# Patient Record
Sex: FEMALE | Race: BLACK OR AFRICAN AMERICAN | Employment: OTHER | URBAN - METROPOLITAN AREA
[De-identification: names, ages, dates, MRNs, and addresses within clinical notes are randomized per-mention and may not be internally consistent; named-entity substitution may affect disease eponyms.]

---

## 2017-06-10 ENCOUNTER — APPOINTMENT (OUTPATIENT)
Dept: GENERAL RADIOLOGY | Age: 82
DRG: 064 | End: 2017-06-10
Attending: EMERGENCY MEDICINE
Payer: MEDICARE

## 2017-06-10 ENCOUNTER — APPOINTMENT (OUTPATIENT)
Dept: CT IMAGING | Age: 82
DRG: 064 | End: 2017-06-10
Attending: EMERGENCY MEDICINE
Payer: MEDICARE

## 2017-06-10 ENCOUNTER — HOSPITAL ENCOUNTER (INPATIENT)
Age: 82
LOS: 5 days | Discharge: NURSING, REHAB OR LONG-TERM CARE | DRG: 064 | End: 2017-06-16
Attending: EMERGENCY MEDICINE | Admitting: FAMILY MEDICINE
Payer: MEDICARE

## 2017-06-10 DIAGNOSIS — K59.00 CONSTIPATION, UNSPECIFIED CONSTIPATION TYPE: ICD-10-CM

## 2017-06-10 DIAGNOSIS — R79.1 SUPRATHERAPEUTIC INR: ICD-10-CM

## 2017-06-10 DIAGNOSIS — I61.9 STROKE DUE TO INTRACEREBRAL HEMORRHAGE (HCC): Primary | ICD-10-CM

## 2017-06-10 DIAGNOSIS — R53.1 LEFT-SIDED WEAKNESS: ICD-10-CM

## 2017-06-10 DIAGNOSIS — I61.9 HEMORRHAGIC STROKE (HCC): ICD-10-CM

## 2017-06-10 DIAGNOSIS — E66.01 MORBID OBESITY, UNSPECIFIED OBESITY TYPE (HCC): ICD-10-CM

## 2017-06-10 DIAGNOSIS — N18.30 CKD (CHRONIC KIDNEY DISEASE) STAGE 3, GFR 30-59 ML/MIN (HCC): ICD-10-CM

## 2017-06-10 DIAGNOSIS — R06.02 SOB (SHORTNESS OF BREATH): ICD-10-CM

## 2017-06-10 LAB
ALBUMIN SERPL BCP-MCNC: 3.4 G/DL (ref 3.4–5)
ALBUMIN/GLOB SERPL: 1 {RATIO} (ref 0.8–1.7)
ALP SERPL-CCNC: 103 U/L (ref 45–117)
ALT SERPL-CCNC: 14 U/L (ref 13–56)
AMPHET UR QL SCN: NEGATIVE
ANION GAP BLD CALC-SCNC: 10 MMOL/L (ref 3–18)
APPEARANCE UR: CLEAR
ASPIRIN TEST, ASPIRN: 586 ARU
AST SERPL W P-5'-P-CCNC: 18 U/L (ref 15–37)
BARBITURATES UR QL SCN: NEGATIVE
BENZODIAZ UR QL: NEGATIVE
BILIRUB SERPL-MCNC: 0.5 MG/DL (ref 0.2–1)
BILIRUB UR QL: NEGATIVE
BUN SERPL-MCNC: 22 MG/DL (ref 7–18)
BUN/CREAT SERPL: 13 (ref 12–20)
CALCIUM SERPL-MCNC: 7.6 MG/DL (ref 8.5–10.1)
CANNABINOIDS UR QL SCN: NEGATIVE
CHLORIDE SERPL-SCNC: 101 MMOL/L (ref 100–108)
CK MB CFR SERPL CALC: NORMAL % (ref 0–4)
CK MB SERPL-MCNC: <1 NG/ML (ref 5–25)
CK SERPL-CCNC: 53 U/L (ref 26–192)
CO2 SERPL-SCNC: 29 MMOL/L (ref 21–32)
COCAINE UR QL SCN: NEGATIVE
COLOR UR: YELLOW
CREAT SERPL-MCNC: 1.68 MG/DL (ref 0.6–1.3)
EPITH CASTS URNS QL MICRO: NORMAL /LPF (ref 0–5)
ERYTHROCYTE [DISTWIDTH] IN BLOOD BY AUTOMATED COUNT: 15.3 % (ref 11.6–14.5)
FIBRINOGEN PPP-MCNC: 614 MG/DL (ref 210–451)
GLOBULIN SER CALC-MCNC: 3.4 G/DL (ref 2–4)
GLUCOSE BLD STRIP.AUTO-MCNC: 294 MG/DL (ref 70–110)
GLUCOSE SERPL-MCNC: 322 MG/DL (ref 74–99)
GLUCOSE UR STRIP.AUTO-MCNC: 250 MG/DL
HCT VFR BLD AUTO: 41.9 % (ref 35–45)
HDSCOM,HDSCOM: NORMAL
HGB BLD-MCNC: 13.2 G/DL (ref 12–16)
HGB UR QL STRIP: ABNORMAL
INR PPP: 3.3 (ref 0.8–1.2)
KETONES UR QL STRIP.AUTO: NEGATIVE MG/DL
LEUKOCYTE ESTERASE UR QL STRIP.AUTO: NEGATIVE
MCH RBC QN AUTO: 26.5 PG (ref 24–34)
MCHC RBC AUTO-ENTMCNC: 31.5 G/DL (ref 31–37)
MCV RBC AUTO: 84.1 FL (ref 74–97)
METHADONE UR QL: NEGATIVE
NITRITE UR QL STRIP.AUTO: NEGATIVE
OPIATES UR QL: NEGATIVE
P2Y12 PLT RESPONSE,PPPR: 241 PRU (ref 194–418)
PCP UR QL: NEGATIVE
PH UR STRIP: 5 [PH] (ref 5–8)
PLATELET # BLD AUTO: 138 K/UL (ref 135–420)
PMV BLD AUTO: 10.1 FL (ref 9.2–11.8)
POTASSIUM SERPL-SCNC: 3.1 MMOL/L (ref 3.5–5.5)
PROT SERPL-MCNC: 6.8 G/DL (ref 6.4–8.2)
PROT UR STRIP-MCNC: NEGATIVE MG/DL
PROTHROMBIN TIME: 31.7 SEC (ref 11.5–15.2)
RBC # BLD AUTO: 4.98 M/UL (ref 4.2–5.3)
RBC #/AREA URNS HPF: NORMAL /HPF (ref 0–5)
SODIUM SERPL-SCNC: 140 MMOL/L (ref 136–145)
SP GR UR REFRACTOMETRY: 1.01 (ref 1–1.03)
THROMBIN TIME: 18.2 SECS (ref 13.8–18.2)
TROPONIN I SERPL-MCNC: <0.02 NG/ML (ref 0–0.04)
UROBILINOGEN UR QL STRIP.AUTO: 1 EU/DL (ref 0.2–1)
WBC # BLD AUTO: 5.1 K/UL (ref 4.6–13.2)
WBC URNS QL MICRO: NORMAL /HPF (ref 0–4)

## 2017-06-10 PROCEDURE — 80053 COMPREHEN METABOLIC PANEL: CPT | Performed by: EMERGENCY MEDICINE

## 2017-06-10 PROCEDURE — 83036 HEMOGLOBIN GLYCOSYLATED A1C: CPT | Performed by: FAMILY MEDICINE

## 2017-06-10 PROCEDURE — 80307 DRUG TEST PRSMV CHEM ANLYZR: CPT | Performed by: EMERGENCY MEDICINE

## 2017-06-10 PROCEDURE — 74011000258 HC RX REV CODE- 258: Performed by: EMERGENCY MEDICINE

## 2017-06-10 PROCEDURE — 85384 FIBRINOGEN ACTIVITY: CPT | Performed by: EMERGENCY MEDICINE

## 2017-06-10 PROCEDURE — 99285 EMERGENCY DEPT VISIT HI MDM: CPT

## 2017-06-10 PROCEDURE — 70450 CT HEAD/BRAIN W/O DYE: CPT

## 2017-06-10 PROCEDURE — 85610 PROTHROMBIN TIME: CPT | Performed by: EMERGENCY MEDICINE

## 2017-06-10 PROCEDURE — 71010 XR CHEST PORT: CPT

## 2017-06-10 PROCEDURE — 85576 BLOOD PLATELET AGGREGATION: CPT | Performed by: EMERGENCY MEDICINE

## 2017-06-10 PROCEDURE — 74011250636 HC RX REV CODE- 250/636: Performed by: EMERGENCY MEDICINE

## 2017-06-10 PROCEDURE — 85670 THROMBIN TIME PLASMA: CPT | Performed by: EMERGENCY MEDICINE

## 2017-06-10 PROCEDURE — 94762 N-INVAS EAR/PLS OXIMTRY CONT: CPT

## 2017-06-10 PROCEDURE — 96365 THER/PROPH/DIAG IV INF INIT: CPT

## 2017-06-10 PROCEDURE — 85027 COMPLETE CBC AUTOMATED: CPT | Performed by: EMERGENCY MEDICINE

## 2017-06-10 PROCEDURE — 82550 ASSAY OF CK (CPK): CPT | Performed by: EMERGENCY MEDICINE

## 2017-06-10 PROCEDURE — 86900 BLOOD TYPING SEROLOGIC ABO: CPT | Performed by: EMERGENCY MEDICINE

## 2017-06-10 PROCEDURE — 82533 TOTAL CORTISOL: CPT | Performed by: FAMILY MEDICINE

## 2017-06-10 PROCEDURE — 82962 GLUCOSE BLOOD TEST: CPT

## 2017-06-10 PROCEDURE — 81001 URINALYSIS AUTO W/SCOPE: CPT | Performed by: EMERGENCY MEDICINE

## 2017-06-10 RX ORDER — SODIUM CHLORIDE 9 MG/ML
250 INJECTION, SOLUTION INTRAVENOUS AS NEEDED
Status: DISCONTINUED | OUTPATIENT
Start: 2017-06-10 | End: 2017-06-16 | Stop reason: HOSPADM

## 2017-06-10 RX ADMIN — PHYTONADIONE 10 MG: 10 INJECTION, EMULSION INTRAMUSCULAR; INTRAVENOUS; SUBCUTANEOUS at 23:44

## 2017-06-10 NOTE — Clinical Note
Status[de-identified] Inpatient [101] Type of Bed: Intensive Care [6] Inpatient Hospitalization Certified Necessary for the Following Reasons: 3. Patient receiving treatment that can only be provided in an inpatient setting (further clarification in H&P documentation) Admitting Diagnosis: Hemorrhagic stroke [facility]) [[id number]] Admitting Physician: Darryl Pal Attending Physician: Darryl Pal Estimated Length of Stay: > or = to 2 Midnights Discharge Plan[de-identified] Home with Office Follow-up

## 2017-06-10 NOTE — IP AVS SNAPSHOT
Current Discharge Medication List  
  
START taking these medications Dose & Instructions Dispensing Information Comments Morning Noon Evening Bedtime  
 bisacodyl 10 mg suppository Commonly known as:  DULCOLAX Your last dose was: Your next dose is:    
   
   
 Dose:  10 mg Insert 10 mg into rectum daily as needed. Quantity:  30 Suppository Refills:  0  
     
   
   
   
  
 folic acid 1 mg tablet Commonly known as:  [employer] Your last dose was: Your next dose is:    
   
   
 Dose:  1 mg Take 1 Tab by mouth daily. Quantity:  30 Tab Refills:  0 Thiamine Mononitrate 100 mg tablet Commonly known as:  B-1 Your last dose was: Your next dose is:    
   
   
 Dose:  100 mg Take 1 Tab by mouth daily. Quantity:  30 Tab Refills:  0 CONTINUE these medications which have NOT CHANGED Dose & Instructions Dispensing Information Comments Morning Noon Evening Bedtime AMARYL 2 mg tablet Generic drug:  glimepiride Your last dose was: Your next dose is:    
   
   
 Dose:  2 mg Take 2 mg by mouth daily. Refills:  0 AMBIEN 10 mg tablet Generic drug:  zolpidem Your last dose was: Your next dose is:    
   
   
 Dose:  10 mg Take 10 mg by mouth nightly as needed for Sleep. Refills:  0  
     
   
   
   
  
 betamethasone valerate 0.1 % topical cream  
Commonly known as:  Kayla Alejandrock Your last dose was: Your next dose is:    
   
   
 Apply  to affected area two (2) times a day. Refills:  0 LAURA-SEQUELS PO Your last dose was: Your next dose is:    
   
   
 Dose:  50 mg Take 50 mg by mouth daily. Refills:  0  
     
   
   
   
  
 furosemide 40 mg tablet Commonly known as:  LASIX Your last dose was: Your next dose is: Take  by mouth two (2) times a day. Refills:  0  
     
   
   
   
  
 gabapentin 300 mg capsule Commonly known as:  NEURONTIN Your last dose was: Your next dose is:    
   
   
 Dose:  300 mg Take 300 mg by mouth nightly. Refills:  0  
     
   
   
   
  
 linagliptin 5 mg tablet Commonly known as:  Dudley Sayer Your last dose was: Your next dose is:    
   
   
 Dose:  5 mg Take 5 mg by mouth daily. Refills:  0  
     
   
   
   
  
 losartan 50 mg tablet Commonly known as:  COZAAR Your last dose was: Your next dose is:    
   
   
 Dose:  50 mg Take 50 mg by mouth daily. Refills:  0  
     
   
   
   
  
 meclizine 25 mg tablet Commonly known as:  ANTIVERT Your last dose was: Your next dose is:    
   
   
 Dose:  25 mg Take 25 mg by mouth four (4) times daily as needed. Refills:  0  
     
   
   
   
  
 metoprolol tartrate 50 mg tablet Commonly known as:  LOPRESSOR Your last dose was: Your next dose is:    
   
   
 Dose:  50 mg Take 50 mg by mouth two (2) times a day. Refills:  0  
     
   
   
   
  
 simvastatin 40 mg tablet Commonly known as:  ZOCOR Your last dose was: Your next dose is:    
   
   
 Dose:  40 mg Take 40 mg by mouth nightly. Refills:  0  
     
   
   
   
  
 spironolactone 50 mg tablet Commonly known as:  ALDACTONE Your last dose was: Your next dose is:    
   
   
 Dose:  50 mg Take 50 mg by mouth two (2) times a day. Refills:  0  
     
   
   
   
  
 SYNTHROID 100 mcg tablet Generic drug:  levothyroxine Your last dose was: Your next dose is:    
   
   
 Dose:  100 mcg Take 100 mcg by mouth Daily (before breakfast). Refills:  0  
     
   
   
   
  
 VITAMIN C 500 mg tablet Generic drug:  ascorbic acid (vitamin C) Your last dose was:     
   
Your next dose is:    
   
   
 Dose:  500 mg  
 Take 500 mg by mouth daily. Refills:  0 STOP taking these medications   
 warfarin 5 mg tablet Commonly known as:  COUMADIN Where to Get Your Medications Information on where to get these meds will be given to you by the nurse or doctor. ! Ask your nurse or doctor about these medications  
  bisacodyl 10 mg suppository  
 folic acid 1 mg tablet Thiamine Mononitrate 100 mg tablet

## 2017-06-10 NOTE — IP AVS SNAPSHOT
Gita Winter 
 
 
 [address] [place] [address] 
[phone] Patient: Elen Peoples MRN: [id number] You are allergic to the following No active allergies Recent Documentation Height Weight BMI OB Status Smoking Status 1.499 m 97.7 kg 43.5 kg/m2 Postmenopausal Never Smoker Unresulted Labs Order Current Status OCCULT BLOOD, STOOL In process CULTURE, BLOOD Preliminary result CULTURE, BLOOD Preliminary result Emergency Contacts Name Discharge Info Relation Home Work Mobile Cullen Do  Child [2] [phone] Cullen Do  Child [2] [phone] Jenaro Benavides  Son [22]   [phone] About your hospitalization You were admitted on:  2017 You last received care in the:  [address] [facility]UR[facility] You were discharged on:  2017 Unit phone number:  [phone] Why you were hospitalized Your primary diagnosis was:  Hemorrhagic Stroke (Hcc) Your diagnoses also included:  Chronic A-Fib (Hcc), Supratherapeutic Inr, Left-Sided Weakness, Hypokalemia, Ckd (Chronic Kidney Disease) Stage 3, Gfr 30-59 Ml/Min, Obesity, Sob (Shortness Of Breath), Constipation Providers Seen During Your Hospitalizations Provider Role Specialty Primary office phone Radha Gillespie MD Attending Provider Emergency Medicine [phone] Azalia Vazquez MD Attending Provider [facility] [phone] [address] DO Attending Provider Internal Medicine [phone] Javi Truong MD Attending Provider Internal Medicine [phone] Your Primary Care Physician (PCP) Primary Care Physician Office Phone Office Fax [facility] [phone] Follow-up Information Follow up With Details Comments Contact Info MD Monik Gambino [facility] [address] [facility] [address] [id number] 
[phone] Current Discharge Medication List  
  
START taking these medications Dose & Instructions Dispensing Information Comments Morning Noon Evening Bedtime  
 bisacodyl 10 mg suppository Commonly known as:  DULCOLAX Your last dose was: Your next dose is:    
   
   
 Dose:  10 mg Insert 10 mg into rectum daily as needed. Quantity:  30 Suppository Refills:  0  
     
   
   
   
  
 folic acid 1 mg tablet Commonly known as:  Google Your last dose was: Your next dose is:    
   
   
 Dose:  1 mg Take 1 Tab by mouth daily. Quantity:  30 Tab Refills:  0 Thiamine Mononitrate 100 mg tablet Commonly known as:  B-1 Your last dose was: Your next dose is:    
   
   
 Dose:  100 mg Take 1 Tab by mouth daily. Quantity:  30 Tab Refills:  0 CONTINUE these medications which have NOT CHANGED Dose & Instructions Dispensing Information Comments Morning Noon Evening Bedtime AMARYL 2 mg tablet Generic drug:  glimepiride Your last dose was: Your next dose is:    
   
   
 Dose:  2 mg Take 2 mg by mouth daily. Refills:  0 AMBIEN 10 mg tablet Generic drug:  zolpidem Your last dose was: Your next dose is:    
   
   
 Dose:  10 mg Take 10 mg by mouth nightly as needed for Sleep. Refills:  0  
     
   
   
   
  
 betamethasone valerate 0.1 % topical cream  
Commonly known as:  Ella Links Your last dose was: Your next dose is:    
   
   
 Apply  to affected area two (2) times a day. Refills:  0 LAURA-SEQUELS PO Your last dose was: Your next dose is:    
   
   
 Dose:  50 mg Take 50 mg by mouth daily. Refills:  0  
     
   
   
   
  
 furosemide 40 mg tablet Commonly known as:  LASIX Your last dose was: Your next dose is: Take  by mouth two (2) times a day. Refills:  0  
     
   
   
   
  
 gabapentin 300 mg capsule Commonly known as:  NEURONTIN Your last dose was: Your next dose is:    
   
   
 Dose:  300 mg Take 300 mg by mouth nightly. Refills:  0  
     
   
   
   
  
 linagliptin 5 mg tablet Commonly known as:  [place] Your last dose was: Your next dose is:    
   
   
 Dose:  5 mg Take 5 mg by mouth daily. Refills:  0  
     
   
   
   
  
 losartan 50 mg tablet Commonly known as:  COZAAR Your last dose was: Your next dose is:    
   
   
 Dose:  50 mg Take 50 mg by mouth daily. Refills:  0  
     
   
   
   
  
 meclizine 25 mg tablet Commonly known as:  ANTIVERT Your last dose was: Your next dose is:    
   
   
 Dose:  25 mg Take 25 mg by mouth four (4) times daily as needed. Refills:  0  
     
   
   
   
  
 metoprolol tartrate 50 mg tablet Commonly known as:  LOPRESSOR Your last dose was: Your next dose is:    
   
   
 Dose:  50 mg Take 50 mg by mouth two (2) times a day. Refills:  0  
     
   
   
   
  
 simvastatin 40 mg tablet Commonly known as:  ZOCOR Your last dose was: Your next dose is:    
   
   
 Dose:  40 mg Take 40 mg by mouth nightly. Refills:  0  
     
   
   
   
  
 spironolactone 50 mg tablet Commonly known as:  ALDACTONE Your last dose was: Your next dose is:    
   
   
 Dose:  50 mg Take 50 mg by mouth two (2) times a day. Refills:  0  
     
   
   
   
  
 SYNTHROID 100 mcg tablet Generic drug:  levothyroxine Your last dose was: Your next dose is:    
   
   
 Dose:  100 mcg Take 100 mcg by mouth Daily (before breakfast). Refills:  0  
     
   
   
   
  
 VITAMIN C 500 mg tablet Generic drug:  ascorbic acid (vitamin C) Your last dose was: Your next dose is: Dose:  500 mg Take 500 mg by mouth daily. Refills:  0 STOP taking these medications   
 warfarin 5 mg tablet Commonly known as:  COUMADIN Where to Get Your Medications Information on where to get these meds will be given to you by the nurse or doctor. ! Ask your nurse or doctor about these medications  
  bisacodyl 10 mg suppository  
 folic acid 1 mg tablet Thiamine Mononitrate 100 mg tablet Discharge Instructions Atrial Fibrillation: Care Instructions Your Care Instructions Atrial fibrillation is an irregular and often fast heartbeat. Treating this condition is important for several reasons. It can cause blood clots, which can travel from your heart to your brain and cause a stroke. If you have a fast heartbeat, you may feel lightheaded, dizzy, and weak. An irregular heartbeat can also increase your risk for heart failure. Atrial fibrillation is often the result of another heart condition, such as high blood pressure or coronary artery disease. Making changes to improve your heart condition will help you stay healthy and active. Follow-up care is a key part of your treatment and safety. Be sure to make and go to all appointments, and call your doctor if you are having problems. It's also a good idea to know your test results and keep a list of the medicines you take. How can you care for yourself at home? Medicines · Take your medicines exactly as prescribed. Call your doctor if you think you are having a problem with your medicine. You will get more details on the specific medicines your doctor prescribes. · If your doctor has given you a blood thinner to prevent a stroke, be sure you get instructions about how to take your medicine safely. Blood thinners can cause serious bleeding problems.  
· Do not take any vitamins, over-the-counter drugs, or herbal products without talking to your doctor first. 
 Lifestyle changes · Do not smoke. Smoking can increase your chance of a stroke and heart attack. If you need help quitting, talk to your doctor about stop-smoking programs and medicines. These can increase your chances of quitting for good. · Eat a heart-healthy diet. · Stay at a healthy weight. Lose weight if you need to. · Limit alcohol to 2 drinks a day for men and 1 drink a day for women. Too much alcohol can cause health problems. · Avoid colds and flu. Get a pneumococcal vaccine shot. If you have had one before, ask your doctor whether you need another dose. Get a flu shot every year. If you must be around people with colds or flu, wash your hands often. Activity · If your doctor recommends it, get more exercise. Walking is a good choice. Bit by bit, increase the amount you walk every day. Try for at least 30 minutes on most days of the week. You also may want to swim, bike, or do other activities. Your doctor may suggest that you join a cardiac rehabilitation program so that you can have help increasing your physical activity safely. · Start light exercise if your doctor says it is okay. Even a small amount will help you get stronger, have more energy, and manage stress. Walking is an easy way to get exercise. Start out by walking a little more than you did in the hospital. Gradually increase the amount you walk. · When you exercise, watch for signs that your heart is working too hard. You are pushing too hard if you cannot talk while you are exercising. If you become short of breath or dizzy or have chest pain, sit down and rest immediately. · Check your pulse regularly. Place two fingers on the artery at the palm side of your wrist, in line with your thumb. If your heartbeat seems uneven or fast, talk to your doctor. When should you call for help? Call 911 anytime you think you may need emergency care. For example, call if: 
· You have symptoms of a heart attack. These may include: ¨ Chest pain or pressure, or a strange feeling in the chest. 
¨ Sweating. ¨ Shortness of breath. ¨ Nausea or vomiting. ¨ Pain, pressure, or a strange feeling in the back, neck, jaw, or upper belly or in one or both shoulders or arms. ¨ Lightheadedness or sudden weakness. ¨ A fast or irregular heartbeat. After you call 911, the  may tell you to chew 1 adult-strength or 2 to 4 low-dose aspirin. Wait for an ambulance. Do not try to drive yourself. · You have symptoms of a stroke. These may include: 
¨ Sudden numbness, tingling, weakness, or loss of movement in your face, arm, or leg, especially on only one side of your body. ¨ Sudden vision changes. ¨ Sudden trouble speaking. ¨ Sudden confusion or trouble understanding simple statements. ¨ Sudden problems with walking or balance. ¨ A sudden, severe headache that is different from past headaches. · You passed out (lost consciousness). Call your doctor now or seek immediate medical care if: 
· You have new or increased shortness of breath. · You feel dizzy or lightheaded, or you feel like you may faint. · Your heart rate becomes irregular. · You can feel your heart flutter in your chest or skip heartbeats. Tell your doctor if these symptoms are new or worse. Watch closely for changes in your health, and be sure to contact your doctor if you have any problems. Where can you learn more? Go to http://yamilet-joanne.info/. Enter U020 in the search box to learn more about \"Atrial Fibrillation: Care Instructions. \" Current as of: January 27, 2016 Content Version: 11.2 © 9637-7914 [employer]. Care instructions adapted under license by [employer] (which disclaims liability or warranty for this information).  If you have questions about a medical condition or this instruction, always ask your healthcare professional. [address] any warranty or liability for your use of this information. Stroke: Care Instructions Your Care Instructions You have had a stroke. This means that the blood flow to a part of your brain was blocked for some time, which damages the nerve cells in that part of the brain. The part of your body controlled by that part of your brain may not function properly now. The brain is an amazing organ that can heal itself to some degree. The stroke you had damaged part of your brain. But other parts of your brain may take over in some way for the damaged areas. You have already started this process. Your doctor will talk with you about what you can do to prevent another stroke. High blood pressure, high cholesterol, and diabetes are all risk factors for stroke. If you have any of these conditions, work with your doctor to make sure they are under control. Other risk factors for stroke include being overweight, smoking, and not getting regular exercise. Going home may be hard for you and your family. The more you can try to do for yourself, the better. Remember to take each day one at a time. Follow-up care is a key part of your treatment and safety. Be sure to make and go to all appointments, and call your doctor if you are having problems. It's also a good idea to know your test results and keep a list of the medicines you take. How can you care for yourself at home? · Enter a stroke rehabilitation (rehab) program, if your doctor recommends it. Physical, speech, and occupational therapies can help you manage bathing, dressing, eating, and other basics of daily living. · Do not drive until your doctor says it is okay. · It is normal to feel sad or depressed after a stroke. If these feelings last, talk to your doctor. · If you are having problems with urine leakage, go to the bathroom at regular times, including when you first wake up and at bedtime. Also, limit fluids after dinner.  
· If you are constipated, drink plenty of fluids, enough so that your urine is light yellow or clear like water. If you have kidney, heart, or liver disease and have to limit fluids, talk with your doctor before you increase the amount of fluids you drink. Set up a regular time for using the toilet. If you continue to have constipation, your doctor may suggest using a bulking agent, such as Metamucil, or a stool softener, laxative, or enema. Medicines · Take your medicines exactly as prescribed. Call your doctor if you think you are having a problem with your medicine. You may be taking several medicines. ACE (angiotensin-converting enzyme) inhibitors, angiotensin II receptor blockers (ARBs), beta-blockers, diuretics (water pills), and calcium channel blockers control your blood pressure. Statins help lower cholesterol. Your doctor may also prescribe medicines for depression, pain, sleep problems, anxiety, or agitation. · If your doctor has given you a blood thinner to prevent another stroke, be sure you get instructions about how to take your medicine safely. Blood thinners can cause serious bleeding problems. · Do not take any over-the-counter medicines or herbal products without talking to your doctor first. 
· If you take birth control pills or hormone therapy, talk to your doctor about whether they are right for you. For family members and caregivers · Make the home safe. Set up a room so that your loved one does not have to climb stairs. Be sure the bathroom is on the same floor. Move throw rugs and furniture that could cause falls. Make sure that the lighting is good. Put grab bars and seats in tubs and showers. · Find out what your loved one can do and what he or she needs help with. Try not to do things for your loved one that your loved one can do on his or her own. Help him or her learn and practice new skills. · Visit and talk with your loved one often. Try doing activities together that you both enjoy, such as playing cards or board games.  Keep in touch with your loved one's friends as much as you can. Encourage them to visit. · Take care of yourself. Do not try to do everything yourself. Ask other family members to help. Eat well, get enough rest, and take time to do things that you enjoy. Keep up with your own doctor visits, and make sure to take your medicines regularly. Get out of the house as much as you can. Join a local support group. Find out if you qualify for home health care visits to help with rehab or for adult day care. When should you call for help? Call 911 anytime you think you may need emergency care. For example, call if: 
· You have signs of another stroke. These may include: 
¨ Sudden numbness, tingling, weakness, or loss of movement in your face, arm, or leg, especially on only one side of your body. ¨ Sudden vision changes. ¨ Sudden trouble speaking. ¨ Sudden confusion or trouble understanding simple statements. ¨ Sudden problems with walking or balance. ¨ A sudden, severe headache that is different from past headaches. Call 911 even if these symptoms go away in a few minutes. Call your doctor now or seek immediate medical care if: 
· You have new symptoms that may be related to your stroke, such as falls or trouble swallowing. Watch closely for changes in your health, and be sure to contact your doctor if you have any problems. Where can you learn more? Go to http://yamilet-joanne.info/. Enter T118 in the search box to learn more about \"Stroke: Care Instructions. \" Current as of: June 4, 2016 Content Version: 11.2 © 1822-4408 [employer]. Care instructions adapted under license by [employer] (which disclaims liability or warranty for this information). If you have questions about a medical condition or this instruction, always ask your healthcare professional. [address] any warranty or liability for your use of this information. DISCHARGE SUMMARY from Nurse The following personal items are in your possession at time of discharge: 
 
Dental Appliances: Partials, Uppers, With patient Visual Aid: None Home Medications: None Jewelry: None Clothing: Pajamas, [address] home Other Valuables: None PATIENT INSTRUCTIONS: 
 
 
F-face looks uneven A-arms unable to move or move unevenly S-speech slurred or non-existent T-time-call 911 as soon as signs and symptoms begin-DO NOT go Back to bed or wait to see if you get better-TIME IS BRAIN. Warning Signs of HEART ATTACK Call 911 if you have these symptoms: 
? Chest discomfort. Most heart attacks involve discomfort in the center of the chest that lasts more than a few minutes, or that goes away and comes back. It can feel like uncomfortable pressure, squeezing, fullness, or pain. ? Discomfort in other areas of the upper body. Symptoms can include pain or discomfort in one or both arms, the back, neck, jaw, or stomach. ? Shortness of breath with or without chest discomfort. ? Other signs may include breaking out in a cold sweat, nausea, or lightheadedness. Don't wait more than five minutes to call [address]! Fast action can save your life. Calling 911 is almost always the fastest way to get lifesaving treatment. Emergency Medical Services staff can begin treatment when they arrive  up to an hour sooner than if someone gets to the hospital by car. The discharge information has been reviewed with the patient. The patient verbalized understanding. Discharge medications reviewed with the patient and appropriate educational materials and side effects teaching were provided. Discharge Orders None Asker Announcement We are excited to announce that we are making your provider's discharge notes available to you in Asker. You will see these notes when they are completed and signed by the physician that discharged you from your recent hospital stay. If you have any questions or concerns about any information you see in Asker, please call the Health Information Department where you were seen or reach out to your Primary Care Provider for more information about your plan of care. Introducing [facility]! Steven Smith introduces Asker patient portal. Now you can access parts of your medical record, email your doctor's office, and request medication refills online. 1. In your internet browser, go to [email or web address] 2. Click on the First Time User? Click Here link in the Sign In box. You will see the New Member Sign Up page. 3. Enter your Asker Access Code exactly as it appears below. You will not need to use this code after youve completed the sign-up process. If you do not sign up before the expiration date, you must request a new code. · Asker Access Code: [email or web address] Expires: 9/14/2017  3:50 PM 
 
4. Enter the last four digits of your Social Security Number (xxxx) and Date of Birth (mm/dd/yyyy) as indicated and click Submit. You will be taken to the next sign-up page. 5. Create a Asker ID. This will be your Asker login ID and cannot be changed, so think of one that is secure and easy to remember. 6. Create a Asker password. You can change your password at any time. 7. Enter your Password Reset Question and Answer. This can be used at a later time if you forget your password. 8. Enter your e-mail address. You will receive e-mail notification when new information is available in [address] 9. Click Sign Up. You can now view and download portions of your medical record. 10. Click the Download Summary menu link to download a portable copy of your medical information. If you have questions, please visit the Frequently Asked Questions section of the [employer] website. Remember, MyChart is NOT to be used for urgent needs. For medical emergencies, dial 911. Now available from your iPhone and Android! General Information Please provide this summary of care documentation to your next provider. Patient Signature:  ____________________________________________________________ Date:  ____________________________________________________________  
  
[facility] Provider Signature:  ____________________________________________________________ Date:  ____________________________________________________________

## 2017-06-11 ENCOUNTER — APPOINTMENT (OUTPATIENT)
Dept: GENERAL RADIOLOGY | Age: 82
DRG: 064 | End: 2017-06-11
Attending: FAMILY MEDICINE
Payer: MEDICARE

## 2017-06-11 ENCOUNTER — APPOINTMENT (OUTPATIENT)
Dept: CT IMAGING | Age: 82
DRG: 064 | End: 2017-06-11
Attending: FAMILY MEDICINE
Payer: MEDICARE

## 2017-06-11 PROBLEM — I48.20 CHRONIC A-FIB (HCC): Status: ACTIVE | Noted: 2017-06-11

## 2017-06-11 PROBLEM — I61.9 HEMORRHAGIC STROKE (HCC): Status: ACTIVE | Noted: 2017-06-11

## 2017-06-11 PROBLEM — R79.1 SUPRATHERAPEUTIC INR: Status: ACTIVE | Noted: 2017-06-11

## 2017-06-11 PROBLEM — E87.6 HYPOKALEMIA: Status: ACTIVE | Noted: 2017-06-11

## 2017-06-11 PROBLEM — R53.1 LEFT-SIDED WEAKNESS: Status: ACTIVE | Noted: 2017-06-11

## 2017-06-11 LAB
ABO + RH BLD: NORMAL
ALBUMIN SERPL BCP-MCNC: 3.2 G/DL (ref 3.4–5)
ALBUMIN/GLOB SERPL: 1 {RATIO} (ref 0.8–1.7)
ALP SERPL-CCNC: 91 U/L (ref 45–117)
ALT SERPL-CCNC: 14 U/L (ref 13–56)
AMMONIA PLAS-SCNC: 43 UMOL/L (ref 11–32)
AMPHET UR QL SCN: NEGATIVE
AMYLASE SERPL-CCNC: 66 U/L (ref 25–115)
ANION GAP BLD CALC-SCNC: 11 MMOL/L (ref 3–18)
APTT PPP: 35.7 SEC (ref 23–36.4)
AST SERPL W P-5'-P-CCNC: 15 U/L (ref 15–37)
ATRIAL RATE: 44 BPM
BARBITURATES UR QL SCN: NEGATIVE
BENZODIAZ UR QL: NEGATIVE
BILIRUB DIRECT SERPL-MCNC: 0.2 MG/DL (ref 0–0.2)
BILIRUB SERPL-MCNC: 0.4 MG/DL (ref 0.2–1)
BLOOD GROUP ANTIBODIES SERPL: NORMAL
BUN SERPL-MCNC: 21 MG/DL (ref 7–18)
BUN/CREAT SERPL: 13 (ref 12–20)
CA-I SERPL-SCNC: 0.91 MMOL/L (ref 1.12–1.32)
CALCIUM SERPL-MCNC: 7.6 MG/DL (ref 8.5–10.1)
CALCULATED R AXIS, ECG10: 62 DEGREES
CALCULATED T AXIS, ECG11: 44 DEGREES
CANNABINOIDS UR QL SCN: NEGATIVE
CHLORIDE SERPL-SCNC: 101 MMOL/L (ref 100–108)
CHOLEST SERPL-MCNC: 154 MG/DL
CK MB CFR SERPL CALC: NORMAL % (ref 0–4)
CK MB SERPL-MCNC: <1 NG/ML (ref 5–25)
CK SERPL-CCNC: 52 U/L (ref 26–192)
CK SERPL-CCNC: 55 U/L (ref 26–192)
CK SERPL-CCNC: 59 U/L (ref 26–192)
CO2 SERPL-SCNC: 30 MMOL/L (ref 21–32)
COCAINE UR QL SCN: NEGATIVE
CORTIS SERPL-MCNC: 14.9 UG/DL (ref 3.09–22.4)
CREAT SERPL-MCNC: 1.56 MG/DL (ref 0.6–1.3)
DIAGNOSIS, 93000: NORMAL
ERYTHROCYTE [DISTWIDTH] IN BLOOD BY AUTOMATED COUNT: 15.4 % (ref 11.6–14.5)
ERYTHROCYTE [SEDIMENTATION RATE] IN BLOOD: 41 MM/HR (ref 0–30)
EST. AVERAGE GLUCOSE BLD GHB EST-MCNC: 197 MG/DL
GLOBULIN SER CALC-MCNC: 3.2 G/DL (ref 2–4)
GLUCOSE BLD STRIP.AUTO-MCNC: 156 MG/DL (ref 70–110)
GLUCOSE BLD STRIP.AUTO-MCNC: 187 MG/DL (ref 70–110)
GLUCOSE BLD STRIP.AUTO-MCNC: 199 MG/DL (ref 70–110)
GLUCOSE BLD STRIP.AUTO-MCNC: 231 MG/DL (ref 70–110)
GLUCOSE SERPL-MCNC: 252 MG/DL (ref 74–99)
HBA1C MFR BLD: 8.5 % (ref 4.2–5.6)
HCT VFR BLD AUTO: 38.3 % (ref 35–45)
HDLC SERPL-MCNC: 74 MG/DL (ref 40–60)
HDLC SERPL: 2.1 {RATIO} (ref 0–5)
HDSCOM,HDSCOM: NORMAL
HGB BLD-MCNC: 11.8 G/DL (ref 12–16)
INR PPP: 1.3 (ref 0.8–1.2)
INR PPP: 1.4 (ref 0.8–1.2)
INR PPP: 1.4 (ref 0.8–1.2)
INR PPP: 2 (ref 0.8–1.2)
LDLC SERPL CALC-MCNC: 53.2 MG/DL (ref 0–100)
LIPASE SERPL-CCNC: 369 U/L (ref 73–393)
LIPID PROFILE,FLP: ABNORMAL
MAGNESIUM SERPL-MCNC: 1.6 MG/DL (ref 1.6–2.6)
MAGNESIUM SERPL-MCNC: 1.9 MG/DL (ref 1.6–2.6)
MCH RBC QN AUTO: 26 PG (ref 24–34)
MCHC RBC AUTO-ENTMCNC: 30.8 G/DL (ref 31–37)
MCV RBC AUTO: 84.5 FL (ref 74–97)
METHADONE UR QL: NEGATIVE
OPIATES UR QL: NEGATIVE
PCP UR QL: NEGATIVE
PHOSPHATE SERPL-MCNC: 1.5 MG/DL (ref 2.5–4.9)
PHOSPHATE SERPL-MCNC: 1.7 MG/DL (ref 2.5–4.9)
PLATELET # BLD AUTO: 121 K/UL (ref 135–420)
PMV BLD AUTO: 10.5 FL (ref 9.2–11.8)
POTASSIUM SERPL-SCNC: 2.8 MMOL/L (ref 3.5–5.5)
POTASSIUM SERPL-SCNC: 3.2 MMOL/L (ref 3.5–5.5)
PROT SERPL-MCNC: 6.4 G/DL (ref 6.4–8.2)
PROTHROMBIN TIME: 15.4 SEC (ref 11.5–15.2)
PROTHROMBIN TIME: 16.8 SEC (ref 11.5–15.2)
PROTHROMBIN TIME: 16.9 SEC (ref 11.5–15.2)
PROTHROMBIN TIME: 21.9 SEC (ref 11.5–15.2)
Q-T INTERVAL, ECG07: 462 MS
QRS DURATION, ECG06: 90 MS
QTC CALCULATION (BEZET), ECG08: 438 MS
RBC # BLD AUTO: 4.53 M/UL (ref 4.2–5.3)
SODIUM SERPL-SCNC: 142 MMOL/L (ref 136–145)
SPECIMEN EXP DATE BLD: NORMAL
TRIGL SERPL-MCNC: 134 MG/DL (ref ?–150)
TROPONIN I SERPL-MCNC: <0.02 NG/ML (ref 0–0.04)
VENTRICULAR RATE, ECG03: 54 BPM
VLDLC SERPL CALC-MCNC: 26.8 MG/DL
WBC # BLD AUTO: 4.6 K/UL (ref 4.6–13.2)

## 2017-06-11 PROCEDURE — 85027 COMPLETE CBC AUTOMATED: CPT | Performed by: FAMILY MEDICINE

## 2017-06-11 PROCEDURE — 82550 ASSAY OF CK (CPK): CPT | Performed by: FAMILY MEDICINE

## 2017-06-11 PROCEDURE — 93005 ELECTROCARDIOGRAM TRACING: CPT

## 2017-06-11 PROCEDURE — 83735 ASSAY OF MAGNESIUM: CPT | Performed by: FAMILY MEDICINE

## 2017-06-11 PROCEDURE — 77030033263 HC DRSG MEPILEX 16-48IN BORD MOLN -B

## 2017-06-11 PROCEDURE — 85652 RBC SED RATE AUTOMATED: CPT | Performed by: FAMILY MEDICINE

## 2017-06-11 PROCEDURE — 36415 COLL VENOUS BLD VENIPUNCTURE: CPT | Performed by: FAMILY MEDICINE

## 2017-06-11 PROCEDURE — 84100 ASSAY OF PHOSPHORUS: CPT | Performed by: HOSPITALIST

## 2017-06-11 PROCEDURE — 82150 ASSAY OF AMYLASE: CPT | Performed by: FAMILY MEDICINE

## 2017-06-11 PROCEDURE — 82962 GLUCOSE BLOOD TEST: CPT

## 2017-06-11 PROCEDURE — 80076 HEPATIC FUNCTION PANEL: CPT | Performed by: FAMILY MEDICINE

## 2017-06-11 PROCEDURE — 92610 EVALUATE SWALLOWING FUNCTION: CPT

## 2017-06-11 PROCEDURE — 84132 ASSAY OF SERUM POTASSIUM: CPT | Performed by: FAMILY MEDICINE

## 2017-06-11 PROCEDURE — 74011000250 HC RX REV CODE- 250: Performed by: FAMILY MEDICINE

## 2017-06-11 PROCEDURE — 74011250636 HC RX REV CODE- 250/636: Performed by: FAMILY MEDICINE

## 2017-06-11 PROCEDURE — 70450 CT HEAD/BRAIN W/O DYE: CPT

## 2017-06-11 PROCEDURE — 30233K1 TRANSFUSION OF NONAUTOLOGOUS FROZEN PLASMA INTO PERIPHERAL VEIN, PERCUTANEOUS APPROACH: ICD-10-PCS | Performed by: INTERNAL MEDICINE

## 2017-06-11 PROCEDURE — 82330 ASSAY OF CALCIUM: CPT | Performed by: FAMILY MEDICINE

## 2017-06-11 PROCEDURE — 82272 OCCULT BLD FECES 1-3 TESTS: CPT | Performed by: FAMILY MEDICINE

## 2017-06-11 PROCEDURE — P9059 PLASMA, FRZ BETWEEN 8-24HOUR: HCPCS | Performed by: EMERGENCY MEDICINE

## 2017-06-11 PROCEDURE — 36430 TRANSFUSION BLD/BLD COMPNT: CPT

## 2017-06-11 PROCEDURE — 85610 PROTHROMBIN TIME: CPT | Performed by: FAMILY MEDICINE

## 2017-06-11 PROCEDURE — 82140 ASSAY OF AMMONIA: CPT | Performed by: FAMILY MEDICINE

## 2017-06-11 PROCEDURE — 74011000258 HC RX REV CODE- 258: Performed by: FAMILY MEDICINE

## 2017-06-11 PROCEDURE — 77030020256 HC SOL INJ NACL 0.9%  500ML

## 2017-06-11 PROCEDURE — 74011000250 HC RX REV CODE- 250: Performed by: INTERNAL MEDICINE

## 2017-06-11 PROCEDURE — 85730 THROMBOPLASTIN TIME PARTIAL: CPT | Performed by: FAMILY MEDICINE

## 2017-06-11 PROCEDURE — 80061 LIPID PANEL: CPT | Performed by: FAMILY MEDICINE

## 2017-06-11 PROCEDURE — 93306 TTE W/DOPPLER COMPLETE: CPT

## 2017-06-11 PROCEDURE — 74011250636 HC RX REV CODE- 250/636: Performed by: INTERNAL MEDICINE

## 2017-06-11 PROCEDURE — 74011250636 HC RX REV CODE- 250/636: Performed by: HOSPITALIST

## 2017-06-11 PROCEDURE — 74011250637 HC RX REV CODE- 250/637: Performed by: HOSPITALIST

## 2017-06-11 PROCEDURE — 84100 ASSAY OF PHOSPHORUS: CPT | Performed by: FAMILY MEDICINE

## 2017-06-11 PROCEDURE — 74011250637 HC RX REV CODE- 250/637: Performed by: FAMILY MEDICINE

## 2017-06-11 PROCEDURE — C9113 INJ PANTOPRAZOLE SODIUM, VIA: HCPCS | Performed by: FAMILY MEDICINE

## 2017-06-11 PROCEDURE — 74011636637 HC RX REV CODE- 636/637: Performed by: FAMILY MEDICINE

## 2017-06-11 PROCEDURE — 83690 ASSAY OF LIPASE: CPT | Performed by: FAMILY MEDICINE

## 2017-06-11 PROCEDURE — 80307 DRUG TEST PRSMV CHEM ANLYZR: CPT | Performed by: FAMILY MEDICINE

## 2017-06-11 PROCEDURE — 65610000006 HC RM INTENSIVE CARE

## 2017-06-11 PROCEDURE — 86141 C-REACTIVE PROTEIN HS: CPT | Performed by: FAMILY MEDICINE

## 2017-06-11 RX ORDER — DEXTROSE 50 % IN WATER (D50W) INTRAVENOUS SYRINGE
12.5-25 AS NEEDED
Status: DISCONTINUED | OUTPATIENT
Start: 2017-06-11 | End: 2017-06-16 | Stop reason: HOSPADM

## 2017-06-11 RX ORDER — MAGNESIUM SULFATE HEPTAHYDRATE 40 MG/ML
2 INJECTION, SOLUTION INTRAVENOUS ONCE
Status: COMPLETED | OUTPATIENT
Start: 2017-06-11 | End: 2017-06-11

## 2017-06-11 RX ORDER — ACETAMINOPHEN 650 MG/1
650 SUPPOSITORY RECTAL
Status: DISCONTINUED | OUTPATIENT
Start: 2017-06-11 | End: 2017-06-16 | Stop reason: HOSPADM

## 2017-06-11 RX ORDER — LABETALOL HCL 20 MG/4 ML
5 SYRINGE (ML) INTRAVENOUS
Status: DISCONTINUED | OUTPATIENT
Start: 2017-06-11 | End: 2017-06-16 | Stop reason: HOSPADM

## 2017-06-11 RX ORDER — POTASSIUM CHLORIDE 7.45 MG/ML
10 INJECTION INTRAVENOUS
Status: COMPLETED | OUTPATIENT
Start: 2017-06-11 | End: 2017-06-12

## 2017-06-11 RX ORDER — CHLORHEXIDINE GLUCONATE 1.2 MG/ML
15 RINSE ORAL EVERY 12 HOURS
Status: DISCONTINUED | OUTPATIENT
Start: 2017-06-11 | End: 2017-06-11

## 2017-06-11 RX ORDER — ALBUTEROL SULFATE 0.83 MG/ML
2.5 SOLUTION RESPIRATORY (INHALATION)
Status: DISCONTINUED | OUTPATIENT
Start: 2017-06-11 | End: 2017-06-16 | Stop reason: HOSPADM

## 2017-06-11 RX ORDER — NICARDIPINE HYDROCHLORIDE 0.1 MG/ML
0-15 INJECTION INTRAVENOUS
Status: DISCONTINUED | OUTPATIENT
Start: 2017-06-11 | End: 2017-06-11 | Stop reason: CLARIF

## 2017-06-11 RX ORDER — DEXTROSE MONOHYDRATE AND SODIUM CHLORIDE 5; .9 G/100ML; G/100ML
0.75 INJECTION, SOLUTION INTRAVENOUS CONTINUOUS
Status: DISCONTINUED | OUTPATIENT
Start: 2017-06-11 | End: 2017-06-11

## 2017-06-11 RX ORDER — SODIUM CHLORIDE AND POTASSIUM CHLORIDE .9; .15 G/100ML; G/100ML
SOLUTION INTRAVENOUS CONTINUOUS
Status: DISCONTINUED | OUTPATIENT
Start: 2017-06-11 | End: 2017-06-11

## 2017-06-11 RX ORDER — FACIAL-BODY WIPES
10 EACH TOPICAL DAILY PRN
Status: DISCONTINUED | OUTPATIENT
Start: 2017-06-11 | End: 2017-06-16 | Stop reason: HOSPADM

## 2017-06-11 RX ORDER — ACETAMINOPHEN 325 MG/1
650 TABLET ORAL
Status: DISCONTINUED | OUTPATIENT
Start: 2017-06-11 | End: 2017-06-16 | Stop reason: HOSPADM

## 2017-06-11 RX ORDER — INSULIN LISPRO 100 [IU]/ML
INJECTION, SOLUTION INTRAVENOUS; SUBCUTANEOUS EVERY 6 HOURS
Status: DISCONTINUED | OUTPATIENT
Start: 2017-06-11 | End: 2017-06-12

## 2017-06-11 RX ORDER — BISACODYL 5 MG
5 TABLET, DELAYED RELEASE (ENTERIC COATED) ORAL DAILY PRN
Status: DISCONTINUED | OUTPATIENT
Start: 2017-06-11 | End: 2017-06-16 | Stop reason: HOSPADM

## 2017-06-11 RX ORDER — MAGNESIUM SULFATE 100 %
4 CRYSTALS MISCELLANEOUS AS NEEDED
Status: DISCONTINUED | OUTPATIENT
Start: 2017-06-11 | End: 2017-06-16 | Stop reason: HOSPADM

## 2017-06-11 RX ORDER — AMOXICILLIN 250 MG
2 CAPSULE ORAL
Status: DISCONTINUED | OUTPATIENT
Start: 2017-06-11 | End: 2017-06-14

## 2017-06-11 RX ORDER — ONDANSETRON 2 MG/ML
1 INJECTION INTRAMUSCULAR; INTRAVENOUS
Status: DISCONTINUED | OUTPATIENT
Start: 2017-06-11 | End: 2017-06-16 | Stop reason: HOSPADM

## 2017-06-11 RX ORDER — POTASSIUM CHLORIDE, DEXTROSE MONOHYDRATE AND SODIUM CHLORIDE 300; 5; 900 MG/100ML; G/100ML; MG/100ML
INJECTION, SOLUTION INTRAVENOUS CONTINUOUS
Status: DISCONTINUED | OUTPATIENT
Start: 2017-06-11 | End: 2017-06-13

## 2017-06-11 RX ADMIN — INSULIN LISPRO 2 UNITS: 100 INJECTION, SOLUTION INTRAVENOUS; SUBCUTANEOUS at 11:38

## 2017-06-11 RX ADMIN — POTASSIUM CHLORIDE 10 MEQ: 7.46 INJECTION, SOLUTION INTRAVENOUS at 08:26

## 2017-06-11 RX ADMIN — POTASSIUM CHLORIDE 10 MEQ: 7.46 INJECTION, SOLUTION INTRAVENOUS at 06:42

## 2017-06-11 RX ADMIN — SODIUM CHLORIDE 40 MG: 9 INJECTION, SOLUTION INTRAMUSCULAR; INTRAVENOUS; SUBCUTANEOUS at 08:26

## 2017-06-11 RX ADMIN — NICARDIPINE HYDROCHLORIDE 5 MG/HR: 0.1 INJECTION, SOLUTION INTRAVENOUS at 02:37

## 2017-06-11 RX ADMIN — SODIUM CHLORIDE 40 MG: 9 INJECTION, SOLUTION INTRAMUSCULAR; INTRAVENOUS; SUBCUTANEOUS at 21:08

## 2017-06-11 RX ADMIN — POTASSIUM CHLORIDE 10 MEQ: 7.46 INJECTION, SOLUTION INTRAVENOUS at 18:54

## 2017-06-11 RX ADMIN — SODIUM CHLORIDE 40 MG: 9 INJECTION, SOLUTION INTRAMUSCULAR; INTRAVENOUS; SUBCUTANEOUS at 02:35

## 2017-06-11 RX ADMIN — POTASSIUM CHLORIDE 10 MEQ: 7.46 INJECTION, SOLUTION INTRAVENOUS at 21:08

## 2017-06-11 RX ADMIN — LACTULOSE 20 G: 20 SOLUTION ORAL at 16:54

## 2017-06-11 RX ADMIN — SODIUM PHOSPHATE, MONOBASIC, MONOHYDRATE AND SODIUM PHOSPHATE, DIBASIC ANHYDROUS 9 MMOL: 276; 142 INJECTION, SOLUTION INTRAVENOUS at 05:28

## 2017-06-11 RX ADMIN — INSULIN LISPRO 2 UNITS: 100 INJECTION, SOLUTION INTRAVENOUS; SUBCUTANEOUS at 06:18

## 2017-06-11 RX ADMIN — INSULIN LISPRO 3 UNITS: 100 INJECTION, SOLUTION INTRAVENOUS; SUBCUTANEOUS at 03:59

## 2017-06-11 RX ADMIN — DEXTROSE MONOHYDRATE, SODIUM CHLORIDE, AND POTASSIUM CHLORIDE 74.9 ML/HR: 50; 9; 2.98 INJECTION, SOLUTION INTRAVENOUS at 04:01

## 2017-06-11 RX ADMIN — POTASSIUM CHLORIDE 10 MEQ: 7.46 INJECTION, SOLUTION INTRAVENOUS at 22:09

## 2017-06-11 RX ADMIN — INSULIN LISPRO 2 UNITS: 100 INJECTION, SOLUTION INTRAVENOUS; SUBCUTANEOUS at 17:11

## 2017-06-11 RX ADMIN — LACTULOSE 20 G: 20 SOLUTION ORAL at 21:08

## 2017-06-11 RX ADMIN — LEVOTHYROXINE SODIUM ANHYDROUS 50 MCG: 100 INJECTION, POWDER, LYOPHILIZED, FOR SOLUTION INTRAVENOUS at 10:07

## 2017-06-11 RX ADMIN — POTASSIUM CHLORIDE 10 MEQ: 7.46 INJECTION, SOLUTION INTRAVENOUS at 17:52

## 2017-06-11 RX ADMIN — MAGNESIUM SULFATE HEPTAHYDRATE 2 G: 40 INJECTION, SOLUTION INTRAVENOUS at 04:02

## 2017-06-11 RX ADMIN — SODIUM CHLORIDE 6 MMOL: 900 INJECTION, SOLUTION INTRAVENOUS at 21:08

## 2017-06-11 RX ADMIN — POTASSIUM CHLORIDE 10 MEQ: 7.46 INJECTION, SOLUTION INTRAVENOUS at 23:18

## 2017-06-11 RX ADMIN — FOLIC ACID: 5 INJECTION, SOLUTION INTRAMUSCULAR; INTRAVENOUS; SUBCUTANEOUS at 16:54

## 2017-06-11 RX ADMIN — POTASSIUM CHLORIDE 10 MEQ: 7.46 INJECTION, SOLUTION INTRAVENOUS at 04:02

## 2017-06-11 RX ADMIN — POTASSIUM CHLORIDE 10 MEQ: 7.46 INJECTION, SOLUTION INTRAVENOUS at 05:20

## 2017-06-11 RX ADMIN — DOCUSATE SODIUM AND SENNOSIDES 2 TABLET: 8.6; 5 TABLET, FILM COATED ORAL at 21:09

## 2017-06-11 NOTE — PROGRESS NOTES
0130 - Pt arrived on unit. Skin assessment completed with Nadiya Steele RN. Pt bathed and neuro assessment complete. Blood noted on and around pt's vagina. Pt stated it was from a catheter being inserted. Pt denies any pain or discomfort at this time. 0200 - Blood noted in pt urine. Pt denies any pain or discomfort from urination. 0230 - Nicardipine started at 5 mg/hr for SBP > 140 sustained. 9358 - Orders placed for potassium, magnesium, and phosphorus replacements per electrolyte replacement protocol. [address] [place] called to ask to have the phosphorus mixed and brought over. 7276 - Sodium phosphate arrived on unit.   0700 - Report given to Jeb Cranker, RN. Orders, medications, neuro exam, labs, and skin reviewed.

## 2017-06-11 NOTE — CONSULTS
Neurology Consult Note    Admit Date: 6/10/2017  Length of Stay: 0  Primary Care: Stephen Aguila MD       Assessment:   Right internal capsule hemorrhagic stroke  Atrial fibrillation, high INR, s/p FFP  hyperammonemia       Plan: on lactulose, will start PT/OT after she stabilizes. Her speech and swallowing seem intact. Stroke Protocol in place  Recheck CT if she changes clinically  She will need rehabilitation and will have to stop Coumadin     HPI: Admitted with acute onset left sides weakness last evening. No headache, blurred vision or slurred speech. CT showed small IC bleed with INR 3.3. Vitamin K and FFP were given since then. Second CT of the head showed some increase in the size from about 9.7 to 14 mm. INR to be repeated this evening. Principle Problem: Hemorrhagic stroke [facility])     Problem List: Principal Problem:    Hemorrhagic stroke ([facility] [address] (6/11/2017)    Active Problems:    Chronic a-fib ([facility] [address] (6/11/2017)      Supratherapeutic INR (6/11/2017)      Left-sided weakness (6/11/2017)      Hypokalemia (6/11/2017)        Vital Signs:   Visit Vitals    /60    Pulse 64    Temp 98.6 °F (37 °C)    Resp 19    Wt 95.9 kg (211 lb 6.7 oz)    SpO2 96%    BMI 42.7 kg/m2        Neurological examination:   · Appearance:  Awake, alert in NAD  · Mental status examination:  Memory, attention, language and knowledge ok  · Cardiovascular: 3+ leg edema, heart irregularly irregular, no murmurs, no carotid bruits  · Cranial Nerves: face symmetric, speech clear, tongue and uvula midline, dentures, hearing intact, visual fields normal, pupils 4mm and reactive bilaterally, pseudophakia  · Motor exam: left hemiparesis, arm 1/5 deltoid and biceps, triceps are 4/5,  5/5, I/O 4/5.  Left leg: psoas 2/5, TA 2/5  · Sensory: distal temp loss  · Station and Gait: not tested due to clinical factors  · Reflexes: none in legs, 1+ in arms, Left Babinski present  · Coordination:   Reduced AMR on the left    Allergies: No Known Allergies     Review of Systems: Review of Systems   Constitutional: Negative for chills and fever. HENT: Negative for hearing loss. Eyes: Negative for visual disturbance. Respiratory: Negative for shortness of breath. Cardiovascular: Positive for leg swelling. Negative for palpitations. Musculoskeletal: Positive for gait problem. Skin: Negative for color change. Neurological: Positive for weakness and numbness. Negative for tremors, seizures, syncope, facial asymmetry, speech difficulty, light-headedness and headaches. Hematological: Bruises/bleeds easily. Psychiatric/Behavioral: Negative for confusion.        PMH:   Past Medical History:   Diagnosis Date    Atrial fibrillation ([facility] [address]     Chronic kidney disease     unknown what stage    [facility] ([facility]         FH:   Family History   Problem Relation Age of Onset    Family history unknown: Yes        SH:   Social History     Social History    Marital status:      Spouse name: N/A    Number of children: N/A    Years of education: N/A     Social History Main Topics    Smoking status: Never Smoker    Smokeless tobacco: None    Alcohol use No    Drug use: No    Sexual activity: Not Asked     Other Topics Concern    None     Social History Narrative          Medications:    [x] REVIEWED  Current Facility-Administered Medications   Medication    ELECTROLYTE REPLACEMENT PROTOCOL    ELECTROLYTE REPLACEMENT PROTOCOL    ELECTROLYTE REPLACEMENT PROTOCOL    PHARMACY INFORMATION NOTE    ondansetron (ZOFRAN) injection 1 mg    labetalol (NORMODYNE;TRANDATE) 20 mg/4 mL (5 mg/mL) injection 5 mg    acetaminophen (TYLENOL) tablet 650 mg    acetaminophen (TYLENOL) suppository 650 mg    senna-docusate (PERICOLACE) 8.6-50 mg per tablet 2 Tab    bisacodyl (DULCOLAX) tablet 5 mg    bisacodyl (DULCOLAX) suppository 10 mg    pantoprazole (PROTONIX) 40 mg in sodium chloride 0.9 % 10 mL injection    albuterol (PROVENTIL VENTOLIN) nebulizer solution 2.5 mg    insulin lispro (HUMALOG) injection    glucose chewable tablet 16 g    dextrose (D50W) injection syrg 12.5-25 g    glucagon (GLUCAGEN) injection 1 mg    levothyroxine (SYNTHROID) injection 50 mcg    dextrose 5% - 0.9% NaCl with KCl 40 mEq/L infusion    niCARdipine (CARDENE) 25 mg in 0.9% sodium chloride 250 mL infusion    folic acid (FOLVITE) 1 mg, thiamine (B-1) 100 mg in 0.9% sodium chloride 50 mL ivpb    lactulose (CHRONULAC) solution 20 g    0.9% sodium chloride infusion 250 mL     Data:    [x] REVIEWED  CT Results (most recent):      Results from Hospital Encounter encounter on 06/10/17   CT HEAD WO CONT   Narrative EXAM: CT head    INDICATION: Follow-up of intracranial hemorrhage    COMPARISON: 6/10/2017. TECHNIQUE: Axial CT imaging of the head was performed without intravenous  contrast.    One or more dose reduction techniques were used on this CT: automated exposure  control, adjustment of the mAs and/or kVp according to patient's size, and  iterative reconstruction techniques. The specific techniques utilized on this CT  exam have been documented in the patient's electronic medical record.  _______________    FINDINGS:    BRAIN AND POSTERIOR FOSSA: There has been interval increase in the size of the  right internal capsule intracranial hemorrhage, now measuring 14 x 13 x 15 mm  (previously 10 x 6 x 11 mm) with an associated hematocrit level. No  intraventricular extension or significant mass effect. There is a mild degree of sulcal enlargement and ventricular dilatation  consistent with cortical atrophy. There is hypoattenuation of the  periventricular white matter, which is nonspecific but most likely represents  changes from chronic microangiopathy. EXTRA-AXIAL SPACES AND MENINGES: There are no abnormal extra-axial fluid  collections. CALVARIUM: Intact. SINUSES: Clear.     OTHER: Atherosclerotic calcifications noted.    _______________         Impression IMPRESSION:    1. Mild interval increase in size of small-volume right internal capsule  intracranial hemorrhage. 2. Senescent changes of the brain including cortical atrophy and findings most  suggestive of chronic microvascular ischemia. MRI Results (most recent):  No results found for this or any previous visit.   Latest Cardiology Procedure:  Results for orders placed or performed during the hospital encounter of 06/10/17   EKG, 12 LEAD, INITIAL   Result Value Ref Range    Ventricular Rate 54 BPM    Atrial Rate 44 BPM    QRS Duration 90 ms    Q-T Interval 462 ms    QTC Calculation (Bezet) 438 ms    Calculated R Axis 62 degrees    Calculated T Axis 44 degrees    Diagnosis       Atrial fibrillation with slow ventricular response  Nonspecific ST abnormality , probably digitalis effect  Abnormal ECG  No previous ECGs available  Confirmed by Tre Blue MD, --- ([id number]) on 6/11/2017 10:00:44 AM         Important Labs:  No components found for: B12  Lab Results   Component Value Date/Time    Calcium 7.6 06/11/2017 01:30 AM    Phosphorus 1.5 06/11/2017 01:30 AM    Magnesium 1.9 06/11/2017 08:45 AM     No results found for: TSH, FT4, TSHEXT  Lab Results   Component Value Date/Time    Hemoglobin A1c 8.5 06/10/2017 09:55 PM

## 2017-06-11 NOTE — PROGRESS NOTES
Problem: Dysphagia (Adult)  Goal: *Acute Goals and Plan of Care (Insert Text)  Recommendations:  Diet: mech-soft  Meds: per pt preference  Aspiration Precautions  Oral Care TID    Goals: Patient will:  1. Tolerate PO trials with 0 s/s overt distress in 4/5 trials  2. Utilize compensatory swallow strategies/maneuvers (decrease bite/sip, size/rate, alt. liq/sol) with min cues in 4/5 trials  [address] EVALUATION     Patient: Michael Farmer (55 y.o. female)  Date: 6/11/2017  Primary Diagnosis: Hemorrhagic stroke (HCC)  Hemorrhagic stroke ([facility] [address]  Supratherapeutic INR  Chronic a-fib (HCC)        Precautions: aspiration         ASSESSMENT :  Based on the objective data described below, the patient presents with mild oropharyngeal in the setting of stroke. Pt A&Ox4; screen revealed basic cognitive-linguistic function intact. OM exam revealed structures intact for po intake. Accepted thin + straw, applesauce, and crackers without aspiration s/s. Pt with labored oral bolus prep with lingual spread requiring liquid wash to clear. Adequate swallow timing. SLP recommending mech-soft diet initiation with SLP to follow for diet tolerance and education. D/w RN, Hoda Sosa.      Patient will benefit from skilled intervention to address the above impairments. Patients rehabilitation potential is considered to be Fair  Factors which may influence rehabilitation potential include:   [X]            None noted  [ ]            Mental ability/status  [ ]            Medical condition  [ ]            Home/family situation and support systems  [ ]            Safety awareness  [ ]            Pain tolerance/management  [ ]            Other:        PLAN :  Recommendations and Planned Interventions:  mech-soft  Frequency/Duration: Patient will be followed by speech-language pathology  1-2 visits to address goals. Discharge Recommendations: Skilled Nursing Facility       SUBJECTIVE:   Patient stated Aram hale . OBJECTIVE:       Past Medical History:   Diagnosis Date    Atrial fibrillation ([facility] [address]      Chronic kidney disease       unknown what stage    [facility] ([facility]     History reviewed. No pertinent surgical history. Prior Level of Function/Home Situation: lives with dtr  Home Situation  Home Environment: Private residence  One/Two Story Residence: Two story  Living Alone: No  Support Systems: Child(janeth), Family member(s), Friends \ neighbors  Patient Expects to be Discharged to[de-identified] Private residence  Current DME Used/Available at Home: Alber Ripley, straight, Walker  Diet prior to admission: regular  Current Diet:  soft   Cognitive and Communication Status:  Neurologic State: Alert  Orientation Level: Oriented X4  Cognition: Follows commands  Perception: Appears intact  Perseveration: No perseveration noted  Safety/Judgement: Fall prevention  Oral Assessment:  Oral Assessment  Labial: No impairment  Dentition: Intact  Oral Hygiene: good  Lingual: Decreased rate  Velum: No impairment  Mandible: No impairment  P.O. Trials:  Patient Position: HOB 60  Vocal quality prior to P.O.: No impairment  Consistency Presented: Thin liquid;Pudding; Solid  How Presented: SLP-fed/presented;Straw;Successive swallows     Bolus Acceptance: No impairment  Bolus Formation/Control: Impaired  Type of Impairment: Delayed;Mastication  Propulsion: Delayed (# of seconds)  Oral Residue: 10-50% of bolus; Lingual  Initiation of Swallow: No impairment  Laryngeal Elevation: Functional  Aspiration Signs/Symptoms: None  Pharyngeal Phase Characteristics: Poor endurance  Effective Modifications: Small sips and bites  Cues for Modifications: Minimal        Oral Phase Severity: Mild  Pharyngeal Phase Severity : Mild     GCODESwallowing:  Swallow Current Status CJ= 20-39%   Swallow Goal Status CI= 1-19%     The severity rating is based on the following outcomes:  BROOKE Noms Swallow Level 5              Clinical Judgement     PAIN:  Start of Eval: 0  End of Eval: 0      After treatment:   [ ]            Patient left in no apparent distress sitting up in chair  [X]            Patient left in no apparent distress in bed  [X]            Call bell left within reach  [X]            Nursing notified  [ ]            Family present  [ ]            Caregiver present  [ ]            Bed alarm activated      COMMUNICATION/EDUCATION:   [X]            Aspiration precautions; swallow safety; compensatory techniques. [X]            Patient/family have participated as able in goal setting and plan of care. [ ]            Patient/family agree to work toward stated goals and plan of care. [ ]            Patient understands intent and goals of therapy; neutral about participation. [ ]            Patient unable to participate in goal setting/plan of care; educ ongoing with interdisciplinary staff  [ ]             Posted safety precautions in patient's room.      Thank you for this referral.  ROSMERY Palomo  Time Calculation: 15 mins

## 2017-06-11 NOTE — H&P
History and Physical    Patient: Ricardo Castro               Sex: female          DOA: 6/10/2017       YOB: 1929      Age:  80 y.o.        LOS:  LOS: 0 days        Chief Complaint   Patient presents with    Extremity Weakness         HPI:     Ricardo Castro is a 80 y.o. female with pmhx of Afib, hypothyroid , CKD, hypertension, DM, Neuropathy who presents with c/o left sided weakness onset PM today. Patient was last seen normal around 5 pm. She reports that she was at home and she got up to walk to the bathroom and her left leg and arm were weak. She denies any LOC, headache , blurry vision or slurred speech. Her grandson drove her to the ED. On arrival a Code S was called. CT head done showed small brain hemorrhage. No midline shift. INR 3.3 . Patient on [facility] Warfarin for chronic afib. FFP and Vit K ordered. Patient will be admitted to NICU. Intensivist and neurology consulted. Past Medical History:   Diagnosis Date    Atrial fibrillation ([facility] [address]     Chronic kidney disease     unknown what stage    Diabetes ([facility] [address]    . History reviewed. No pertinent surgical history. No current facility-administered medications on file prior to encounter. Current Outpatient Prescriptions on File Prior to Encounter   Medication Sig Dispense Refill    IRON/DOCUSATE SODIUM (LAURA-SEQUELS PO) Take 50 mg by mouth daily.  ascorbic acid, vitamin C, (VITAMIN C) 500 mg tablet Take 500 mg by mouth daily.  meclizine (ANTIVERT) 25 mg tablet Take 25 mg by mouth four (4) times daily as needed.  warfarin (COUMADIN) 5 mg tablet Take 5 mg by mouth daily. Indications: 5 mg 1/2 tabs QHS 6 days a week      zolpidem (AMBIEN) 10 mg tablet Take 10 mg by mouth nightly as needed for Sleep.  betamethasone valerate (VALISONE) 0.1 % topical cream Apply  to affected area two (2) times a day.  furosemide (LASIX) 40 mg tablet Take  by mouth two (2) times a day.       gabapentin (NEURONTIN) 300 mg capsule Take 300 mg by mouth nightly.  losartan (COZAAR) 50 mg tablet Take 50 mg by mouth daily.  glimepiride (AMARYL) 2 mg tablet Take 2 mg by mouth daily.  metoprolol tartrate (LOPRESSOR) 50 mg tablet Take 50 mg by mouth two (2) times a day.  levothyroxine (SYNTHROID) 100 mcg tablet Take 100 mcg by mouth Daily (before breakfast).  spironolactone (ALDACTONE) 50 mg tablet Take 50 mg by mouth two (2) times a day.  simvastatin (ZOCOR) 40 mg tablet Take 40 mg by mouth nightly. Social History     Social History    Marital status:      Spouse name: N/A    Number of children: N/A    Years of education: N/A     Occupational History    Not on file. Social History Main Topics    Smoking status: Never Smoker    Smokeless tobacco: Not on file    Alcohol use No    Drug use: No    Sexual activity: Not on file     Other Topics Concern    Not on file     Social History Narrative       Prior to Admission Medications   Prescriptions Last Dose Informant Patient Reported? Taking? IRON/DOCUSATE SODIUM (LAURA-SEQUELS PO)   Yes No   Sig: Take 50 mg by mouth daily. ascorbic acid, vitamin C, (VITAMIN C) 500 mg tablet   Yes No   Sig: Take 500 mg by mouth daily. betamethasone valerate (VALISONE) 0.1 % topical cream   Yes No   Sig: Apply  to affected area two (2) times a day. furosemide (LASIX) 40 mg tablet   Yes No   Sig: Take  by mouth two (2) times a day.   gabapentin (NEURONTIN) 300 mg capsule   Yes No   Sig: Take 300 mg by mouth nightly. glimepiride (AMARYL) 2 mg tablet   Yes No   Sig: Take 2 mg by mouth daily. levothyroxine (SYNTHROID) 100 mcg tablet   Yes No   Sig: Take 100 mcg by mouth Daily (before breakfast). losartan (COZAAR) 50 mg tablet   Yes No   Sig: Take 50 mg by mouth daily. meclizine (ANTIVERT) 25 mg tablet   Yes No   Sig: Take 25 mg by mouth four (4) times daily as needed.    metoprolol tartrate (LOPRESSOR) 50 mg tablet   Yes No   Sig: Take 50 mg by mouth two (2) times a day. simvastatin (ZOCOR) 40 mg tablet   Yes No   Sig: Take 40 mg by mouth nightly. spironolactone (ALDACTONE) 50 mg tablet   Yes No   Sig: Take 50 mg by mouth two (2) times a day. warfarin (COUMADIN) 5 mg tablet   Yes No   Sig: Take 5 mg by mouth daily. Indications: 5 mg 1/2 tabs QHS 6 days a week   zolpidem (AMBIEN) 10 mg tablet   Yes No   Sig: Take 10 mg by mouth nightly as needed for Sleep. Facility-Administered Medications: None       Family History   Problem Relation Age of Onset    Family history unknown: Yes       Review of Systems   Constitutional: Negative. HENT: Negative. Eyes: Negative. Respiratory: Negative. Cardiovascular: Negative. Genitourinary: Negative. Musculoskeletal: Negative. Skin: Negative. Neurological: Positive for focal weakness. Negative for dizziness, sensory change, speech change, seizures and loss of consciousness. Endo/Heme/Allergies: Negative. Psychiatric/Behavioral: Negative. Physical Exam:       Visit Vitals    /53    Pulse (!) 58    Temp 98.2 °F (36.8 °C)    Resp 20    Wt 99.8 kg (220 lb)    SpO2 98%    BMI 44.43 kg/m2     Physical Exam   Constitutional: She is oriented to person, place, and time. She appears well-developed and well-nourished. No distress. HENT:   Head: Normocephalic and atraumatic. Mouth/Throat: Oropharynx is clear and moist. No oropharyngeal exudate. Eyes: Conjunctivae and EOM are normal. Pupils are equal, round, and reactive to light. Neck: Neck supple. Cardiovascular: Normal rate, S1 normal, S2 normal and normal heart sounds. An irregularly irregular rhythm present. No murmur heard. Pulmonary/Chest: Effort normal and breath sounds normal. No respiratory distress. She has no wheezes. She has no rales. Abdominal: Soft. Bowel sounds are normal. She exhibits no distension. There is no tenderness. There is no guarding. Musculoskeletal: She exhibits edema (BL). Neurological: She is alert and oriented to person, place, and time. GCS eye subscore is 4. GCS verbal subscore is 5. GCS motor subscore is 6. Left upper and lower weakness  No sensory changes bilaterally      Skin: Skin is warm. No rash noted. She is not diaphoretic. No erythema. No pallor. Vitals reviewed. Ancillary Studies: All lab and imaging reviewed for the past 24 hours. Recent Results (from the past 24 hour(s))   CBC W/O DIFF    Collection Time: 06/10/17  9:55 PM   Result Value Ref Range    WBC 5.1 4.6 - 13.2 K/uL    RBC 4.98 4.20 - 5.30 M/uL    HGB 13.2 12.0 - 16.0 g/dL    HCT 41.9 35.0 - 45.0 %    MCV 84.1 74.0 - 97.0 FL    MCH 26.5 24.0 - 34.0 PG    MCHC 31.5 31.0 - 37.0 g/dL    RDW 15.3 (H) 11.6 - 14.5 %    PLATELET 895 829 - 228 K/uL    MPV 10.1 9.2 - 88.6 FL   METABOLIC PANEL, COMPREHENSIVE    Collection Time: 06/10/17  9:55 PM   Result Value Ref Range    Sodium 140 136 - 145 mmol/L    Potassium 3.1 (L) 3.5 - 5.5 mmol/L    Chloride 101 100 - 108 mmol/L    CO2 29 21 - 32 mmol/L    Anion gap 10 3.0 - 18 mmol/L    Glucose 322 (H) 74 - 99 mg/dL    BUN 22 (H) 7.0 - 18 MG/DL    Creatinine 1.68 (H) 0.6 - 1.3 MG/DL    BUN/Creatinine ratio 13 12 - 20      GFR est AA 35 (L) >60 ml/min/1.73m2    GFR est non-AA 29 (L) >60 ml/min/1.73m2    Calcium 7.6 (L) 8.5 - 10.1 MG/DL    Bilirubin, total 0.5 0.2 - 1.0 MG/DL    ALT (SGPT) 14 13 - 56 U/L    AST (SGOT) 18 15 - 37 U/L    Alk.  phosphatase 103 45 - 117 U/L    Protein, total 6.8 6.4 - 8.2 g/dL    Albumin 3.4 3.4 - 5.0 g/dL    Globulin 3.4 2.0 - 4.0 g/dL    A-G Ratio 1.0 0.8 - 1.7     PROTHROMBIN TIME + INR    Collection Time: 06/10/17  9:55 PM   Result Value Ref Range    Prothrombin time 31.7 (H) 11.5 - 15.2 sec    INR 3.3 (H) 0.8 - 1.2     THROMBIN TIME    Collection Time: 06/10/17  9:55 PM   Result Value Ref Range    Thrombin time 18.2 13.8 - 18.2 SECS   CARDIAC PANEL,(CK, CKMB & TROPONIN)    Collection Time: 06/10/17  9:55 PM   Result Value Ref Range CK 53 26 - 192 U/L    CK - MB <1.0 <3.6 ng/ml    CK-MB Index Cannot be calulated 0.0 - 4.0 %    Troponin-I, Qt. <0.02 0.0 - 0.045 NG/ML   FIBRINOGEN    Collection Time: 06/10/17  9:55 PM   Result Value Ref Range    Fibrinogen 614 (H) 210 - 451 mg/dL   TYPE & SCREEN    Collection Time: 06/10/17  9:55 PM   Result Value Ref Range    Crossmatch Expiration 06/13/2017     ABO/Rh(D) Melly Karma POSITIVE     Antibody screen NEG    ASPIRIN TEST    Collection Time: 06/10/17  9:55 PM   Result Value Ref Range    Aspirin test 586 ARU   PLATELET FUNCTION, VERIFY NOW P2Y12    Collection Time: 06/10/17  9:55 PM   Result Value Ref Range    P2Y12 Plt response 241 194 - 418 PRU   GLUCOSE, POC    Collection Time: 06/10/17  9:57 PM   Result Value Ref Range    Glucose (POC) 294 (H) 70 - 110 mg/dL   URINALYSIS W/ RFLX MICROSCOPIC    Collection Time: 06/10/17 10:15 PM   Result Value Ref Range    Color YELLOW      Appearance CLEAR      Specific gravity 1.013 1.005 - 1.030      pH (UA) 5.0 5.0 - 8.0      Protein NEGATIVE  NEG mg/dL    Glucose 250 (A) NEG mg/dL    Ketone NEGATIVE  NEG mg/dL    Bilirubin NEGATIVE  NEG      Blood MODERATE (A) NEG      Urobilinogen 1.0 0.2 - 1.0 EU/dL    Nitrites NEGATIVE  NEG      Leukocyte Esterase NEGATIVE  NEG     DRUG SCREEN, URINE    Collection Time: 06/10/17 10:15 PM   Result Value Ref Range    BENZODIAZEPINE NEGATIVE  NEG      BARBITURATES NEGATIVE  NEG      THC (TH-CANNABINOL) NEGATIVE  NEG      OPIATES NEGATIVE  NEG      PCP(PHENCYCLIDINE) NEGATIVE  NEG      COCAINE NEGATIVE  NEG      AMPHETAMINE NEGATIVE  NEG      METHADONE NEGATIVE       HDSCOM (NOTE)    URINE MICROSCOPIC ONLY    Collection Time: 06/10/17 10:15 PM   Result Value Ref Range    WBC 0 to 3 0 - 4 /hpf    RBC 4 to 10 0 - 5 /hpf    Epithelial cells FEW 0 - 5 /lpf   FFP, ALLOCATE    Collection Time: 06/10/17 10:45 PM   Result Value Ref Range    Unit number [id number]     Blood component type FP24H,THAW     Unit division 00     Status of unit ALLOCATED     Unit number [id number]     Blood component type FP24H,THAW     Unit division 00     Status of unit ALLOCATED        Assessment/Plan     Principal Problem:    Hemorrhagic stroke ([address] (6/11/2017)    Active Problems:    Chronic a-fib (HCC) (6/11/2017)      Supratherapeutic INR (6/11/2017)      Left-sided weakness (6/11/2017)      Hypokalemia (6/11/2017)      CKD4  JOSIE  DM  HTN  Neuropathy  Hypothyroid     PLAN:    Hemorrhagic stroke   Left side weakness. Likely hypertensive in the setting of supra therapeutic INR 3.3  Reversal Vit K and FFP ordered. Monitor INR for goal < 1.5  CT head reviewed. Small hemorrhage no midline shift. Repeat CT head AM ordered  PT/OT/Speech  D/C Warfarin  Neuro and Vital check per protocol  Keep SBP < 140. Use Labetalol or titrate Cardene to meet goal  Neurology consult   PCCM consulted    Chronic Afib  Currently in afib w. Normal ventricular response  No EKG done . Order EKG  Hold Warfarin . INR supra therapeutic 3.3 . On reversal due to brain hemorrhage   Monitor INR for goal < 1.5    Left side weakness   2/2 Hemorrhagic CVA     CKD4/JOSIE  Recommend Nephrology consult     Hypokalemia   Replete    HTN  Controlled  Keep sbp < 140 per Hemorrhagic CVA .     DM  SSI  A1c    Neuropathy  Resume gabapentin if cleared by speech for dysphagia     Hypothyroid   Synthroid    DVT Prophylaxis - None Supra therapeutic     GI Prophylaxis     Full code             Valery Panchal MD  6/11/2017  12:04 AM

## 2017-06-11 NOTE — ED PROVIDER NOTES
HPI Comments: 9:55 PM Anne Mays is a 80 y.o. female with a history of Diabetes, A-Fib, and Chronic kidney disease who presents to the emergency department c/o L side weakness onset approximately at 5pm. The patient explains was eating dinner and got up to go use the bathroom when her L leg gave out. Pt denies problems talking or swallowing, chest pain, abdominal pain, nausea, and vomiting. No other concerns at this time. PCP: Tasia Wallace MD      The history is provided by the patient. Past Medical History:   Diagnosis Date    Atrial fibrillation ([facility]     Chronic kidney disease     unknown what stage    Diabetes ([facility]        History reviewed. No pertinent surgical history. Family History:   Problem Relation Age of Onset    Family history unknown: Yes       Social History     Social History    Marital status:      Spouse name: N/A    Number of children: N/A    Years of education: N/A     Occupational History    Not on file. Social History Main Topics    Smoking status: Never Smoker    Smokeless tobacco: Not on file    Alcohol use No    Drug use: No    Sexual activity: Not on file     Other Topics Concern    Not on file     Social History Narrative         ALLERGIES: Review of patient's allergies indicates no known allergies. Review of Systems   Constitutional: Negative for fever. HENT: Negative for trouble swallowing. Respiratory: Negative for shortness of breath. Cardiovascular: Negative for chest pain. Gastrointestinal: Negative for abdominal pain, diarrhea and vomiting. Genitourinary: Negative for difficulty urinating. Musculoskeletal: Negative for back pain and neck pain. Skin: Negative for wound. Neurological: Positive for weakness (L side and leg). Negative for syncope and speech difficulty. Psychiatric/Behavioral: Negative for behavioral problems. All other systems reviewed and are negative.       Vitals:    06/10/17 2300 06/10/17 2315 06/10/17 2321 06/10/17 2330   BP: 131/51 130/56 117/68 145/53   Pulse: (!) 58 61 61 (!) 58   Resp: 16 20 19 20   Temp:   98.2 °F (36.8 °C)    SpO2: 98% 98% 95% 98%   Weight:                Physical Exam   Constitutional: She is oriented to person, place, and time. She appears well-developed. No distress. Chronically ill-appearing, nad   HENT:   Head: Normocephalic and atraumatic. Eyes: EOM are normal. Pupils are equal, round, and reactive to light. Neck: Normal range of motion. Cardiovascular: Normal rate. Pulmonary/Chest: Effort normal and breath sounds normal. No respiratory distress. Abdominal: Soft. There is no tenderness. Musculoskeletal: Normal range of motion. She exhibits edema (bilateral lower extremity edema). Mechanically stable   Neurological: She is alert and oriented to person, place, and time. L upper and lower extremity weakness. Psychiatric: Her speech is normal and behavior is normal.   Nursing note and vitals reviewed. MDM  Number of Diagnoses or Management Options  Stroke due to intracerebral hemorrhage [facility]):   Supratherapeutic INR:   Diagnosis management comments: 81 yo AAF with PMHx afib, DM presents with left side weakness, onset about 1715. No speech problems, no other symptoms. Examination in ED with left arm and left leg weakness. Suspect likely acute stroke. Code S started. Discussed with family. 11:57 PM  CT with small ICH. INR 3.3, will reverse as per neurology recs. Work-up otherwise unremarkable. Spoke with Dr. Kayla Rothman, intensivist, who agreed to evaluate the pt in ICU. Discussed with Dr. Javier Beckham, who agreed to evaluate pt for admission. Updated pt and family on results and poc.        Amount and/or Complexity of Data Reviewed  Clinical lab tests: ordered and reviewed  Tests in the radiology section of CPT®: ordered and reviewed  Discussion of test results with the performing providers: yes  Obtain history from someone other than the patient: yes  Review and summarize past medical records: yes  Discuss the patient with other providers: yes  Independent visualization of images, tracings, or specimens: yes    Risk of Complications, Morbidity, and/or Mortality  Presenting problems: high  Diagnostic procedures: high  Management options: high    Critical Care  Total time providing critical care:  minutes    Patient Progress  Patient progress: stable    ED Course       CRITICAL CARE (ASAP ONLY)  Performed by: [employer]  Authorized by: [employer]     Critical care provider statement:     Critical care time was exclusive of:  Separately billable procedures and treating other patients    Critical care was necessary to treat or prevent imminent or life-threatening deterioration of the following conditions:  CNS failure or compromise    Critical care was time spent personally by me on the following activities:  Ordering and performing treatments and interventions, ordering and review of laboratory studies, ordering and review of radiographic studies, pulse oximetry, re-evaluation of patient's condition, review of old charts, obtaining history from patient or surrogate, examination of patient, evaluation of patient's response to treatment, discussions with consultants and development of treatment plan with patient or surrogate          Vitals:  Patient Vitals for the past 12 hrs:   Temp Pulse Resp BP SpO2   06/10/17 2330 - (!) 58 20 145/53 98 %   06/10/17 2321 98.2 °F (36.8 °C) 61 19 117/68 95 %   06/10/17 2315 - 61 20 130/56 98 %   06/10/17 2300 - (!) 58 16 131/51 98 %   06/10/17 2245 - 63 19 117/52 98 %   06/10/17 2240 - 61 21 135/44 97 %   06/10/17 2235 - 65 18 134/45 98 %   06/10/17 2230 - 67 20 132/58 98 %   06/10/17 2225 - 63 23 121/41 97 %   06/10/17 2220 - 64 21 129/44 96 %   06/10/17 2215 - 65 22 121/46 96 %   06/10/17 2213 - 69 19 126/63 94 %   06/10/17 2200 - 74 18 146/51 98 %   06/10/17 2155 99.3 °F (37.4 °C) 68 16 168/71 98 % 06/10/17 2154 - 70 26 - 97 %   06/10/17 2151 - 73 21 - 97 %             X-Ray, CT or other radiology findings or impressions:  CT HEAD WO CONT   Final Result      XR CHEST PORT    (Results Pending)           Progress notes, Consult notes or additional Procedure notes:   10:08 Consult:  Discussed care with Dr. Etienne Yadav, will evaluate. Standard discussion; including history of patients chief complaint, available diagnostic results, and treatment course. 11:02 PM Consult:  Discussed care with Dr. Christy Stark, agrees to evaluate patient. Standard discussion; including history of patients chief complaint, available diagnostic results, and treatment course. Disposition:  Diagnosis:   1. Stroke due to intracerebral hemorrhage ([facility] [address]    2. Supratherapeutic INR        Disposition: Admit to ICU    Martin Zendejas 8 for and in presence of Reji Cox MD (06/10/17)      Physician Attestation  I personally preformed the services described in this documentation, reviewed and edited the documentation which was dictated to the scribe in my presence, and it accurately records my words and actions.      Reji Cox MD (06/10/17)      Signed by: Martin Velez, 06/10/17, 9:54 PM

## 2017-06-11 NOTE — PROGRESS NOTES
[facility]   Discharge Planning/ Assessment    Reasons for Intervention: Interviewed patient, she agrees to share her discharge information with her daughter, see below. She lives with her daughter and uses a walker or a cane to assist with ambulation and see Dr Reynaldo Osborne for her primary care needs. She describes herself as independent but now states she has left sided weakness. She states she would like to return home at discharge but concedes she may need rehab. Provided a SNF list, Placed in e discharge , matching held until she can share the list with her daughter.      High Risk Criteria  [x] Yes  []No   Physician Referral  [] Yes  [x]No        Date    Nursing Referral  [] Yes  [x]No        Date    Patient/Family Request  [] Yes  [x]No        Date       Resources:    Medicare  [x] Yes  []No   Medicaid  [] Yes  [x]No   No Resources  [] Yes  [x]No   Private Insurance  [] Yes  [x]No    Name/Phone Number    Other  [] Yes  [x]No        (i.e. Workman's Comp)         Prior Services:    Prior Services  [] Yes  [x]No   Home Health  [] Yes  [x]No   [address] [place]  [] Yes  [x]No        Number of [address]  [] Yes  [x]No       Meals on Wheels  [] Yes  [x]No   Office on Aging  [] Yes  [x]No   Transportation Services  [] Yes  [x]No   Nursing Home  [] Yes  [x]No        Nursing Home Name    [address]  [] Yes  [x]No        [address] Name    Other       Information Source:      Information obtained from  [x] Patient  [] Parent   [] [facility]  [] Child  [] Spouse   [] Significant Other/Partner   [] Friend      [] EMS    [] Nursing Home Chart          [x] Other:chart   Chart Review  [x] Yes  []No     Family/Support System:    Patient lives with  [] Alone    [] Spouse   [] Significant Other  [x] Children  [] Caretaker   [] Parent  [] Sibling     [] Other       Other Support System:    Is the patient responsible for care of others  [] Yes  [x]No Information of person caring for patient on  discharge self   Managers financial affairs independently  [x] Yes  []No   If no, explain:      Status Prior to Admission:    Mental Status  [x] Awake  [] Alert  [] Oriented  [] Quiet/Calm [] Lethargic/Sedated   [] Disoriented  [] Restless/Anxious  [] Combative   Personal Care  [] Dependent  [] [address]  [x] Requires Assistance   Meal Preparation Ability  [] Independent   [] Standby Assistance   [] Minimal Assistance   [] Moderate Assistance  [x] Maximum Assistance     [] Total Assistance   Chores  [] Independent with Chores   [] N/A Nursing Home Resident   [x] Requires Assistance   Bowel/Bladder  [] Continent  [] Catheter  [] Incontinent  [] Ostomy Self-Care    [] Urine Diversion Self-Care  [] Maximum Assistance     [] Total Assistance   Number of Persons needed for assistance    DME at home  [] [address], [address], Ladell Ping  [x] [address] [place] [address], Ne, Straight   [] Commode    [] Bathroom/Grab Bars  [] Hospital Bed  [] Nebulizer  [] Oxygen           [] Raised Toilet Seat  [] Shower Chair  [] Side Rails for Bed   [] Tub Transfer Bench   [x] Towana Prey  [] Holger Kerr, Livan      [] Other:   Vendor      Treatment Presently Receiving:    Current Treatments  [] Chemotherapy  [] Dialysis  [] Insulin  [] IVAB [x] IVaccess   [x] O2  [] PCA   [] PT   [] RT   [] Tube Feedings   [] Wound Care     Psychosocial Evaluation:    Verbalized Knowledge of Disease Process  [x] Patient  [x]Family   Coping with Disease Process  [x] Patient  []Family   Requires Further Counseling Coping with Disease Process  [] Patient  []Family     Identified Projected Needs:    Home Health Aid  [] Yes  [x]No   Transportation  [x] Yes  []No   Education  [] Yes  [x]No        Specific Education     Financial Counseling  [] Yes  [x]No   Inability to Care for Self/Will Require 24 hour care  [] Yes  [x]No   Pain Management  [] Yes  [x]No   Home Infusion Therapy  [] Yes  [x]No   Oxygen Therapy  [] Yes  [x]No   DME  [] Yes [x]No   Long Term Care Placement  [] Yes  [x]No   Rehab  [x] Yes  []No   Physical Therapy  [x] Yes  []No   Needs Anticipated At This Time  [x] Yes  []No     Intra-Hospital Referral:    [address]  [] Yes  [x]No     [] Yes  [x]No   Patient Representative  [] Yes  [x]No   Staff for Teaching Needs  [] Yes  [x]No   Specialty Teaching Needs     Diabetic Educator  [] Yes  [x]No   Referral for Diabetic Educator Needed  [] Yes  [x]No  If Yes, place order for Nutritionist or Diabetic Consult     Tentative Discharge Plan:    Home with No Services  [] Yes  [x]No   Home with [address]  [] Yes  [x]No        If Yes, specify type    Home Care Program  [] Yes  [x]No        If Yes, specify type    Meals on Wheels  [] Yes  [x]No   Office of Aging  [] Yes  [x]No   NHP  [] Yes  [x]No   Return to the Nursing Home  [] Yes  [x]No   Rehab Therapy  [x] Yes  []No   Acute Rehab  [] Yes  [x]No   Subacute Rehab  [x] Yes  []No   Private Care  [] Yes  [x]No   Substance Abuse Referral  [] Yes  [x]No   Transportation  [] Yes  [x]No   Chore Service  [] Yes  [x]No   Inpatient Hospice  [] Yes  [x]No   OP RT  [] Yes  [x] No   OP Hemo  [] Yes  [x] No   OP PT  [] Yes  [x]No   Support Group  [] Yes  [x]No   Reach to Recovery  [] Yes  [x]No   OP Oncology Clinic  [] Yes  [x]No   Clinic Appointment  [] Yes  [x]No   DME  [] Yes  [x]No   Comments    Name of D/C Planner or  Given to Patient or Family Fouzia Vasquez RN   Phone Number [phone] extension [phone]   Date June 11, 2017   Time 1018 am   If you are discharged home, whom do you designate to participate in your discharge plan and receive any information needed?      Enter name of Noam Ramos  daughter        Phone # of designee         Address of Mabel Matthew Maria Isabelsylviemargaret [facility] [address] [place] [address] Drive, [address] [place] [address]        Updated June 11, 2017        Patient refused to designate any           individual

## 2017-06-11 NOTE — CONSULTS
PCCM  Consult, Initial, Brief    Small hemorrhagic small vessel stroke while on AC for Afib. Doing well; NIHSS 4 on admission and does not appear to have changed. Repeat CT at 6am sl increase in hemorrhage volume, but now INR is down to 1.4 (from 2 at 0130 AM). FFP completed infusing at 0450. No antiplatelets on board.     Repeat INR at 1800    -devan [phone]

## 2017-06-11 NOTE — PROGRESS NOTES
Pt is an 80year old admitted earlier this morning by the night hospitalist for hemorrhagic stroke. Chart was reviewed to include review of labs, imaging, and notes. Ammonia elevated at 43 and Lactulose started and will recheck Ammonia in AM and other AM labs also ordered.  Continue current treatment plan as outlined in H & P

## 2017-06-11 NOTE — ROUTINE PROCESS
0710- Bedside shift change report given to Rashi Wright RN (oncoming nurse) by Gadiel Erwin (offgoing nurse). Report included the following information SBAR, MAR and Cardiac Rhythm Sinus Lakesha Reading w/ A- Fib.     1915- Bedside shift change report given to Gadiel Erwin (oncoming nurse) by Josr Waldron (offgoing nurse). Report included the following information SBAR, MAR and Cardiac Rhythm Sinus Lakesha Reading w/A fib.

## 2017-06-11 NOTE — PROGRESS NOTES
Patient has designated her duaghter to participate in his/her discharge plan and to receive any needed information.      Name:   Address:  Teresita Hahn [address] [place] [address], [address] [place] [address]   June 11, 2017     Phone number:

## 2017-06-11 NOTE — ED NOTES
RN Straight Cath pt for UA specimen, pt has depends and small BM. Staff cleaned pt. No Abnormalities noted. Minimal urine returned.

## 2017-06-11 NOTE — PROGRESS NOTES
Problem: Diabetes Self-Management  Goal: *Insulin pump training  Outcome: Resolved/Met Date Met:  56/12/93  Not applicable  Goal: *Patient Specific Goal (EDIT GOAL, INSERT TEXT)  Outcome: Resolved/Met Date Met:  69/30/01  Not applicable

## 2017-06-11 NOTE — ED NOTES
Pt taken directly to ed bed 4 for eval.  Pt has left sided weakness, last normal at 1700 after eating dinner. Dr Moni Stevenson to bedside. Code S level 2 called.   Pt awake, alert and in NAD

## 2017-06-11 NOTE — PROGRESS NOTES
6/11/2017 - Patient chart reviewed however patient with active bedrest orders. PT eval will be held until Bedrest orders are removed or it is clearly documented to see patient at bed level for therapy. Will continue to f/u with patient. Thank you.   Oj Morel, PT, DPT

## 2017-06-12 ENCOUNTER — APPOINTMENT (OUTPATIENT)
Dept: GENERAL RADIOLOGY | Age: 82
DRG: 064 | End: 2017-06-12
Attending: HOSPITALIST
Payer: MEDICARE

## 2017-06-12 ENCOUNTER — APPOINTMENT (OUTPATIENT)
Dept: CT IMAGING | Age: 82
DRG: 064 | End: 2017-06-12
Attending: PSYCHIATRY & NEUROLOGY
Payer: MEDICARE

## 2017-06-12 ENCOUNTER — APPOINTMENT (OUTPATIENT)
Dept: GENERAL RADIOLOGY | Age: 82
DRG: 064 | End: 2017-06-12
Attending: FAMILY MEDICINE
Payer: MEDICARE

## 2017-06-12 LAB
ALBUMIN SERPL BCP-MCNC: 3.1 G/DL (ref 3.4–5)
ALBUMIN/GLOB SERPL: 1.1 {RATIO} (ref 0.8–1.7)
ALP SERPL-CCNC: 87 U/L (ref 45–117)
ALT SERPL-CCNC: 13 U/L (ref 13–56)
AMMONIA PLAS-SCNC: 54 UMOL/L (ref 11–32)
ANION GAP BLD CALC-SCNC: 9 MMOL/L (ref 3–18)
APTT PPP: 29.3 SEC (ref 23–36.4)
AST SERPL W P-5'-P-CCNC: 16 U/L (ref 15–37)
ATRIAL RATE: 258 BPM
ATRIAL RATE: 64 BPM
BASOPHILS # BLD AUTO: 0 K/UL (ref 0–0.06)
BASOPHILS # BLD: 0 % (ref 0–2)
BILIRUB SERPL-MCNC: 0.6 MG/DL (ref 0.2–1)
BLD PROD TYP BPU: NORMAL
BLD PROD TYP BPU: NORMAL
BPU ID: NORMAL
BPU ID: NORMAL
BUN SERPL-MCNC: 13 MG/DL (ref 7–18)
BUN/CREAT SERPL: 9 (ref 12–20)
CA-I SERPL-SCNC: 1.03 MMOL/L (ref 1.12–1.32)
CA-I SERPL-SCNC: 1.13 MMOL/L (ref 1.12–1.32)
CALCIUM SERPL-MCNC: 8.2 MG/DL (ref 8.5–10.1)
CALCULATED R AXIS, ECG10: 54 DEGREES
CALCULATED R AXIS, ECG10: 56 DEGREES
CALCULATED T AXIS, ECG11: 41 DEGREES
CALCULATED T AXIS, ECG11: 77 DEGREES
CHLORIDE SERPL-SCNC: 109 MMOL/L (ref 100–108)
CO2 SERPL-SCNC: 26 MMOL/L (ref 21–32)
CREAT SERPL-MCNC: 1.37 MG/DL (ref 0.6–1.3)
DIAGNOSIS, 93000: NORMAL
DIAGNOSIS, 93000: NORMAL
DIFFERENTIAL METHOD BLD: ABNORMAL
EOSINOPHIL # BLD: 0.2 K/UL (ref 0–0.4)
EOSINOPHIL NFR BLD: 4 % (ref 0–5)
ERYTHROCYTE [DISTWIDTH] IN BLOOD BY AUTOMATED COUNT: 15.5 % (ref 11.6–14.5)
GLOBULIN SER CALC-MCNC: 2.8 G/DL (ref 2–4)
GLUCOSE BLD STRIP.AUTO-MCNC: 206 MG/DL (ref 70–110)
GLUCOSE BLD STRIP.AUTO-MCNC: 210 MG/DL (ref 70–110)
GLUCOSE BLD STRIP.AUTO-MCNC: 214 MG/DL (ref 70–110)
GLUCOSE BLD STRIP.AUTO-MCNC: 240 MG/DL (ref 70–110)
GLUCOSE BLD STRIP.AUTO-MCNC: 249 MG/DL (ref 70–110)
GLUCOSE SERPL-MCNC: 206 MG/DL (ref 74–99)
HCT VFR BLD AUTO: 35.6 % (ref 35–45)
HEMOCCULT STL QL: NEGATIVE
HGB BLD-MCNC: 11.1 G/DL (ref 12–16)
INR PPP: 1.2 (ref 0.8–1.2)
LYMPHOCYTES # BLD AUTO: 23 % (ref 21–52)
LYMPHOCYTES # BLD: 1.2 K/UL (ref 0.9–3.6)
MAGNESIUM SERPL-MCNC: 1.9 MG/DL (ref 1.6–2.6)
MCH RBC QN AUTO: 26.2 PG (ref 24–34)
MCHC RBC AUTO-ENTMCNC: 31.2 G/DL (ref 31–37)
MCV RBC AUTO: 84.2 FL (ref 74–97)
MONOCYTES # BLD: 0.4 K/UL (ref 0.05–1.2)
MONOCYTES NFR BLD AUTO: 7 % (ref 3–10)
NEUTS SEG # BLD: 3.4 K/UL (ref 1.8–8)
NEUTS SEG NFR BLD AUTO: 66 % (ref 40–73)
PHOSPHATE SERPL-MCNC: 2 MG/DL (ref 2.5–4.9)
PLATELET # BLD AUTO: 138 K/UL (ref 135–420)
PMV BLD AUTO: 11.1 FL (ref 9.2–11.8)
POTASSIUM SERPL-SCNC: 3.6 MMOL/L (ref 3.5–5.5)
POTASSIUM SERPL-SCNC: 4.7 MMOL/L (ref 3.5–5.5)
PROT SERPL-MCNC: 5.9 G/DL (ref 6.4–8.2)
PROTHROMBIN TIME: 14.9 SEC (ref 11.5–15.2)
Q-T INTERVAL, ECG07: 354 MS
Q-T INTERVAL, ECG07: 468 MS
QRS DURATION, ECG06: 80 MS
QRS DURATION, ECG06: 94 MS
QTC CALCULATION (BEZET), ECG08: 413 MS
QTC CALCULATION (BEZET), ECG08: 475 MS
RBC # BLD AUTO: 4.23 M/UL (ref 4.2–5.3)
SODIUM SERPL-SCNC: 144 MMOL/L (ref 136–145)
STATUS OF UNIT,%ST: NORMAL
STATUS OF UNIT,%ST: NORMAL
UNIT DIVISION, %UDIV: 0
UNIT DIVISION, %UDIV: 0
VENTRICULAR RATE, ECG03: 62 BPM
VENTRICULAR RATE, ECG03: 82 BPM
WBC # BLD AUTO: 5.1 K/UL (ref 4.6–13.2)

## 2017-06-12 PROCEDURE — C9113 INJ PANTOPRAZOLE SODIUM, VIA: HCPCS | Performed by: FAMILY MEDICINE

## 2017-06-12 PROCEDURE — 85610 PROTHROMBIN TIME: CPT | Performed by: FAMILY MEDICINE

## 2017-06-12 PROCEDURE — 74011636637 HC RX REV CODE- 636/637: Performed by: INTERNAL MEDICINE

## 2017-06-12 PROCEDURE — 92526 ORAL FUNCTION THERAPY: CPT

## 2017-06-12 PROCEDURE — 77030011256 HC DRSG MEPILEX <16IN NO BORD MOLN -A

## 2017-06-12 PROCEDURE — 71010 XR CHEST PORT: CPT

## 2017-06-12 PROCEDURE — C9113 INJ PANTOPRAZOLE SODIUM, VIA: HCPCS | Performed by: INTERNAL MEDICINE

## 2017-06-12 PROCEDURE — 65270000029 HC RM PRIVATE

## 2017-06-12 PROCEDURE — 74011250637 HC RX REV CODE- 250/637: Performed by: INTERNAL MEDICINE

## 2017-06-12 PROCEDURE — 74011000250 HC RX REV CODE- 250: Performed by: FAMILY MEDICINE

## 2017-06-12 PROCEDURE — 74011250637 HC RX REV CODE- 250/637: Performed by: FAMILY MEDICINE

## 2017-06-12 PROCEDURE — 74011636637 HC RX REV CODE- 636/637: Performed by: FAMILY MEDICINE

## 2017-06-12 PROCEDURE — 84100 ASSAY OF PHOSPHORUS: CPT | Performed by: FAMILY MEDICINE

## 2017-06-12 PROCEDURE — 93005 ELECTROCARDIOGRAM TRACING: CPT

## 2017-06-12 PROCEDURE — 82962 GLUCOSE BLOOD TEST: CPT

## 2017-06-12 PROCEDURE — 74011250636 HC RX REV CODE- 250/636: Performed by: INTERNAL MEDICINE

## 2017-06-12 PROCEDURE — 74011250636 HC RX REV CODE- 250/636: Performed by: HOSPITALIST

## 2017-06-12 PROCEDURE — 82330 ASSAY OF CALCIUM: CPT | Performed by: INTERNAL MEDICINE

## 2017-06-12 PROCEDURE — 70450 CT HEAD/BRAIN W/O DYE: CPT

## 2017-06-12 PROCEDURE — 84132 ASSAY OF SERUM POTASSIUM: CPT | Performed by: FAMILY MEDICINE

## 2017-06-12 PROCEDURE — 36415 COLL VENOUS BLD VENIPUNCTURE: CPT | Performed by: INTERNAL MEDICINE

## 2017-06-12 PROCEDURE — 77030029684 HC NEB SM VOL KT MONA -A

## 2017-06-12 PROCEDURE — 74011000258 HC RX REV CODE- 258: Performed by: INTERNAL MEDICINE

## 2017-06-12 PROCEDURE — 85025 COMPLETE CBC W/AUTO DIFF WBC: CPT | Performed by: FAMILY MEDICINE

## 2017-06-12 PROCEDURE — 77030033263 HC DRSG MEPILEX 16-48IN BORD MOLN -B

## 2017-06-12 PROCEDURE — 82330 ASSAY OF CALCIUM: CPT | Performed by: FAMILY MEDICINE

## 2017-06-12 PROCEDURE — 85730 THROMBOPLASTIN TIME PARTIAL: CPT | Performed by: FAMILY MEDICINE

## 2017-06-12 PROCEDURE — 83735 ASSAY OF MAGNESIUM: CPT | Performed by: FAMILY MEDICINE

## 2017-06-12 PROCEDURE — 74011000258 HC RX REV CODE- 258: Performed by: FAMILY MEDICINE

## 2017-06-12 PROCEDURE — 82140 ASSAY OF AMMONIA: CPT | Performed by: HOSPITALIST

## 2017-06-12 PROCEDURE — 74011250636 HC RX REV CODE- 250/636: Performed by: FAMILY MEDICINE

## 2017-06-12 PROCEDURE — 74011250637 HC RX REV CODE- 250/637: Performed by: HOSPITALIST

## 2017-06-12 RX ORDER — PANTOPRAZOLE SODIUM 40 MG/10ML
40 INJECTION, POWDER, LYOPHILIZED, FOR SOLUTION INTRAVENOUS EVERY 12 HOURS
Status: DISCONTINUED | OUTPATIENT
Start: 2017-06-12 | End: 2017-06-14

## 2017-06-12 RX ORDER — SODIUM,POTASSIUM PHOSPHATES 280-250MG
2 POWDER IN PACKET (EA) ORAL
Status: COMPLETED | OUTPATIENT
Start: 2017-06-12 | End: 2017-06-12

## 2017-06-12 RX ORDER — INSULIN LISPRO 100 [IU]/ML
INJECTION, SOLUTION INTRAVENOUS; SUBCUTANEOUS
Status: DISCONTINUED | OUTPATIENT
Start: 2017-06-12 | End: 2017-06-16 | Stop reason: HOSPADM

## 2017-06-12 RX ORDER — POTASSIUM CHLORIDE 1.5 G/1.77G
40 POWDER, FOR SOLUTION ORAL
Status: COMPLETED | OUTPATIENT
Start: 2017-06-12 | End: 2017-06-12

## 2017-06-12 RX ADMIN — INSULIN LISPRO 3 UNITS: 100 INJECTION, SOLUTION INTRAVENOUS; SUBCUTANEOUS at 00:07

## 2017-06-12 RX ADMIN — INSULIN LISPRO 3 UNITS: 100 INJECTION, SOLUTION INTRAVENOUS; SUBCUTANEOUS at 12:13

## 2017-06-12 RX ADMIN — POTASSIUM CHLORIDE 10 MEQ: 7.46 INJECTION, SOLUTION INTRAVENOUS at 00:32

## 2017-06-12 RX ADMIN — DOCUSATE SODIUM AND SENNOSIDES 2 TABLET: 8.6; 5 TABLET, FILM COATED ORAL at 21:33

## 2017-06-12 RX ADMIN — INSULIN LISPRO 6 UNITS: 100 INJECTION, SOLUTION INTRAVENOUS; SUBCUTANEOUS at 22:13

## 2017-06-12 RX ADMIN — INSULIN LISPRO 6 UNITS: 100 INJECTION, SOLUTION INTRAVENOUS; SUBCUTANEOUS at 16:57

## 2017-06-12 RX ADMIN — ALBUTEROL SULFATE 2.5 MG: 2.5 SOLUTION RESPIRATORY (INHALATION) at 21:49

## 2017-06-12 RX ADMIN — FOLIC ACID: 5 INJECTION, SOLUTION INTRAMUSCULAR; INTRAVENOUS; SUBCUTANEOUS at 16:57

## 2017-06-12 RX ADMIN — POTASSIUM CHLORIDE 40 MEQ: 1.5 POWDER, FOR SOLUTION ORAL at 10:34

## 2017-06-12 RX ADMIN — DEXTROSE MONOHYDRATE, SODIUM CHLORIDE, AND POTASSIUM CHLORIDE: 50; 9; 2.98 INJECTION, SOLUTION INTRAVENOUS at 07:16

## 2017-06-12 RX ADMIN — LACTULOSE 20 G: 20 SOLUTION ORAL at 10:33

## 2017-06-12 RX ADMIN — PANTOPRAZOLE SODIUM 40 MG: 40 INJECTION, POWDER, FOR SOLUTION INTRAVENOUS at 21:34

## 2017-06-12 RX ADMIN — INSULIN LISPRO 3 UNITS: 100 INJECTION, SOLUTION INTRAVENOUS; SUBCUTANEOUS at 08:00

## 2017-06-12 RX ADMIN — POTASSIUM & SODIUM PHOSPHATES POWDER PACK 280-160-250 MG 2 PACKET: 280-160-250 PACK at 10:36

## 2017-06-12 RX ADMIN — CALCIUM GLUCONATE 2 G: 94 INJECTION, SOLUTION INTRAVENOUS at 10:31

## 2017-06-12 RX ADMIN — LEVOTHYROXINE SODIUM ANHYDROUS 50 MCG: 100 INJECTION, POWDER, LYOPHILIZED, FOR SOLUTION INTRAVENOUS at 15:42

## 2017-06-12 RX ADMIN — POTASSIUM & SODIUM PHOSPHATES POWDER PACK 280-160-250 MG 2 PACKET: 280-160-250 PACK at 08:00

## 2017-06-12 RX ADMIN — SODIUM CHLORIDE 40 MG: 9 INJECTION, SOLUTION INTRAMUSCULAR; INTRAVENOUS; SUBCUTANEOUS at 10:34

## 2017-06-12 NOTE — PROGRESS NOTES
Hospitalist Progress Note  Marilou Wallace MD  Internal medicine/ Hospitalist    Daily Progress Note: 6/12/2017 1:03 PM      Interval history / Subjective:   Patient admitted for hemorrhagic stroke. He has h/o Afib, hypothyroid , CKD, hypertension, DM, Neuropathy and was on coumadin. INR on admission 3.3 and patient received FFP and vit K. He is now admitted to NICU. Care consisting on monitoring by repeating CT head. Pt feeling fine,no headaches,no blurred vision,no sob. Family members in the room visiting.       Current Facility-Administered Medications   Medication Dose Route Frequency    insulin lispro (HUMALOG) injection   SubCUTAneous AC&HS    ELECTROLYTE REPLACEMENT PROTOCOL  1 Each Other Q8H    ELECTROLYTE REPLACEMENT PROTOCOL  1 Each Other Q8H    ELECTROLYTE REPLACEMENT PROTOCOL  1 Each Other Q8H    PHARMACY INFORMATION NOTE  1 Each Other DAILY    ondansetron (ZOFRAN) injection 1 mg  1 mg IntraVENous Q6H PRN    labetalol (NORMODYNE;TRANDATE) 20 mg/4 mL (5 mg/mL) injection 5 mg  5 mg IntraVENous Q15MIN PRN    acetaminophen (TYLENOL) tablet 650 mg  650 mg Oral Q4H PRN    acetaminophen (TYLENOL) suppository 650 mg  650 mg Rectal Q4H PRN    senna-docusate (PERICOLACE) 8.6-50 mg per tablet 2 Tab  2 Tab Oral QHS    bisacodyl (DULCOLAX) tablet 5 mg  5 mg Oral DAILY PRN    bisacodyl (DULCOLAX) suppository 10 mg  10 mg Rectal DAILY PRN    pantoprazole (PROTONIX) 40 mg in sodium chloride 0.9 % 10 mL injection  40 mg IntraVENous Q12H    albuterol (PROVENTIL VENTOLIN) nebulizer solution 2.5 mg  2.5 mg Nebulization Q4H PRN    glucose chewable tablet 16 g  4 Tab Oral PRN    dextrose (D50W) injection syrg 12.5-25 g  12.5-25 g IntraVENous PRN    glucagon (GLUCAGEN) injection 1 mg  1 mg IntraMUSCular PRN    levothyroxine (SYNTHROID) injection 50 mcg  50 mcg IntraVENous DAILY    dextrose 5% - 0.9% NaCl with KCl 40 mEq/L infusion   IntraVENous CONTINUOUS    niCARdipine (CARDENE) 25 mg in 0.9% sodium chloride 250 mL infusion  0-15 mg/hr IntraVENous TITRATE    folic acid (FOLVITE) 1 mg, thiamine (B-1) 100 mg in 0.9% sodium chloride 50 mL ivpb   IntraVENous DAILY    0.9% sodium chloride infusion 250 mL  250 mL IntraVENous PRN        Review of Systems  No complaint. Objective:     Visit Vitals    /54    Pulse 77    Temp 99.2 °F (37.3 °C)    Resp 26    Wt 97.7 kg (215 lb 6.2 oz)    SpO2 97%    BMI 43.5 kg/m2      O2 Device: Room air    Temp (24hrs), Av.1 °F (37.3 °C), Min:98.8 °F (37.1 °C), Max:99.5 °F (37.5 °C)      701 -  1900  In: 374.5 [I.V.:374.5]  Out: 150 [Urine:150]  06/10 1901 -  0700  In: 3853.7 [P.O.:140; I.V.:3307.9]  Out: 1210 [Urine:1210]  P/E  NAD,lying in bed,comfortably,family members sitting in the room. Heent:perrla,at/nc,mouth moist.  Lungs ctab  Heart s1s2 nl,no m/g  Abdm:soft,not tender,bs present. Extr:no edema,good pulses.   Neuro:aaox3,grossly intact    Data Review    Recent Results (from the past 12 hour(s))   PROTHROMBIN TIME + INR    Collection Time: 17  2:40 AM   Result Value Ref Range    Prothrombin time 14.9 11.5 - 15.2 sec    INR 1.2 0.8 - 1.2     PTT    Collection Time: 17  2:40 AM   Result Value Ref Range    aPTT 29.3 23.0 - 36.4 SEC   CALCIUM, IONIZED    Collection Time: 17  2:40 AM   Result Value Ref Range    Ionized Calcium 1.03 (L) 1.12 - 1.32 MMOL/L   MAGNESIUM    Collection Time: 17  2:40 AM   Result Value Ref Range    Magnesium 1.9 1.6 - 2.6 mg/dL   PHOSPHORUS    Collection Time: 17  2:40 AM   Result Value Ref Range    Phosphorus 2.0 (L) 2.5 - 4.9 MG/DL   CBC WITH AUTOMATED DIFF    Collection Time: 17  2:40 AM   Result Value Ref Range    WBC 5.1 4.6 - 13.2 K/uL    RBC 4.23 4.20 - 5.30 M/uL    HGB 11.1 (L) 12.0 - 16.0 g/dL    HCT 35.6 35.0 - 45.0 %    MCV 84.2 74.0 - 97.0 FL    MCH 26.2 24.0 - 34.0 PG    MCHC 31.2 31.0 - 37.0 g/dL    RDW 15.5 (H) 11.6 - 14.5 %    PLATELET 669 495 - 036 K/uL    MPV 11.1 9.2 - 11.8 FL    NEUTROPHILS 66 40 - 73 %    LYMPHOCYTES 23 21 - 52 %    MONOCYTES 7 3 - 10 %    EOSINOPHILS 4 0 - 5 %    BASOPHILS 0 0 - 2 %    ABS. NEUTROPHILS 3.4 1.8 - 8.0 K/UL    ABS. LYMPHOCYTES 1.2 0.9 - 3.6 K/UL    ABS. MONOCYTES 0.4 0.05 - 1.2 K/UL    ABS. EOSINOPHILS 0.2 0.0 - 0.4 K/UL    ABS. BASOPHILS 0.0 0.0 - 0.06 K/UL    DF AUTOMATED     METABOLIC PANEL, COMPREHENSIVE    Collection Time: 06/12/17  2:40 AM   Result Value Ref Range    Sodium 144 136 - 145 mmol/L    Potassium 3.6 3.5 - 5.5 mmol/L    Chloride 109 (H) 100 - 108 mmol/L    CO2 26 21 - 32 mmol/L    Anion gap 9 3.0 - 18 mmol/L    Glucose 206 (H) 74 - 99 mg/dL    BUN 13 7.0 - 18 MG/DL    Creatinine 1.37 (H) 0.6 - 1.3 MG/DL    BUN/Creatinine ratio 9 (L) 12 - 20      GFR est AA 44 (L) >60 ml/min/1.73m2    GFR est non-AA 36 (L) >60 ml/min/1.73m2    Calcium 8.2 (L) 8.5 - 10.1 MG/DL    Bilirubin, total 0.6 0.2 - 1.0 MG/DL    ALT (SGPT) 13 13 - 56 U/L    AST (SGOT) 16 15 - 37 U/L    Alk.  phosphatase 87 45 - 117 U/L    Protein, total 5.9 (L) 6.4 - 8.2 g/dL    Albumin 3.1 (L) 3.4 - 5.0 g/dL    Globulin 2.8 2.0 - 4.0 g/dL    A-G Ratio 1.1 0.8 - 1.7     AMMONIA    Collection Time: 06/12/17  2:40 AM   Result Value Ref Range    Ammonia 54 (H) 11 - 32 UMOL/L   EKG, 12 LEAD, SUBSEQUENT    Collection Time: 06/12/17  5:46 AM   Result Value Ref Range    Ventricular Rate 82 BPM    Atrial Rate 64 BPM    QRS Duration 80 ms    Q-T Interval 354 ms    QTC Calculation (Bezet) 413 ms    Calculated R Axis 54 degrees    Calculated T Axis 77 degrees    Diagnosis       Poor data quality, interpretation may be adversely affected  Electrode noise  Atrial fibrillation  Nonspecific ST and T wave abnormality , probably digitalis effect  Abnormal ECG  Confirmed by Giuseppe Hay ([id number]) on 6/12/2017 11:39:45 AM     GLUCOSE, POC    Collection Time: 06/12/17 11:58 AM   Result Value Ref Range    Glucose (POC) 249 (H) 70 - 110 mg/dL         Assessment/Plan:     Principal Problem: Hemorrhagic stroke ([facility] [address] (6/11/2017)    Active Problems:    Chronic a-fib ([facility] [address] (6/11/2017)      Supratherapeutic INR (6/11/2017)      Left-sided weakness (6/11/2017)      Hypokalemia (6/11/2017)      CKD4    JOSIE    DM    HTN    Neuropathy    Hypothyroid     Care Plan  1. Hemorrhagic stroke      Left side weakness. Likely hypertensive in the setting of supra therapeutic INR 3.3     Reversal Vit K and FFP given on admission. INR=1.2 today     Monitor INR for goal < 1.5     CT head repeated:CT-mildly progressively enlarging small right thalamic hematoma. PT/OT/Speech     D/C Warfarin     Neuro and Vital check per protocol     Keep SBP < 140. Use Labetalol or titrate Cardene to meet goal     Neurology consulted and following patient         2. Chronic Afib     Currently in afib w. Normal ventricular response     No EKG done . Order EKG     Hold Warfarin . Not a candidate for [facility]     3. Left side weakness      2/2 Hemorrhagic CVA      PT/OT     4. CKD4/JOSIE     Creat improved.     5. Hypokalemia      Resoleved,will follow     6. HTN     Controlled     Keep sbp < 140 per Hemorrhagic CVA .     7.DM    SSI    A1c     8. Neuropathy     Resume gabapentin if cleared by speech for dysphagia      9. Hypothyroid      Synthroid     DVT Prophylaxis - None Supra therapeutic      GI Prophylaxis      Full code

## 2017-06-12 NOTE — PROGRESS NOTES
OT order received and chart reviewed. Pt has active bedrest orders; will need bedrest orders discontinued in order to mobilize pt and complete OT evaluation. Thank you.     Ophelia Ellington MS OTR/L  Office Ext: [phone]  Pager: [phone]

## 2017-06-12 NOTE — PROGRESS NOTES
conducted an initial consultation and Spiritual Assessment for Elen Peoples, who is a 80 y.o.,female. Patients Primary Language is: [place]. According to the patients EMR Worship Affiliation is: Other. The reason the Patient came to the hospital is:   Patient Active Problem List    Diagnosis Date Noted    Hemorrhagic stroke ([facility] [address] 06/11/2017    Chronic a-fib ([facility] [address] 06/11/2017    Supratherapeutic INR 06/11/2017    Left-sided weakness 06/11/2017    Hypokalemia 06/11/2017        The  provided the following Interventions:  Initiated a relationship of care and support with patient in room 2604 this morning around 0945. Listened empathically as patient talked about being here this time and in the past for some surgery. Provided information about Spiritual Care Services and of our continuous presence here for her. Offered prayer and assurance of continued prayers on patients behalf. The following outcomes were achieved:  Patient shared limited information aboutheir  Her  medical narrative and spiritual journey/beliefs. Patient processed feeling about current hospitalization. Patient expressed gratitude for pastoral care visit. Assessment:  Patient does not have any Restorationism/cultural needs that will affect patients preferences in health care. There are no further spiritual or Restorationism issues which require Spiritual Care Services interventions at this time. Plan:  Chaplains will continue to follow and will provide pastoral care on an as needed/requested basis    . Brandi Benitez   Spiritual Care   [phone]

## 2017-06-12 NOTE — DIABETES MGMT
NUTRITIONAL ASSESSMENT GLYCEMIC CONTROL/ PLAN OF Bettie Benavides           80 y.o.           6/10/2017                 1. Stroke due to intracerebral hemorrhage ([address]    2. Supratherapeutic INR       INTERVENTIONS/PLAN:   1. Add No Added Sugar [place] Instant Breakfast (stw) TID  2. Implement preferences. 3.  Monitor po intake, glycemic control, labs and weights. 4.  Advance corrective lispro to very insulin resistant dosing. ASSESSMENT:   Nutritional Status:  Pt is 199% ideal wt; BMI (calculated): 43.5 kg/m2 (extreme obesity). Pt appears well nourished. Poor po noted today. Diabetes Management:   Chart review indicates pt was admitted to ED on 12-12-16 with hypoglycemia (BS was 44), was taking Amaryl at the time and was eating well. Her dtr states pt then followed up with Dr. Brad Melendez who discontinued Amaryl, pt was started on Trajenta and she has since not experienced hypoglycemia. Varied blood glucose at this time. Recent blood glucose:  6/12/17:  240, 249  6/11/17:  231, 187, 156, 199 - received 9 units corrective lispro     Within target range (non-ICU: <140; ICU<180): [] Yes   [x]  No  Current Insulin regimen:   Corrective lispro, normal insulin sensitivity ACHS  Home medication/insulin regimen:   Tranjeta  HbA1c:  8.5% - ave BG has been ~ 195 mg/dL over past 3 months. Adequate glycemic control PTA:  [x] Yes, considering pt's advanced age  [] No     SUBJECTIVE/OBJECTIVE:   Information obtained from: pt, pt's dtr, chart review, ICU rounds  Pt reports having a poor appetite PTA with weight loss but unable to quantify. She denies N/V. No food allergies and she denies problems with chewing or swallowing. Pt admitted with Small hemorrhagic small vessel stroke while on anticoagulant for Afib and PMHX of T2DM, CKD, HTN.       Diet: mechanical soft (SLP note reviewed)  - pt intake at breakfast <100 calories    Patient Vitals for the past 100 hrs:   % Diet Eaten   06/12/17 1200 0 % 06/12/17 0900 10 %   06/11/17 1800 75 %   06/11/17 1400 75 %         Medications: [x]                Reviewed   IVF:  D5 NS with 40 mEq KCl/L at 74.9 ml/hr    Most Recent POC Glucose: Recent Labs      06/12/17   0240  06/11/17   0130  06/10/17   2155   GLU  206*  252*  322*         Labs:   Lab Results   Component Value Date/Time    Hemoglobin A1c 8.5 06/10/2017 09:55 PM     Lab Results   Component Value Date/Time    Sodium 144 06/12/2017 02:40 AM    Potassium 3.6 06/12/2017 02:40 AM    Chloride 109 06/12/2017 02:40 AM    CO2 26 06/12/2017 02:40 AM    Anion gap 9 06/12/2017 02:40 AM    Glucose 206 06/12/2017 02:40 AM    BUN 13 06/12/2017 02:40 AM    Creatinine 1.37 06/12/2017 02:40 AM    Calcium 8.2 06/12/2017 02:40 AM    Magnesium 1.9 06/12/2017 02:40 AM    Phosphorus 2.0 06/12/2017 02:40 AM    Albumin 3.1 06/12/2017 02:40 AM       Anthropometrics: IBW : 49 kg (108 lb), % IBW (Calculated): 199.43 %, BMI (calculated): 43.5  Wt Readings from Last 1 Encounters:   06/12/17 97.7 kg (215 lb 6.2 oz)    Ht Readings from Last 1 Encounters:   06/12/17 4' 11\" (1.499 m)       Estimated Nutrition Needs:  2765 Kcals/day, Protein (g): 59 g Fluid (ml): 1600 ml  Based on:   [x]          Actual BW    []          ABW   []            Adjusted BW        Nutrition Diagnoses:   Obesity due to excess energy intake PTA as evidenced by BMI of 43.5 kg/m2. Altered nutrition related labs due to diabetes as evidenced by A1C of 8.5%. Nutrition Interventions:  No added Sugar [place] Instant Breakfast TID. Mechanical soft diet  No concentrated sweets  implement preferences  Goal:   Blood glucose will be within target range of  mg/dL by 6/15/17. Po intake will meet > 75% nutrient needs by 6/17/17.         Nutrition Monitoring and Evaluation      [x]     Monitor po intake on meal rounds  [x]     Continue inpatient monitoring and intervention  []     Other:      Nutrition Risk:  []   High     [x]  Moderate    [] Minimal/Uncompromised    Martine Wan, [address] [place] [address], E   Office:  [facility] Pager:  [phone]

## 2017-06-12 NOTE — CDMP QUERY
Please clarify if this patient is being treated/managed for:    =>Cerebral Edema  noted on Ct scan 6-12-17    =>Other Explanation of clinical findings  =>Unable to Determine (no explanation of clinical findings)    The medical record reflects the following:    Risk: pt admitted with Cva     Clinical Indicators:  edema noted on ct scan 6-12-17  not apparent on initial ct scan of 6-10-17     Treatment:  neuro consult, ffp, vit k,     Please clarify and document your clinical opinion in the progress notes and discharge summary including the definitive and/or presumptive diagnosis, (suspected or probable), related to the above clinical findings. Please include clinical findings supporting your diagnosis. If you DECLINE this query or would like to communicate with [facility], please utilize the \"[facility] message box\" at the TOP of the Progress Note on the right.       Thank you,  Rozina Peralta Rn/CCDS    [phone]

## 2017-06-12 NOTE — PROGRESS NOTES
PT orders received and chart reviewed. Active bedrest orders.  Per recommendation of stroke round team, holding PT until removal of bedrest orders to allow for functional mobility eval.        Thank you,     Uzair Genao, PT, DPT

## 2017-06-12 NOTE — PROGRESS NOTES
Problem: Dysphagia (Adult)  Goal: *Acute Goals and Plan of Care (Insert Text)  Recommendations:  Diet: mech-soft  Meds: per pt preference  Aspiration Precautions  Oral Care TID  Other: Patient may have cereal in milk for breakfast    Goals: Patient will:  1. Tolerate PO trials with 0 s/s overt distress in 4/5 trials - goal met 6/12  2. Utilize compensatory swallow strategies/maneuvers (decrease bite/sip, size/rate, alt. liq/sol) with min cues in 4/5 trials - goal met 6/12   Outcome: Progressing Towards Goal  SPEECH LANGUAGE PATHOLOGY DYSPHAGIA TREATMENT/DISCHARGE     Patient: Rafaela Carranza (89 y.o. female)  Date: 6/12/2017  Diagnosis: Hemorrhagic stroke ([facility] [address]  Hemorrhagic stroke ([facility] [address]  Supratherapeutic INR  Chronic a-fib ([facility] [address] Hemorrhagic stroke ([facility] [address]       Precautions: aspiration        ASSESSMENT:  Patient seen for swallowing therapy this day. Patient with thin liquids (+) straw and dental soft therapuetic snack. Of note, breakfast tray at bedside with ~70-80% of meal remaining. Reports only eating cereal for breakfast. With trials today, patient demonstrated no oropharyngeal dysphagia. Limited dentition noted, therefore recommend cont dental soft diet, however patient is cleared to consume cereal texture with milk. Discussed with RN, Mirtha Lara. Patient educated on importance of safe swallowing guidelines (small bites/sips, decreased rate, alt solids/liquids, HOB >60 for all PO intake); verbalized comprehension. Maximum therapeutic gains met; safest, least restrictive diet achieved. D/C ST intervention at this time. PLAN:  Recommendations and Planned Interventions:  Dental soft, thin liquids  Maximum therapeutic gains met; safest, least restrictive diet achieved. D/C ST intervention at this time. Discharge Recommendations:  None       SUBJECTIVE:   Patient stated I just don't have an appetite.       OBJECTIVE:   Cognitive and Communication Status:  Neurologic State: Alert  Orientation Level: Oriented X4  Cognition: Appropriate decision making  Perception: Appears intact  Perseveration: No perseveration noted  Safety/Judgement: Awareness of environment  Dysphagia Treatment:  Oral Assessment:  Oral Assessment  Labial: No impairment  Dentition: Intact  Oral Hygiene: good  Lingual: No impairment  Velum: No impairment  Mandible: No impairment  P.O. Trials:              Patient Position: HOb60              Vocal quality prior to P.O.: No impairment              Consistency Presented: Thin liquid, Solid              How Presented: SLP-fed/presented, Straw              How Much: 12              Bolus Acceptance: No impairment              Bolus Formation/Control: No impairment              Type of Impairment: Delayed, Mastication              Propulsion: No impairment              Oral Residue: None              Initiation of Swallow: No impairment              Laryngeal Elevation: Functional              Aspiration Signs/Symptoms: None              Pharyngeal Phase Characteristics: No impairment, issues, or problems               Effective Modifications: Alternate liquids/solids, Small sips and bites              Cues for Modifications: None                                Oral Phase Severity: No impairment              Pharyngeal Phase Severity : No impairment                       PAIN:  Start of Tx: 0  End of Tx: 0      After treatment:   [ ]              Patient left in no apparent distress sitting up in chair  [X]              Patient left in no apparent distress in bed  [X]              Call bell left within reach  [X]              Nursing notified  [ ]              Family present  [ ]              Caregiver present  [ ]              Bed alarm activated         COMMUNICATION/EDUCATION:   [X]        Aspiration precautions; swallow safety; compensatory techniques  [ ]        Patient unable to participate in education; education ongoing with staff  [ ]         Posted safety precautions in patient's room.   [ ] Oral-motor/laryngeal strengthening exercises        Soraya Lock MS [facility] SLP  Time Calculation: 30 mins

## 2017-06-12 NOTE — INTERDISCIPLINARY ROUNDS
Interdisciplinary STROKE Rounds ICH/SAH       Recommendations:     Recommendations are as follows:   1. Mobilize and discontinue bed rest  2:  Wait 2 weeks before restarting anticoagulation  3:  Good rehabilitation candidate  Gordy Rubinstein MD    Tests for Today: none  Discharge/Case Management Plan: home vs SNF    Core Measure Review:     VTE Prophylaxis:  Yes   Dysphagia Screen:  Yes   Therapies:       1. Physical Therapy consult:  Bedrest: yes  pending        2. Occupational Therapy consult:  pending        3. Speech Therapy consult:   Recommendations:  Diet: mech-soft  Meds: per pt preference  Aspiration Precautions  Oral Care TID   Nutrition Consult pending   Stroke Care Plan:  Yes   Stroke Education Book Given: Yes   Smoking cessation given: No - never smoker   Current NIHSS Total: 7 (06/12/17 0700)   Pre-mRS 3   Current mRS 4   ICH Score 1   NEFF/DONOHUE n/a   Marcelo Grade n/a     Situation     Santosh koroma a 80 y.o. female presented to ED c/o L side weakness onset approximately at 5pm. The patient explains was eating dinner and got up to go use the bathroom when her L leg gave out. Onset: 06/10/17 1700    Hospital day: 1     Background     Past Medical History:   Diagnosis Date    Atrial fibrillation ([facility]     Chronic kidney disease     unknown what stage    Diabetes ([facility] [facility]        History reviewed. No pertinent surgical history. Prescriptions Prior to Admission   Medication Sig    IRON/DOCUSATE SODIUM (LAURA-SEQUELS PO) Take 50 mg by mouth daily.  ascorbic acid, vitamin C, (VITAMIN C) 500 mg tablet Take 500 mg by mouth daily.  meclizine (ANTIVERT) 25 mg tablet Take 25 mg by mouth four (4) times daily as needed.  warfarin (COUMADIN) 5 mg tablet Take 5 mg by mouth daily. Indications: 5 mg 1/2 tabs QHS 6 days a week    zolpidem (AMBIEN) 10 mg tablet Take 10 mg by mouth nightly as needed for Sleep.     betamethasone valerate (VALISONE) 0.1 % topical cream Apply  to affected area two (2) times a day.  furosemide (LASIX) 40 mg tablet Take  by mouth two (2) times a day.  gabapentin (NEURONTIN) 300 mg capsule Take 300 mg by mouth nightly.  losartan (COZAAR) 50 mg tablet Take 50 mg by mouth daily.  glimepiride (AMARYL) 2 mg tablet Take 2 mg by mouth daily.  metoprolol tartrate (LOPRESSOR) 50 mg tablet Take 50 mg by mouth two (2) times a day.  levothyroxine (SYNTHROID) 100 mcg tablet Take 100 mcg by mouth Daily (before breakfast).  spironolactone (ALDACTONE) 50 mg tablet Take 50 mg by mouth two (2) times a day.  simvastatin (ZOCOR) 40 mg tablet Take 40 mg by mouth nightly.          Current Facility-Administered Medications:     insulin lispro (HUMALOG) injection, , SubCUTAneous, AC&HS, Jose Lyon MD    potassium chloride (KLOR-CON) packet 40 mEq, 40 mEq, Oral, NOW, Jose Lyon MD    potassium, sodium phosphates (NEUTRA-PHOS) packet 2 Packet, 2 Packet, Oral, Q2H, Jose Lyon MD    ELECTROLYTE REPLACEMENT PROTOCOL, 1 Each, Other, Connie Newby MD, 1 Each at 06/12/17 0600    ELECTROLYTE REPLACEMENT PROTOCOL, 1 Each, Other, Connie Newby MD, 1 Each at 06/11/17 1400    ELECTROLYTE REPLACEMENT PROTOCOL, 1 Each, Other, Q8H, Rosanne Arias MD, 1 Each at 06/12/17 0600    PHARMACY INFORMATION NOTE, 1 Each, Other, Wilver Saleem MD, 1 Each at 06/12/17 0600    ondansetron (ZOFRAN) injection 1 mg, 1 mg, IntraVENous, Q6H PRN, Rosanne Arias MD    labetalol (NORMODYNE;TRANDATE) 20 mg/4 mL (5 mg/mL) injection 5 mg, 5 mg, IntraVENous, Q15MIN PRN, Rosanne Arias MD    acetaminophen (TYLENOL) tablet 650 mg, 650 mg, Oral, Q4H PRN, Rosanne Arias MD    acetaminophen (TYLENOL) suppository 650 mg, 650 mg, Rectal, Q4H PRN, Ajit De Paz MD    senna-docusate (PERICOLACE) 8.6-50 mg per tablet 2 Tab, 2 Tab, Oral, QHS, Rosanne Arias MD, 2 Tab at 06/11/17 2109    bisacodyl (DULCOLAX) tablet 5 mg, 5 mg, Oral, DAILY PRN, Ele Antonio MD    bisacodyl (DULCOLAX) suppository 10 mg, 10 mg, Rectal, DAILY PRN, Ele Antonio MD    pantoprazole (PROTONIX) 40 mg in sodium chloride 0.9 % 10 mL injection, 40 mg, IntraVENous, Q12H, Ele Antonio MD, 40 mg at 06/11/17 2108    albuterol (PROVENTIL VENTOLIN) nebulizer solution 2.5 mg, 2.5 mg, Nebulization, Q4H PRN, Ele Antonio MD    glucose chewable tablet 16 g, 4 Tab, Oral, PRN, Ele Antonio MD    dextrose (D50W) injection syrg 12.5-25 g, 12.5-25 g, IntraVENous, PRN, Ele Antonio MD    glucagon (GLUCAGEN) injection 1 mg, 1 mg, IntraMUSCular, PRN, Ele Antonio MD    levothyroxine (SYNTHROID) injection 50 mcg, 50 mcg, IntraVENous, DAILY, Ajit De Paz MD, 50 mcg at 06/11/17 1007    dextrose 5% - 0.9% NaCl with KCl 40 mEq/L infusion, , IntraVENous, CONTINUOUS, Ele Antonio MD, Last Rate: 74.9 mL/hr at 06/12/17 0716    niCARdipine (CARDENE) 25 mg in 0.9% sodium chloride 250 mL infusion, 0-15 mg/hr, IntraVENous, TITRATE, Ele Antonio MD, Stopped at 22/53/60 6317    folic acid (FOLVITE) 1 mg, thiamine (B-1) 100 mg in 0.9% sodium chloride 50 mL ivpb, , IntraVENous, DAILY, Ajit De Paz MD    lactulose (CHRONULAC) solution 20 g, 30 mL, Oral, TID, Gilberto Mathew, , 20 g at 06/11/17 2108    0.9% sodium chloride infusion 250 mL, 250 mL, IntraVENous, PRN, Daren Mendoza MD    Social History     Social History    Marital status:      Spouse name: N/A    Number of children: N/A    Years of education: N/A     Occupational History    Not on file.      Social History Main Topics    Smoking status: Never Smoker    Smokeless tobacco: Not on file    Alcohol use No    Drug use: No    Sexual activity: Not on file     Other Topics Concern    Not on file     Social History Narrative Assessment:     Principal Problem:    Hemorrhagic stroke ([facility] (6/11/2017)    Active Problems:    Chronic a-fib ([facility] (6/11/2017)      Supratherapeutic INR (6/11/2017)      Left-sided weakness (6/11/2017)      Hypokalemia (6/11/2017)        Patient Vitals for the past 12 hrs:   Temp Pulse Resp BP SpO2   06/12/17 0710 - 87 23 132/54 94 %   06/12/17 0700 - 83 26 140/51 94 %   06/12/17 0600 - 77 23 120/52 95 %   06/12/17 0527 - 88 21 139/60 95 %   06/12/17 0400 99.5 °F (37.5 °C) 66 23 141/48 97 %   06/12/17 0300 - 73 22 139/56 97 %   06/12/17 0200 - 67 22 140/54 97 %   06/12/17 0100 - 64 26 125/50 95 %   06/12/17 0022 - 62 21 139/52 96 %   06/12/17 0013 - 62 25 141/58 96 %   06/12/17 0001 99.5 °F (37.5 °C) 62 25 144/56 96 %   06/11/17 2300 - 67 26 140/50 96 %   06/11/17 2200 - 66 24 141/56 95 %   06/11/17 2115 - 71 24 136/57 95 %   06/11/17 2000 98.8 °F (37.1 °C) 67 25 132/55 95 %         Intake/Output Summary (Last 24 hours) at 06/12/17 0724  Last data filed at 06/12/17 0700   Gross per 24 hour   Intake           2866.7 ml   Output              650 ml   Net           2216.7 ml       Labs  :   Lab Results   Component Value Date/Time    Cholesterol, total 154 06/11/2017 01:30 AM    HDL Cholesterol 74 06/11/2017 01:30 AM    LDL, calculated 53.2 06/11/2017 01:30 AM    Triglyceride 134 06/11/2017 01:30 AM    CHOL/HDL Ratio 2.1 06/11/2017 01:30 AM       Lab Results   Component Value Date/Time    Hemoglobin A1c 8.5 06/10/2017 09:55 PM        aPTT   Date Value Ref Range Status   06/12/2017 29.3 23.0 - 36.4 SEC Final     INR   Date Value Ref Range Status   06/12/2017 1.2 0.8 - 1.2   Final     Comment:                INR Therapeutic Ranges         (on stable oral anticoagulant):     INDICATION                INR  DVT/PE/Atrial Fib          2.0-3.0  MI/Mechanical Heart Valve  2.5-3.5         EKG Results     Procedure 720 Value Units Date/Time    EKG, 12 LEAD, SUBSEQUENT [[id number]] Collected:  06/12/17 0546    Order Status:  Completed Updated:  06/12/17 0548     Ventricular Rate 82 BPM      Atrial Rate 64 BPM      QRS Duration 80 ms      Q-T Interval 354 ms      QTC Calculation (Bezet) 413 ms      Calculated R Axis 54 degrees      Calculated T Axis 77 degrees      Diagnosis --     Atrial fibrillation  Nonspecific ST and T wave abnormality , probably digitalis effect  Abnormal ECG  When compared with ECG of 11-JUN-2017 10:21,  ST now depressed in Anterior leads  Nonspecific T wave abnormality no longer evident in Inferior leads  Nonspecific T wave abnormality now evident in Lateral leads  QT has shortened      SCANNED CARDIAC RHYTHM STRIP [[id number]] Collected:  06/11/17 1338    Order Status:  Completed Updated:  06/11/17 1437    EKG, 12 LEAD, SUBSEQUENT [[id number]] Collected:  06/11/17 1021    Order Status:  Completed Updated:  06/11/17 1023     Ventricular Rate 62 BPM      Atrial Rate 258 BPM      QRS Duration 94 ms      Q-T Interval 468 ms      QTC Calculation (Bezet) 475 ms      Calculated R Axis 56 degrees      Calculated T Axis 41 degrees      Diagnosis --     Atrial fibrillation  Abnormal ECG  When compared with ECG of 11-JUN-2017 01:55,  No significant change was found      EKG, 12 LEAD, INITIAL [[id number]] Collected:  06/11/17 0155    Order Status:  Completed Updated:  06/11/17 1000     Ventricular Rate 54 BPM      Atrial Rate 44 BPM      QRS Duration 90 ms      Q-T Interval 462 ms      QTC Calculation (Bezet) 438 ms      Calculated R Axis 62 degrees      Calculated T Axis 44 degrees      Diagnosis --     Atrial fibrillation with slow ventricular response  Nonspecific ST abnormality , probably digitalis effect  Abnormal ECG  No previous ECGs available  Confirmed by Orion Ramirez MD, --- ([id number]) on 6/11/2017 10:00:44 AM      EKG, 12 LEAD, SUBSEQUENT [[id number]]     Order Status:  Canceled     EKG, 12 LEAD, INITIAL [[id number]]     Order Status:  Canceled           CT Results (most recent):    Results from [facility] Encounter encounter on 06/10/17   CT HEAD WO CONT   Narrative EXAM: CT head    INDICATION: Follow-up of intracranial hemorrhage    COMPARISON: 6/10/2017. TECHNIQUE: Axial CT imaging of the head was performed without intravenous  contrast.    One or more dose reduction techniques were used on this CT: automated exposure  control, adjustment of the mAs and/or kVp according to patient's size, and  iterative reconstruction techniques. The specific techniques utilized on this CT  exam have been documented in the patient's electronic medical record.  _______________    FINDINGS:    BRAIN AND POSTERIOR FOSSA: There has been interval increase in the size of the  right internal capsule intracranial hemorrhage, now measuring 14 x 13 x 15 mm  (previously 10 x 6 x 11 mm) with an associated hematocrit level. No  intraventricular extension or significant mass effect. There is a mild degree of sulcal enlargement and ventricular dilatation  consistent with cortical atrophy. There is hypoattenuation of the  periventricular white matter, which is nonspecific but most likely represents  changes from chronic microangiopathy. EXTRA-AXIAL SPACES AND MENINGES: There are no abnormal extra-axial fluid  collections. CALVARIUM: Intact. SINUSES: Clear. OTHER: Atherosclerotic calcifications noted. _______________         Impression IMPRESSION:    1. Mild interval increase in size of small-volume right internal capsule  intracranial hemorrhage. 2. Senescent changes of the brain including cortical atrophy and findings most  suggestive of chronic microvascular ischemia. MRI Results (most recent):  No results found for this or any previous visit. ECHO Results: none ordered                                              Hemorrhagic Causes   Hypertensive: Chronic, sustained or acute crisis. Most common. Bleeding Diathesis: Acquired or inherited disorders of coagulation. Rare.  Use of antiplatelets, anticoagulants, or thrombolytic agents. Tumor: Bleeding in tumor bed. Vasculitis: Unusual cause intraparenchymal hemorrhage. Elevated erythrocyte sedimentation rate or C-reactive protein    Drug Abuse: Use of cocaine, amphetamines, and/or other stimulants    Aneurysm, Cavernous Malformation, Venous Thrombosis or AVM: uncommon    Cerebral Amyloid Angiopathy    Hemorrhagic Conversion from Ischemic Stroke         Discipline Participation:   Interdisciplinary rounds were conducted by: Neuroscience Medical Direcctor, Stroke Coordinator and PT/OT/SLP Rep      REFERENCE       Score Modified [place] Description    0 No Symptoms at all   1 No significant disability despite symptoms;able to carry out all usual duties    2 Slight disability; unable to carry out all previous activities , but able to look after own affairs without assistance.    3 Moderate disability; requires some help but able to walk without assistance   4 Severe disability ;unable to walk without assistance and unable to attend to own bodily needs without assistance   5  Severe disability; bedridden, incontinent and requiring constant nursing care and attention   6 Dead        Score  Og/Ramírez Description   1  Asymptomatic, mild headache, nuchal rigidity   2  Moderate to sever headache, nuchal rigidity, no neurologic deficit other than cranial nerve   3  Drowsiness/confusion, mild focal neurologic deficit   4  Stupor, moderate/severe hemiparesis   5  Come, decerebrate posturing   Total

## 2017-06-12 NOTE — PROGRESS NOTES
PCCM  Progress  6/12/2017     Small hemorrhagic small vessel stroke while on AC for Afib.     Persistent left hemiparesis; NIHSS 7 is [facility] Tel[facility] on presentation (4) but does not appear to have changed since arrival in NCCU. Repeat CT at 5:15 AM says AGAIN SLIGHT INCREASE in hemorrhage volume. INR last night 1.3 and is now 1.2. Plts 138 with normal testing parameters & no antiplatelets on board. Seen by Stroke Team this AM:  Recommendations are as follows:   1.  Mobilize and discontinue bed rest  2: Wait 2 weeks before restarting anticoagulation  3: Good rehabilitation candidate  A Davis LAWS    IMPRESSION & PLAN:  # small ICH  # normotensive  # followed by [facility] Maggie Valencia Neurologist  # no role for lactulose my opinion - will dc  # PCCM will sign off; available as needed      -[facility] [phone]

## 2017-06-12 NOTE — PROGRESS NOTES
1900 - Report received from Radha Arnold, [address] [place] [address]. Orders, medications, labs, and skin reviewed. 0530 - Pt has had multiple liquid stools. Gluteal cleft showed scant blood. EPC cream applied to site and surrounding areas. 0630 - Lab called and informed this nurse the chemistry drawn at [phone] had hemolyzed and needed to be re-drawn. 0754 - New chemistry drawn and walked to lab and handed to technician to run.   0700 - Report given to Leonidas Sam, [address] [place] [address]. Orders, medications, labs, neuro exam, and skin reviewed. 26 - Called lab to inquire about when labs would result. Told two minutes.

## 2017-06-12 NOTE — PROGRESS NOTES
Progress Note    Patient: Isadora Monteiro MRN: [id number]  SSN: [id number]    YOB: 1929  Age: 80 y.o. Sex: female      Admit Date: 6/10/2017    LOS: 1 day     Subjective:     Denies headache or other pain. Feels weakness left side seems a little better. BP stable. Off coumadin. INR today 1.2. Repeat CT reviewed and shows slight increase in size of hemorrhage, now 17x14 mm with mild edema, originally 9x6 mm. Objective:     Vitals:    06/12/17 0800 06/12/17 0900 06/12/17 1100 06/12/17 1200   BP: 125/58 138/80 131/50 150/54   Pulse: 81 84 83 77   Resp: 25 23 22 26   Temp: 99 °F (37.2 °C)   99.2 °F (37.3 °C)   SpO2: 93% 94% 94% 97%   Weight:            Intake and Output:  Current Shift: 06/12 0701 - 06/12 1900  In: 704.3 [P.O.:180; I.V.:524.3]  Out: 150 [Urine:150]  Last three shifts: 06/10 1901 - 06/12 0700  In: 3853.7 [P.O.:140; I.V.:3307.9]  Out: 1210 [Urine:1210]    Physical Exam:   GENERAL: alert, cooperative, no distress, appears stated age  EYE: negative  EXTREMITIES:  no edema  NEUROLOGIC: awake, alert, oriented. Speech fluent and appropriate. Normal strength right. Left arm biceps 2/5 with triceps 4-/5. Left finger spread 3/5. Left hip flexion barely 3/5    Lab/Data Review: All lab results for the last 24 hours reviewed. , glu 206, creat 1.37. Echo with 55-60% EF and moderate tricuspid regurgitation, otherwise ok    Assessment/Plan: 1. ICH in setting of high INR. CT with slight increase in size but remains small and clinically she is stable. 2. Left hemiparesis. 3. Atrial fibrillation. Plan: Jennifer Cage to mobilize. PT/OT. No anticoagulation for next 2 weeks. Can go to floor.      Principal Problem:    Hemorrhagic stroke ([address] (6/11/2017)    Active Problems:    Chronic a-fib ([address] (6/11/2017)      Supratherapeutic INR (6/11/2017)      Left-sided weakness (6/11/2017)      Hypokalemia (6/11/2017)            Signed By: Chris Culver MD     June 12, 2017

## 2017-06-12 NOTE — ROUTINE PROCESS
Primary Nurse Angela Ashley RN and Jaun Perez RN performed a dual skin assessment on this patient Impairment noted- see wound doc flow sheet  Kamlesh score is 7

## 2017-06-13 ENCOUNTER — APPOINTMENT (OUTPATIENT)
Dept: CT IMAGING | Age: 82
DRG: 064 | End: 2017-06-13
Attending: INTERNAL MEDICINE
Payer: MEDICARE

## 2017-06-13 ENCOUNTER — APPOINTMENT (OUTPATIENT)
Dept: GENERAL RADIOLOGY | Age: 82
DRG: 064 | End: 2017-06-13
Attending: HOSPITALIST
Payer: MEDICARE

## 2017-06-13 LAB
ANION GAP BLD CALC-SCNC: 11 MMOL/L (ref 3–18)
APPEARANCE UR: CLEAR
APTT PPP: 30.1 SEC (ref 23–36.4)
ARTERIAL PATENCY WRIST A: YES
ATRIAL RATE: 81 BPM
BACTERIA URNS QL MICRO: NEGATIVE /HPF
BASE DEFICIT BLD-SCNC: 2 MMOL/L
BASOPHILS # BLD AUTO: 0 K/UL (ref 0–0.1)
BASOPHILS # BLD: 0 % (ref 0–3)
BDY SITE: ABNORMAL
BILIRUB UR QL: NEGATIVE
BNP SERPL-MCNC: 6251 PG/ML (ref 0–1800)
BODY TEMPERATURE: 98.3
BUN SERPL-MCNC: 8 MG/DL (ref 7–18)
BUN/CREAT SERPL: 7 (ref 12–20)
CA-I SERPL-SCNC: 1.22 MMOL/L (ref 1.12–1.32)
CALCIUM SERPL-MCNC: 8.7 MG/DL (ref 8.5–10.1)
CALCULATED P AXIS, ECG09: 62 DEGREES
CALCULATED R AXIS, ECG10: 54 DEGREES
CALCULATED T AXIS, ECG11: 43 DEGREES
CHLORIDE SERPL-SCNC: 113 MMOL/L (ref 100–108)
CO2 SERPL-SCNC: 21 MMOL/L (ref 21–32)
COLOR UR: YELLOW
CREAT SERPL-MCNC: 1.09 MG/DL (ref 0.6–1.3)
CRP SERPL HS-MCNC: 6.7 MG/L (ref 0–3)
D DIMER PPP FEU-MCNC: 1.53 UG/ML(FEU)
DIAGNOSIS, 93000: NORMAL
DIFFERENTIAL METHOD BLD: ABNORMAL
EOSINOPHIL # BLD: 0.1 K/UL (ref 0–0.4)
EOSINOPHIL NFR BLD: 2 % (ref 0–5)
EPITH CASTS URNS QL MICRO: ABNORMAL /LPF (ref 0–5)
ERYTHROCYTE [DISTWIDTH] IN BLOOD BY AUTOMATED COUNT: 16 % (ref 11.6–14.5)
GAS FLOW.O2 O2 DELIVERY SYS: ABNORMAL L/MIN
GLUCOSE BLD STRIP.AUTO-MCNC: 150 MG/DL (ref 70–110)
GLUCOSE BLD STRIP.AUTO-MCNC: 168 MG/DL (ref 70–110)
GLUCOSE BLD STRIP.AUTO-MCNC: 227 MG/DL (ref 70–110)
GLUCOSE BLD STRIP.AUTO-MCNC: 230 MG/DL (ref 70–110)
GLUCOSE BLD STRIP.AUTO-MCNC: 235 MG/DL (ref 70–110)
GLUCOSE SERPL-MCNC: 198 MG/DL (ref 74–99)
GLUCOSE UR STRIP.AUTO-MCNC: 250 MG/DL
HCO3 BLD-SCNC: 23.9 MMOL/L (ref 22–26)
HCT VFR BLD AUTO: 35.4 % (ref 35–45)
HGB BLD-MCNC: 11 G/DL (ref 12–16)
HGB UR QL STRIP: ABNORMAL
INR PPP: 1.1 (ref 0.8–1.2)
KETONES UR QL STRIP.AUTO: NEGATIVE MG/DL
LEUKOCYTE ESTERASE UR QL STRIP.AUTO: NEGATIVE
LYMPHOCYTES # BLD AUTO: 18 % (ref 20–51)
LYMPHOCYTES # BLD: 1 K/UL (ref 0.8–3.5)
MAGNESIUM SERPL-MCNC: 1.7 MG/DL (ref 1.6–2.6)
MCH RBC QN AUTO: 26.4 PG (ref 24–34)
MCHC RBC AUTO-ENTMCNC: 31.1 G/DL (ref 31–37)
MCV RBC AUTO: 85.1 FL (ref 74–97)
MONOCYTES # BLD: 0.3 K/UL (ref 0–1)
MONOCYTES NFR BLD AUTO: 6 % (ref 2–9)
NEUTS SEG # BLD: 4.1 K/UL (ref 1.8–8)
NEUTS SEG NFR BLD AUTO: 74 % (ref 42–75)
NITRITE UR QL STRIP.AUTO: NEGATIVE
O2/TOTAL GAS SETTING VFR VENT: 45 %
P-R INTERVAL, ECG05: 186 MS
PCO2 BLD: 46.9 MMHG (ref 35–45)
PEEP RESPIRATORY: 8 CMH2O
PH BLD: 7.32 [PH] (ref 7.35–7.45)
PH UR STRIP: 5.5 [PH] (ref 5–8)
PHOSPHATE SERPL-MCNC: 2.4 MG/DL (ref 2.5–4.9)
PIP ISTAT,IPIP: 14
PLATELET # BLD AUTO: 156 K/UL (ref 135–420)
PMV BLD AUTO: 10.4 FL (ref 9.2–11.8)
PO2 BLD: 116 MMHG (ref 80–100)
POTASSIUM SERPL-SCNC: 4.8 MMOL/L (ref 3.5–5.5)
PRESSURE SUPPORT SETTING VENT: 6 CMH2O
PROT UR STRIP-MCNC: NEGATIVE MG/DL
PROTHROMBIN TIME: 14.1 SEC (ref 11.5–15.2)
Q-T INTERVAL, ECG07: 358 MS
QRS DURATION, ECG06: 82 MS
QTC CALCULATION (BEZET), ECG08: 415 MS
RBC # BLD AUTO: 4.16 M/UL (ref 4.2–5.3)
RBC #/AREA URNS HPF: ABNORMAL /HPF (ref 0–5)
RBC MORPH BLD: ABNORMAL
SAO2 % BLD: 98 % (ref 92–97)
SERVICE CMNT-IMP: ABNORMAL
SODIUM SERPL-SCNC: 145 MMOL/L (ref 136–145)
SP GR UR REFRACTOMETRY: 1.01 (ref 1–1.03)
SPECIMEN TYPE: ABNORMAL
SPONTANEOUS TIMED, IST: YES
TOTAL RESP. RATE, ITRR: 18
UROBILINOGEN UR QL STRIP.AUTO: 0.2 EU/DL (ref 0.2–1)
VENTRICULAR RATE, ECG03: 81 BPM
WBC # BLD AUTO: 5.5 K/UL (ref 4.6–13.2)
WBC URNS QL MICRO: ABNORMAL /HPF (ref 0–4)

## 2017-06-13 PROCEDURE — 94660 CPAP INITIATION&MGMT: CPT

## 2017-06-13 PROCEDURE — 77030034849

## 2017-06-13 PROCEDURE — 82803 BLOOD GASES ANY COMBINATION: CPT

## 2017-06-13 PROCEDURE — 36415 COLL VENOUS BLD VENIPUNCTURE: CPT | Performed by: INTERNAL MEDICINE

## 2017-06-13 PROCEDURE — 85379 FIBRIN DEGRADATION QUANT: CPT | Performed by: HOSPITALIST

## 2017-06-13 PROCEDURE — 74011000258 HC RX REV CODE- 258: Performed by: INTERNAL MEDICINE

## 2017-06-13 PROCEDURE — 74011000250 HC RX REV CODE- 250: Performed by: FAMILY MEDICINE

## 2017-06-13 PROCEDURE — C9113 INJ PANTOPRAZOLE SODIUM, VIA: HCPCS | Performed by: INTERNAL MEDICINE

## 2017-06-13 PROCEDURE — 74011250636 HC RX REV CODE- 250/636: Performed by: INTERNAL MEDICINE

## 2017-06-13 PROCEDURE — 87040 BLOOD CULTURE FOR BACTERIA: CPT | Performed by: INTERNAL MEDICINE

## 2017-06-13 PROCEDURE — 71010 XR CHEST PORT: CPT

## 2017-06-13 PROCEDURE — 74011636320 HC RX REV CODE- 636/320: Performed by: INTERNAL MEDICINE

## 2017-06-13 PROCEDURE — 93005 ELECTROCARDIOGRAM TRACING: CPT

## 2017-06-13 PROCEDURE — 74011250636 HC RX REV CODE- 250/636

## 2017-06-13 PROCEDURE — 81001 URINALYSIS AUTO W/SCOPE: CPT | Performed by: INTERNAL MEDICINE

## 2017-06-13 PROCEDURE — 74011250637 HC RX REV CODE- 250/637: Performed by: FAMILY MEDICINE

## 2017-06-13 PROCEDURE — 85025 COMPLETE CBC W/AUTO DIFF WBC: CPT | Performed by: FAMILY MEDICINE

## 2017-06-13 PROCEDURE — 65610000006 HC RM INTENSIVE CARE

## 2017-06-13 PROCEDURE — 82962 GLUCOSE BLOOD TEST: CPT

## 2017-06-13 PROCEDURE — 80048 BASIC METABOLIC PNL TOTAL CA: CPT | Performed by: FAMILY MEDICINE

## 2017-06-13 PROCEDURE — 71260 CT THORAX DX C+: CPT

## 2017-06-13 PROCEDURE — 84100 ASSAY OF PHOSPHORUS: CPT | Performed by: FAMILY MEDICINE

## 2017-06-13 PROCEDURE — 83880 ASSAY OF NATRIURETIC PEPTIDE: CPT | Performed by: INTERNAL MEDICINE

## 2017-06-13 PROCEDURE — 83735 ASSAY OF MAGNESIUM: CPT | Performed by: FAMILY MEDICINE

## 2017-06-13 PROCEDURE — 74011250636 HC RX REV CODE- 250/636: Performed by: FAMILY MEDICINE

## 2017-06-13 PROCEDURE — 85730 THROMBOPLASTIN TIME PARTIAL: CPT | Performed by: FAMILY MEDICINE

## 2017-06-13 PROCEDURE — 74011000258 HC RX REV CODE- 258: Performed by: FAMILY MEDICINE

## 2017-06-13 PROCEDURE — 36600 WITHDRAWAL OF ARTERIAL BLOOD: CPT

## 2017-06-13 PROCEDURE — 85610 PROTHROMBIN TIME: CPT | Performed by: FAMILY MEDICINE

## 2017-06-13 PROCEDURE — 82330 ASSAY OF CALCIUM: CPT | Performed by: FAMILY MEDICINE

## 2017-06-13 PROCEDURE — 74011636637 HC RX REV CODE- 636/637: Performed by: INTERNAL MEDICINE

## 2017-06-13 RX ORDER — MAGNESIUM SULFATE 1 G/100ML
1 INJECTION INTRAVENOUS ONCE
Status: ACTIVE | OUTPATIENT
Start: 2017-06-13 | End: 2017-06-13

## 2017-06-13 RX ORDER — LABETALOL HCL 20 MG/4 ML
SYRINGE (ML) INTRAVENOUS
Status: DISPENSED
Start: 2017-06-13 | End: 2017-06-13

## 2017-06-13 RX ORDER — LEVOFLOXACIN 5 MG/ML
750 INJECTION, SOLUTION INTRAVENOUS
Status: DISCONTINUED | OUTPATIENT
Start: 2017-06-13 | End: 2017-06-14

## 2017-06-13 RX ORDER — FUROSEMIDE 10 MG/ML
INJECTION INTRAMUSCULAR; INTRAVENOUS
Status: COMPLETED
Start: 2017-06-13 | End: 2017-06-13

## 2017-06-13 RX ADMIN — DOCUSATE SODIUM AND SENNOSIDES 2 TABLET: 8.6; 5 TABLET, FILM COATED ORAL at 22:16

## 2017-06-13 RX ADMIN — INSULIN LISPRO 6 UNITS: 100 INJECTION, SOLUTION INTRAVENOUS; SUBCUTANEOUS at 09:32

## 2017-06-13 RX ADMIN — FOLIC ACID: 5 INJECTION, SOLUTION INTRAMUSCULAR; INTRAVENOUS; SUBCUTANEOUS at 16:08

## 2017-06-13 RX ADMIN — INSULIN LISPRO 3 UNITS: 100 INJECTION, SOLUTION INTRAVENOUS; SUBCUTANEOUS at 17:14

## 2017-06-13 RX ADMIN — FUROSEMIDE 40 MG: 10 INJECTION, SOLUTION INTRAMUSCULAR; INTRAVENOUS at 10:10

## 2017-06-13 RX ADMIN — ALBUTEROL SULFATE 2.5 MG: 2.5 SOLUTION RESPIRATORY (INHALATION) at 09:39

## 2017-06-13 RX ADMIN — LEVOFLOXACIN 750 MG: 5 INJECTION, SOLUTION INTRAVENOUS at 12:15

## 2017-06-13 RX ADMIN — PANTOPRAZOLE SODIUM 40 MG: 40 INJECTION, POWDER, FOR SOLUTION INTRAVENOUS at 22:16

## 2017-06-13 RX ADMIN — PANTOPRAZOLE SODIUM 40 MG: 40 INJECTION, POWDER, FOR SOLUTION INTRAVENOUS at 09:31

## 2017-06-13 RX ADMIN — LABETALOL HYDROCHLORIDE 5 MG: 5 INJECTION, SOLUTION INTRAVENOUS at 10:19

## 2017-06-13 RX ADMIN — INSULIN LISPRO 3 UNITS: 100 INJECTION, SOLUTION INTRAVENOUS; SUBCUTANEOUS at 22:27

## 2017-06-13 RX ADMIN — INSULIN DETEMIR 5 UNITS: 100 INJECTION, SOLUTION SUBCUTANEOUS at 17:14

## 2017-06-13 RX ADMIN — IOPAMIDOL 70 ML: 612 INJECTION, SOLUTION INTRAVENOUS at 09:01

## 2017-06-13 RX ADMIN — ACETAMINOPHEN 650 MG: 325 TABLET, FILM COATED ORAL at 04:34

## 2017-06-13 RX ADMIN — SODIUM CHLORIDE 2000 MG: 900 INJECTION, SOLUTION INTRAVENOUS at 13:35

## 2017-06-13 RX ADMIN — LEVOTHYROXINE SODIUM ANHYDROUS 50 MCG: 100 INJECTION, POWDER, LYOPHILIZED, FOR SOLUTION INTRAVENOUS at 11:59

## 2017-06-13 RX ADMIN — INSULIN LISPRO 6 UNITS: 100 INJECTION, SOLUTION INTRAVENOUS; SUBCUTANEOUS at 11:30

## 2017-06-13 RX ADMIN — ALBUTEROL SULFATE 2.5 MG: 2.5 SOLUTION RESPIRATORY (INHALATION) at 04:04

## 2017-06-13 RX ADMIN — DEXTROSE MONOHYDRATE, SODIUM CHLORIDE, AND POTASSIUM CHLORIDE: 50; 9; 2.98 INJECTION, SOLUTION INTRAVENOUS at 00:14

## 2017-06-13 RX ADMIN — PIPERACILLIN SODIUM,TAZOBACTAM SODIUM 4.5 G: 4; .5 INJECTION, POWDER, FOR SOLUTION INTRAVENOUS at 14:53

## 2017-06-13 NOTE — PROGRESS NOTES
1051: Called to patient's room for respiratory distress. Ordered to draw ABG. Attempted twice - once in right radial, once right brachial - and was unsuccessful. Given verbal order by Dr Mariola Braxton to place pt on BiPAP and transport pt to ICU. Pt placed on BiPAP with the following initial settings: IPAP 14, EPAP 8, FIO2 100%. BiPAP check performed. 1226: ABG drawn by RT Tiffanie Montaño. Results shown to Dr Kristina Lopez. Received pt on BiPAP with following settings: IPAP 14, EPAP 8, FIO2 45%. 1334: Pt transported from 2708 to 2802 via hospital bed and BiPAP. No adverse effects observed. 1559: Pt awake, breathing comfortably on BiPAP. SPo2 99% on BiPAP. Pt removed from BiPAP and placed on 5 LPM NC. Will titrate FIO2 as pt tolerates. Diminished breath sounds heard. Pt appears to  Tolerate NC.  BiPAP is on standby

## 2017-06-13 NOTE — DIABETES MGMT
GLYCEMIC CONTROL       Pt currently receiving corrective lispro ACHS,very insulin resistant. Suggest adding very low dose of basal insulin. Recommend 5 units Levemir/day. (GFR- 58). Pt takes Tradjenta at home;  A1C = 8.5%.         Majo Hoffmann RD, CDE   Office:  [address] [place] [address] Pager:  [phone]

## 2017-06-13 NOTE — PROGRESS NOTES
[facility] Dosing Services     Pharmacy dosing ABX empirically for treatment HAP     Day of Therapy: 0    Labs:   Bun/Serum Creatinine - 8 mg/dl / 1.09 mg/dl  CrCl - 39.3 ml/min  WBC - 5.5    Cultures:   pending    Plan:   Vancomycin:   Goal trough 15-20. Loading dose: 2 gm IV x1. Continue with 0.75 gm iv q24h. Check Vanco-trough on 6/16. Zosyn:  Continue 4.5 gm IV q8h extended 4 hours infusion / protocol. Levaquin:  Continue 750 mg IV q48h    Pharmacy to follow and will make changes to dose and/or frequency based on clinical status. Thanks for the consult.

## 2017-06-13 NOTE — PROGRESS NOTES
RECOMMENDATION TO DISCONTINUE PROTON PUMP INHIBITOR    The patient was ordered a PPI for the following indication:  SUP Prophylaxis    The patient is no longer in the ICU and is on the following Diet: Dental Soft    The patients profile has been reviewed and no documentation of the following were found:  Heartburn/symptomatic GERD, Gastritis, GI bleed, Peptic Ulcer Disease (Gastric or Duodenal Ulcers), Proximal GI fistula, erosive esophagitis or Girons esophagitis, hypersecretory conditions (eg, Zollinger-Fofana syndrome), H. pylori,  NSAID/corticosteroid prevention or treatment of ulcer. Please indicate reason for continuing the proton pump inhibitor. Thank you,  LOIS HARMON

## 2017-06-13 NOTE — PROGRESS NOTES
Reason for Renal Dosing:  Per Renal Dosing Policy    Indication: HAP    Patient clinical status and labs ordered/reviewed. Pt Weight Weight: 97.7 kg (215 lb 6.2 oz)   Serum Creatinine Lab Results   Component Value Date/Time    Creatinine 1.09 06/13/2017 04:55 AM       Creatinine Clearance Estimated Creatinine Clearance: 39.3 mL/min (based on Cr of 1.09). BUN Lab Results   Component Value Date/Time    BUN 8 06/13/2017 04:55 AM       WBC Lab Results   Component Value Date/Time    WBC 5.5 06/13/2017 04:55 AM      Temperature Temp: 98.4 °F (36.9 °C)   HR Pulse (Heart Rate): (!) 102       BP BP: 185/77           Drug type: extended spectrum penicillin      Drug/dose: The patient meets Zosyn ES dosing regimen. The Zosyn has been renally dosed to 4.5 grams IV every 8 hours, each dose infused over 4 hours. Continue to monitor.     Signed Yolanda Ponce PHARMD  Date 6/13/2017  Time 10:54 AM

## 2017-06-13 NOTE — PROGRESS NOTES
Pt. Care received. Resting comfortably in bed. A/O x 4. Denies pain. Call light within reach and bed in lowest position and locked. Patient stable.

## 2017-06-13 NOTE — PROGRESS NOTES
[facility]  ICU transfer 6/13/2017 at 1100 AM    We were aware of patient but not following. She was transferred out of ICU yesterday with no resp complaints breathing RA. Apparently she was started on NCO2 last night. Looks like she has developed pulmonary congestion. Doing ok on BIPAP.      -[facility] [phone]

## 2017-06-13 NOTE — PROGRESS NOTES
[phone]  Pt reports SOB. Pt 89% on RA, titrated up to 4L NC to raise to 95%. Resp treatment given for wheezing. Dr Celso Gayle contacted and STAT CXR ordered. 0430  Pt with HR sustained 120-125 bpm.  Pt continues to report SOB despite pulse ox of 95-99% on 4L and post nebs treatment. Dr Celso Gayle paged and D Dimer ordered; contact Dr with results. 0752  Pt's son called and was updated     [phone]  Informed Dr Jb Lara of pt's D Dimer results; CT chest ordered.

## 2017-06-13 NOTE — PROGRESS NOTES
0818: 1st attempt for OT evaluation; transporter arriving to take pt off the floor. 2352: 2nd attempt; pt eating breakfast.  1000: 3rd attempt; rapid response called due to pt report of difficulty breathing. Will hold for today. Thank you.     Stan Perales MS OTR/L  Office Ext: [phone]  Pager: [phone]

## 2017-06-13 NOTE — PROGRESS NOTES
Problem: Hemorrhagic Stroke: Day 2  Goal: Activity/Safety  Outcome: Not Progressing Towards Goal  Pt with decreased mobility; Q2 turn per protocol  Goal: Nutrition/Diet  Outcome: Progressing Towards Goal  Pt with poor appetite; tolerating small amounts of food  Goal: Respiratory  Outcome: Not Progressing Towards Goal  Pt reported sudden SOB at rest; pt on 4L NC, Nebs given PRN; CRX performed, awaiting results  Goal: *Optimal pain control at patients stated goal  Outcome: Progressing Towards Goal  Pt denies pain

## 2017-06-13 NOTE — ROUTINE PROCESS
Rapid response called on patient for respiratory distress. Patient placed on 4L NC and hooked up to Vital signs machine. Dr. Birgit Paris, Dr. Willy Parks, and Dr. Marcia Espinosa present. Primary RN Bro Church and nurse manager sania present as well. Respiratory called for STAT ABG and STAT X-ray ordered. Lasix given. Labetalol given for SBP >160. Patient placed on BIPAP and transferred to ICU as Stepdown. Connected to monitor. EKG completed.

## 2017-06-13 NOTE — PROGRESS NOTES
Assumed care of patient. Patient is AOx4, resting in bed, in stable condition. Patient denies having any pain or discomfort. Dual NIHSS conducted with Leonid RIOS. 1000: RRT called on patient due to difficulty breathing. RRT team arrived. Dr. Char Cole and Dr. Ric Nassar responded to the RRT, labs, and BP medication administered. 1035: After stabilizing patient, patient was transferred to ICU, room 2708.

## 2017-06-13 NOTE — PROGRESS NOTES
Hospitalist Progress Note  Windy Shannon MD  Internal medicine/ Hospitalist    Daily Progress Note: 6/13/2017 1:03 PM      Interval history / Subjective:   Patient admitted for hemorrhagic stroke. He has h/o Afib, hypothyroid , CKD, hypertension, DM, Neuropathy and was on coumadin. INR on admission 3.3 and patient received FFP and vit K. He is now admitted to NICU. Care consisting on monitoring by repeating CT head. Pt went into respiratory distress today am and was transferred to icu on BIPAP. Lasix iv was given and patient's O2 sat improved. She was also started on iv atbx as CTA chest showed possible pneumonia.       Current Facility-Administered Medications   Medication Dose Route Frequency    labetalol (NORMODYNE;TRANDATE) 20 mg/4 mL (5 mg/mL) injection        levoFLOXacin (LEVAQUIN) 750 mg in D5W IVPB  750 mg IntraVENous Q48H    magnesium sulfate 1 g/100 ml IVPB (premix or compounded)  1 g IntraVENous ONCE    piperacillin-tazobactam (ZOSYN) 4.5 g in 0.9% sodium chloride (MBP/ADV) 100 mL MBP - EXTENDED 4 HOUR INFUSION ###  4.5 g IntraVENous Q8H    insulin detemir (LEVEMIR) injection 5 Units  5 Units SubCUTAneous DAILY WITH DINNER    [START ON 6/14/2017] vancomycin (VANCOCIN) 750 mg in 0.9% sodium chloride (MBP/ADV) 250 mL ADV  750 mg IntraVENous Q24H    [START ON 6/16/2017] VANCOMYCIN INFORMATION NOTE   Other ONCE    insulin lispro (HUMALOG) injection   SubCUTAneous AC&HS    pantoprazole (PROTONIX) injection 40 mg  40 mg IntraVENous Q12H    PHARMACY INFORMATION NOTE  1 Each Other DAILY    ondansetron (ZOFRAN) injection 1 mg  1 mg IntraVENous Q6H PRN    labetalol (NORMODYNE;TRANDATE) 20 mg/4 mL (5 mg/mL) injection 5 mg  5 mg IntraVENous Q15MIN PRN    acetaminophen (TYLENOL) tablet 650 mg  650 mg Oral Q4H PRN    acetaminophen (TYLENOL) suppository 650 mg  650 mg Rectal Q4H PRN    senna-docusate (PERICOLACE) 8.6-50 mg per tablet 2 Tab  2 Tab Oral QHS    bisacodyl (DULCOLAX) tablet 5 mg  5 mg Oral DAILY PRN    bisacodyl (DULCOLAX) suppository 10 mg  10 mg Rectal DAILY PRN    albuterol (PROVENTIL VENTOLIN) nebulizer solution 2.5 mg  2.5 mg Nebulization Q4H PRN    glucose chewable tablet 16 g  4 Tab Oral PRN    dextrose (D50W) injection syrg 12.5-25 g  12.5-25 g IntraVENous PRN    glucagon (GLUCAGEN) injection 1 mg  1 mg IntraMUSCular PRN    levothyroxine (SYNTHROID) injection 50 mcg  50 mcg IntraVENous DAILY    folic acid (FOLVITE) 1 mg, thiamine (B-1) 100 mg in 0.9% sodium chloride 50 mL ivpb   IntraVENous DAILY    0.9% sodium chloride infusion 250 mL  250 mL IntraVENous PRN        Review of Systems  On BIPAP,feeling that her breathing is better. Objective:     Visit Vitals    /56    Pulse 86    Temp 97.7 °F (36.5 °C)    Resp 19    Ht 4' 11\" (1.499 m)  Comment: from old record 16    Wt 97.7 kg (215 lb 6.2 oz)    SpO2 100%    BMI 43.5 kg/m2    O2 Flow Rate (L/min): 5 l/min O2 Device: Nasal cannula    Temp (24hrs), Av.4 °F (36.9 °C), Min:97.1 °F (36.2 °C), Max:99.7 °F (37.6 °C)      701 - 1900  In: 567 [P.O.:120; I.V.:650]  Out: 0556 [Urine:1825]  1901 -  0700  In: 3160.3 [P.O.:510; I.V.:2650.3]  Out: 56 [Urine:675]  P/E  NAD,lying in bed,on BIPAP now,family members sitting in the room. Heent:perrla,at/nc,mouth moist.  Lungs ctab  Heart s1s2 nl,no m/g  Abdm:soft,not tender,bs present. Extr:no edema,good pulses.   Neuro:aaox3,grossly intact    Data Review    Recent Results (from the past 12 hour(s))   PTT    Collection Time: 17  4:55 AM   Result Value Ref Range    aPTT 30.1 23.0 - 36.4 SEC   CALCIUM, IONIZED    Collection Time: 17  4:55 AM   Result Value Ref Range    Ionized Calcium 1.22 1.12 - 1.32 MMOL/L   MAGNESIUM    Collection Time: 17  4:55 AM   Result Value Ref Range    Magnesium 1.7 1.6 - 2.6 mg/dL   PHOSPHORUS    Collection Time: 17  4:55 AM   Result Value Ref Range    Phosphorus 2.4 (L) 2.5 - 4.9 MG/DL   METABOLIC PANEL, BASIC    Collection Time: 06/13/17  4:55 AM   Result Value Ref Range    Sodium 145 136 - 145 mmol/L    Potassium 4.8 3.5 - 5.5 mmol/L    Chloride 113 (H) 100 - 108 mmol/L    CO2 21 21 - 32 mmol/L    Anion gap 11 3.0 - 18 mmol/L    Glucose 198 (H) 74 - 99 mg/dL    BUN 8 7.0 - 18 MG/DL    Creatinine 1.09 0.6 - 1.3 MG/DL    BUN/Creatinine ratio 7 (L) 12 - 20      GFR est AA 58 (L) >60 ml/min/1.73m2    GFR est non-AA 47 (L) >60 ml/min/1.73m2    Calcium 8.7 8.5 - 10.1 MG/DL   CBC WITH AUTOMATED DIFF    Collection Time: 06/13/17  4:55 AM   Result Value Ref Range    WBC 5.5 4.6 - 13.2 K/uL    RBC 4.16 (L) 4.20 - 5.30 M/uL    HGB 11.0 (L) 12.0 - 16.0 g/dL    HCT 35.4 35.0 - 45.0 %    MCV 85.1 74.0 - 97.0 FL    MCH 26.4 24.0 - 34.0 PG    MCHC 31.1 31.0 - 37.0 g/dL    RDW 16.0 (H) 11.6 - 14.5 %    PLATELET 296 306 - 604 K/uL    MPV 10.4 9.2 - 11.8 FL    NEUTROPHILS 74 42 - 75 %    LYMPHOCYTES 18 (L) 20 - 51 %    MONOCYTES 6 2 - 9 %    EOSINOPHILS 2 0 - 5 %    BASOPHILS 0 0 - 3 %    ABS. NEUTROPHILS 4.1 1.8 - 8.0 K/UL    ABS. LYMPHOCYTES 1.0 0.8 - 3.5 K/UL    ABS. MONOCYTES 0.3 0 - 1.0 K/UL    ABS. EOSINOPHILS 0.1 0.0 - 0.4 K/UL    ABS.  BASOPHILS 0.0 0.0 - 0.1 K/UL    RBC COMMENTS NORMOCYTIC, NORMOCHROMIC      DF MANUAL     PROTHROMBIN TIME + INR    Collection Time: 06/13/17  4:55 AM   Result Value Ref Range    Prothrombin time 14.1 11.5 - 15.2 sec    INR 1.1 0.8 - 1.2     D DIMER    Collection Time: 06/13/17  4:55 AM   Result Value Ref Range    D DIMER 1.53 (H) <0.46 ug/ml(FEU)   GLUCOSE, POC    Collection Time: 06/13/17  8:20 AM   Result Value Ref Range    Glucose (POC) 227 (H) 70 - 110 mg/dL   GLUCOSE, POC    Collection Time: 06/13/17 10:01 AM   Result Value Ref Range    Glucose (POC) 230 (H) 70 - 110 mg/dL   EKG, 12 LEAD, INITIAL    Collection Time: 06/13/17 10:50 AM   Result Value Ref Range    Ventricular Rate 81 BPM    Atrial Rate 81 BPM    P-R Interval 186 ms    QRS Duration 82 ms    Q-T Interval 358 ms    QTC Calculation (Bezet) 415 ms    Calculated P Axis 62 degrees    Calculated R Axis 54 degrees    Calculated T Axis 43 degrees    Diagnosis       Normal sinus rhythm  Normal ECG  Confirmed by Kevin Quintero ([id number]) on 6/13/2017 4:06:35 PM     POC G3    Collection Time: 06/13/17 12:29 PM   Result Value Ref Range    Device: BIPAP      FIO2 (POC) 45 %    pH (POC) 7.315 (L) 7.35 - 7.45      pCO2 (POC) 46.9 (H) 35.0 - 45.0 MMHG    pO2 (POC) 116 (H) 80 - 100 MMHG    HCO3 (POC) 23.9 22 - 26 MMOL/L    sO2 (POC) 98 (H) 92 - 97 %    Base deficit (POC) 2 mmol/L    PEEP/CPAP (POC) 8 cmH2O    PIP (POC) 14      Pressure support 6 cmH2O    Allens test (POC) YES      Total resp. rate 18      Site RIGHT RADIAL      Patient temp. 98.3      Specimen type (POC) ARTERIAL      Performed by Corene Agent     Spontaneous timed YES     GLUCOSE, POC    Collection Time: 06/13/17 12:50 PM   Result Value Ref Range    Glucose (POC) 235 (H) 70 - 110 mg/dL         Assessment/Plan:     Principal Problem:    Hemorrhagic stroke ([facility] [address] (6/11/2017)    Active Problems:    Chronic a-fib (HCC) (6/11/2017)      Supratherapeutic INR (6/11/2017)      Left-sided weakness (6/11/2017)      Hypokalemia (6/11/2017)      CKD4    JOSIE    DM    HTN    Neuropathy    Hypothyroid     Care Plan  1. Hemorrhagic stroke      Left side weakness. Likely hypertensive in the setting of supra therapeutic INR 3.3     Reversal Vit K and FFP given on admission. INR=1.2 today     Monitor INR for goal < 1.5     CT head repeated:CT-mildly progressively enlarging small right thalamic hematoma. PT/OT/Speech     D/C Warfarin     Neuro and Vital check per protocol     Keep SBP < 140. Use Labetalol or titrate Cardene to meet goal     Neurology consulted and following patient    2. Acute hypoxic respiratory failure    -Likely due to fluid overload.    -Lasix given and pt improved. -Now in icu on BIPAP    -Critical care following and appreciate help.     3.Possible PNA on CTA    -On vanc,zosyn,levaquin    -Blood cx ordered. 4.Chronic Afib     Currently in afib w. Normal ventricular response     No EKG done . Order EKG     Hold Warfarin . Not a candidate for [facility]     5. Left side weakness      2/2 Hemorrhagic CVA      PT/OT     4. CKD4/JOSIE     Resolved JOSIE,creatinine normal     5. Hypokalemia      Corrected     6. HTN     Controlled     Keep sbp < 140 per Hemorrhagic CVA .     7.DM    SSI    A1c     8. Neuropathy     Will renew neurontin once stable      9. Hypothyroid      Synthroid     DVT Prophylaxis - None Supra therapeutic      GI Prophylaxis      Full code   Will order palliative care for future care decision

## 2017-06-13 NOTE — ROUTINE PROCESS
Bedside and Verbal shift change report given to [address] by Chris Cameron RN. Report included the following information SBAR, Kardex, Intake/Output and MAR.

## 2017-06-13 NOTE — ROUTINE PROCESS
Patient received into 21  after RRT on 2 west.  Patient on bipap. EKG performed with NSR result, urinary catheter inserted and ua obtained per protocol.

## 2017-06-13 NOTE — CDMP QUERY
To reflect this pt's bmi, please add the below documentation to the progress notes     =>Morbid obesity with bmi >40  =>Other Explanation of clinical findings  =>Unable to Determine (no explanation of clinical findings)    The medical record reflects the following    Please clarify and document your clinical opinion in the progress notes and discharge summary including the definitive and/or presumptive diagnosis, (suspected or probable), related to the above clinical findings. Please include clinical findings supporting your diagnosis. If you DECLINE this query or would like to communicate with [facility], please utilize the \"Needish message box\" at the TOP of the Progress Note on the right.       Thank you,  Jadon Munoz RN/CCDS   [phone]

## 2017-06-14 PROBLEM — N18.30 CKD (CHRONIC KIDNEY DISEASE) STAGE 3, GFR 30-59 ML/MIN (HCC): Status: ACTIVE | Noted: 2017-06-14

## 2017-06-14 PROBLEM — E66.9 OBESITY: Status: ACTIVE | Noted: 2017-06-14

## 2017-06-14 PROBLEM — R06.02 SOB (SHORTNESS OF BREATH): Status: ACTIVE | Noted: 2017-06-14

## 2017-06-14 LAB
ABO + RH BLD: NORMAL
ALBUMIN SERPL BCP-MCNC: 2.8 G/DL (ref 3.4–5)
ALBUMIN/GLOB SERPL: 1 {RATIO} (ref 0.8–1.7)
ALP SERPL-CCNC: 83 U/L (ref 45–117)
ALT SERPL-CCNC: 12 U/L (ref 13–56)
AMMONIA PLAS-SCNC: 25 UMOL/L (ref 11–32)
AMYLASE SERPL-CCNC: 33 U/L (ref 25–115)
APTT PPP: 29.9 SEC (ref 23–36.4)
AST SERPL W P-5'-P-CCNC: 12 U/L (ref 15–37)
BILIRUB DIRECT SERPL-MCNC: 0.4 MG/DL (ref 0–0.2)
BILIRUB SERPL-MCNC: 0.9 MG/DL (ref 0.2–1)
BLOOD GROUP ANTIBODIES SERPL: NORMAL
CA-I SERPL-SCNC: 1.2 MMOL/L (ref 1.12–1.32)
GLOBULIN SER CALC-MCNC: 2.8 G/DL (ref 2–4)
GLUCOSE BLD STRIP.AUTO-MCNC: 143 MG/DL (ref 70–110)
GLUCOSE BLD STRIP.AUTO-MCNC: 153 MG/DL (ref 70–110)
GLUCOSE BLD STRIP.AUTO-MCNC: 156 MG/DL (ref 70–110)
GLUCOSE BLD STRIP.AUTO-MCNC: 175 MG/DL (ref 70–110)
INR PPP: 1.2 (ref 0.8–1.2)
LIPASE SERPL-CCNC: 91 U/L (ref 73–393)
MAGNESIUM SERPL-MCNC: 1.5 MG/DL (ref 1.6–2.6)
PHOSPHATE SERPL-MCNC: 2.5 MG/DL (ref 2.5–4.9)
PROT SERPL-MCNC: 5.6 G/DL (ref 6.4–8.2)
PROTHROMBIN TIME: 14.9 SEC (ref 11.5–15.2)
SPECIMEN EXP DATE BLD: NORMAL

## 2017-06-14 PROCEDURE — 82962 GLUCOSE BLOOD TEST: CPT

## 2017-06-14 PROCEDURE — 80076 HEPATIC FUNCTION PANEL: CPT | Performed by: FAMILY MEDICINE

## 2017-06-14 PROCEDURE — C9113 INJ PANTOPRAZOLE SODIUM, VIA: HCPCS | Performed by: INTERNAL MEDICINE

## 2017-06-14 PROCEDURE — 85730 THROMBOPLASTIN TIME PARTIAL: CPT | Performed by: FAMILY MEDICINE

## 2017-06-14 PROCEDURE — 36415 COLL VENOUS BLD VENIPUNCTURE: CPT | Performed by: FAMILY MEDICINE

## 2017-06-14 PROCEDURE — 82330 ASSAY OF CALCIUM: CPT | Performed by: FAMILY MEDICINE

## 2017-06-14 PROCEDURE — 82150 ASSAY OF AMYLASE: CPT | Performed by: FAMILY MEDICINE

## 2017-06-14 PROCEDURE — 65270000029 HC RM PRIVATE

## 2017-06-14 PROCEDURE — 97530 THERAPEUTIC ACTIVITIES: CPT

## 2017-06-14 PROCEDURE — 82140 ASSAY OF AMMONIA: CPT | Performed by: FAMILY MEDICINE

## 2017-06-14 PROCEDURE — 74011000258 HC RX REV CODE- 258: Performed by: INTERNAL MEDICINE

## 2017-06-14 PROCEDURE — 74011636637 HC RX REV CODE- 636/637: Performed by: INTERNAL MEDICINE

## 2017-06-14 PROCEDURE — 94660 CPAP INITIATION&MGMT: CPT

## 2017-06-14 PROCEDURE — 85610 PROTHROMBIN TIME: CPT | Performed by: FAMILY MEDICINE

## 2017-06-14 PROCEDURE — 76450000000

## 2017-06-14 PROCEDURE — 83690 ASSAY OF LIPASE: CPT | Performed by: FAMILY MEDICINE

## 2017-06-14 PROCEDURE — 74011250636 HC RX REV CODE- 250/636: Performed by: INTERNAL MEDICINE

## 2017-06-14 PROCEDURE — 74011250637 HC RX REV CODE- 250/637: Performed by: PHYSICIAN ASSISTANT

## 2017-06-14 PROCEDURE — 86900 BLOOD TYPING SEROLOGIC ABO: CPT | Performed by: FAMILY MEDICINE

## 2017-06-14 PROCEDURE — 97165 OT EVAL LOW COMPLEX 30 MIN: CPT

## 2017-06-14 PROCEDURE — 84100 ASSAY OF PHOSPHORUS: CPT | Performed by: FAMILY MEDICINE

## 2017-06-14 PROCEDURE — 74011000250 HC RX REV CODE- 250: Performed by: FAMILY MEDICINE

## 2017-06-14 PROCEDURE — 97162 PT EVAL MOD COMPLEX 30 MIN: CPT

## 2017-06-14 PROCEDURE — 83735 ASSAY OF MAGNESIUM: CPT | Performed by: FAMILY MEDICINE

## 2017-06-14 RX ORDER — ASPIRIN 325 MG/1
100 TABLET, FILM COATED ORAL DAILY
Status: DISCONTINUED | OUTPATIENT
Start: 2017-06-15 | End: 2017-06-16 | Stop reason: HOSPADM

## 2017-06-14 RX ORDER — FOLIC ACID 1 MG/1
1 TABLET ORAL DAILY
Status: DISCONTINUED | OUTPATIENT
Start: 2017-06-15 | End: 2017-06-16 | Stop reason: HOSPADM

## 2017-06-14 RX ORDER — PANTOPRAZOLE SODIUM 40 MG/1
40 TABLET, DELAYED RELEASE ORAL
Status: DISCONTINUED | OUTPATIENT
Start: 2017-06-15 | End: 2017-06-16 | Stop reason: HOSPADM

## 2017-06-14 RX ORDER — MAGNESIUM SULFATE HEPTAHYDRATE 40 MG/ML
2 INJECTION, SOLUTION INTRAVENOUS ONCE
Status: COMPLETED | OUTPATIENT
Start: 2017-06-14 | End: 2017-06-14

## 2017-06-14 RX ORDER — FUROSEMIDE 40 MG/1
40 TABLET ORAL
Status: DISCONTINUED | OUTPATIENT
Start: 2017-06-14 | End: 2017-06-16 | Stop reason: HOSPADM

## 2017-06-14 RX ADMIN — INSULIN LISPRO 3 UNITS: 100 INJECTION, SOLUTION INTRAVENOUS; SUBCUTANEOUS at 22:52

## 2017-06-14 RX ADMIN — FUROSEMIDE 40 MG: 40 TABLET ORAL at 16:29

## 2017-06-14 RX ADMIN — MAGNESIUM SULFATE HEPTAHYDRATE 2 G: 40 INJECTION, SOLUTION INTRAVENOUS at 11:02

## 2017-06-14 RX ADMIN — INSULIN LISPRO 3 UNITS: 100 INJECTION, SOLUTION INTRAVENOUS; SUBCUTANEOUS at 12:51

## 2017-06-14 RX ADMIN — FUROSEMIDE 40 MG: 40 TABLET ORAL at 11:02

## 2017-06-14 RX ADMIN — INSULIN DETEMIR 5 UNITS: 100 INJECTION, SOLUTION SUBCUTANEOUS at 17:45

## 2017-06-14 RX ADMIN — INSULIN LISPRO 3 UNITS: 100 INJECTION, SOLUTION INTRAVENOUS; SUBCUTANEOUS at 08:53

## 2017-06-14 RX ADMIN — PIPERACILLIN SODIUM,TAZOBACTAM SODIUM 4.5 G: 4; .5 INJECTION, POWDER, FOR SOLUTION INTRAVENOUS at 01:32

## 2017-06-14 RX ADMIN — PANTOPRAZOLE SODIUM 40 MG: 40 INJECTION, POWDER, FOR SOLUTION INTRAVENOUS at 08:31

## 2017-06-14 RX ADMIN — LEVOTHYROXINE SODIUM ANHYDROUS 50 MCG: 100 INJECTION, POWDER, LYOPHILIZED, FOR SOLUTION INTRAVENOUS at 08:53

## 2017-06-14 RX ADMIN — PIPERACILLIN SODIUM,TAZOBACTAM SODIUM 4.5 G: 4; .5 INJECTION, POWDER, FOR SOLUTION INTRAVENOUS at 08:31

## 2017-06-14 NOTE — PROGRESS NOTES
Patient received in bed awake from 2800. Patient alert and oriented X4, denies pain and discomfort. Patient resting quietly. Frequent use items within reach. Bed locked in low position. Call bell within reach and patient verbalized understanding of use for assistance and needs. Family at bedside. 1940:  Bedside and Verbal shift change report given to BLANCA Salas (oncoming nurse) by Lien Rutledge RN BSN (offgoing nurse). Report included the following information SBAR, Kardex, Intake/Output, MAR and Recent Results.

## 2017-06-14 NOTE — PROGRESS NOTES
06/13/2017 2000 Assumed care of pt after bedside report with pt lying in bed with HOB elevated. Monitor denotes NSR without ectopy. Neuro intact except for lt arm and not pulling against gravity. AAO x 4. Lungs clear and diminished in bases. O2 in progress at 4 L/NC. Abd soft and nontender. Duggan catheter in place and draining clear yellow urine. 2200 Acucheck result 150. Lispro insulin 3 units SQ given as per sliding scale coverage. 06/14/2017  0000 Pt c/o SOB. RR 26 and O2 sat 97. Pt became increasingly anxious and RT called and in to place pt on BIPAP. 0100 SX resolved and pt resting without complaints. 0200 AM labs drawn and sent to lab. 0600 Pt in no acute distress. Resting without complaints.

## 2017-06-14 NOTE — ROUTINE PROCESS
Bedside verbal report received from [facility]. 0730 IV line found to be clotted / infiltrated. 0745  New PIV in L hand started as documented. Bedside verbal report given to [facility]. TRANSFER - OUT REPORT:    Verbal report given to [facility] RN (name) on Anders Samayoa  being transferred to  (unit) for routine progression of care       Report consisted of patients Situation, Background, Assessment and   Recommendations(SBAR). Information from the following report(s) SBAR, Kardex and Cardiac Rhythm sinus rhytmn w/ occasionally sinus exit block was reviewed with the receiving nurse. Lines:   Peripheral IV 06/14/17 Left Hand (Active)   Site Assessment Clean, dry, & intact 6/14/2017  8:00 AM   Phlebitis Assessment 0 6/14/2017  8:00 AM   Infiltration Assessment 0 6/14/2017  8:00 AM   Dressing Status Clean, dry, & intact 6/14/2017  8:00 AM   Dressing Type Transparent;Tape 6/14/2017  8:00 AM   Hub Color/Line Status Blue;Patent; Flushed 6/14/2017  8:00 AM   Action Taken Open ports on tubing capped 6/14/2017  8:00 AM   Alcohol Cap Used Yes 6/14/2017  8:00 AM        Opportunity for questions and clarification was provided.       Patient transported with:   O2 @ 3 liters  Registered Nurse

## 2017-06-14 NOTE — PROGRESS NOTES
PCCM AM Brief/MDR   Uses lasix 4x a day at home PTA  Good diuresis yesterday with return to baseline  Used BIPAP last night  No PAP or O2 at home. +Morbid obesity  PT worked with her today  d/c antibitotics  Reason for hosp was small ICH which does not seem to have changed. She has new LUE hemiparesis although  strength is reasonably well preserved.    Pt is followed by [address] [place], [place] [address]  PCCM will continue to follow for/ determine resp rx needs over next day or so  -devan   pager: [phone]

## 2017-06-14 NOTE — CDMP QUERY
Please clarify if this patient is being treated/managed for:    =>Acute systolic or Acute Diastolic Chf   =>Other Explanation of clinical findings  =>Unable to Determine (no explanation of clinical findings)    The medical record reflects the following:    Risk: Cva,  pt with hx of ckd, htn, heart disease ,     Clinical Indicators:  6-13-17  RR called for resp distress, pt placed on 4l nc, bnp 6251, ef 55%   Cxr:   Continued findings suggestive of moderate CHF with small bilateral pleural  effusions, with interval increased right basilar consolidation. Treatment:  02, lasix, stat abg's, labetalol, to icu , bi-pap and or 02     Please clarify and document your clinical opinion in the progress notes and discharge summary including the definitive and/or presumptive diagnosis, (suspected or probable), related to the above clinical findings. Please include clinical findings supporting your diagnosis. If you DECLINE this query or would like to communicate with [facility], please utilize the \"Testt message box\" at the TOP of the Progress Note on the right.       Thank you,  Page Jones RN/CCDS   [phone]

## 2017-06-14 NOTE — PROGRESS NOTES
Hospitalist Progress Note  Alisson Ren MD  Internal medicine/ Hospitalist    Daily Progress Note: 6/14/2017 1:03 PM      Interval history / Subjective:   Patient admitted for hemorrhagic stroke. He has h/o Afib, hypothyroid , CKD, hypertension, DM, Neuropathy and was on coumadin. INR on admission 3.3 and patient received FFP and vit K. He is now admitted to NICU. Care consisting on monitoring by repeating CT head. Pt went into respiratory distress today am and was transferred to icu on BIPAP. Lasix iv was given and patient's O2 sat improved. She was also started on iv atbx as CTA chest showed possible pneumonia.       Current Facility-Administered Medications   Medication Dose Route Frequency    furosemide (LASIX) tablet 40 mg  40 mg Oral ACB&D    [START ON 6/15/2017] pantoprazole (PROTONIX) tablet 40 mg  40 mg Oral ACB    [START ON 5/64/3716] folic acid (FOLVITE) tablet 1 mg  1 mg Oral DAILY    [START ON 6/15/2017] Thiamine Mononitrate (B-1) tablet 100 mg  100 mg Oral DAILY    insulin detemir (LEVEMIR) injection 5 Units  5 Units SubCUTAneous DAILY WITH DINNER    [START ON 6/16/2017] VANCOMYCIN INFORMATION NOTE   Other ONCE    insulin lispro (HUMALOG) injection   SubCUTAneous AC&HS    PHARMACY INFORMATION NOTE  1 Each Other DAILY    ondansetron (ZOFRAN) injection 1 mg  1 mg IntraVENous Q6H PRN    labetalol (NORMODYNE;TRANDATE) 20 mg/4 mL (5 mg/mL) injection 5 mg  5 mg IntraVENous Q15MIN PRN    acetaminophen (TYLENOL) tablet 650 mg  650 mg Oral Q4H PRN    acetaminophen (TYLENOL) suppository 650 mg  650 mg Rectal Q4H PRN    bisacodyl (DULCOLAX) tablet 5 mg  5 mg Oral DAILY PRN    bisacodyl (DULCOLAX) suppository 10 mg  10 mg Rectal DAILY PRN    albuterol (PROVENTIL VENTOLIN) nebulizer solution 2.5 mg  2.5 mg Nebulization Q4H PRN    glucose chewable tablet 16 g  4 Tab Oral PRN    dextrose (D50W) injection syrg 12.5-25 g  12.5-25 g IntraVENous PRN    glucagon (GLUCAGEN) injection 1 mg  1 mg IntraMUSCular PRN    levothyroxine (SYNTHROID) injection 50 mcg  50 mcg IntraVENous DAILY    0.9% sodium chloride infusion 250 mL  250 mL IntraVENous PRN        Review of Systems  Off BIPAP and feeling better    Objective:     Visit Vitals    BP (!) 167/97 (BP 1 Location: Left arm, BP Patient Position: Head of bed elevated (Comment degrees))    Pulse (!) 107    Temp 98.1 °F (36.7 °C)    Resp 25    Ht 4' 11\" (1.499 m)  Comment: from old record 16    Wt 97.7 kg (215 lb 6.2 oz)    SpO2 95%    BMI 43.5 kg/m2    O2 Flow Rate (L/min): 3 l/min O2 Device: Nasal cannula, Humidifier    Temp (24hrs), Av.1 °F (37.3 °C), Min:97.8 °F (36.6 °C), Max:100.9 °F (38.3 °C)       07 -  1900  In: 150 [I.V.:150]  Out: 545 [Urine:545]  1901 -  0700  In: 2113.2 [P.O.:420; I.V.:1693.2]  Out: 5367 [Urine:3155]  P/E  NAD,lying in bed,on BIPAP now,family members sitting in the room. Heent:perrla,at/nc,mouth moist.  Lungs ctab  Heart s1s2 nl,no m/g  Abdm:soft,not tender,bs present. Extr:no edema,good pulses. Neuro:aaox3,grossly intact    Data Review    Recent Results (from the past 12 hour(s))   GLUCOSE, POC    Collection Time: 17  7:14 AM   Result Value Ref Range    Glucose (POC) 175 (H) 70 - 110 mg/dL   GLUCOSE, POC    Collection Time: 17 12:30 PM   Result Value Ref Range    Glucose (POC) 156 (H) 70 - 110 mg/dL   GLUCOSE, POC    Collection Time: 17  5:15 PM   Result Value Ref Range    Glucose (POC) 143 (H) 70 - 110 mg/dL         Assessment/Plan:     Principal Problem:    Hemorrhagic stroke ([facility] [address] (2017)    Active Problems:    Chronic a-fib (HCC) (2017)      Supratherapeutic INR (2017)      Left-sided weakness (2017)      Hypokalemia (2017)      CKD4    JOSIE    DM    HTN    Neuropathy    Hypothyroid     Care Plan  1. Hemorrhagic stroke      Left side weakness.  Likely hypertensive in the setting of supra therapeutic INR 3.3     Reversal Vit K and FFP given on admission. INR=1.2 today     Monitor INR for goal < 1.5     CT head repeated:CT-mildly progressively enlarging small right thalamic hematoma. PT/OT/Speech     D/C Warfarin     Neuro and Vital check per protocol     Keep SBP < 140. Use Labetalol or titrate Cardene to meet goal     Neurology consulted and following patient    2. Acute hypoxic respiratory failure    -Likely due to fluid overload.    -Lasix given and pt improved. -Now in icu on BIPAP    -Critical care following and appreciate help. 3.Possible PNA on CTA    -On vanc,zosyn,levaquin    -Blood cx ordered. 4.Chronic Afib     Currently in afib w. Normal ventricular response     No EKG done . Order EKG     Hold Warfarin . Not a candidate for [facility]     5. Left side weakness      2/2 Hemorrhagic CVA      PT/OT     4. CKD4/JOSIE     Resolved JOSIE,creatinine normal     5. Hypokalemia      Corrected     6. HTN     Controlled     Keep sbp < 140 per Hemorrhagic CVA .     7.DM    SSI    A1c     8. Neuropathy     Will renew neurontin once stable      9. Hypothyroid      Synthroid     DVT Prophylaxis - None Supra therapeutic      GI Prophylaxis      Full code   Will order palliative care for future care decision

## 2017-06-14 NOTE — PROGRESS NOTES
Progress Note    Patient: Michelle Pratt MRN: [id number]  SSN: [id number]    YOB: 1929  Age: 80 y.o. Sex: female      Admit Date: 6/10/2017    LOS: 3 days     Subjective:     Feeling stronger. Working with PT/OT. No new issues. Objective:     Vitals:    06/14/17 1100 06/14/17 1200 06/14/17 1300 06/14/17 1500   BP: 140/64 140/67 129/67 (!) 167/97   Pulse: 95 94 92 (!) 107   Resp: 23 (!) 33 30 25   Temp:  99.1 °F (37.3 °C)  98.1 °F (36.7 °C)   SpO2: 100% 100% 100% 95%   Weight:       Height:            Intake and Output:  Current Shift: 06/14 0701 - 06/14 1900  In: 150 [I.V.:150]  Out: 545 [Urine:545]  Last three shifts: 06/12 1901 - 06/14 0700  In: 2113.2 [P.O.:420; I.V.:1693.2]  Out: 3155 [Urine:3155]    Physical Exam:   GENERAL: alert, cooperative, no distress, appears stated age  NEUROLOGIC: awake, alert. Speech fluent and appropriate. Normal facial symmetry. Right strength normal. Left arm 4/5. Left hip flexion 4-/5    Lab/Data Review: All lab results for the last 24 hours reviewed. Assessment/Plan: 1. ICH, stable  2.  Left hemiparesis secondary #1, improving    Plan: Coumadin can be restarted 2 weeks from admission     Principal Problem:    Hemorrhagic stroke ([address] (6/11/2017)    Active Problems:    Chronic a-fib ([address] (6/11/2017)      Supratherapeutic INR (6/11/2017)      Left-sided weakness (6/11/2017)      Hypokalemia (6/11/2017)      CKD (chronic kidney disease) stage 3, GFR 30-59 ml/min (6/14/2017)      Obesity (6/14/2017)      SOB (shortness of breath) (6/14/2017)            Signed By: Melo Neville MD     June 14, 2017

## 2017-06-14 NOTE — PROGRESS NOTES
Problem: Mobility Impaired (Adult and Pediatric)  Goal: *Acute Goals and Plan of Care (Insert Text)  Physical Therapy Goals  Initiated 6/14/2017 and to be accomplished within 7 day(s)  1. Patient will move from supine to sit and sit to supine in bed with minimal assistance/contact guard assist.   2. Patient will transfer from bed to chair and chair to bed with minimal assistance/contact guard assist using the least restrictive device. 3. Patient will perform sit to stand with minimal assistance/contact guard assist.  4. Patient will ambulate with minimal assistance/contact guard assist for 25 feet with the least restrictive device. Outcome: Progressing Towards Goal  PHYSICAL THERAPY EVALUATION     Patient: Alistair Murillo (83 y.o. female)  Date: 6/14/2017  Primary Diagnosis: Hemorrhagic stroke ([facility] [address]  Hemorrhagic stroke ([facility] [address]  Supratherapeutic INR  Chronic a-fib ([facility] [address]  Precautions:  Fall      ASSESSMENT :  Patient requires between maximal assistance and total assistance for bed mobility, transfers and ambulation. Max A x2 for supine to sit and sit to supine transfers. Seated EOB with poor balance. Decreased AROM BLE. RUE decreased elbow AROM; R shoulder flexion 25%.  strength R<L. Respiratory rate increased to 42 breaths/minutes and  with sitting EOB. Returned to supine in bed; all needs in reach. RN Asif Lira aware. Patient presents with deficits in:  Bed Mobility, Transfers, Gait, Strength and Balance     Patient will benefit from skilled intervention to address the above impairments.   Patients rehabilitation potential is considered to be Fair  Factors which may influence rehabilitation potential include:   [ ]         None noted  [ ]         Mental ability/status  [X]         Medical condition  [ ]         Home/family situation and support systems  [ ]         Safety awareness  [ ]         Pain tolerance/management  [ ]         Other:        PLAN :  Recommendations and Planned Interventions:  [X]           Bed Mobility Training             [X]    Neuromuscular Re-Education  [X]           Transfer Training                   [ ]    Orthotic/Prosthetic Training  [X]           Gait Training                          [ ]    Modalities  [X]           Therapeutic Exercises          [ ]    Edema Management/Control  [X]           Therapeutic Activities            [X]    Patient and Family Training/Education  [ ]           Other (comment):     Frequency/Duration: Patient will be followed by physical therapy 4-7 days per week to address goals. Discharge Recommendations: Rehab  Further Equipment Recommendations for Discharge: straight cane and rolling walker; patient has       SUBJECTIVE:   Agreeable to PT      OBJECTIVE DATA SUMMARY:       Past Medical History:   Diagnosis Date    Atrial fibrillation ([facility] [address]      Chronic kidney disease       unknown what stage    Diabetes ([facility] [address]     History reviewed. No pertinent surgical history. Barriers to Learning/Limitations: None  Compensate with: visual, verbal, tactile, kinesthetic cues/model     G CODES:Mobility [id number] Current  CM= 80-99%   Goal  CL= 60-79%. The severity rating is based on the Other [facility] Sitting Balance Scale 1/5        Eval Complexity: History: MEDIUM  Complexity : 1-2 comorbidities / personal factors will impact the outcome/ POC Exam:MEDIUM Complexity : 3 Standardized tests and measures addressing body structure, function, activity limitation and / or participation in recreation  Presentation: MEDIUM Complexity : Evolving with changing characteristics  Clinical Decision Making:Medium Complexity [facility] Sitting Balance Scale 1/5 Overall Complexity:MEDIUM     [facility] Sitting Balance Scale 1/5  0: Pt performs 25% or less of sitting activity (Max assist) CN, 100% impaired. 1: Pt supports self with upper extremities but requires therapist assistance.  Pt performs 25-50% of effort (Mod assist) CM, 80% to <100% impaired. 1+: Pt supports self with upper extremities but requires therapist assistance. Pt performs >50% effort. (Min assist). CL, 60% to <80% impaired. 2: Pt supports self independently with both upper extremities. CL, 60% to <80% impaired. 2+: Pt support self independently with 1 upper extremity. CK, 40% to <60% impaired. 3: Pt sits without upper extremity support for up to 30 seconds. CK, 40% to <60% impaired. 3+: Pt sits without upper extremity support for 30 seconds or greater. CJ, 20% to <40% impaired. 4: Pt moves and returns trunkal midpoint 1-2 inches in one plane. CJ, 20% to <40% impaired. 4+: Pt moves and returns trunkal midpoint 1-2 inches in multiple planes. CI, 1% to <20% impaired. 5: Pt moves and returns trunkal midpoint in all planes greater than 2 inches. CH, 0% impaired. [address] Standing Balance Scale  0: Pt performs 25% or less of standing activity (Max assist) CN, 100% impaired. 1: Pt supports self with upper extremities but requires therapist assistance. Pt performs 25-50% of effort (Mod assist) CM, 80% to <100% impaired. 1+: Pt supports self with upper extremities but requires therapist assistance. Pt performs >50% effort. (Min assist). CL, 60% to <80% impaired. 2: Pt supports self independently with both upper extremities (walker, crutches, parallel bars). CL, 60% to <80% impaired. 2+: Pt support self independently with 1 upper extremity (cane, crutch, 1 parallel bar). CK, 40% to <60% impaired. 3: Pt stands without upper extremity support for up to 30 seconds. CK, 40% to <60% impaired. 3+: Pt stands without upper extremity support for 30 seconds or greater. CJ, 20% to <40% impaired. 4: Pt independently moves and returns center of gravity 1-2 inches in one plane. CJ, 20% to <40% impaired. 4+: Pt independently moves and returns center of gravity 1-2 inches in multiple planes. CI, 1% to <20% impaired.   5: Pt independently moves and returns center of gravity in all planes greater than 2 inches. CH, 0% impaired. Prior Level of Function/Home Situation:   Home Situation  Home Environment: Private residence  One/Two Story Residence: Two story  Lift Chair Available: Yes  Living Alone: No  Support Systems: Family member(s), Child(janeth)  Patient Expects to be Discharged to[de-identified] Private residence  Current DME Used/Available at Home: He [employer], straight, Walker, rolling  Critical Behavior:  Neurologic State: Alert  Orientation Level: Oriented to person  Cognition: Follows commands  Safety/Judgement: Fall prevention; Awareness of environment  Psychosocial  Patient Behaviors: Cooperative;Calm  Strength:    Strength: Grossly decreased, non-functional (LUE; BLE)  Tone & Sensation:   Tone: Normal  Range Of Motion:  AROM: Grossly decreased, non-functional (LUE)  Functional Mobility:  Bed Mobility:  Supine to Sit: Maximum assistance;Assist x2  Sit to Supine: Maximum assistance;Assist x2  Transfers:  Sit to Stand:  (not tested)  Balance:   Sitting: Impaired  Sitting - Static: Poor (constant support)  Sitting - Dynamic: Poor (constant support)  Ambulation/Gait Training:  Gait Description (WDL):  (not tested)  Therapeutic Exercises:   Sitting EOB 5min   Reaching RUE x5  Pain:  Pre treatment pain level:  Post treatment pain level:  Pain Scale 1: Numeric (0 - 10)  Pain Intensity 1: 0  Activity Tolerance:   Poor  Please refer to the flowsheet for vital signs taken during this treatment. After treatment:   [ ]         Patient left in no apparent distress sitting up in chair  [X]         Patient left in no apparent distress in bed  [X]         Call bell left within reach  [X]         Nursing notified  [ ]         Caregiver present  [ ]         Bed alarm activated      COMMUNICATION/EDUCATION:   [X]         Fall prevention education was provided and the patient/caregiver indicated understanding. [X]         Patient/family have participated as able in goal setting and plan of care.   [X] Patient/family agree to work toward stated goals and plan of care. [ ]         Patient understands intent and goals of therapy, but is neutral about his/her participation. [ ]         Patient is unable to participate in goal setting and plan of care. Patient educated on the role of physical therapy during the acute stay  and the importance of mobility. VU.        Thank you for this referral.  Emily Fair, PT   Time Calculation: 26 mins

## 2017-06-14 NOTE — PROGRESS NOTES
[facility]: [phone])  [facility]: [phone]   [facility]: [phone]    Patient Name: Jaycee Gonzales  YOB: 1929    Date of Initial Consult: 6-14-17  Reason for Consult: Care Decisions  Requesting Provider: Amador Hyatt MD   Primary Care Physician: Chinedu Hernandez MD      SUMMARY:   Jaycee Gonzales is a 80y.o. year old with a past history of Diabetes/Neuropathy, A-Fib, Hypothyroid, and Chronic kidney disease, who was admitted on 6/10/2017 from ER/Home with a diagnosis of new left sided weakness Current medical issues leading to Palliative Medicine involvement include: CT head done showed small brain hemorrhage. No midline shift. INR 3.3 . Was on [facility] Warfarin for chronic afib, FFP and Vit K ordered. Asked to address patients goals of care. PALLIATIVE DIAGNOSES:   1. CVA   2. CKD Stage 3-4  3. Obesity  4. SOB       PLAN:   1. Viridiana Feng NP and I met with patient at her bedside, she is alert, fully oriented and agreed to meet with us when her dtr Rosi Burns came to visit. We called Purvi, she offered to meet with us after noon, we returned at 1pm, present was her other dtr and a granddtr as well. They shared patient had been ,  in 1993, had 2 dtrs, 2 sons, dtr oRsi Burns has always lived home, to help care for her parents. She has 5 grandchildren, her 2 brothers have passed. She worked in [employer] in [place] for 36 yrs. She denies pain, really not sob but not yet like at  Home, no complaints of n/v, had many BM day prior, would like to eat/drink. 2. CVA-she is right handed and is now unable to use her left side easily-feels heavy and although sensation intact is not able to use it normally. She does not notice, nor does SLP issues with swallowing or with speech.  She understands that the COUMADIN was to prevent small clots forming in heart due to A FIB and causing strokes, instead the blood was too thin and it caused a bleed in her brain. 3. CKD Stage 3-4-she is aware that this has been an ongoing concern, that she takes high dose Lasix to ensure adequate voiding. She shared that she had been on Losartan and Aldactone, off this now due to worsening renal function. 4. Obesity-she has been unable to maneuver in her home, uses a cane but now has a stair lift to get to second floor. No discussion of underlying NISA but clearly this is possible contributor to her co-morbidities. 5. SOB-she understood why she was brought to ICU, that she got acutely winded and likely it was due to excess fluid. She shared that at home she took Lasix 80mg bid, so it is believed that she got fluid overloaded and thus SOB. She was not happy with the BIPAP but agreed that she would use it again, if it was for a short time and she was expected to recover. 6. Goals of care and Code Status-patient had completed a Medical Power of Attny naming her dtr and her son, done years ago with a local . It did not state her wishes and we discussed those with her. She is very clear that she does not want HEROICS, no CPR/SHOCKS or INTUBATION at time of death, nor would she want INTUBATION even if for potentially reversible process, although she would be willing to use BIPAP for limited time. She completed a POST FORM-electing DNR/DNI, LIMITED INTERVENTIONS to INCLUDE re-hospitalizations, BIPAP, IV'S, testing but she would not want DIALYSIS, NO FEEDING TUBES, prefers to eat/drink what she is able. Copies given to dtr, copy to chart, DNR entered into EMR. She is hoping to return home, prefers not to go to SNF, would be willing to get PT/OT/SLP at home, dtr is ok with providing ongoing care and was receptive to allowing more help to provide ongoing care to her mother. 7. Initial consult note routed to primary continuity provider  8.  Communicated plan of care with: Palliative IDT, Attending, ICU TEAM.       GOALS OF CARE:     [====Goals of Care====]  GOALS OF CARE:  Patient / health care proxy stated goals: to go home soon      TREATMENT PREFERENCES:   Code Status: DNR    Advance Care Planning:  Advance Care Planning 6/11/2017   Patient's Healthcare Decision Maker is: Legal Next of Kin   Primary Decision Maker Name Dimitri Dominguez   Primary Decision Maker Phone Number [phone]   Primary Decision Maker Relationship to Patient Adult child   Confirm Advance Directive None       Other:    The palliative care team has discussed with patient / health care proxy about goals of care / treatment preferences for patient.  [====Goals of Care====]    Advance Care Planning 6/11/2017   Patient's Healthcare Decision Maker is: Legal Next of [address]   Primary Decision Maker Name Dimitri Dominguez   Primary Decision Maker Phone Number [phone]   Primary Decision Maker Relationship to Patient Adult child   Confirm Advance Directive None      Lives with her dtr     HISTORY:     History obtained from: Chart    CHIEF COMPLAINT: Left side weakness    HPI/SUBJECTIVE:   97.8, 146->94, 194/125->123/61, 25 on BIPAP-> 3L at 100%, WT 215lb. HIDALGO 2700, Stool x 4 on 6-12  MAR-Nebs, Tylenol, Folvite, Lasix, Levaquin, Synthroid, Protonix, Zosyn, Pericolace. LABS-WBC 5.5, H&H 11 & 35.4, Plat 156, INR 3.3->1.2, ESR 41, Albumin 3.4, BUN/Creat 22/1.68->8/1.09, Ammonia 54->25, HgbA1c 8.5, Blood C&S NGTD,   ECHO-Left ventricle: Size was at the upper limits of normal. Systolic function  was normal. Ejection fraction was estimated in the range of 55 % to 60 %. There were no regional wall motion abnormalities. Wall thickness was  mildly increased. Right ventricle: The ventricle was mildly dilated. Left atrium: The atrium was mildly dilated. Right atrium: The atrium was dilated. Mitral valve: There was mild regurgitation. Tricuspid valve: There was moderate regurgitation. Inferior vena cava, hepatic veins:  The inferior vena cava was dilated  CT Head-Mildly progressively enlarging small right thalamic hematoma.     No significant mass effect. CT Chest-Moderate dependent pleural effusions with adjacent passive subsegmental  atelectasis in lower lobes. Minimal subsegmental atelectasis inferior lingula.     2. Patchy airspace disease posterior segment right upper lobe suggesting  pneumonia.     3. Thickening of minor and right major fissures.     4. Moderate hiatal hernia. Poorly defined small areas of nodularity and  calcifications left thyroid lobe. CXR-  Continued findings suggestive of moderate CHF with small bilateral pleural  effusions, with interval increased right basilar consolidation.         Neuro Consult-Right internal capsule hemorrhagic stroke  Atrial fibrillation, high INR, s/p FFP  Hyperammonemia. Ok to mobilize. PT/OT. No anticoagulation for next 2 weeks. Can go to floor  Pulm Consult-transferred back to  ICU for RESP DISTRESS on 6-13-placed on BIPAP  SLP-[facility]-soft diet   The patient is:   [x] Verbal and participatory  [] Non-participatory due to:       Clinical Pain Assessment (nonverbal scale for nonverbal patients): Pain: 0  denied       FUNCTIONAL ASSESSMENT:     Palliative Performance Scale (PPS):60%          PSYCHOSOCIAL/SPIRITUAL SCREENING:     Advance Care Planning:  Advance Care Planning 6/11/2017   Patient's Healthcare Decision Maker is: Legal Next of [address]   Primary Decision Maker Name Mortimer Shirk   Primary Decision Maker Phone Number [phone]   Primary Decision Maker Relationship to Patient Adult child   Confirm Advance Directive None        Any spiritual / Sikh concerns:  [] Yes /  [x] No    Caregiver Burnout:  [] Yes /  [x] No /  [] No Caregiver Present      Anticipatory grief assessment:   [x] Normal  / [] Maladaptive       ESAS Anxiety: Anxiety: 0    ESAS Depression: Depression: 0        REVIEW OF SYSTEMS:     Positive and pertinent negative findings in ROS are noted above in HPI.  Denies pain, wants to drink, has no appetite but feels thirsty, no n/v, feels less sob but not yet like normal. Had bm yesterday. The following systems were [x] reviewed / [] unable to be reviewed as noted in HPI  Other findings are noted below. Systems: constitutional, ears/nose/mouth/throat, respiratory, gastrointestinal, genitourinary, musculoskeletal, integumentary, neurologic, psychiatric, endocrine. Positive findings noted below. Modified ESAS Completed by: provider   Fatigue: 2 Drowsiness: 0   Depression: 0 Pain: 0   Anxiety: 0 Nausea: 0   Anorexia: 0 Dyspnea: 2     Constipation: No     Stool Occurrence(s): 1        PHYSICAL EXAM:     Wt Readings from Last 3 Encounters:   06/12/17 97.7 kg (215 lb 6.2 oz)   12/12/16 97.1 kg (214 lb)     Blood pressure 129/67, pulse 92, temperature 99.1 °F (37.3 °C), resp. rate 30, height 4' 11\" (1.499 m), weight 97.7 kg (215 lb 6.2 oz), SpO2 100 %.   Pain:  Pain Scale 1: Numeric (0 - 10)  Pain Intensity 1: 0     Pain Location 1: Head  Pain Orientation 1: Anterior  Pain Description 1: Aching  Pain Intervention(s) 1: Medication (see MAR)  Last bowel movement: x 4 on 6-12    Constitutional: alert and fully oriented, wearing n/c,  In no distress  Eyes: pupils equal, anicteric  ENMT: no nasal discharge, moist mucous membranes  Respiratory: breathing not labored, symmetric  Gastrointestinal: soft non-tender, +bowel sounds, obese  Musculoskeletal: no deformity, no tenderness to palpation  Skin: warm, dry  Neurologic: following commands, moving but left is weak,unable to raise hand off pillow Psychiatric: full affect, no hallucinations  Other:       HISTORY:     Principal Problem:    Hemorrhagic stroke ([facility] [address] (6/11/2017)    Active Problems:    Chronic a-fib (HCC) (6/11/2017)      Supratherapeutic INR (6/11/2017)      Left-sided weakness (6/11/2017)      Hypokalemia (6/11/2017)      Past Medical History:   Diagnosis Date    Atrial fibrillation (HCC)     Chronic kidney disease     unknown what stage    Diabetes ([facility] [address]       History reviewed. No pertinent surgical history. Family History   Problem Relation Age of Onset    Family history unknown: Yes     History reviewed, no pertinent family history.   Social History   Substance Use Topics    Smoking status: Never Smoker    Smokeless tobacco: Not on file    Alcohol use No     No Known Allergies   Current Facility-Administered Medications   Medication Dose Route Frequency    furosemide (LASIX) tablet 40 mg  40 mg Oral ACB&D    [START ON 6/15/2017] pantoprazole (PROTONIX) tablet 40 mg  40 mg Oral ACB    insulin detemir (LEVEMIR) injection 5 Units  5 Units SubCUTAneous DAILY WITH DINNER    [START ON 6/16/2017] VANCOMYCIN INFORMATION NOTE   Other ONCE    insulin lispro (HUMALOG) injection   SubCUTAneous AC&HS    PHARMACY INFORMATION NOTE  1 Each Other DAILY    ondansetron (ZOFRAN) injection 1 mg  1 mg IntraVENous Q6H PRN    labetalol (NORMODYNE;TRANDATE) 20 mg/4 mL (5 mg/mL) injection 5 mg  5 mg IntraVENous Q15MIN PRN    acetaminophen (TYLENOL) tablet 650 mg  650 mg Oral Q4H PRN    acetaminophen (TYLENOL) suppository 650 mg  650 mg Rectal Q4H PRN    bisacodyl (DULCOLAX) tablet 5 mg  5 mg Oral DAILY PRN    bisacodyl (DULCOLAX) suppository 10 mg  10 mg Rectal DAILY PRN    albuterol (PROVENTIL VENTOLIN) nebulizer solution 2.5 mg  2.5 mg Nebulization Q4H PRN    glucose chewable tablet 16 g  4 Tab Oral PRN    dextrose (D50W) injection syrg 12.5-25 g  12.5-25 g IntraVENous PRN    glucagon (GLUCAGEN) injection 1 mg  1 mg IntraMUSCular PRN    levothyroxine (SYNTHROID) injection 50 mcg  50 mcg IntraVENous DAILY    folic acid (FOLVITE) 1 mg, thiamine (B-1) 100 mg in 0.9% sodium chloride 50 mL ivpb   IntraVENous DAILY    0.9% sodium chloride infusion 250 mL  250 mL IntraVENous PRN        LAB AND IMAGING FINDINGS:     Lab Results   Component Value Date/Time    WBC 5.5 06/13/2017 04:55 AM    HGB 11.0 06/13/2017 04:55 AM    PLATELET 480 88/37/6742 04:55 AM     Lab Results   Component Value Date/Time    Sodium 145 06/13/2017 04:55 AM    Potassium 4.8 06/13/2017 04:55 AM    Chloride 113 06/13/2017 04:55 AM    CO2 21 06/13/2017 04:55 AM    BUN 8 06/13/2017 04:55 AM    Creatinine 1.09 06/13/2017 04:55 AM    Calcium 8.7 06/13/2017 04:55 AM    Magnesium 1.5 06/14/2017 01:00 AM    Phosphorus 2.5 06/14/2017 01:00 AM      Lab Results   Component Value Date/Time    AST (SGOT) 12 06/14/2017 01:00 AM    Alk. phosphatase 83 06/14/2017 01:00 AM    Protein, total 5.6 06/14/2017 01:00 AM    Albumin 2.8 06/14/2017 01:00 AM    Globulin 2.8 06/14/2017 01:00 AM     Lab Results   Component Value Date/Time    INR 1.2 06/14/2017 01:00 AM    Prothrombin time 14.9 06/14/2017 01:00 AM    aPTT 29.9 06/14/2017 01:00 AM      No results found for: IRON, FE, TIBC, IBCT, PSAT, FERR   No results found for: PH, PCO2, PO2  No components found for: [place]   Lab Results   Component Value Date/Time    CK 55 06/11/2017 02:00 PM    CK - MB <1.0 06/11/2017 02:00 PM              Total time: 100  Counseling / coordination time, spent as noted above: 70  > 50% counseling / coordination?: yes with patient her 2 dtrs     Prolonged service was provided for  [x]30 min   []75 min in face to face time in the presence of the patient, spent as noted above. Time Start: 1Time End: 2:10pm  Note: this can only be billed with 31447 (initial) or [phone] (follow up). If multiple start / stop times, list each separately.

## 2017-06-14 NOTE — PROGRESS NOTES
Problem: Self Care Deficits Care Plan (Adult)  Goal: *Acute Goals and Plan of Care (Insert Text)  Occupational Therapy Goals  Initiated 6/14/2017 within 7 day(s). 1. Patient will engaged in self-feeding at EOB with minimal assistance for balance and minimal vcs to incorporate LUE. 2. Patient will perform upper body dressing with minimal assistance utilizing adaptive strategies, prn.  3. Patient will perform functional task at EOB for 5 minutes with minimal assistance for balance to increase activity tolerance for ADLs. 4. Patient will perform toilet transfers with moderate assistance . 5. Patient will perform all aspects of toileting with moderate assistance . 6. Patient will participate in upper extremity therapeutic exercise/activities with minimal assistance/contact guard assist for 8 minutes to increase AROM/strength of LUE for participation in ADLs. 7. Patient will utilize energy conservation techniques during functional activities with minimal verbal cues. Outcome: Progressing Towards Goal  OCCUPATIONAL THERAPY EVALUATION     Patient: Gui Prater (89 y.o. female)  Date: 6/14/2017  Primary Diagnosis: Hemorrhagic stroke (HCC)  Hemorrhagic stroke ([facility]  Supratherapeutic INR  Chronic a-fib ([facility]        Precautions:  Fall      ASSESSMENT :  Based on the objective data described below, the patient presents with impairments with regard to bed mobility in preparation for ADLs, activity tolerance, LUE function/strength, and overall independence in ADLs secondary to recent stroke. Pt transferring to floor on arrival; Jose L Coleman RN and [place], RN and family present in room. Pt c/o SOB during session; HOB elevated. RUE full AROM. LUE, unable to achieve shoulder flex, full elbow flex against gravity; 3+/5 tricep strength, good L  strength. Pt engaged in rolling with min A to R and L sides; demo'ing fair activity tolerance.  Pt refused to maneuver to EOB; family requesting pt eat and rest. In prep for self-feeding task, pt repositioned and engaged in weight shifting with HOB elevated (using RUE to reposition upper body secondary to lateral lean to L) to facilitate midline in sitting and assume proper positioning. Pt encourage to utilized LUE during all tasks; she verbalized understanding. Pillows positioned under L elbow for support. Recommend rehab upon d/c. Patient will benefit from skilled intervention to address the above impairments. Patients rehabilitation potential is considered to be Good  Factors which may influence rehabilitation potential include:   [ ]             None noted  [ ]             Mental ability/status  [X]             Medical condition  [ ]             Home/family situation and support systems  [ ]             Safety awareness  [ ]             Pain tolerance/management  [ ]             Other:           PLAN :  Recommendations and Planned Interventions:  [X]               Self Care Training                  [X]        Therapeutic Activities  [X]               Functional Mobility Training    [ ]        Cognitive Retraining  [X]               Therapeutic Exercises           [X]        Endurance Activities  [X]               Balance Training                   [X]        Neuromuscular Re-Education  [ ]               Visual/Perceptual Training     [X]   Home Safety Training  [X]               Patient Education                 [X]        Family Training/Education  [ ]               Other (comment):     Frequency/Duration: Patient will be followed by occupational therapy 4-7 times a week to address goals. Discharge Recommendations: Rehab  Further Equipment Recommendations for Discharge: bedside commode, shower chair       SUBJECTIVE:   Patient stated Not today\" (referring to maneuvering to EOB)      OBJECTIVE DATA SUMMARY:       Past Medical History:   Diagnosis Date    Atrial fibrillation ([facility]      Chronic kidney disease       unknown what stage    Diabetes ([facility]     History reviewed. No pertinent surgical history. Barriers to Learning/Limitations: None  Compensate with: visual, verbal, tactile, kinesthetic cues/model     GCODES:  Self Care  Current  CL= 60-79%   Goal  CJ= 20-39%. The severity rating is based on the Other functional Assessment, MMT, ROM     Eval Complexity: History: LOW Complexity : Brief history review ; Examination: LOW Complexity : 1-3 performance deficits relating to physical, cognitive , or psychosocial skils that result in activity limitations and / or participation restrictions ; Decision Making:LOW Complexity : No comorbidities that affect functional and no verbal or physical assistance needed to complete eval tasks      Prior Level of Function/Home Situation: Pt was modified independent with self care tasks and utilized straight cane/RW for functional mobility PTA>  Home Situation  Home Environment: Private residence  One/Two Story Residence: Two story  Lift Chair Available: Yes  Living Alone: No  Support Systems: Family member(s), Child(janeth)  Patient Expects to be Discharged to[de-identified] Private residence  Current DME Used/Available at Home: [place], straight, Walker, rolling  [X]  Right hand dominant          [ ]  Left hand dominant     Cognitive/Behavioral Status:  Neurologic State: Alert  Orientation Level: Oriented X4  Cognition: Follows commands  Safety/Judgement: Awareness of environment; Fall prevention      Skin: Intact (BUEs)  Edema: None noted (BUEs)     Vision/Perceptual:    Acuity: Able to read clock/calendar on wall without difficulty       Coordination:  Coordination: Generally decreased, functional (RUE WFL; LUE generally decreased)  Fine Motor Skills-Upper: Right Intact; Left Intact (L generally decreased)    Gross Motor Skills-Upper: Right Intact; Left Intact      Balance:  Sitting:  (not assessed; pt refused despite encouragement)  Standing:  (not assessed)     Strength:  Strength: Generally decreased, functional (RUE: 4+/5; LUE: 3/5 bicep; 3+/5 tricep; good )     Tone & Sensation:  Tone: Normal (BUEs)  Sensation:  (unable to accurately assess)     Range of Motion:  AROM: Generally decreased, functional (RUE WFL; LUE: full elbow flexion with time)  PROM: Within functional limits (BUEs)     Functional Mobility and Transfers for ADLs:  Bed Mobility:  Rolling: Minimum assistance  Supine to Sit:  (not assessed; pt refused)  Sit to Supine: Maximum assistance;Assist x2     Transfers:  Sit to Stand:  (not assessed)     ADL Assessment:  Feeding: Moderate assistance  Oral Facial Hygiene/Grooming: Minimum assistance  Bathing: Maximum assistance  Upper Body Dressing: Maximum assistance  Lower Body Dressing: Maximum assistance  Toileting: Maximum assistance     Cognitive Retraining  Safety/Judgement: Awareness of environment; Fall prevention      Therapeutic Activity: In preparation for LB dressing while supine (for impaired balance and energy conservation), pt engaged in rolling with min A to R and L sides; demo'ing fair activity tolerance. In prep for self-feeding task, pt repositioned and engaged in weight shifting with HOB elevated (using RUE to reposition upper body) to facilitate midline in sitting and assume proper positioning in preparation for self-feeding task. Pain:  Pre treatment pain level: 0/10  Post treatment pain level: 0/10  Pain Scale 1: Numeric (0 - 10)  Pain Intensity 1: 0     Activity Tolerance:  Fair-  Please refer to the flowsheet for vital signs taken during this treatment. After treatment:   [ ] Patient left in no apparent distress sitting up in chair  [X] Patient left in no apparent distress in bed  [X] Call bell left within reach  [X] Nursing notified  [X] Caregiver present  [ ] Bed alarm activated      COMMUNICATION/EDUCATION: Patient/family educated on role of OT and POC. They verbalized understanding. [ ] Home safety education was provided and the patient/caregiver indicated understanding.   [X] Patient/family have participated as able in goal setting and plan of care. [X] Patient/family agree to work toward stated goals and plan of care. [ ] Patient understands intent and goals of therapy, but is neutral about his/her participation. [ ] Patient is unable to participate in goal setting and plan of care.      Thank you for this referral.     Allison Fuentes MS OTR/L  Time Calculation: 18 mins

## 2017-06-15 ENCOUNTER — APPOINTMENT (OUTPATIENT)
Dept: CT IMAGING | Age: 82
DRG: 064 | End: 2017-06-15
Attending: INTERNAL MEDICINE
Payer: MEDICARE

## 2017-06-15 LAB
ANION GAP BLD CALC-SCNC: 9 MMOL/L (ref 3–18)
APTT PPP: 29.9 SEC (ref 23–36.4)
BASOPHILS # BLD AUTO: 0 K/UL (ref 0–0.06)
BASOPHILS # BLD: 0 % (ref 0–2)
BUN SERPL-MCNC: 12 MG/DL (ref 7–18)
BUN/CREAT SERPL: 10 (ref 12–20)
CA-I SERPL-SCNC: 1.14 MMOL/L (ref 1.12–1.32)
CALCIUM SERPL-MCNC: 8.7 MG/DL (ref 8.5–10.1)
CHLORIDE SERPL-SCNC: 105 MMOL/L (ref 100–108)
CO2 SERPL-SCNC: 30 MMOL/L (ref 21–32)
CREAT SERPL-MCNC: 1.24 MG/DL (ref 0.6–1.3)
DIFFERENTIAL METHOD BLD: ABNORMAL
EOSINOPHIL # BLD: 0.2 K/UL (ref 0–0.4)
EOSINOPHIL NFR BLD: 5 % (ref 0–5)
ERYTHROCYTE [DISTWIDTH] IN BLOOD BY AUTOMATED COUNT: 15.8 % (ref 11.6–14.5)
GLUCOSE BLD STRIP.AUTO-MCNC: 102 MG/DL (ref 70–110)
GLUCOSE BLD STRIP.AUTO-MCNC: 103 MG/DL (ref 70–110)
GLUCOSE BLD STRIP.AUTO-MCNC: 154 MG/DL (ref 70–110)
GLUCOSE BLD STRIP.AUTO-MCNC: 161 MG/DL (ref 70–110)
GLUCOSE SERPL-MCNC: 84 MG/DL (ref 74–99)
HCT VFR BLD AUTO: 35 % (ref 35–45)
HGB BLD-MCNC: 11 G/DL (ref 12–16)
INR PPP: 1.2 (ref 0.8–1.2)
LYMPHOCYTES # BLD AUTO: 12 % (ref 21–52)
LYMPHOCYTES # BLD: 0.5 K/UL (ref 0.9–3.6)
MAGNESIUM SERPL-MCNC: 1.8 MG/DL (ref 1.6–2.6)
MCH RBC QN AUTO: 26.4 PG (ref 24–34)
MCHC RBC AUTO-ENTMCNC: 31.4 G/DL (ref 31–37)
MCV RBC AUTO: 84.1 FL (ref 74–97)
MONOCYTES # BLD: 0.4 K/UL (ref 0.05–1.2)
MONOCYTES NFR BLD AUTO: 8 % (ref 3–10)
NEUTS SEG # BLD: 3.2 K/UL (ref 1.8–8)
NEUTS SEG NFR BLD AUTO: 75 % (ref 40–73)
PHOSPHATE SERPL-MCNC: 2.1 MG/DL (ref 2.5–4.9)
PLATELET # BLD AUTO: 166 K/UL (ref 135–420)
PMV BLD AUTO: 9.9 FL (ref 9.2–11.8)
POTASSIUM SERPL-SCNC: 3.5 MMOL/L (ref 3.5–5.5)
PROTHROMBIN TIME: 14.6 SEC (ref 11.5–15.2)
RBC # BLD AUTO: 4.16 M/UL (ref 4.2–5.3)
SODIUM SERPL-SCNC: 144 MMOL/L (ref 136–145)
WBC # BLD AUTO: 4.3 K/UL (ref 4.6–13.2)

## 2017-06-15 PROCEDURE — 70450 CT HEAD/BRAIN W/O DYE: CPT

## 2017-06-15 PROCEDURE — 36415 COLL VENOUS BLD VENIPUNCTURE: CPT | Performed by: FAMILY MEDICINE

## 2017-06-15 PROCEDURE — 97530 THERAPEUTIC ACTIVITIES: CPT

## 2017-06-15 PROCEDURE — 74011636637 HC RX REV CODE- 636/637: Performed by: INTERNAL MEDICINE

## 2017-06-15 PROCEDURE — 82962 GLUCOSE BLOOD TEST: CPT

## 2017-06-15 PROCEDURE — 85730 THROMBOPLASTIN TIME PARTIAL: CPT | Performed by: FAMILY MEDICINE

## 2017-06-15 PROCEDURE — 94660 CPAP INITIATION&MGMT: CPT

## 2017-06-15 PROCEDURE — 83735 ASSAY OF MAGNESIUM: CPT | Performed by: FAMILY MEDICINE

## 2017-06-15 PROCEDURE — 65270000029 HC RM PRIVATE

## 2017-06-15 PROCEDURE — 74011250637 HC RX REV CODE- 250/637: Performed by: PHYSICIAN ASSISTANT

## 2017-06-15 PROCEDURE — 82330 ASSAY OF CALCIUM: CPT | Performed by: FAMILY MEDICINE

## 2017-06-15 PROCEDURE — 85610 PROTHROMBIN TIME: CPT | Performed by: FAMILY MEDICINE

## 2017-06-15 PROCEDURE — 84100 ASSAY OF PHOSPHORUS: CPT | Performed by: FAMILY MEDICINE

## 2017-06-15 PROCEDURE — 80048 BASIC METABOLIC PNL TOTAL CA: CPT | Performed by: FAMILY MEDICINE

## 2017-06-15 PROCEDURE — 97535 SELF CARE MNGMENT TRAINING: CPT

## 2017-06-15 PROCEDURE — 85025 COMPLETE CBC W/AUTO DIFF WBC: CPT | Performed by: FAMILY MEDICINE

## 2017-06-15 PROCEDURE — 74011250637 HC RX REV CODE- 250/637: Performed by: INTERNAL MEDICINE

## 2017-06-15 RX ORDER — LEVOTHYROXINE SODIUM 100 UG/1
100 TABLET ORAL
Status: DISCONTINUED | OUTPATIENT
Start: 2017-06-15 | End: 2017-06-16 | Stop reason: HOSPADM

## 2017-06-15 RX ADMIN — PANTOPRAZOLE SODIUM 40 MG: 40 TABLET, DELAYED RELEASE ORAL at 08:33

## 2017-06-15 RX ADMIN — FUROSEMIDE 40 MG: 40 TABLET ORAL at 08:33

## 2017-06-15 RX ADMIN — FOLIC ACID 1 MG: 1 TABLET ORAL at 08:33

## 2017-06-15 RX ADMIN — INSULIN LISPRO 3 UNITS: 100 INJECTION, SOLUTION INTRAVENOUS; SUBCUTANEOUS at 21:57

## 2017-06-15 RX ADMIN — LEVOTHYROXINE SODIUM 100 MCG: 100 TABLET ORAL at 17:45

## 2017-06-15 RX ADMIN — FUROSEMIDE 40 MG: 40 TABLET ORAL at 17:45

## 2017-06-15 RX ADMIN — INSULIN DETEMIR 5 UNITS: 100 INJECTION, SOLUTION SUBCUTANEOUS at 18:12

## 2017-06-15 RX ADMIN — THIAMINE HCL TAB 100 MG 100 MG: 100 TAB at 08:32

## 2017-06-15 RX ADMIN — INSULIN LISPRO 3 UNITS: 100 INJECTION, SOLUTION INTRAVENOUS; SUBCUTANEOUS at 13:24

## 2017-06-15 NOTE — PROGRESS NOTES
[facility]: [phone] ([phone])  [facility]: [phone] [address]: [phone]    Patient Name: Jaycee Gonzales  YOB: 1929    Date of Initial Consult: 6-14-17  Reason for Consult: Care Decisions  Requesting Provider: Amador Hyatt MD   Primary Care Physician: Chinedu Hernandez MD      SUMMARY:   Jaycee Gonzales is a 80y.o. year old with a past history of Diabetes/Neuropathy, A-Fib, Hypothyroid, and Chronic kidney disease, who was admitted on 6/10/2017 from ER/Home with a diagnosis of new left sided weakness Current medical issues leading to Palliative Medicine involvement include: CT head done showed small brain hemorrhage. No midline shift. INR 3.3 . Was on [facility] Warfarin for chronic afib, FFP and Vit K ordered. Asked to address patients goals of care. PALLIATIVE DIAGNOSES:   1. CVA   2. CKD Stage 3-4  3. Obesity  4. SOB       PLAN:   1. Viridiana Feng NP and I met with patient at her bedside, her 2 dtrs and one son are present, she is alert and in no distress, had just finished working with OT and did well. 2. CVA-working with OT, found it good to sit up, does appreciate that working with the therapists will be valuable and is considering admission to [facility]. 3. CKD Stage 3-4-she is aware that this has been an ongoing concern, that she takes high dose Lasix to ensure adequate voiding. She shared that she had been on Losartan and Aldactone, off this now due to worsening renal function. 4. Obesity-has had a poor appetite here, but family confirms that this is not new, had been eating very little for at least 2-3 yrs, can't relate it to a new medicine nor any issues with pain orally or change in smell or trouble with n/v or swallowing. Her records from w/u with her PCP are not in our EMR nor in [place], but if unable to access them would at least check a TSH and B12, as with her very low intake not having wt loss is odd.  Meanwhile we have added GLUCERNA as supplement. 5. SOB-presently feels back to her baseline, not winded at all, likely could be off supplement oxygen and have saturation checked to confirm is adequate. 6. Goals of care and Code Status-yesterday she completed a POST FORM-electing DNR/DNI, LIMITED INTERVENTIONS to INCLUDE re-hospitalizations, BIPAP, IV'S, testing but she would not want DIALYSIS, NO FEEDING TUBES, prefers to eat/drink what she is able. Family are hoping she will continue to receive PT, OT and would like this in [facility] if bed available. 7. Initial consult note routed to primary continuity provider  8. Communicated plan of care with: Palliative IDT, Attending, ICU TEAM.       GOALS OF CARE:     [====Goals of Care====]  GOALS OF CARE:  Patient / health care proxy stated goals: to go home soon      TREATMENT PREFERENCES:   Code Status: DNR    Advance Care Planning:  Advance Care Planning 6/11/2017   Patient's Healthcare Decision Maker is: Legal Next of Kin   Primary Decision Maker Name Jonna Rodriguez   Primary Decision Maker Phone Number [phone]   Primary Decision Maker Relationship to Patient Adult child   Confirm Advance Directive None       Other:    The palliative care team has discussed with patient / health care proxy about goals of care / treatment preferences for patient.  [====Goals of Care====]    Advance Care Planning 6/11/2017   Patient's Healthcare Decision Maker is: Legal Next of Kin   Primary Decision Maker Name Jonna Rodriguez   Primary Decision Maker Phone Number [phone]   Primary Decision Maker Relationship to Patient Adult child   Confirm Advance Directive None      Lives with her dtr     HISTORY:     History obtained from: Chart    CHIEF COMPLAINT: Left side weakness    HPI/SUBJECTIVE:   97.8, 146->91, 140/68, 16 on BIPAP-> 3L at 100%, WT 215lb. HIDALGO 1875, Stool x 1 on 6-14  MAR-Nebs, Tylenol, Folvite, Lasix, Levaquin, Synthroid, Protonix, Zosyn, Pericolace.   LABS-WBC 5.5, H&H 11 & 35.4, Plat 156, INR 3.3->1.2, ESR 41, Albumin 3.4, BUN/Creat 22/1.68->8/1.09, Ammonia 54->25, HgbA1c 8.5, Blood C&S NGTD, BNP 6251  ECHO-Left ventricle: Size was at the upper limits of normal. Systolic function  was normal. Ejection fraction was estimated in the range of 55 % to 60 %. There were no regional wall motion abnormalities. Wall thickness was  mildly increased. Right ventricle: The ventricle was mildly dilated. Left atrium: The atrium was mildly dilated. Right atrium: The atrium was dilated. Mitral valve: There was mild regurgitation. Tricuspid valve: There was moderate regurgitation. Inferior vena cava, hepatic veins: The inferior vena cava was dilated  CT Head-Mildly progressively enlarging small right thalamic hematoma.     No significant mass effect. CT Chest-Moderate dependent pleural effusions with adjacent passive subsegmental  atelectasis in lower lobes. Minimal subsegmental atelectasis inferior lingula.     2. Patchy airspace disease posterior segment right upper lobe suggesting  pneumonia.     3. Thickening of minor and right major fissures.     4. Moderate hiatal hernia. Poorly defined small areas of nodularity and  calcifications left thyroid lobe. CXR-  Continued findings suggestive of moderate CHF with small bilateral pleural  effusions, with interval increased right basilar consolidation.         Neuro Consult-Right internal capsule hemorrhagic stroke  Atrial fibrillation, high INR, s/p FFP  Hyperammonemia. Ok to mobilize. PT/OT. No anticoagulation for next 2 weeks.  Can go to floor  Pulm Consult-transferred back to  ICU for RESP DISTRESS on 6-13-placed on BIPAP  SLP-[facility]-soft diet   The patient is:   [x] Verbal and participatory  [] Non-participatory due to:       Clinical Pain Assessment (nonverbal scale for nonverbal patients): Pain: 0  denied       FUNCTIONAL ASSESSMENT:     Palliative Performance Scale (PPS):70%          PSYCHOSOCIAL/SPIRITUAL SCREENING:     Advance Care Planning:  Advance Care Planning 6/11/2017   Patient's Healthcare Decision Maker is: Legal Next of Kin   Primary Decision Maker Name Rivera Ramires   Primary Decision Maker Phone Number [phone]   Primary Decision Maker Relationship to Patient Adult child   Confirm Advance Directive None        Any spiritual / Restoration concerns:  [] Yes /  [x] No    Caregiver Burnout:  [] Yes /  [x] No /  [] No Caregiver Present      Anticipatory grief assessment:   [x] Normal  / [] Maladaptive       ESAS Anxiety: Anxiety: 0    ESAS Depression: Depression: 0        REVIEW OF SYSTEMS:     Positive and pertinent negative findings in ROS are noted above in HPI. Denies pain, feels breathing is now good, like at home, complains of no appetite, not a new issue, not sure but feels nothing tastes good, no n/v, no trouble with pain or swalllowing. Had bm yesterday. The following systems were [x] reviewed / [] unable to be reviewed as noted in HPI  Other findings are noted below. Systems: constitutional, ears/nose/mouth/throat, respiratory, gastrointestinal, genitourinary, musculoskeletal, integumentary, neurologic, psychiatric, endocrine. Positive findings noted below. Modified ESAS Completed by: provider   Fatigue: 2 Drowsiness: 0   Depression: 0 Pain: 0   Anxiety: 0 Nausea: 0   Anorexia: 4 Dyspnea: 0     Constipation: No     Stool Occurrence(s): 1        PHYSICAL EXAM:     Wt Readings from Last 3 Encounters:   06/12/17 97.7 kg (215 lb 6.2 oz)   12/12/16 97.1 kg (214 lb)     Blood pressure 128/75, pulse 100, temperature 98.2 °F (36.8 °C), resp. rate 14, height 4' 11\" (1.499 m), weight 97.7 kg (215 lb 6.2 oz), SpO2 100 %.   Pain:  Pain Scale 1: Numeric (0 - 10)  Pain Intensity 1: 0     Pain Location 1: Head  Pain Orientation 1: Anterior  Pain Description 1: Aching  Pain Intervention(s) 1: Medication (see MAR)  Last bowel movement: x 1 on 6-14    Constitutional: alert and fully oriented, wearing n/c,  In no distress  Eyes: pupils equal, anicteric  ENMT: no nasal discharge, moist mucous membranes, tongue was without lesions and no abn coloring  Respiratory: breathing not labored, symmetric  Gastrointestinal: soft non-tender, +bowel sounds, obese  Musculoskeletal: no deformity, no tenderness to palpation  Skin: warm, dry  Neurologic: following commands, moving but left is weak, clumsy   Psychiatric: full affect, no hallucinations  Other:       HISTORY:     Principal Problem:    Hemorrhagic stroke ([facility] (6/11/2017)    Active Problems:    Chronic a-fib ([facility] (6/11/2017)      Supratherapeutic INR (6/11/2017)      Left-sided weakness (6/11/2017)      Hypokalemia (6/11/2017)      CKD (chronic kidney disease) stage 3, GFR 30-59 ml/min (6/14/2017)      Obesity (6/14/2017)      SOB (shortness of breath) (6/14/2017)      Past Medical History:   Diagnosis Date    Atrial fibrillation (HCC)     Chronic kidney disease     unknown what stage    Diabetes ([facility]       History reviewed. No pertinent surgical history. Family History   Problem Relation Age of Onset    Family history unknown: Yes     History reviewed, no pertinent family history.   Social History   Substance Use Topics    Smoking status: Never Smoker    Smokeless tobacco: Not on file    Alcohol use No     No Known Allergies   Current Facility-Administered Medications   Medication Dose Route Frequency    furosemide (LASIX) tablet 40 mg  40 mg Oral ACB&D    pantoprazole (PROTONIX) tablet 40 mg  40 mg Oral ACB    folic acid (FOLVITE) tablet 1 mg  1 mg Oral DAILY    Thiamine Mononitrate (B-1) tablet 100 mg  100 mg Oral DAILY    insulin detemir (LEVEMIR) injection 5 Units  5 Units SubCUTAneous DAILY WITH DINNER    insulin lispro (HUMALOG) injection   SubCUTAneous AC&HS    PHARMACY INFORMATION NOTE  1 Each Other DAILY    ondansetron (ZOFRAN) injection 1 mg  1 mg IntraVENous Q6H PRN    labetalol (NORMODYNE;TRANDATE) 20 mg/4 mL (5 mg/mL) injection 5 mg  5 mg IntraVENous Q15MIN PRN    acetaminophen (TYLENOL) tablet 650 mg  650 mg Oral Q4H PRN    acetaminophen (TYLENOL) suppository 650 mg  650 mg Rectal Q4H PRN    bisacodyl (DULCOLAX) tablet 5 mg  5 mg Oral DAILY PRN    bisacodyl (DULCOLAX) suppository 10 mg  10 mg Rectal DAILY PRN    albuterol (PROVENTIL VENTOLIN) nebulizer solution 2.5 mg  2.5 mg Nebulization Q4H PRN    glucose chewable tablet 16 g  4 Tab Oral PRN    dextrose (D50W) injection syrg 12.5-25 g  12.5-25 g IntraVENous PRN    glucagon (GLUCAGEN) injection 1 mg  1 mg IntraMUSCular PRN    levothyroxine (SYNTHROID) injection 50 mcg  50 mcg IntraVENous DAILY    0.9% sodium chloride infusion 250 mL  250 mL IntraVENous PRN        LAB AND IMAGING FINDINGS:     Lab Results   Component Value Date/Time    WBC 4.3 06/15/2017 04:35 AM    HGB 11.0 06/15/2017 04:35 AM    PLATELET 645 36/99/2772 04:35 AM     Lab Results   Component Value Date/Time    Sodium 144 06/15/2017 04:35 AM    Potassium 3.5 06/15/2017 04:35 AM    Chloride 105 06/15/2017 04:35 AM    CO2 30 06/15/2017 04:35 AM    BUN 12 06/15/2017 04:35 AM    Creatinine 1.24 06/15/2017 04:35 AM    Calcium 8.7 06/15/2017 04:35 AM    Magnesium 1.8 06/15/2017 04:35 AM    Phosphorus 2.1 06/15/2017 04:35 AM      Lab Results   Component Value Date/Time    AST (SGOT) 12 06/14/2017 01:00 AM    Alk.  phosphatase 83 06/14/2017 01:00 AM    Protein, total 5.6 06/14/2017 01:00 AM    Albumin 2.8 06/14/2017 01:00 AM    Globulin 2.8 06/14/2017 01:00 AM     Lab Results   Component Value Date/Time    INR 1.2 06/15/2017 12:53 AM    Prothrombin time 14.6 06/15/2017 12:53 AM    aPTT 29.9 06/15/2017 12:53 AM      No results found for: IRON, FE, TIBC, IBCT, PSAT, FERR   No results found for: PH, PCO2, PO2  No components found for: [place]   Lab Results   Component Value Date/Time    CK 55 06/11/2017 02:00 PM    CK - MB <1.0 06/11/2017 02:00 PM              Total time: 40  Counseling / coordination time, spent as noted above: 30  > 50% counseling / coordination?: yes with patient her 2 dtrs and son    Prolonged service was provided for  []30 min   []75 min in face to face time in the presence of the patient, spent as noted above. Time Start: Time End:   Note: this can only be billed with 37292 (initial) or [id number] (follow up). If multiple start / stop times, list each separately.

## 2017-06-15 NOTE — PROGRESS NOTES
Spoke  With pt and  2 dtrs  And  Son, pt  States she is undecided about  Going to  [employer] She lives with her dtr, who states  Its up to her. they  Will let me know in am. If needs snf  First choice [facility] and sec choice amy ovalle. posted in [employer] [facility] [employer].

## 2017-06-15 NOTE — PROGRESS NOTES
1940: Bedside verbal shift report received from Ayan Haider [facility]. Pt is awake in bed, no signs of distress, instructed to press call light if assistance is needed, call light within reach. 0715: Bedside and Verbal shift change report given to Violette Guerrero (oncoming nurse) by Nicolle Martinez RN (offgoing nurse). Report included the following information SBAR, Kardex, Intake/Output, MAR and Recent Results.

## 2017-06-15 NOTE — PROGRESS NOTES
Hospitalist Progress Note  Nessa Meier MD  Internal medicine/ Hospitalist    Daily Progress Note: 6/15/2017 1:03 PM      Interval history / Subjective:   Patient admitted for hemorrhagic stroke. He has h/o Afib, hypothyroid , CKD, hypertension, DM, Neuropathy and was on coumadin. INR on admission 3.3 and patient received FFP and vit K. He is now admitted to NICU. Care consisting on monitoring by repeating CT head. Pt went into respiratory distress today am and was transferred to icu on BIPAP. Lasix iv was given and patient's O2 sat improved. She was also started on iv atbx as CTA chest showed possible pneumonia. Atbx were d/dne as there was nos strong evidence of infection.       Current Facility-Administered Medications   Medication Dose Route Frequency    furosemide (LASIX) tablet 40 mg  40 mg Oral ACB&D    pantoprazole (PROTONIX) tablet 40 mg  40 mg Oral ACB    folic acid (FOLVITE) tablet 1 mg  1 mg Oral DAILY    Thiamine Mononitrate (B-1) tablet 100 mg  100 mg Oral DAILY    insulin detemir (LEVEMIR) injection 5 Units  5 Units SubCUTAneous DAILY WITH DINNER    insulin lispro (HUMALOG) injection   SubCUTAneous AC&HS    PHARMACY INFORMATION NOTE  1 Each Other DAILY    ondansetron (ZOFRAN) injection 1 mg  1 mg IntraVENous Q6H PRN    labetalol (NORMODYNE;TRANDATE) 20 mg/4 mL (5 mg/mL) injection 5 mg  5 mg IntraVENous Q15MIN PRN    acetaminophen (TYLENOL) tablet 650 mg  650 mg Oral Q4H PRN    acetaminophen (TYLENOL) suppository 650 mg  650 mg Rectal Q4H PRN    bisacodyl (DULCOLAX) tablet 5 mg  5 mg Oral DAILY PRN    bisacodyl (DULCOLAX) suppository 10 mg  10 mg Rectal DAILY PRN    albuterol (PROVENTIL VENTOLIN) nebulizer solution 2.5 mg  2.5 mg Nebulization Q4H PRN    glucose chewable tablet 16 g  4 Tab Oral PRN    dextrose (D50W) injection syrg 12.5-25 g  12.5-25 g IntraVENous PRN    glucagon (GLUCAGEN) injection 1 mg  1 mg IntraMUSCular PRN    levothyroxine (SYNTHROID) injection 50 mcg  50 mcg IntraVENous DAILY    0.9% sodium chloride infusion 250 mL  250 mL IntraVENous PRN        Review of Systems  Eating lunch and feeling better. Objective:     Visit Vitals    /68 (BP 1 Location: Right arm, BP Patient Position: At rest)    Pulse 96    Temp 97.9 °F (36.6 °C)    Resp 16    Ht 4' 11\" (1.499 m)  Comment: from old record 16    Wt 97.7 kg (215 lb 6.2 oz)    SpO2 100%    BMI 43.5 kg/m2    O2 Flow Rate (L/min): 2 l/min O2 Device: Nasal cannula    Temp (24hrs), Av °F (36.7 °C), Min:97.8 °F (36.6 °C), Max:98.3 °F (36.8 °C)      06/15 0701 - 06/15 1900  In: 240 [P.O.:240]  Out: 400 [Urine:400]  1901 - 06/15 0700  In: 250 [I.V.:250]  Out: 2425 [Urine:2425]  P/E  NAD,aao3  Heent:perrla,at/nc,mouth moist.  Lungs ctab  Heart s1s2 nl,no m/g  Abdm:soft,not tender,bs present. Extr:no edema,good pulses. Neuro:aaox3,grossly intact    Data Review    Recent Results (from the past 12 hour(s))   CALCIUM, IONIZED    Collection Time: 06/15/17  4:35 AM   Result Value Ref Range    Ionized Calcium 1.14 1.12 - 1.32 MMOL/L   MAGNESIUM    Collection Time: 06/15/17  4:35 AM   Result Value Ref Range    Magnesium 1.8 1.6 - 2.6 mg/dL   PHOSPHORUS    Collection Time: 06/15/17  4:35 AM   Result Value Ref Range    Phosphorus 2.1 (L) 2.5 - 4.9 MG/DL   CBC WITH AUTOMATED DIFF    Collection Time: 06/15/17  4:35 AM   Result Value Ref Range    WBC 4.3 (L) 4.6 - 13.2 K/uL    RBC 4.16 (L) 4.20 - 5.30 M/uL    HGB 11.0 (L) 12.0 - 16.0 g/dL    HCT 35.0 35.0 - 45.0 %    MCV 84.1 74.0 - 97.0 FL    MCH 26.4 24.0 - 34.0 PG    MCHC 31.4 31.0 - 37.0 g/dL    RDW 15.8 (H) 11.6 - 14.5 %    PLATELET 563 660 - 997 K/uL    MPV 9.9 9.2 - 11.8 FL    NEUTROPHILS 75 (H) 40 - 73 %    LYMPHOCYTES 12 (L) 21 - 52 %    MONOCYTES 8 3 - 10 %    EOSINOPHILS 5 0 - 5 %    BASOPHILS 0 0 - 2 %    ABS. NEUTROPHILS 3.2 1.8 - 8.0 K/UL    ABS. LYMPHOCYTES 0.5 (L) 0.9 - 3.6 K/UL    ABS. MONOCYTES 0.4 0.05 - 1.2 K/UL    ABS.  EOSINOPHILS 0.2 0.0 - 0.4 K/UL    ABS. BASOPHILS 0.0 0.0 - 0.06 K/UL    DF AUTOMATED     METABOLIC PANEL, BASIC    Collection Time: 06/15/17  4:35 AM   Result Value Ref Range    Sodium 144 136 - 145 mmol/L    Potassium 3.5 3.5 - 5.5 mmol/L    Chloride 105 100 - 108 mmol/L    CO2 30 21 - 32 mmol/L    Anion gap 9 3.0 - 18 mmol/L    Glucose 84 74 - 99 mg/dL    BUN 12 7.0 - 18 MG/DL    Creatinine 1.24 0.6 - 1.3 MG/DL    BUN/Creatinine ratio 10 (L) 12 - 20      GFR est AA 50 (L) >60 ml/min/1.73m2    GFR est non-AA 41 (L) >60 ml/min/1.73m2    Calcium 8.7 8.5 - 10.1 MG/DL   GLUCOSE, POC    Collection Time: 06/15/17  8:07 AM   Result Value Ref Range    Glucose (POC) 102 70 - 110 mg/dL   GLUCOSE, POC    Collection Time: 06/15/17 11:42 AM   Result Value Ref Range    Glucose (POC) 154 (H) 70 - 110 mg/dL         Assessment/Plan:     Principal Problem:    Hemorrhagic stroke ([address] (6/11/2017)    Active Problems:    Chronic a-fib (HCC) (6/11/2017)      Supratherapeutic INR (6/11/2017)      Left-sided weakness (6/11/2017)      Hypokalemia (6/11/2017)      CKD4    JOSIE    DM    HTN    Neuropathy    Hypothyroid     Care Plan  1. Hemorrhagic stroke      Left side weakness. Likely hypertensive in the setting of supra therapeutic INR 3.3     Reversal Vit K and FFP given on admission. INR=1.2 today     Monitor INR for goal < 1.5     CT head repeated:CT-mildly progressively enlarging small right thalamic hematoma. PT/OT/Speech     D/C Warfarin     Neuro and Vital check per protocol     Keep SBP < 140. Use Labetalol or titrate Cardene to meet goal     Neurology consulted and following patient     Will d/c to rehab    2. Acute hypoxic respiratory failure    -Likely due to fluid overload.    -Lasix given and pt improved. -Now in icu on BIPAP    -Critical care following and appreciate help. 3.Possible PNA on CTA    -On vanc,zosyn,levaquin    -Blood cx ordered. 4.Chronic Afib     Currently in afib w. Normal ventricular response     No EKG done .  Order EKG     Hold Warfarin . Not a candidate for [facility]     5. Left side weakness      2/2 Hemorrhagic CVA      PT/OT     4. CKD4/JOSIE     Resolved JOSIE,creatinine normal     5. Hypokalemia      Corrected     6. HTN     Controlled     Keep sbp < 140 per Hemorrhagic CVA .     7.DM    SSI    A1c     8. Neuropathy     Will renew neurontin once stable      9. Hypothyroid      Synthroid     DVT Prophylaxis - None Supra therapeutic      GI Prophylaxis      Full code   Will order palliative care for future care decision

## 2017-06-15 NOTE — PROGRESS NOTES
pts dtr and pt agree for snf at [address]. [address] and family. They agree to take pt home  Whatever  Level  She is at  After  Snf. Spoke with dr Carole Stark , he states pt should be  Ready tomorrow may need to con't bipap jimmy  To  Start auth.

## 2017-06-15 NOTE — PROGRESS NOTES
Problem: Mobility Impaired (Adult and Pediatric)  Goal: *Acute Goals and Plan of Care (Insert Text)  Physical Therapy Goals  Initiated 6/14/2017 and to be accomplished within 7 day(s)  1. Patient will move from supine to sit and sit to supine in bed with minimal assistance/contact guard assist.   2. Patient will transfer from bed to chair and chair to bed with minimal assistance/contact guard assist using the least restrictive device. 3. Patient will perform sit to stand with minimal assistance/contact guard assist.  4. Patient will ambulate with minimal assistance/contact guard assist for 25 feet with the least restrictive device. Outcome: Progressing Towards Goal  PHYSICAL THERAPY TREATMENT     Patient: Ricardo Castro (28 y.o. female)  Date: 6/15/2017  Diagnosis: Hemorrhagic stroke ([facility]  Hemorrhagic stroke ([facility] [address]  Supratherapeutic INR  Chronic a-fib ([facility] [address] Hemorrhagic stroke [facility])  Precautions: Fall  Chart, physical therapy assessment, plan of care and goals were reviewed. ASSESSMENT:  Max A x2 for sit to supine and supine to sit. Fair balance at EOB for BLE exercise. Hip flexion and knee extension x10 AROM. Completed sit to stand max A x2 with ww. Pre activity vitals /72, HR 99, O2 saturation 100%. Post activity vitals /73, HR 93. Returned to bed max A x2. All needs in reach. EDUCATION: bed mobility, balance, vitals, transfers  Progression toward goals:        Improving appropriately and progressing toward goals       PLAN:  Patient continues to benefit from skilled intervention to address the above impairments. Continue treatment per established plan of care.   Discharge Recommendations:  Rehab  Further Equipment Recommendations for Discharge:  TBD       SUBJECTIVE:   Agreeable to PT      OBJECTIVE DATA SUMMARY:   Critical Behavior:  Neurologic State: Alert  Orientation Level: Oriented X4  Cognition: Follows commands  Safety/Judgement: Awareness of environment, Fall prevention [address] Sitting Balance Scale 1+/5  0: Pt performs 25% or less of sitting activity (Max assist) CN, 100% impaired. 1: Pt supports self with upper extremities but requires therapist assistance. Pt performs 25-50% of effort (Mod assist) CM, 80% to <100% impaired. 1+: Pt supports self with upper extremities but requires therapist assistance. Pt performs >50% effort. (Min assist). CL, 60% to <80% impaired. 2: Pt supports self independently with both upper extremities. CL, 60% to <80% impaired. 2+: Pt support self independently with 1 upper extremity. CK, 40% to <60% impaired. 3: Pt sits without upper extremity support for up to 30 seconds. CK, 40% to <60% impaired. 3+: Pt sits without upper extremity support for 30 seconds or greater. CJ, 20% to <40% impaired. 4: Pt moves and returns trunkal midpoint 1-2 inches in one plane. CJ, 20% to <40% impaired. 4+: Pt moves and returns trunkal midpoint 1-2 inches in multiple planes. CI, 1% to <20% impaired. 5: Pt moves and returns trunkal midpoint in all planes greater than 2 inches. CH, 0% impaired. G CODE:Mobility [id number] Current  CL= 60-79%   Goal  CK= 40-59%.   The severity rating is based on the Other [address] Sitting Balance Scale 1+/5  Functional Mobility Training:  Bed Mobility:  Supine to Sit: Maximum assistance;Assist x2  Sit to Supine: Assist x2;Maximum assistance  Scooting: Assist x2;Maximum assistance  Transfers:  Sit to Stand: Maximum assistance;Assist x2  Stand to Sit: Maximum assistance;Assist x2  Balance:  Sitting: Impaired  Sitting - Static: Fair (occasional)  Sitting - Dynamic: Poor (constant support)  Standing: Impaired  Standing - Static: Poor  Ambulation/Gait Training:  Not tested  Neuro Re-Education:  Sitting EOB 3 min  Therapeutic Exercises:   BLE AROM x10  Sit to stand x1  Vital Signs  Pain:  Pain Scale 1: Numeric (0 - 10)  Pain Intensity 1: 0  Activity Tolerance:   Fair     After treatment:   Patient left in no apparent distress in bed  Call bell left within reach  Nursing notified      Reynaldo Oviedo, PT   Time Calculation: 19 mins

## 2017-06-15 NOTE — ROUTINE PROCESS
Bedside and Verbal shift change report given to Radha RIOS and Tacos Rahman RN (oshncoming nurse) by Erick Baker RN (offgoing nurse). Report included the folowing information SBAR, Kardex, Intake/Output, MAR and Recent Results.

## 2017-06-15 NOTE — PROGRESS NOTES
[facility]: This patient meets P & T approved criteria for conversion from IV to oral therapy for the following medication:     Levothyroxine 50 mcg IV daily was discontinued and Levothyroxine 100 mcg PO daily ac breakfast was started. If the patient no longer meets all criteria for oral therapy, the pharmacist will switch back to IV therapy. Thanks.

## 2017-06-15 NOTE — PROGRESS NOTES
Patient is unable to communicate at this time due to her current condition and it causing her to sleep a lot. Anais Wilkins offered prayer. Anais Wilkins will continue to follow and will provide pastoral care on an as needed/requested basis.     Rishi Mary   Spiritual Care   [phone]

## 2017-06-15 NOTE — PROGRESS NOTES
Assumed patient care. Received patient awake, alert, oriented X4. Respiration is even, unlabored. Patient denies any pain/ discomfort at this time.  Dual NIH scale done with BLANCA Salas Not in acute distress

## 2017-06-15 NOTE — PROGRESS NOTES
Problem: Self Care Deficits Care Plan (Adult)  Goal: *Acute Goals and Plan of Care (Insert Text)  Occupational Therapy Goals  Initiated 6/14/2017 within 7 day(s). 1. Patient will engaged in self-feeding at EOB with minimal assistance for balance and minimal vcs to incorporate LUE. 2. Patient will perform upper body dressing with minimal assistance utilizing adaptive strategies, prn.  3. Patient will perform functional task at EOB for 5 minutes with minimal assistance for balance to increase activity tolerance for ADLs. 4. Patient will perform toilet transfers with moderate assistance . 5. Patient will perform all aspects of toileting with moderate assistance . 6. Patient will participate in upper extremity therapeutic exercise/activities with minimal assistance/contact guard assist for 8 minutes to increase AROM/strength of LUE for participation in ADLs. 7. Patient will utilize energy conservation techniques during functional activities with minimal verbal cues. Outcome: Progressing Towards Goal  OCCUPATIONAL THERAPY TREATMENT     Patient: Analia Willis (36 y.o. female)  Date: 6/15/2017  Diagnosis: Hemorrhagic stroke ([facility] [address]  Hemorrhagic stroke ([facility] [address]  Supratherapeutic INR  Chronic a-fib ([facility] [address] Hemorrhagic stroke [facility])       Precautions: Fall  Chart, occupational therapy assessment, plan of care, and goals were reviewed. ASSESSMENT:  Pt is pleasant and cooperative, with supportive family at bedside. Pt agreeable to EOB activity. Pt performs LUE TherEx in all planes, weakness requires assist to achieve full ROM. Pt requires Max Assist w/bed mobility to EOB 2/2 body habitus and LLE/LUE paresis. Pt tolerates sitting EOB ~ 15 minutes performing simple ADL grooming tasks and self-feeding ADL. Pt requires mod verbal/tactile cues to maintain midline 2/2 L lateral lean. Pt w/poor appetite and request return to supine.   EDUCATION Pt and family educated on importance of UE TherEx and encouraged to perform throughout the day. Progression toward goals:  [X]          Improving appropriately and progressing toward goals  [ ]          Improving slowly and progressing toward goals  [ ]          Not making progress toward goals and plan of care will be adjusted       PLAN:  Patient continues to benefit from skilled intervention to address the above impairments. Continue treatment per established plan of care. Discharge Recommendations:  [facility]  Further Equipment Recommendations for Discharge:  shower chair and rolling walker       SUBJECTIVE:   Patient stated It feels good to sit up.       OBJECTIVE DATA SUMMARY:         Cognitive/Behavioral Status:  Neurologic State: Alert  Orientation Level: Oriented X4  Cognition: Follows commands, Appropriate for age attention/concentration  Safety/Judgement: Awareness of environment, Fall prevention  Functional Mobility and Transfers for ADLs:              Bed Mobility:  Rolling: Minimum assistance  Supine to Sit: Maximum assistance; Additional time (w/HOB raised and SRs)  Sit to Supine: Maximum assistance  Scooting: Maximum assistance;Assist x2  Balance:  Sitting: Impaired  Sitting - Static: Fair (occasional)  Sitting - Dynamic: Fair (occasional) (annette minus/poor plus)  ADL Intervention:  Feeding  Utensil Management: Modified independent (w/RUE)  Food to Mouth: Modified independent  Drink to Mouth: Modified independent     Grooming  Washing Face: Stand-by assistance (seated EOB)     Therapeutic Exercises:   AAROM > AROM LUE all planes     Pain  Pre Treatment:0  Post Treatment:0  Pain Scale 1: Numeric (0 - 10)  Pain Intensity 1: 0     Activity Tolerance:    Good     Please refer to the flowsheet for vital signs taken during this treatment.   After treatment:   [ ]  Patient left in no apparent distress sitting up in chair  [X]  Patient left in no apparent distress in bed  [X]  Call bell left within reach  [X]  Nursing notified  [X]  family present  [ ]  Bed alarm activated     TYLER Linda  Time Calculation: 25 mins

## 2017-06-15 NOTE — PROGRESS NOTES
PCCM  6/15/2017    VSS Afeb    EMR reviewed; she was seen by hospitalist today & doing well.      IMPRESSION:  # See prior  # small ICH clinically stable with left HP  # resp isufficiency from obesity and volume overload/ HF    PLAN  I will see again in FU tomorrow if she remains hospitalized   Please call for any Q or concerns    -devan    [phone]

## 2017-06-16 ENCOUNTER — HOSPITAL ENCOUNTER (INPATIENT)
Age: 82
LOS: 28 days | Discharge: HOME HEALTH | End: 2017-07-14
Attending: INTERNAL MEDICINE | Admitting: INTERNAL MEDICINE

## 2017-06-16 VITALS
TEMPERATURE: 98.1 F | SYSTOLIC BLOOD PRESSURE: 147 MMHG | RESPIRATION RATE: 14 BRPM | OXYGEN SATURATION: 98 % | WEIGHT: 215.39 LBS | HEIGHT: 59 IN | DIASTOLIC BLOOD PRESSURE: 72 MMHG | HEART RATE: 96 BPM | BODY MASS INDEX: 43.42 KG/M2

## 2017-06-16 PROBLEM — K59.00 CONSTIPATION: Status: ACTIVE | Noted: 2017-06-16

## 2017-06-16 LAB
ANION GAP BLD CALC-SCNC: 8 MMOL/L (ref 3–18)
APTT PPP: 29.2 SEC (ref 23–36.4)
BASOPHILS # BLD AUTO: 0 K/UL (ref 0–0.06)
BASOPHILS # BLD: 1 % (ref 0–2)
BUN SERPL-MCNC: 13 MG/DL (ref 7–18)
BUN/CREAT SERPL: 10 (ref 12–20)
CA-I SERPL-SCNC: 1.13 MMOL/L (ref 1.12–1.32)
CALCIUM SERPL-MCNC: 8.4 MG/DL (ref 8.5–10.1)
CHLORIDE SERPL-SCNC: 103 MMOL/L (ref 100–108)
CO2 SERPL-SCNC: 32 MMOL/L (ref 21–32)
CREAT SERPL-MCNC: 1.27 MG/DL (ref 0.6–1.3)
DIFFERENTIAL METHOD BLD: ABNORMAL
EOSINOPHIL # BLD: 0.3 K/UL (ref 0–0.4)
EOSINOPHIL NFR BLD: 8 % (ref 0–5)
ERYTHROCYTE [DISTWIDTH] IN BLOOD BY AUTOMATED COUNT: 15.6 % (ref 11.6–14.5)
GLUCOSE BLD STRIP.AUTO-MCNC: 140 MG/DL (ref 70–110)
GLUCOSE BLD STRIP.AUTO-MCNC: 52 MG/DL (ref 70–110)
GLUCOSE BLD STRIP.AUTO-MCNC: 92 MG/DL (ref 70–110)
GLUCOSE SERPL-MCNC: 87 MG/DL (ref 74–99)
HCT VFR BLD AUTO: 33.3 % (ref 35–45)
HGB BLD-MCNC: 10.4 G/DL (ref 12–16)
INR PPP: 1.2 (ref 0.8–1.2)
LYMPHOCYTES # BLD AUTO: 21 % (ref 21–52)
LYMPHOCYTES # BLD: 0.8 K/UL (ref 0.9–3.6)
MAGNESIUM SERPL-MCNC: 1.6 MG/DL (ref 1.6–2.6)
MAGNESIUM SERPL-MCNC: 2.3 MG/DL (ref 1.6–2.6)
MCH RBC QN AUTO: 26.1 PG (ref 24–34)
MCHC RBC AUTO-ENTMCNC: 31.2 G/DL (ref 31–37)
MCV RBC AUTO: 83.5 FL (ref 74–97)
MONOCYTES # BLD: 0.4 K/UL (ref 0.05–1.2)
MONOCYTES NFR BLD AUTO: 10 % (ref 3–10)
NEUTS SEG # BLD: 2.3 K/UL (ref 1.8–8)
NEUTS SEG NFR BLD AUTO: 60 % (ref 40–73)
PHOSPHATE SERPL-MCNC: 1.8 MG/DL (ref 2.5–4.9)
PLATELET # BLD AUTO: 178 K/UL (ref 135–420)
PMV BLD AUTO: 9.7 FL (ref 9.2–11.8)
POTASSIUM SERPL-SCNC: 3.1 MMOL/L (ref 3.5–5.5)
POTASSIUM SERPL-SCNC: 3.6 MMOL/L (ref 3.5–5.5)
PROTHROMBIN TIME: 15 SEC (ref 11.5–15.2)
RBC # BLD AUTO: 3.99 M/UL (ref 4.2–5.3)
SODIUM SERPL-SCNC: 143 MMOL/L (ref 136–145)
TSH SERPL DL<=0.05 MIU/L-ACNC: 3.21 UIU/ML (ref 0.36–3.74)
WBC # BLD AUTO: 3.8 K/UL (ref 4.6–13.2)

## 2017-06-16 PROCEDURE — 83735 ASSAY OF MAGNESIUM: CPT | Performed by: INTERNAL MEDICINE

## 2017-06-16 PROCEDURE — 77030011256 HC DRSG MEPILEX <16IN NO BORD MOLN -A

## 2017-06-16 PROCEDURE — 82330 ASSAY OF CALCIUM: CPT | Performed by: FAMILY MEDICINE

## 2017-06-16 PROCEDURE — 82962 GLUCOSE BLOOD TEST: CPT

## 2017-06-16 PROCEDURE — 74011000250 HC RX REV CODE- 250: Performed by: INTERNAL MEDICINE

## 2017-06-16 PROCEDURE — 97530 THERAPEUTIC ACTIVITIES: CPT

## 2017-06-16 PROCEDURE — 84100 ASSAY OF PHOSPHORUS: CPT | Performed by: FAMILY MEDICINE

## 2017-06-16 PROCEDURE — 74011250636 HC RX REV CODE- 250/636: Performed by: INTERNAL MEDICINE

## 2017-06-16 PROCEDURE — 85610 PROTHROMBIN TIME: CPT | Performed by: FAMILY MEDICINE

## 2017-06-16 PROCEDURE — 74011250637 HC RX REV CODE- 250/637: Performed by: INTERNAL MEDICINE

## 2017-06-16 PROCEDURE — 83735 ASSAY OF MAGNESIUM: CPT | Performed by: FAMILY MEDICINE

## 2017-06-16 PROCEDURE — 97535 SELF CARE MNGMENT TRAINING: CPT

## 2017-06-16 PROCEDURE — 85025 COMPLETE CBC W/AUTO DIFF WBC: CPT | Performed by: FAMILY MEDICINE

## 2017-06-16 PROCEDURE — 74011250637 HC RX REV CODE- 250/637: Performed by: PHYSICIAN ASSISTANT

## 2017-06-16 PROCEDURE — 94660 CPAP INITIATION&MGMT: CPT

## 2017-06-16 PROCEDURE — 84443 ASSAY THYROID STIM HORMONE: CPT | Performed by: INTERNAL MEDICINE

## 2017-06-16 PROCEDURE — 84132 ASSAY OF SERUM POTASSIUM: CPT | Performed by: FAMILY MEDICINE

## 2017-06-16 PROCEDURE — 36415 COLL VENOUS BLD VENIPUNCTURE: CPT | Performed by: FAMILY MEDICINE

## 2017-06-16 PROCEDURE — 85730 THROMBOPLASTIN TIME PARTIAL: CPT | Performed by: FAMILY MEDICINE

## 2017-06-16 RX ORDER — FOLIC ACID 1 MG/1
1 TABLET ORAL DAILY
Qty: 30 TAB | Refills: 0 | Status: SHIPPED
Start: 2017-06-16 | End: 2017-07-14

## 2017-06-16 RX ORDER — ZOLPIDEM TARTRATE 5 MG/1
5 TABLET ORAL
Status: DISCONTINUED | OUTPATIENT
Start: 2017-06-16 | End: 2017-07-14 | Stop reason: HOSPADM

## 2017-06-16 RX ORDER — BETAMETHASONE VALERATE 1.2 MG/G
CREAM TOPICAL 2 TIMES DAILY
Status: DISCONTINUED | OUTPATIENT
Start: 2017-06-17 | End: 2017-07-05

## 2017-06-16 RX ORDER — GLIMEPIRIDE 2 MG/1
2 TABLET ORAL
Status: DISCONTINUED | OUTPATIENT
Start: 2017-06-17 | End: 2017-06-20

## 2017-06-16 RX ORDER — ASPIRIN 325 MG/1
100 TABLET, FILM COATED ORAL DAILY
Qty: 30 TAB | Refills: 0 | Status: SHIPPED | OUTPATIENT
Start: 2017-06-16 | End: 2017-07-14

## 2017-06-16 RX ORDER — ASCORBIC ACID 250 MG
500 TABLET ORAL DAILY
Status: DISCONTINUED | OUTPATIENT
Start: 2017-06-17 | End: 2017-07-14 | Stop reason: HOSPADM

## 2017-06-16 RX ORDER — SIMVASTATIN 40 MG/1
40 TABLET, FILM COATED ORAL
Status: DISCONTINUED | OUTPATIENT
Start: 2017-06-16 | End: 2017-07-14 | Stop reason: HOSPADM

## 2017-06-16 RX ORDER — MAGNESIUM SULFATE HEPTAHYDRATE 40 MG/ML
2 INJECTION, SOLUTION INTRAVENOUS ONCE
Status: COMPLETED | OUTPATIENT
Start: 2017-06-16 | End: 2017-06-16

## 2017-06-16 RX ORDER — ADHESIVE BANDAGE
30 BANDAGE TOPICAL DAILY PRN
Status: DISCONTINUED | OUTPATIENT
Start: 2017-06-16 | End: 2017-07-14 | Stop reason: HOSPADM

## 2017-06-16 RX ORDER — FACIAL-BODY WIPES
10 EACH TOPICAL
Qty: 30 SUPPOSITORY | Refills: 0 | Status: SHIPPED | OUTPATIENT
Start: 2017-06-16

## 2017-06-16 RX ORDER — GABAPENTIN 300 MG/1
300 CAPSULE ORAL
Status: DISCONTINUED | OUTPATIENT
Start: 2017-06-16 | End: 2017-07-14 | Stop reason: HOSPADM

## 2017-06-16 RX ORDER — POTASSIUM CHLORIDE 7.45 MG/ML
10 INJECTION INTRAVENOUS
Status: COMPLETED | OUTPATIENT
Start: 2017-06-16 | End: 2017-06-16

## 2017-06-16 RX ORDER — POLYETHYLENE GLYCOL 3350 17 G/17G
17 POWDER, FOR SOLUTION ORAL DAILY
Status: DISCONTINUED | OUTPATIENT
Start: 2017-06-17 | End: 2017-06-16 | Stop reason: HOSPADM

## 2017-06-16 RX ORDER — MECLIZINE HCL 12.5 MG 12.5 MG/1
25 TABLET ORAL
Status: DISCONTINUED | OUTPATIENT
Start: 2017-06-16 | End: 2017-07-14 | Stop reason: HOSPADM

## 2017-06-16 RX ORDER — FUROSEMIDE 20 MG/1
40 TABLET ORAL
Status: DISCONTINUED | OUTPATIENT
Start: 2017-06-17 | End: 2017-06-22

## 2017-06-16 RX ORDER — DORZOLAMIDE HYDROCHLORIDE AND TIMOLOL MALEATE 20; 5 MG/ML; MG/ML
1 SOLUTION/ DROPS OPHTHALMIC 2 TIMES DAILY
Status: DISCONTINUED | OUTPATIENT
Start: 2017-06-16 | End: 2017-07-14 | Stop reason: HOSPADM

## 2017-06-16 RX ORDER — SPIRONOLACTONE 25 MG/1
50 TABLET ORAL 2 TIMES DAILY
Status: DISCONTINUED | OUTPATIENT
Start: 2017-06-16 | End: 2017-07-14 | Stop reason: HOSPADM

## 2017-06-16 RX ORDER — POTASSIUM CHLORIDE 7.45 MG/ML
10 INJECTION INTRAVENOUS ONCE
Status: COMPLETED | OUTPATIENT
Start: 2017-06-16 | End: 2017-06-16

## 2017-06-16 RX ORDER — LEVOTHYROXINE SODIUM 50 UG/1
100 TABLET ORAL
Status: DISCONTINUED | OUTPATIENT
Start: 2017-06-17 | End: 2017-07-14 | Stop reason: HOSPADM

## 2017-06-16 RX ORDER — ASPIRIN 325 MG/1
100 TABLET, FILM COATED ORAL DAILY
Status: DISCONTINUED | OUTPATIENT
Start: 2017-06-17 | End: 2017-07-14 | Stop reason: HOSPADM

## 2017-06-16 RX ORDER — INSULIN LISPRO 100 [IU]/ML
INJECTION, SOLUTION INTRAVENOUS; SUBCUTANEOUS
Status: DISCONTINUED | OUTPATIENT
Start: 2017-06-17 | End: 2017-07-14 | Stop reason: HOSPADM

## 2017-06-16 RX ORDER — LOSARTAN POTASSIUM 50 MG/1
50 TABLET ORAL DAILY
Status: DISCONTINUED | OUTPATIENT
Start: 2017-06-17 | End: 2017-06-21

## 2017-06-16 RX ORDER — FACIAL-BODY WIPES
10 EACH TOPICAL DAILY PRN
Status: DISCONTINUED | OUTPATIENT
Start: 2017-06-16 | End: 2017-07-14 | Stop reason: HOSPADM

## 2017-06-16 RX ORDER — METOPROLOL TARTRATE 50 MG/1
50 TABLET ORAL 2 TIMES DAILY
Status: DISCONTINUED | OUTPATIENT
Start: 2017-06-16 | End: 2017-07-14 | Stop reason: HOSPADM

## 2017-06-16 RX ORDER — FOLIC ACID 1 MG/1
1 TABLET ORAL DAILY
Status: DISCONTINUED | OUTPATIENT
Start: 2017-06-17 | End: 2017-07-14 | Stop reason: HOSPADM

## 2017-06-16 RX ADMIN — POTASSIUM CHLORIDE 10 MEQ: 7.46 INJECTION, SOLUTION INTRAVENOUS at 13:12

## 2017-06-16 RX ADMIN — PANTOPRAZOLE SODIUM 40 MG: 40 TABLET, DELAYED RELEASE ORAL at 10:01

## 2017-06-16 RX ADMIN — POTASSIUM CHLORIDE 10 MEQ: 7.46 INJECTION, SOLUTION INTRAVENOUS at 09:00

## 2017-06-16 RX ADMIN — POTASSIUM CHLORIDE 10 MEQ: 7.46 INJECTION, SOLUTION INTRAVENOUS at 10:02

## 2017-06-16 RX ADMIN — THIAMINE HCL TAB 100 MG 100 MG: 100 TAB at 10:01

## 2017-06-16 RX ADMIN — FOLIC ACID 1 MG: 1 TABLET ORAL at 10:01

## 2017-06-16 RX ADMIN — FUROSEMIDE 40 MG: 40 TABLET ORAL at 10:01

## 2017-06-16 RX ADMIN — LEVOTHYROXINE SODIUM 100 MCG: 100 TABLET ORAL at 10:01

## 2017-06-16 RX ADMIN — MAGNESIUM SULFATE HEPTAHYDRATE 2 G: 40 INJECTION, SOLUTION INTRAVENOUS at 11:32

## 2017-06-16 RX ADMIN — GABAPENTIN 300 MG: 300 CAPSULE ORAL at 21:42

## 2017-06-16 RX ADMIN — SPIRONOLACTONE 50 MG: 25 TABLET, FILM COATED ORAL at 21:42

## 2017-06-16 RX ADMIN — SIMVASTATIN 40 MG: 40 TABLET, FILM COATED ORAL at 21:42

## 2017-06-16 RX ADMIN — METOPROLOL TARTRATE 50 MG: 50 TABLET ORAL at 21:42

## 2017-06-16 RX ADMIN — DORZOLAMIDE HYDROCHLORIDE AND TIMOLOL MALEATE 1 DROP: 20; 5 SOLUTION/ DROPS OPHTHALMIC at 21:41

## 2017-06-16 NOTE — PROGRESS NOTES
Bipap  - patient placed on bipap on 6/13 for acute respiratory distress   - patient removed from unit in ICU within 4-5 hours  - patient use of bipap following acute need   - nightly for short intervals     - no recent x-ray ( possible acute pulmonary edema causing need )    Believe patient's long term use not indicated. - patient states she seems to feel no benefit of use    O2 use    - SPO2 remains >/= 92% at rest and minor activity    Plan: Discuss D/C of bipap and O2 at this time.  Assessment request by Dr Roshan Donaldson       -

## 2017-06-16 NOTE — IP AVS SNAPSHOT
Kathe Bang 
 
 
 [address] [place] [address] Patient: Lily Shaver MRN: [id number] You are allergic to the following No active allergies Recent Documentation Height Weight Breastfeeding? BMI OB Status Smoking Status 1.499 m 95.3 kg No 42.41 kg/m2 Postmenopausal Never Smoker Emergency Contacts Name Discharge Info Relation Home Work Mobile Purvi Benavides DISCHARGE CAREGIVER [3] Child [2] [phone] Jenaro Benavides  Son [22]   [phone] Claudiamariangel Lorenz Ruthy Son)  Other Relative [6] [phone] Rosie Chavez DISCHARGE CAREGIVER [3] Daughter [21]   [phone] About your hospitalization You were admitted on:  2017 You last received care in the:  [address] You were discharged on:  2017 Unit phone number:  [phone] Why you were hospitalized Your primary diagnosis was:  Not on File Providers Seen During Your Hospitalizations Provider Role Specialty Primary office phone Maurizio Montalvo MD Attending Provider Geriatric Medicine [phone] Your Primary Care Physician (PCP) Primary Care Physician Office Phone Office Fax Reinaldo Lindsay [phone] Follow-up Information Follow up With Details Comments Contact Info Iman Cueva MD  Follow up on 17 @ [facility] [address] [facility] [address] [id number] 
[phone] Current Discharge Medication List  
  
START taking these medications Dose & Instructions Dispensing Information Comments Morning Noon Evening Bedtime  
 aspirin 81 mg chewable tablet Your last dose was: Your next dose is:    
   
   
 Dose:  81 mg Take 1 Tab by mouth daily. Quantity:  100 Tab Refills:  0 dorzolamide-timolol 22.3-6.8 mg/mL ophthalmic solution Commonly known as:  COSOPT Your last dose was: Your next dose is:    
   
   
 Dose:  1 Drop Administer 1 Drop to both eyes two (2) times a day. Quantity:  5 mL Refills:  0  
     
   
   
   
  
 ferrous fumarate-vitamin C 200 mg (65 mg iron)-25 mg Tber Commonly known as:  LAURA-SEQUELS (IRON-VIT C) Your last dose was: Your next dose is:    
   
   
 Dose:  1 Tab Take 1 Tab by mouth daily. Quantity:  60 Tab Refills:  0  
     
   
   
   
  
 pantoprazole 40 mg tablet Commonly known as:  PROTONIX Your last dose was: Your next dose is:    
   
   
 Dose:  40 mg Take 1 Tab by mouth Daily (before breakfast). Quantity:  60 Tab Refills:  0 CONTINUE these medications which have CHANGED Dose & Instructions Dispensing Information Comments Morning Noon Evening Bedtime  
 furosemide 20 mg tablet Commonly known as:  LASIX What changed:   
- medication strength 
- how to take this - when to take this 
- additional instructions Your last dose was: Your next dose is: One pill a day Quantity:  60 Tab Refills:  0  
     
   
   
   
  
 gabapentin 300 mg capsule Commonly known as:  NEURONTIN What changed:  when to take this Your last dose was: Your next dose is:    
   
   
 Dose:  300 mg Take 1 Cap by mouth nightly. Quantity:  60 Cap Refills:  0  
     
   
   
   
  
 linagliptin 5 mg tablet Commonly known as:  [place] Bou[place]acar What changed:  when to take this Your last dose was: Your next dose is:    
   
   
 Dose:  5 mg Take 1 Tab by mouth Daily (before breakfast). Quantity:  60 Tab Refills:  0  
     
   
   
   
  
 zolpidem 5 mg tablet Commonly known as:  AMBIEN What changed:   
- medication strength 
- how much to take Your last dose was: Your next dose is: Dose:  5 mg Take 1 Tab by mouth nightly as needed for Sleep. Max Daily Amount: 5 mg. Quantity:  60 Tab Refills:  0 CONTINUE these medications which have NOT CHANGED Dose & Instructions Dispensing Information Comments Morning Noon Evening Bedtime  
 ascorbic acid (vitamin C) 500 mg tablet Commonly known as:  VITAMIN C Your last dose was: Your next dose is:    
   
   
 Dose:  500 mg Take 1 Tab by mouth daily. Quantity:  60 Tab Refills:  0  
     
   
   
   
  
 folic acid 1 mg tablet Commonly known as:  Google Your last dose was: Your next dose is:    
   
   
 Dose:  1 mg Take 1 Tab by mouth daily. Quantity:  60 Tab Refills:  0  
     
   
   
   
  
 levothyroxine 100 mcg tablet Commonly known as:  SYNTHROID Your last dose was: Your next dose is:    
   
   
 Dose:  100 mcg Take 1 Tab by mouth Daily (before breakfast). Quantity:  60 Tab Refills:  0  
     
   
   
   
  
 metoprolol tartrate 50 mg tablet Commonly known as:  LOPRESSOR Your last dose was: Your next dose is:    
   
   
 Dose:  50 mg Take 1 Tab by mouth two (2) times a day. Quantity:  60 Tab Refills:  0  
     
   
   
   
  
 simvastatin 40 mg tablet Commonly known as:  ZOCOR Your last dose was: Your next dose is:    
   
   
 Dose:  40 mg Take 1 Tab by mouth nightly. Quantity:  60 Tab Refills:  0  
     
   
   
   
  
 spironolactone 50 mg tablet Commonly known as:  ALDACTONE Your last dose was: Your next dose is:    
   
   
 Dose:  50 mg Take 1 Tab by mouth two (2) times a day. Quantity:  60 Tab Refills:  0 Thiamine Mononitrate 100 mg tablet Commonly known as:  B-1 Your last dose was: Your next dose is:    
   
   
 Dose:  100 mg Take 1 Tab by mouth daily. Quantity:  60 Tab Refills:  0 ASK your doctor about these medications Dose & Instructions Dispensing Information Comments Morning Noon Evening Bedtime AMARYL 2 mg tablet Generic drug:  glimepiride Your last dose was: Your next dose is:    
   
   
 Dose:  2 mg Take 2 mg by mouth daily. Refills:  0  
     
   
   
   
  
 betamethasone valerate 0.1 % topical cream  
Commonly known as:  Chelsi Tay Your last dose was: Your next dose is:    
   
   
 Apply  to affected area two (2) times a day. Refills:  0  
     
   
   
   
  
 bisacodyl 10 mg suppository Commonly known as:  DULCOLAX Your last dose was: Your next dose is:    
   
   
 Dose:  10 mg Insert 10 mg into rectum daily as needed. Quantity:  30 Suppository Refills:  0 LAURA-SEQUELS PO Your last dose was: Your next dose is:    
   
   
 Dose:  50 mg Take 50 mg by mouth daily. Refills:  0  
     
   
   
   
  
 losartan 50 mg tablet Commonly known as:  COZAAR Your last dose was: Your next dose is:    
   
   
 Dose:  50 mg Take 50 mg by mouth daily. Refills:  0  
     
   
   
   
  
 meclizine 25 mg tablet Commonly known as:  ANTIVERT Your last dose was: Your next dose is:    
   
   
 Dose:  25 mg Take 25 mg by mouth four (4) times daily as needed. Refills:  0 Where to Get Your Medications Information on where to get these meds will be given to you by the nurse or doctor. ! Ask your nurse or doctor about these medications  
  ascorbic acid (vitamin C) 500 mg tablet  
 aspirin 81 mg chewable tablet  
 dorzolamide-timolol 22.3-6.8 mg/mL ophthalmic solution  
 ferrous fumarate-vitamin C 200 mg (65 mg ABGI)-04 mg Tber  
 folic acid 1 mg tablet  
 furosemide 20 mg tablet  
 gabapentin 300 mg capsule  
 levothyroxine 100 mcg tablet  
 linagliptin 5 mg tablet  
 metoprolol tartrate 50 mg tablet pantoprazole 40 mg tablet  
 simvastatin 40 mg tablet  
 spironolactone 50 mg tablet Thiamine Mononitrate 100 mg tablet  
 zolpidem 5 mg tablet Discharge Instructions DISCHARGE SUMMARY from Nurse The following personal items are in your possession at time of discharge: 
 
Dental Appliances: Lowers, Uppers Visual Aid: Glasses Jewelry: None Clothing: Slippers, Undergarments (3 nightgowns) Other Valuables: Lauren Bush PATIENT INSTRUCTIONS: 
 
 
F-face looks uneven A-arms unable to move or move unevenly S-speech slurred or non-existent T-time-call 911 as soon as signs and symptoms begin-DO NOT go Back to bed or wait to see if you get better-TIME IS BRAIN. Warning Signs of HEART ATTACK Call 911 if you have these symptoms: 
? Chest discomfort. Most heart attacks involve discomfort in the center of the chest that lasts more than a few minutes, or that goes away and comes back. It can feel like uncomfortable pressure, squeezing, fullness, or pain. ? Discomfort in other areas of the upper body. Symptoms can include pain or discomfort in one or both arms, the back, neck, jaw, or stomach. ? Shortness of breath with or without chest discomfort. ? Other signs may include breaking out in a cold sweat, nausea, or lightheadedness. Don't wait more than five minutes to call [address]! Fast action can save your life. Calling 911 is almost always the fastest way to get lifesaving treatment. Emergency Medical Services staff can begin treatment when they arrive  up to an hour sooner than if someone gets to the hospital by car. The discharge information has been reviewed with the Patient and family. The Patient and family verbalized understanding. Discharge medications reviewed with the Patient and family and appropriate educational materials and side effects teaching were provided. Discharge Instructions Attachments/References STROKE (ENGLISH) Discharge Orders None Introducing [facility]! [facility] introduces [employer] patient portal. Now you can access parts of your medical record, email your doctor's office, and request medication refills online. 1. In your internet browser, go to [email or web address] [email or web address] 2. Click on the First Time User? Click Here link in the Sign In box. You will see the New Member Sign Up page. 3. Enter your [employer] Access Code exactly as it appears below. You will not need to use this code after youve completed the sign-up process. If you do not sign up before the expiration date, you must request a new code. · [employer] Access Code: [email or web address] Expires: 9/14/2017  3:50 PM 
 
4. Enter the last four digits of your Social Security Number (xxxx) and Date of Birth (mm/dd/yyyy) as indicated and click Submit. You will be taken to the next sign-up page. 5. Create a [employer] ID. This will be your [employer] login ID and cannot be changed, so think of one that is secure and easy to remember. 6. Create a [employer] password. You can change your password at any time. 7. Enter your Password Reset Question and Answer. This can be used at a later time if you forget your password. 8. Enter your e-mail address. You will receive e-mail notification when new information is available in [address] 9. Click Sign Up. You can now view and download portions of your medical record. 10. Click the Download Summary menu link to download a portable copy of your medical information.  
 
If you have questions, please visit the Frequently Asked Questions section of [facility]. Remember, MyChart is NOT to be used for urgent needs. For medical emergencies, dial 911. Now available from your iPhone and Android! General Information Please provide this summary of care documentation to your next provider. Patient Signature:  ____________________________________________________________ Date:  ____________________________________________________________  
  
Fidencio Mack Provider Signature:  ____________________________________________________________ Date:  ____________________________________________________________ More Information Stroke: Care Instructions Your Care Instructions You have had a stroke. This means that the blood flow to a part of your brain was blocked for some time, which damages the nerve cells in that part of the brain. The part of your body controlled by that part of your brain may not function properly now. The brain is an amazing organ that can heal itself to some degree. The stroke you had damaged part of your brain. But other parts of your brain may take over in some way for the damaged areas. You have already started this process. Your doctor will talk with you about what you can do to prevent another stroke. High blood pressure, high cholesterol, and diabetes are all risk factors for stroke. If you have any of these conditions, work with your doctor to make sure they are under control. Other risk factors for stroke include being overweight, smoking, and not getting regular exercise. Going home may be hard for you and your family. The more you can try to do for yourself, the better. Remember to take each day one at a time. Follow-up care is a key part of your treatment and safety. Be sure to make and go to all appointments, and call your doctor if you are having problems. It's also a good idea to know your test results and keep a list of the medicines you take. How can you care for yourself at home? · Enter a stroke rehabilitation (rehab) program, if your doctor recommends it. Physical, speech, and occupational therapies can help you manage bathing, dressing, eating, and other basics of daily living. · Do not drive until your doctor says it is okay. · It is normal to feel sad or depressed after a stroke. If these feelings last, talk to your doctor. · If you are having problems with urine leakage, go to the bathroom at regular times, including when you first wake up and at bedtime. Also, limit fluids after dinner. · If you are constipated, drink plenty of fluids, enough so that your urine is light yellow or clear like water. If you have kidney, heart, or liver disease and have to limit fluids, talk with your doctor before you increase the amount of fluids you drink. Set up a regular time for using the toilet. If you continue to have constipation, your doctor may suggest using a bulking agent, such as Metamucil, or a stool softener, laxative, or enema. Medicines · Take your medicines exactly as prescribed. Call your doctor if you think you are having a problem with your medicine. You may be taking several medicines. ACE (angiotensin-converting enzyme) inhibitors, angiotensin II receptor blockers (ARBs), beta-blockers, diuretics (water pills), and calcium channel blockers control your blood pressure. Statins help lower cholesterol. Your doctor may also prescribe medicines for depression, pain, sleep problems, anxiety, or agitation. · If your doctor has given you a blood thinner to prevent another stroke, be sure you get instructions about how to take your medicine safely. Blood thinners can cause serious bleeding problems. · Do not take any over-the-counter medicines or herbal products without talking to your doctor first. 
· If you take birth control pills or hormone therapy, talk to your doctor about whether they are right for you. For family members and caregivers · Make the home safe. Set up a room so that your loved one does not have to climb stairs. Be sure the bathroom is on the same floor. Move throw rugs and furniture that could cause falls. Make sure that the lighting is good. Put grab bars and seats in tubs and showers. · Find out what your loved one can do and what he or she needs help with. Try not to do things for your loved one that your loved one can do on his or her own. Help him or her learn and practice new skills. · Visit and talk with your loved one often. Try doing activities together that you both enjoy, such as playing cards or board games. Keep in touch with your loved one's friends as much as you can. Encourage them to visit. · Take care of yourself. Do not try to do everything yourself. Ask other family members to help. Eat well, get enough rest, and take time to do things that you enjoy. Keep up with your own doctor visits, and make sure to take your medicines regularly. Get out of the house as much as you can. Join a local support group. Find out if you qualify for home health care visits to help with rehab or for adult day care. When should you call for help? Call 911 anytime you think you may need emergency care. For example, call if: 
· You have signs of another stroke. These may include: 
¨ Sudden numbness, tingling, weakness, or loss of movement in your face, arm, or leg, especially on only one side of your body. ¨ Sudden vision changes. ¨ Sudden trouble speaking. ¨ Sudden confusion or trouble understanding simple statements. ¨ Sudden problems with walking or balance. ¨ A sudden, severe headache that is different from past headaches. Call 911 even if these symptoms go away in a few minutes. Call your doctor now or seek immediate medical care if: 
· You have new symptoms that may be related to your stroke, such as falls or trouble swallowing. Watch closely for changes in your health, and be sure to contact your doctor if you have any problems. Where can you learn more? Go to http://yamilet-joanne.info/. Enter P035 in the search box to learn more about \"Stroke: Care Instructions. \" Current as of: March 20, 2017 Content Version: 11.3 © 8582-6287 [employer]. Care instructions adapted under license by [employer] (which disclaims liability or warranty for this information). If you have questions about a medical condition or this instruction, always ask your healthcare professional. [address] any warranty or liability for your use of this information.

## 2017-06-16 NOTE — DISCHARGE INSTRUCTIONS
Atrial Fibrillation: Care Instructions  Your Care Instructions    Atrial fibrillation is an irregular and often fast heartbeat. Treating this condition is important for several reasons. It can cause blood clots, which can travel from your heart to your brain and cause a stroke. If you have a fast heartbeat, you may feel lightheaded, dizzy, and weak. An irregular heartbeat can also increase your risk for heart failure. Atrial fibrillation is often the result of another heart condition, such as high blood pressure or coronary artery disease. Making changes to improve your heart condition will help you stay healthy and active. Follow-up care is a key part of your treatment and safety. Be sure to make and go to all appointments, and call your doctor if you are having problems. It's also a good idea to know your test results and keep a list of the medicines you take. How can you care for yourself at home? Medicines  · Take your medicines exactly as prescribed. Call your doctor if you think you are having a problem with your medicine. You will get more details on the specific medicines your doctor prescribes. · If your doctor has given you a blood thinner to prevent a stroke, be sure you get instructions about how to take your medicine safely. Blood thinners can cause serious bleeding problems. · Do not take any vitamins, over-the-counter drugs, or herbal products without talking to your doctor first.  Lifestyle changes  · Do not smoke. Smoking can increase your chance of a stroke and heart attack. If you need help quitting, talk to your doctor about stop-smoking programs and medicines. These can increase your chances of quitting for good. · Eat a heart-healthy diet. · Stay at a healthy weight. Lose weight if you need to. · Limit alcohol to 2 drinks a day for men and 1 drink a day for women. Too much alcohol can cause health problems. · Avoid colds and flu. Get a pneumococcal vaccine shot.  If you have had one before, ask your doctor whether you need another dose. Get a flu shot every year. If you must be around people with colds or flu, wash your hands often. Activity  · If your doctor recommends it, get more exercise. Walking is a good choice. Bit by bit, increase the amount you walk every day. Try for at least 30 minutes on most days of the week. You also may want to swim, bike, or do other activities. Your doctor may suggest that you join a cardiac rehabilitation program so that you can have help increasing your physical activity safely. · Start light exercise if your doctor says it is okay. Even a small amount will help you get stronger, have more energy, and manage stress. Walking is an easy way to get exercise. Start out by walking a little more than you did in the hospital. Gradually increase the amount you walk. · When you exercise, watch for signs that your heart is working too hard. You are pushing too hard if you cannot talk while you are exercising. If you become short of breath or dizzy or have chest pain, sit down and rest immediately. · Check your pulse regularly. Place two fingers on the artery at the palm side of your wrist, in line with your thumb. If your heartbeat seems uneven or fast, talk to your doctor. When should you call for help? Call 911 anytime you think you may need emergency care. For example, call if:  · You have symptoms of a heart attack. These may include:  ¨ Chest pain or pressure, or a strange feeling in the chest.  ¨ Sweating. ¨ Shortness of breath. ¨ Nausea or vomiting. ¨ Pain, pressure, or a strange feeling in the back, neck, jaw, or upper belly or in one or both shoulders or arms. ¨ Lightheadedness or sudden weakness. ¨ A fast or irregular heartbeat. After you call 911, the  may tell you to chew 1 adult-strength or 2 to 4 low-dose aspirin. Wait for an ambulance. Do not try to drive yourself. · You have symptoms of a stroke.  These may include:  ¨ Sudden numbness, tingling, weakness, or loss of movement in your face, arm, or leg, especially on only one side of your body. ¨ Sudden vision changes. ¨ Sudden trouble speaking. ¨ Sudden confusion or trouble understanding simple statements. ¨ Sudden problems with walking or balance. ¨ A sudden, severe headache that is different from past headaches. · You passed out (lost consciousness). Call your doctor now or seek immediate medical care if:  · You have new or increased shortness of breath. · You feel dizzy or lightheaded, or you feel like you may faint. · Your heart rate becomes irregular. · You can feel your heart flutter in your chest or skip heartbeats. Tell your doctor if these symptoms are new or worse. Watch closely for changes in your health, and be sure to contact your doctor if you have any problems. Where can you learn more? Go to http://yamilet-joanne.info/. Enter U020 in the search box to learn more about \"Atrial Fibrillation: Care Instructions. \"  Current as of: January 27, 2016  Content Version: 11.2  © 6171-2992 [employer]. Care instructions adapted under license by [employer] (which disclaims liability or warranty for this information). If you have questions about a medical condition or this instruction, always ask your healthcare professional. [address] any warranty or liability for your use of this information. Stroke: Care Instructions  Your Care Instructions    You have had a stroke. This means that the blood flow to a part of your brain was blocked for some time, which damages the nerve cells in that part of the brain. The part of your body controlled by that part of your brain may not function properly now. The brain is an amazing organ that can heal itself to some degree. The stroke you had damaged part of your brain. But other parts of your brain may take over in some way for the damaged areas.  You have already started this process. Your doctor will talk with you about what you can do to prevent another stroke. High blood pressure, high cholesterol, and diabetes are all risk factors for stroke. If you have any of these conditions, work with your doctor to make sure they are under control. Other risk factors for stroke include being overweight, smoking, and not getting regular exercise. Going home may be hard for you and your family. The more you can try to do for yourself, the better. Remember to take each day one at a time. Follow-up care is a key part of your treatment and safety. Be sure to make and go to all appointments, and call your doctor if you are having problems. It's also a good idea to know your test results and keep a list of the medicines you take. How can you care for yourself at home? · Enter a stroke rehabilitation (rehab) program, if your doctor recommends it. Physical, speech, and occupational therapies can help you manage bathing, dressing, eating, and other basics of daily living. · Do not drive until your doctor says it is okay. · It is normal to feel sad or depressed after a stroke. If these feelings last, talk to your doctor. · If you are having problems with urine leakage, go to the bathroom at regular times, including when you first wake up and at bedtime. Also, limit fluids after dinner. · If you are constipated, drink plenty of fluids, enough so that your urine is light yellow or clear like water. If you have kidney, heart, or liver disease and have to limit fluids, talk with your doctor before you increase the amount of fluids you drink. Set up a regular time for using the toilet. If you continue to have constipation, your doctor may suggest using a bulking agent, such as Metamucil, or a stool softener, laxative, or enema. Medicines  · Take your medicines exactly as prescribed. Call your doctor if you think you are having a problem with your medicine.  You may be taking several medicines. ACE (angiotensin-converting enzyme) inhibitors, angiotensin II receptor blockers (ARBs), beta-blockers, diuretics (water pills), and calcium channel blockers control your blood pressure. Statins help lower cholesterol. Your doctor may also prescribe medicines for depression, pain, sleep problems, anxiety, or agitation. · If your doctor has given you a blood thinner to prevent another stroke, be sure you get instructions about how to take your medicine safely. Blood thinners can cause serious bleeding problems. · Do not take any over-the-counter medicines or herbal products without talking to your doctor first.  · If you take birth control pills or hormone therapy, talk to your doctor about whether they are right for you. For family members and caregivers  · Make the home safe. Set up a room so that your loved one does not have to climb stairs. Be sure the bathroom is on the same floor. Move throw rugs and furniture that could cause falls. Make sure that the lighting is good. Put grab bars and seats in tubs and showers. · Find out what your loved one can do and what he or she needs help with. Try not to do things for your loved one that your loved one can do on his or her own. Help him or her learn and practice new skills. · Visit and talk with your loved one often. Try doing activities together that you both enjoy, such as playing cards or board games. Keep in touch with your loved one's friends as much as you can. Encourage them to visit. · Take care of yourself. Do not try to do everything yourself. Ask other family members to help. Eat well, get enough rest, and take time to do things that you enjoy. Keep up with your own doctor visits, and make sure to take your medicines regularly. Get out of the house as much as you can. Join a local support group. Find out if you qualify for home health care visits to help with rehab or for adult day care. When should you call for help?   Call 911 anytime you think you may need emergency care. For example, call if:  · You have signs of another stroke. These may include:  ¨ Sudden numbness, tingling, weakness, or loss of movement in your face, arm, or leg, especially on only one side of your body. ¨ Sudden vision changes. ¨ Sudden trouble speaking. ¨ Sudden confusion or trouble understanding simple statements. ¨ Sudden problems with walking or balance. ¨ A sudden, severe headache that is different from past headaches. Call 911 even if these symptoms go away in a few minutes. Call your doctor now or seek immediate medical care if:  · You have new symptoms that may be related to your stroke, such as falls or trouble swallowing. Watch closely for changes in your health, and be sure to contact your doctor if you have any problems. Where can you learn more? Go to http://yamilet-joanne.info/. Enter J503 in the search box to learn more about \"Stroke: Care Instructions. \"  Current as of: June 4, 2016  Content Version: 11.2  © 8104-3509 [employer]. Care instructions adapted under license by [employer] (which disclaims liability or warranty for this information). If you have questions about a medical condition or this instruction, always ask your healthcare professional. [place] [address] any warranty or liability for your use of this information.     DISCHARGE SUMMARY from Nurse    The following personal items are in your possession at time of discharge:    Dental Appliances: Partials, Uppers, With patient  Visual Aid: None     Home Medications: None  Jewelry: None  Clothing: Pajamas, Sent home  Other Valuables: None             PATIENT INSTRUCTIONS:    After general anesthesia or intravenous sedation, for 24 hours or while taking prescription Narcotics:  · Limit your activities  · Do not drive and operate hazardous machinery  · Do not make important personal or business decisions  · Do  not drink alcoholic beverages  · If you have not urinated within 8 hours after discharge, please contact your surgeon on call. Report the following to your surgeon:  · Excessive pain, swelling, redness or odor of or around the surgical area  · Temperature over 100.5  · Nausea and vomiting lasting longer than 4 hours or if unable to take medications  · Any signs of decreased circulation or nerve impairment to extremity: change in color, persistent  numbness, tingling, coldness or increase pain  · Any questions        What to do at Home:  Recommended activity: Activity as tolerated,     If you experience any of the following symptoms weakness, slurred speech , please follow up with      *  Please give a list of your current medications to your Primary Care Provider. *  Please update this list whenever your medications are discontinued, doses are      changed, or new medications (including over-the-counter products) are added. *  Please carry medication information at all times in case of emergency situations. These are general instructions for a healthy lifestyle:    No smoking/ No tobacco products/ Avoid exposure to second hand smoke    Surgeon General's Warning:  Quitting smoking now greatly reduces serious risk to your health. Obesity, smoking, and sedentary lifestyle greatly increases your risk for illness    A healthy diet, regular physical exercise & weight monitoring are important for maintaining a healthy lifestyle    You may be retaining fluid if you have a history of heart failure or if you experience any of the following symptoms:  Weight gain of 3 pounds or more overnight or 5 pounds in a week, increased swelling in our hands or feet or shortness of breath while lying flat in bed. Please call your doctor as soon as you notice any of these symptoms; do not wait until your next office visit.     Recognize signs and symptoms of STROKE:    F-face looks uneven    A-arms unable to move or move unevenly    S-speech slurred or non-existent    T-time-call 911 as soon as signs and symptoms begin-DO NOT go       Back to bed or wait to see if you get better-TIME IS BRAIN. Warning Signs of HEART ATTACK     Call 911 if you have these symptoms:   Chest discomfort. Most heart attacks involve discomfort in the center of the chest that lasts more than a few minutes, or that goes away and comes back. It can feel like uncomfortable pressure, squeezing, fullness, or pain.  Discomfort in other areas of the upper body. Symptoms can include pain or discomfort in one or both arms, the back, neck, jaw, or stomach.  Shortness of breath with or without chest discomfort.  Other signs may include breaking out in a cold sweat, nausea, or lightheadedness. Don't wait more than five minutes to call 911 - MINUTES MATTER! Fast action can save your life. Calling 911 is almost always the fastest way to get lifesaving treatment. Emergency Medical Services staff can begin treatment when they arrive -- up to an hour sooner than if someone gets to the hospital by car. The discharge information has been reviewed with the patient. The patient verbalized understanding. Discharge medications reviewed with the patient and appropriate educational materials and side effects teaching were provided.

## 2017-06-16 NOTE — PROGRESS NOTES
Problem: Self Care Deficits Care Plan (Adult)  Goal: *Acute Goals and Plan of Care (Insert Text)  Occupational Therapy Goals  Initiated 6/14/2017 within 7 day(s). 1. Patient will engaged in self-feeding at EOB with minimal assistance for balance and minimal vcs to incorporate LUE. 2. Patient will perform upper body dressing with minimal assistance utilizing adaptive strategies, prn.  3. Patient will perform functional task at EOB for 5 minutes with minimal assistance for balance to increase activity tolerance for ADLs. 4. Patient will perform toilet transfers with moderate assistance . 5. Patient will perform all aspects of toileting with moderate assistance . 6. Patient will participate in upper extremity therapeutic exercise/activities with minimal assistance/contact guard assist for 8 minutes to increase AROM/strength of LUE for participation in ADLs. 7. Patient will utilize energy conservation techniques during functional activities with minimal verbal cues. Outcome: Progressing Towards Goal  OCCUPATIONAL THERAPY TREATMENT     Patient: Anne Mays (65 y.o. female)  Date: 6/16/2017  Diagnosis: Hemorrhagic stroke ([facility] [address]  Hemorrhagic stroke ([facility] [address]  Supratherapeutic INR  Chronic a-fib ([facility] [address] Hemorrhagic stroke [facility])       Precautions: Fall  Chart, occupational therapy assessment, plan of care, and goals were reviewed. ASSESSMENT:  PT improving w/static sitting balance, tolerating ~ 15 minutes sitting EOB performing ADL grooming tasks. Pt demonstrates increase functional use LUE, performing hand hygiene. Pt scoots along EOB w/Min Assist. Anticipate discharge to [facility] today.   EDUCATION UE TherEx  Progression toward goals:  [X]          Improving appropriately and progressing toward goals  [ ]          Improving slowly and progressing toward goals  [ ]          Not making progress toward goals and plan of care will be adjusted       PLAN:  Patient continues to benefit from skilled intervention to address the above impairments. Continue treatment per established plan of care. Discharge Recommendations:  Skilled Nursing Facility  Further Equipment Recommendations for Discharge:  shower chair and rolling walker       SUBJECTIVE:   Patient stated Jaquan Arnold got cold last night.       OBJECTIVE DATA SUMMARY:         Cognitive/Behavioral Status:  Neurologic State: Alert  Orientation Level: Oriented X4  Cognition: Follows commands, Appropriate for age attention/concentration  Safety/Judgement: Awareness of environment, Fall prevention  Functional Mobility and Transfers for ADLs:              Bed Mobility:  Rolling: Minimum assistance  Supine to Sit: Maximum assistance (w/HOB raised and SRs)  Sit to Supine: Maximum assistance  Scooting: Minimum assistance (along EOB to [facility])              Balance:  Sitting: Impaired  Sitting - Static: Fair (occasional)  Sitting - Dynamic: Fair (occasional)  ADL Intervention:  Grooming  Washing Face: Supervision/set-up (w/RUE)  Washing Hands: Stand-by assistance  Brushing Teeth: Minimum assistance; Compensatory technique training;Training to use affected extremity as a gross stabilizer (requires assist opening mouthwash)   Pt rinses w/mouthwash only, weightbearing on RUE while performing task. Lower Body Dressing Assistance  Socks: Total assistance (dependent)  Position Performed: Supine  Cues: Don;Doff     Neuro Re-Education:  Weight bearing LUE, seated EOB performing oral care     Pain:  Pre Treatment:0  Post Treatment:0  Pain Scale 1: Visual  Pain Intensity 1: 0     Activity Tolerance:    Good     Please refer to the flowsheet for vital signs taken during this treatment.   After treatment:   [ ]  Patient left in no apparent distress sitting up in chair  [X]  Patient left in no apparent distress in bed  [X]  Call bell left within reach  [X]  Nursing notified  [ ]  Caregiver present  [ ]  Bed alarm activated     TYLER Linda  Time Calculation: 25 mins

## 2017-06-16 NOTE — PROGRESS NOTES
Assumed pt care resting in bed with eyes close. No s/s of distress. No pain and discomfort noted. Bed on lowest position and wheels lock. Call bell within reach. 6/16/2017  0722 Bedside and Verbal shift change report given to [facility] (oncoming nurse) by Kelley Berg RN   and Angel Larry (preceptor) (offgoing nurse). Report included the following information SBAR, Kardex, Intake/Output and MAR.

## 2017-06-16 NOTE — PROGRESS NOTES
NUTRITION follow-up/Plan of care    RECOMMENDATIONS:   1. Dental Soft  2. Supplements TID  3. Monitor weight and PO intake  3. RD to follow    GOALS:   1. Ongoing: PO intake meets >75% of protein/calorie needs by 6/21  2. Ongoing: Maintain weight (+/- 1-2 lb) by 6/21    ASSESSMENT:    per BMI of 43.5, wt is classified as morbidly obese. Nutrition recommendations listed. RD to follow. Nutrition Risk:  []   High [x]  Moderate [] Low    SUBJECTIVE/OBJECTIVE:     Daughter with pt, states \"pt is not a big eater, wt loss at admit was primary fluid\". Pt & daughter agree to SNF placement at D/C. BS are running 91 -161, good for age. Information Obtained From:   [x] Chart Review  [x] Patient  [x] Family/Caregiver  [] Nurse/Physician   [] Patient Rounds/Interdisciplinary Meeting    Diet: Dental Soft  Patient Vitals for the past 100 hrs:   % Diet Eaten   06/15/17 0947 25 %   06/13/17 1023 0 %   06/12/17 2100 25 %   06/12/17 1200 0 %   06/12/17 0900 10 %     Medications: [x] Reviewed   Encounter Diagnoses     ICD-10-CM ICD-9-CM   1. Stroke due to intracerebral hemorrhage (HCC) I61.9 431   2. Supratherapeutic INR R79.1 790.92   3. CKD (chronic kidney disease) stage 3, GFR 30-59 ml/min N18.3 585.3   4. Hemorrhagic stroke ([facility] [address] I61.9 431   5. Morbid obesity, unspecified obesity type E66.01 278.01   6. SOB (shortness of breath) R06.02 786.05   7.  Left-sided weakness R53.1 728.87     Past Medical History:   Diagnosis Date    Atrial fibrillation ([facility] [address]     Chronic kidney disease     unknown what stage    Diabetes ([facility] [address]      Labs:  Lab Results   Component Value Date/Time    Sodium 143 06/16/2017 01:00 AM    Potassium 3.1 06/16/2017 01:00 AM    Chloride 103 06/16/2017 01:00 AM    CO2 32 06/16/2017 01:00 AM    Anion gap 8 06/16/2017 01:00 AM    Glucose 87 06/16/2017 01:00 AM    BUN 13 06/16/2017 01:00 AM    Creatinine 1.27 06/16/2017 01:00 AM    Calcium 8.4 06/16/2017 01:00 AM    Magnesium 1.6 06/16/2017 01:00 AM Phosphorus 1.8 06/16/2017 01:00 AM    Albumin 2.8 06/14/2017 01:00 AM     Anthropometrics: BMI (calculated): 43.5 Last 3 Recorded Weights in this Encounter    06/10/17 2155 06/11/17 0130 06/12/17 0400   Weight: 99.8 kg (220 lb) 95.9 kg (211 lb 6.7 oz) 97.7 kg (215 lb 6.2 oz)    Ht Readings from Last 1 Encounters:   06/12/17 4' 11\" (1.499 m)     [x]  Weight Loss  []  Weight Gain  []  Weight Stable   []  New wt n/a on record     Estimated Nutrition Needs:   3795 Kcals/day  Protein (g): 59 g    Nutrition Problems Identified:   [x] Suboptimal PO intake   [] Food Allergies  [x] Difficulty chewing/swallowing/poor dentition  [] Constipation/Diarrhea   [] Nausea/Vomiting   [] None  [] Other:     Plan:   [] Therapeutic Diet  []  Obtained/adjusted food preferences/tolerances and/or snacks options   [x]  Supplements added   [] Occupational therapy following for feeding techniques  []  HS snack added   []  Modify diet texture   [x]  Modify diet for food allergies   []  Educate patient   []  Assist with menu selection   [x]  Monitor PO intake on meal rounds   [x]  Continue inpatient monitoring and intervention   []  Participated in discharge planning/Interdisciplinary rounds/Team meetings   []  Other:     Education Needs:   [] Not appropriate for teaching at this time due to   [x] Identified and addressed    Nutrition Monitoring and Evaluation:   [x] Continue inpatient monitoring and interventions    [] Other:     Corrinne Heft  Pager: [phone]

## 2017-06-16 NOTE — PROGRESS NOTES
[facility]: [phone]  [facility]: [phone]   [facility]: [phone]    Patient Name: Codie Contreras  YOB: 1929    Date of Initial Consult: 6-14-17  Reason for Consult: Care Decisions  Requesting Provider: Chaparro Trinidad MD   Primary Care Physician: Sharmin Urban MD      SUMMARY:   Codie Contreras is a 80y.o. year old with a past history of Diabetes/Neuropathy, A-Fib, Hypothyroid, and Chronic kidney disease, who was admitted on 6/10/2017 from ER/Home with a diagnosis of new left sided weakness Current medical issues leading to Palliative Medicine involvement include: CT head done showed small brain hemorrhage. No midline shift. INR 3.3 . Was on [facility] Warfarin for chronic afib, FFP and Vit K ordered. Asked to address patients goals of care. PALLIATIVE DIAGNOSES:   1. CVA   2. CKD Stage 3-4  3. Obesity  4. SOB  5. Constipation       PLAN:   1. I met with patient at her bedside, her 2 dtrs present, she is alert and in no distress, she shared that she was moved from another room b/c of an issue with the [facility], but she was in good spirits, felt she ate very well this am, 100% of her omelet and also had some strawberry ensure. Denies SOB and pain and is off nasal cannula oxygen. She plans to move to TCC today. 2. CVA-working with both PT and OT, looks forward to more improvements while she is on TCC, dtrs both in agreement that this will suit her needs best.  3. CKD Stage 3-4-back on Lasix 40mg BID, noted now low end K, so being repleted, does have low end Mag of 1.6 as well. If opt to use Mag oxide, may not require any miralax for constipation. 4. Obesity- on synthroid, so await a f/u TSH. Requested B12, as well b/c of poor appetite and alteration in taste and prior tongue soreness. 5. Constipation-at home she usually had daily BM, rarely took stool softener, but has not moved bowels now in 2 days.  Suggested try miralax, but as noted if Mag Oxide is started to get mag up, would not use them both. 6. Goals of care and Code Status- she completed a POST FORM-electing DNR/DNI, LIMITED INTERVENTIONS to INCLUDE re-hospitalizations, BIPAP, IV'S, testing but she would not want DIALYSIS, NO FEEDING TUBES, prefers to eat/drink what she is able. Awaiting transfer to [facility] today. 7. Initial consult note routed to primary continuity provider  8. Communicated plan of care with: Palliative IDT, Attending       GOALS OF CARE:     [====Goals of Care====]  GOALS OF CARE:  Patient / health care proxy stated goals: to go home soon      TREATMENT PREFERENCES:   Code Status: DNR    Advance Care Planning:  Advance Care Planning 6/11/2017   Patient's Healthcare Decision Maker is: Legal Next of Kin   Primary Decision Maker Name Alyx Stuart   Primary Decision Maker Phone Number [phone]   Primary Decision Maker Relationship to Patient Adult child   Confirm Advance Directive None       Other:    The palliative care team has discussed with patient / health care proxy about goals of care / treatment preferences for patient.  [====Goals of Care====]    Advance Care Planning 6/11/2017   Patient's Healthcare Decision Maker is: Legal Next of Kin   Primary Decision Maker Name Alyx Stuart   Primary Decision Maker Phone Number [phone]   Primary Decision Maker Relationship to Patient Adult child   Confirm Advance Directive None      Lives with her dtr     HISTORY:     History obtained from: Chart    CHIEF COMPLAINT: Left side weakness    HPI/SUBJECTIVE:   99, 146->87, 136/67, 16 on BIPAP-> 3L at 100%, WT 215lb. HIDALGO 1875, Stool x 1 on 6-14  MAR-Nebs, Tylenol, Folvite, Lasix, Levaquin, Synthroid, Protonix, Zosyn, Pericolace.   LABS-WBC 5.5, H&H 11 & 35.4, Plat 156, INR 3.3->1.2, ESR 41, Albumin 3.4, BUN/Creat 22/1.68->8/1.09, Ammonia 54->25, HgbA1c 8.5, Blood C&S NGTD, BNP 6251  ECHO-Left ventricle: Size was at the upper limits of normal. Systolic function  was normal. Ejection fraction was estimated in the range of 55 % to 60 %. There were no regional wall motion abnormalities. Wall thickness was  mildly increased. Right ventricle: The ventricle was mildly dilated. Left atrium: The atrium was mildly dilated. Right atrium: The atrium was dilated. Mitral valve: There was mild regurgitation. Tricuspid valve: There was moderate regurgitation. Inferior vena cava, hepatic veins: The inferior vena cava was dilated  CT Head-Mildly progressively enlarging small right thalamic hematoma.     No significant mass effect. CT Chest-Moderate dependent pleural effusions with adjacent passive subsegmental  atelectasis in lower lobes. Minimal subsegmental atelectasis inferior lingula.     2. Patchy airspace disease posterior segment right upper lobe suggesting  pneumonia.     3. Thickening of minor and right major fissures.     4. Moderate hiatal hernia. Poorly defined small areas of nodularity and  calcifications left thyroid lobe. CXR-  Continued findings suggestive of moderate CHF with small bilateral pleural  effusions, with interval increased right basilar consolidation.         Neuro Consult-Right internal capsule hemorrhagic stroke  Atrial fibrillation, high INR, s/p FFP  Hyperammonemia. Ok to mobilize. PT/OT. No anticoagulation for next 2 weeks.  Can go to floor  Pulm Consult-transferred back to  ICU for RESP DISTRESS on 6-13-placed on BIPAP  SLP-[facility]-soft diet   The patient is:   [x] Verbal and participatory  [] Non-participatory due to:       Clinical Pain Assessment (nonverbal scale for nonverbal patients): Pain: 0  denied       FUNCTIONAL ASSESSMENT:     Palliative Performance Scale (PPS):70%          PSYCHOSOCIAL/SPIRITUAL SCREENING:     Advance Care Planning:  Advance Care Planning 6/11/2017   Patient's Healthcare Decision Maker is: Legal Next of Lurdes Verdugo   Primary Decision Maker Name CELIA [facility]   Primary Decision Maker Phone Number [phone] Primary Decision Maker Relationship to Patient Adult child   Confirm Advance Directive None        Any spiritual / Latter-day concerns:  [] Yes /  [x] No    Caregiver Burnout:  [] Yes /  [x] No /  [] No Caregiver Present      Anticipatory grief assessment:   [x] Normal  / [] Maladaptive       ESAS Anxiety: Anxiety: 0    ESAS Depression: Depression: 0        REVIEW OF SYSTEMS:     Positive and pertinent negative findings in ROS are noted above in HPI. Denies pain, denies any sob at all, does not want to wear BIPAP, has been eating and in fact enjoyed 100% of her breakfast. Last bm 2 days ago, normally has daily BM. The following systems were [x] reviewed / [] unable to be reviewed as noted in HPI  Other findings are noted below. Systems: constitutional, ears/nose/mouth/throat, respiratory, gastrointestinal, genitourinary, musculoskeletal, integumentary, neurologic, psychiatric, endocrine. Positive findings noted below. Modified ESAS Completed by: provider   Fatigue: 2 Drowsiness: 0   Depression: 0 Pain: 0   Anxiety: 0 Nausea: 0   Anorexia: 0 Dyspnea: 0     Constipation: Yes     Stool Occurrence(s): 1        PHYSICAL EXAM:     Wt Readings from Last 3 Encounters:   06/12/17 97.7 kg (215 lb 6.2 oz)   12/12/16 97.1 kg (214 lb)     Blood pressure 111/72, pulse 97, temperature 98 °F (36.7 °C), resp. rate 14, height 4' 11\" (1.499 m), weight 97.7 kg (215 lb 6.2 oz), SpO2 94 %.   Pain:  Pain Scale 1: Visual  Pain Intensity 1: 0     Pain Location 1: Head  Pain Orientation 1: Anterior  Pain Description 1: Aching  Pain Intervention(s) 1: Medication (see MAR)  Last bowel movement: x 1 on 6-14    Constitutional: alert and fully oriented,off ox, In no distress  Eyes: pupils equal, anicteric  ENMT: no nasal discharge, moist mucous membranes  Respiratory: breathing not labored, symmetric, not wearing oxygen  Gastrointestinal: soft non-tender, +bowel sounds, obese  Musculoskeletal: no deformity, no tenderness to palpation  Skin: warm, dry  Neurologic: following commands, moving but left is weak, clumsy   Psychiatric: full affect, no hallucinations  Other:       HISTORY:     Principal Problem:    Hemorrhagic stroke ([facility] (6/11/2017)    Active Problems:    Chronic a-fib (HCC) (6/11/2017)      Supratherapeutic INR (6/11/2017)      Left-sided weakness (6/11/2017)      Hypokalemia (6/11/2017)      CKD (chronic kidney disease) stage 3, GFR 30-59 ml/min (6/14/2017)      Obesity (6/14/2017)      SOB (shortness of breath) (6/14/2017)      Past Medical History:   Diagnosis Date    Atrial fibrillation (HCC)     Chronic kidney disease     unknown what stage    Diabetes ([facility]       History reviewed. No pertinent surgical history. Family History   Problem Relation Age of Onset    Family history unknown: Yes     History reviewed, no pertinent family history.   Social History   Substance Use Topics    Smoking status: Never Smoker    Smokeless tobacco: Not on file    Alcohol use No     No Known Allergies   Current Facility-Administered Medications   Medication Dose Route Frequency    [START ON 6/17/2017] polyethylene glycol (MIRALAX) packet 17 g  17 g Oral DAILY    levothyroxine (SYNTHROID) tablet 100 mcg  100 mcg Oral ACB    furosemide (LASIX) tablet 40 mg  40 mg Oral ACB&D    pantoprazole (PROTONIX) tablet 40 mg  40 mg Oral ACB    folic acid (FOLVITE) tablet 1 mg  1 mg Oral DAILY    Thiamine Mononitrate (B-1) tablet 100 mg  100 mg Oral DAILY    insulin detemir (LEVEMIR) injection 5 Units  5 Units SubCUTAneous DAILY WITH DINNER    insulin lispro (HUMALOG) injection   SubCUTAneous AC&HS    ondansetron (ZOFRAN) injection 1 mg  1 mg IntraVENous Q6H PRN    labetalol (NORMODYNE;TRANDATE) 20 mg/4 mL (5 mg/mL) injection 5 mg  5 mg IntraVENous Q15MIN PRN    acetaminophen (TYLENOL) tablet 650 mg  650 mg Oral Q4H PRN    acetaminophen (TYLENOL) suppository 650 mg  650 mg Rectal Q4H PRN    bisacodyl (DULCOLAX) tablet 5 mg  5 mg Oral DAILY PRN    bisacodyl (DULCOLAX) suppository 10 mg  10 mg Rectal DAILY PRN    albuterol (PROVENTIL VENTOLIN) nebulizer solution 2.5 mg  2.5 mg Nebulization Q4H PRN    glucose chewable tablet 16 g  4 Tab Oral PRN    dextrose (D50W) injection syrg 12.5-25 g  12.5-25 g IntraVENous PRN    glucagon (GLUCAGEN) injection 1 mg  1 mg IntraMUSCular PRN    0.9% sodium chloride infusion 250 mL  250 mL IntraVENous PRN        LAB AND IMAGING FINDINGS:     Lab Results   Component Value Date/Time    WBC 3.8 06/16/2017 01:00 AM    HGB 10.4 06/16/2017 01:00 AM    PLATELET 727 11/75/1738 01:00 AM     Lab Results   Component Value Date/Time    Sodium 143 06/16/2017 01:00 AM    Potassium 3.1 06/16/2017 01:00 AM    Chloride 103 06/16/2017 01:00 AM    CO2 32 06/16/2017 01:00 AM    BUN 13 06/16/2017 01:00 AM    Creatinine 1.27 06/16/2017 01:00 AM    Calcium 8.4 06/16/2017 01:00 AM    Magnesium 1.6 06/16/2017 01:00 AM    Phosphorus 1.8 06/16/2017 01:00 AM      Lab Results   Component Value Date/Time    AST (SGOT) 12 06/14/2017 01:00 AM    Alk. phosphatase 83 06/14/2017 01:00 AM    Protein, total 5.6 06/14/2017 01:00 AM    Albumin 2.8 06/14/2017 01:00 AM    Globulin 2.8 06/14/2017 01:00 AM     Lab Results   Component Value Date/Time    INR 1.2 06/16/2017 01:00 AM    Prothrombin time 15.0 06/16/2017 01:00 AM    aPTT 29.2 06/16/2017 01:00 AM      No results found for: IRON, FE, TIBC, IBCT, PSAT, FERR   No results found for: PH, PCO2, PO2  No components found for: [place]   Lab Results   Component Value Date/Time    CK 55 06/11/2017 02:00 PM    CK - MB <1.0 06/11/2017 02:00 PM              Total time: 40  Counseling / coordination time, spent as noted above: 30  > 50% counseling / coordination?: yes with patient her 2 dtrs     Prolonged service was provided for  []30 min   []75 min in face to face time in the presence of the patient, spent as noted above.   Time Start: Time End:   Note: this can only be billed with 26034 (initial) or 37950 (follow up).  If multiple start / stop times, list each separately.

## 2017-06-16 NOTE — PROGRESS NOTES
Assumed care of patient, patient in bed awake, bed in lowest position. Call bell in reach. No c/o pain. Dual NIH completed.  Sbar report from [facility] Patient is currently off bipap, and o2 removed, patient stat at 96% room air.     1200 patient o2 drop to 55, o2 put on patient at 2l,     1210 patient BG 57. Apple juice given to patient recheck at 1225.    1225 BG recheck 140

## 2017-06-16 NOTE — DISCHARGE SUMMARY
Discharge Summary    Patient: Shelbi Leahy               Sex: female          DOA: 6/10/2017         YOB: 1929      Age:  80 y.o.        LOS:  LOS: 5 days                Admit Date: 6/10/2017    Discharge Date: 6/16/2017    Admission Diagnoses: Hemorrhagic stroke [facility])  Hemorrhagic stroke ([facility]  Supratherapeutic INR  Chronic a-fib ([facility]    Discharge Diagnoses:    Problem List as of 6/16/2017  Never Reviewed          Codes Class Noted - Resolved    CKD (chronic kidney disease) stage 3, GFR 30-59 ml/min ICD-10-CM: N18.3  ICD-9-CM: 585.3  6/14/2017 - Present        Obesity ICD-10-CM: E66.9  ICD-9-CM: 278.00  6/14/2017 - Present        SOB (shortness of breath) ICD-10-CM: R06.02  ICD-9-CM: 786.05  6/14/2017 - Present        * (Principal)Hemorrhagic stroke ([facility] ICD-10-CM: I61.9  ICD-9-CM: 095  6/11/2017 - Present        Chronic a-fib ([facility] ICD-10-CM: I48.2  ICD-9-CM: 427.31  6/11/2017 - Present        Supratherapeutic INR ICD-10-CM: R79.1  ICD-9-CM: 790.92  6/11/2017 - Present        Left-sided weakness ICD-10-CM: R53.1  ICD-9-CM: 728.87  6/11/2017 - Present        Hypokalemia ICD-10-CM: E87.6  ICD-9-CM: 276.8  6/11/2017 - Present    Acute respiratory failure due to fluid overload              Discharge Medications:   Coumadin on hold now but can be resume on 6/24(2 weeks from admission day) as per neurology    Current Discharge Medication List      START taking these medications    Details   bisacodyl (DULCOLAX) 10 mg suppository Insert 10 mg into rectum daily as needed. Qty: 30 Suppository, Refills: 0      folic acid (FOLVITE) 1 mg tablet Take 1 Tab by mouth daily. Qty: 30 Tab, Refills: 0      Thiamine Mononitrate (B-1) 100 mg tablet Take 1 Tab by mouth daily. Qty: 30 Tab, Refills: 0         CONTINUE these medications which have NOT CHANGED    Details   linagliptin (TRADJENTA) 5 mg tablet Take 5 mg by mouth daily. IRON/DOCUSATE SODIUM (LAURA-SEQUELS PO) Take 50 mg by mouth daily. ascorbic acid, vitamin C, (VITAMIN C) 500 mg tablet Take 500 mg by mouth daily. meclizine (ANTIVERT) 25 mg tablet Take 25 mg by mouth four (4) times daily as needed. zolpidem (AMBIEN) 10 mg tablet Take 10 mg by mouth nightly as needed for Sleep. betamethasone valerate (VALISONE) 0.1 % topical cream Apply  to affected area two (2) times a day. furosemide (LASIX) 40 mg tablet Take  by mouth two (2) times a day.      gabapentin (NEURONTIN) 300 mg capsule Take 300 mg by mouth nightly. losartan (COZAAR) 50 mg tablet Take 50 mg by mouth daily. glimepiride (AMARYL) 2 mg tablet Take 2 mg by mouth daily. metoprolol tartrate (LOPRESSOR) 50 mg tablet Take 50 mg by mouth two (2) times a day. levothyroxine (SYNTHROID) 100 mcg tablet Take 100 mcg by mouth Daily (before breakfast). spironolactone (ALDACTONE) 50 mg tablet Take 50 mg by mouth two (2) times a day. simvastatin (ZOCOR) 40 mg tablet Take 40 mg by mouth nightly. STOP taking these medications       warfarin (COUMADIN) 5 mg tablet Comments:   Reason for Stopping: Follow-up: pcp    Discharge Condition:stable    Activity:as tolerated    Diet:cardiac      Labs:  Labs: Results:       Chemistry Recent Labs      06/16/17   0100  06/15/17   0435  06/14/17   0100   GLU  87  84   --    NA  143  144   --    K  3.1*  3.5   --    CL  103  105   --    CO2  32  30   --    BUN  13  12   --    CREA  1.27  1.24   --    CA  8.4*  8.7   --    AGAP  8  9   --    BUCR  10*  10*   --    AP   --    --   83   TP   --    --   5.6*   ALB   --    --   2.8*   GLOB   --    --   2.8   AGRAT   --    --   1.0      CBC w/Diff Recent Labs      06/16/17   0100  06/15/17   0435   WBC  3.8*  4.3*   RBC  3.99*  4.16*   HGB  10.4*  11.0*   HCT  33.3*  35.0   PLT  178  166   GRANS  60  75*   LYMPH  21  12*   EOS  8*  5      Cardiac Enzymes No results for input(s): CPK, CKND1, NYASIA in the last 72 hours.     No lab exists for component: CKRMB, TROIP   Coagulation Recent Labs      06/16/17   0100  06/15/17   0053   PTP  15.0  14.6   INR  1.2  1.2   APTT  29.2  29.9       Lipid Panel Lab Results   Component Value Date/Time    Cholesterol, total 154 06/11/2017 01:30 AM    HDL Cholesterol 74 06/11/2017 01:30 AM    LDL, calculated 53.2 06/11/2017 01:30 AM    VLDL, calculated 26.8 06/11/2017 01:30 AM    Triglyceride 134 06/11/2017 01:30 AM    CHOL/HDL Ratio 2.1 06/11/2017 01:30 AM      BNP No results for input(s): BNPP in the last 72 hours. Liver Enzymes Recent Labs      06/14/17   0100   TP  5.6*   ALB  2.8*   AP  83   SGOT  12*      Thyroid Studies No results found for: T4, T3U, TSH, TSHEXT       Imaging:  CT head on 6/10/2017:  Small focal acute hemorrhage involving the posterior limb of the right internal  capsule. There is no significant mass effect     CT head on 6/15/2017:  Stable to very slightly decreased size of right thalamic hematoma since prior  study. Surrounding vasogenic edema without significant change. No midline shift. No other significant finding or interval change. Consults:   Neurology    Treatment Team: Treatment Team: Attending Provider: Alisson Ren MD; Scribe: Samantha Zavaleta; Consulting Provider: Kasey Johnston MD; Care Manager: Bernie Bojorquez; Consulting Provider: Sabina Arboleda MD; Utilization Review: Joey Knox RN; Consulting Provider: Cathy Maynard MD; Consulting Provider: Yvette Correia MD; Occupational Therapy Assistant: Mey Valentin; Physical Therapist: Reynaldo Oviedo PT    Significant Diagnostic Studies:see recent lab results. HPI:  Rubi Voodoo is a 80 y.o. female with pmhx of Afib, hypothyroid , CKD, hypertension, DM, Neuropathy who presents with c/o left sided weakness onset PM today. Patient was last seen normal around 5 pm. She reports that she was at home and she got up to walk to the bathroom and her left leg and arm were weak.  She denies any LOC, headache , blurry vision or slurred speech. Her grandson drove her to the ED. On arrival a Code S was called. CT head done showed small brain hemorrhage. No midline shift. INR 3.3 . Patient on [facility] Warfarin for chronic afib. FFP and Vit K ordered. Patient will be admitted to NICU. Intensivist and neurology consulted    Hospital Course:  Patient admitted for hemorrhagic stroke. He has h/o Afib, hypothyroid , CKD, hypertension, DM, Neuropathy and was on coumadin. INR on admission 3.3 and patient received FFP and vit K.iN nicu her care consisted on monitoring by repeating CT head. Patient has been stable and was transferred to neuro floor on 6/12. On 6/13/17,patient was noted to be in respiratory distress and was transferred to [facility] put on BIPAP. CXR showed pulmonary edema and patient improved after being treated with lasix. Since then,her lasix 40 mg bid has been resumed. She was tranferred from icu back to neuro floor on 6/14. Patient is clinically stable,the weakness in left leg and arm have resolved. She will be transferred to [facility] for rehab. Neurology has determined that patient can resume coumadin in 2 weeks from [address] [place] [address] Drive 6/24. Lab results:mg 2.3,potassium 3. 6. Pt is on 2 liters nasal cannula oxygen.   P/E  Lungs ctab  Heart s1s2,irreg  Extr:no edema  Neuro:aaox3    Time for discharge:40 minutes    Nessa Meier MD  June 16, 2017

## 2017-06-16 NOTE — PROGRESS NOTES
Spoke with Dr Hannah Kirk has started auth yesterday  Waiting  For Carey Santacruz, dr Molly Stock  Will let me know  If pt will need to  Continue on bipap.

## 2017-06-16 NOTE — IP AVS SNAPSHOT
Current Discharge Medication List  
  
START taking these medications Dose & Instructions Dispensing Information Comments Morning Noon Evening Bedtime  
 aspirin 81 mg chewable tablet Your last dose was: Your next dose is:    
   
   
 Dose:  81 mg Take 1 Tab by mouth daily. Quantity:  100 Tab Refills:  0  
     
   
   
   
  
 dorzolamide-timolol 22.3-6.8 mg/mL ophthalmic solution Commonly known as:  COSOPT Your last dose was: Your next dose is:    
   
   
 Dose:  1 Drop Administer 1 Drop to both eyes two (2) times a day. Quantity:  5 mL Refills:  0  
     
   
   
   
  
 ferrous fumarate-vitamin C 200 mg (65 mg iron)-25 mg Tber Commonly known as:  LAURA-SEQUELS (IRON-VIT C) Your last dose was: Your next dose is:    
   
   
 Dose:  1 Tab Take 1 Tab by mouth daily. Quantity:  60 Tab Refills:  0  
     
   
   
   
  
 pantoprazole 40 mg tablet Commonly known as:  PROTONIX Your last dose was: Your next dose is:    
   
   
 Dose:  40 mg Take 1 Tab by mouth Daily (before breakfast). Quantity:  60 Tab Refills:  0 CONTINUE these medications which have CHANGED Dose & Instructions Dispensing Information Comments Morning Noon Evening Bedtime  
 furosemide 20 mg tablet Commonly known as:  LASIX What changed:   
- medication strength 
- how to take this - when to take this 
- additional instructions Your last dose was: Your next dose is: One pill a day Quantity:  60 Tab Refills:  0  
     
   
   
   
  
 gabapentin 300 mg capsule Commonly known as:  NEURONTIN What changed:  when to take this Your last dose was: Your next dose is:    
   
   
 Dose:  300 mg Take 1 Cap by mouth nightly. Quantity:  60 Cap Refills:  0  
     
   
   
   
  
 linagliptin 5 mg tablet Commonly known as:  Richard Gaming What changed:  when to take this Your last dose was: Your next dose is:    
   
   
 Dose:  5 mg Take 1 Tab by mouth Daily (before breakfast). Quantity:  60 Tab Refills:  0  
     
   
   
   
  
 zolpidem 5 mg tablet Commonly known as:  AMBIEN What changed:   
- medication strength 
- how much to take Your last dose was: Your next dose is:    
   
   
 Dose:  5 mg Take 1 Tab by mouth nightly as needed for Sleep. Max Daily Amount: 5 mg. Quantity:  60 Tab Refills:  0 CONTINUE these medications which have NOT CHANGED Dose & Instructions Dispensing Information Comments Morning Noon Evening Bedtime  
 ascorbic acid (vitamin C) 500 mg tablet Commonly known as:  VITAMIN C Your last dose was: Your next dose is:    
   
   
 Dose:  500 mg Take 1 Tab by mouth daily. Quantity:  60 Tab Refills:  0  
     
   
   
   
  
 folic acid 1 mg tablet Commonly known as:  Google Your last dose was: Your next dose is:    
   
   
 Dose:  1 mg Take 1 Tab by mouth daily. Quantity:  60 Tab Refills:  0  
     
   
   
   
  
 levothyroxine 100 mcg tablet Commonly known as:  SYNTHROID Your last dose was: Your next dose is:    
   
   
 Dose:  100 mcg Take 1 Tab by mouth Daily (before breakfast). Quantity:  60 Tab Refills:  0  
     
   
   
   
  
 metoprolol tartrate 50 mg tablet Commonly known as:  LOPRESSOR Your last dose was: Your next dose is:    
   
   
 Dose:  50 mg Take 1 Tab by mouth two (2) times a day. Quantity:  60 Tab Refills:  0  
     
   
   
   
  
 simvastatin 40 mg tablet Commonly known as:  ZOCOR Your last dose was: Your next dose is:    
   
   
 Dose:  40 mg Take 1 Tab by mouth nightly. Quantity:  60 Tab Refills:  0  
     
   
   
   
  
 spironolactone 50 mg tablet Commonly known as:  ALDACTONE  
   
 Your last dose was: Your next dose is:    
   
   
 Dose:  50 mg Take 1 Tab by mouth two (2) times a day. Quantity:  60 Tab Refills:  0 Thiamine Mononitrate 100 mg tablet Commonly known as:  B-1 Your last dose was: Your next dose is:    
   
   
 Dose:  100 mg Take 1 Tab by mouth daily. Quantity:  60 Tab Refills:  0 ASK your doctor about these medications Dose & Instructions Dispensing Information Comments Morning Noon Evening Bedtime AMARYL 2 mg tablet Generic drug:  glimepiride Your last dose was: Your next dose is:    
   
   
 Dose:  2 mg Take 2 mg by mouth daily. Refills:  0  
     
   
   
   
  
 betamethasone valerate 0.1 % topical cream  
Commonly known as:  J Carlos Smith Your last dose was: Your next dose is:    
   
   
 Apply  to affected area two (2) times a day. Refills:  0  
     
   
   
   
  
 bisacodyl 10 mg suppository Commonly known as:  DULCOLAX Your last dose was: Your next dose is:    
   
   
 Dose:  10 mg Insert 10 mg into rectum daily as needed. Quantity:  30 Suppository Refills:  0 LAURA-SEQUELS PO Your last dose was: Your next dose is:    
   
   
 Dose:  50 mg Take 50 mg by mouth daily. Refills:  0  
     
   
   
   
  
 losartan 50 mg tablet Commonly known as:  COZAAR Your last dose was: Your next dose is:    
   
   
 Dose:  50 mg Take 50 mg by mouth daily. Refills:  0  
     
   
   
   
  
 meclizine 25 mg tablet Commonly known as:  ANTIVERT Your last dose was: Your next dose is:    
   
   
 Dose:  25 mg Take 25 mg by mouth four (4) times daily as needed. Refills:  0 Where to Get Your Medications Information on where to get these meds will be given to you by the nurse or doctor. ! Ask your nurse or doctor about these medications  
  ascorbic acid (vitamin C) 500 mg tablet  
 aspirin 81 mg chewable tablet  
 dorzolamide-timolol 22.3-6.8 mg/mL ophthalmic solution  
 ferrous fumarate-vitamin C 200 mg (65 mg UDEV)-29 mg Tber  
 folic acid 1 mg tablet  
 furosemide 20 mg tablet  
 gabapentin 300 mg capsule  
 levothyroxine 100 mcg tablet  
 linagliptin 5 mg tablet  
 metoprolol tartrate 50 mg tablet  
 pantoprazole 40 mg tablet  
 simvastatin 40 mg tablet  
 spironolactone 50 mg tablet Thiamine Mononitrate 100 mg tablet  
 zolpidem 5 mg tablet

## 2017-06-17 LAB
GLUCOSE BLD STRIP.AUTO-MCNC: 113 MG/DL (ref 70–110)
GLUCOSE BLD STRIP.AUTO-MCNC: 183 MG/DL (ref 70–110)

## 2017-06-17 PROCEDURE — 82962 GLUCOSE BLOOD TEST: CPT

## 2017-06-17 PROCEDURE — 74011250637 HC RX REV CODE- 250/637: Performed by: INTERNAL MEDICINE

## 2017-06-17 RX ORDER — IPRATROPIUM BROMIDE AND ALBUTEROL SULFATE 2.5; .5 MG/3ML; MG/3ML
3 SOLUTION RESPIRATORY (INHALATION)
Status: DISCONTINUED | OUTPATIENT
Start: 2017-06-17 | End: 2017-07-14 | Stop reason: HOSPADM

## 2017-06-17 RX ADMIN — METOPROLOL TARTRATE 50 MG: 50 TABLET ORAL at 09:38

## 2017-06-17 RX ADMIN — DORZOLAMIDE HYDROCHLORIDE AND TIMOLOL MALEATE 1 DROP: 20; 5 SOLUTION/ DROPS OPHTHALMIC at 09:40

## 2017-06-17 RX ADMIN — SIMVASTATIN 40 MG: 40 TABLET, FILM COATED ORAL at 21:14

## 2017-06-17 RX ADMIN — FOLIC ACID 1 MG: 1 TABLET ORAL at 09:38

## 2017-06-17 RX ADMIN — BETAMETHASONE VALERATE: 1 CREAM TOPICAL at 17:14

## 2017-06-17 RX ADMIN — DORZOLAMIDE HYDROCHLORIDE AND TIMOLOL MALEATE 1 DROP: 20; 5 SOLUTION/ DROPS OPHTHALMIC at 17:12

## 2017-06-17 RX ADMIN — METOPROLOL TARTRATE 50 MG: 50 TABLET ORAL at 17:11

## 2017-06-17 RX ADMIN — FUROSEMIDE 40 MG: 20 TABLET ORAL at 12:49

## 2017-06-17 RX ADMIN — SPIRONOLACTONE 50 MG: 25 TABLET, FILM COATED ORAL at 09:39

## 2017-06-17 RX ADMIN — SPIRONOLACTONE 50 MG: 25 TABLET, FILM COATED ORAL at 17:11

## 2017-06-17 RX ADMIN — Medication 500 MG: at 09:38

## 2017-06-17 RX ADMIN — GLIMEPIRIDE 2 MG: 2 TABLET ORAL at 09:38

## 2017-06-17 RX ADMIN — LEVOTHYROXINE SODIUM 100 MCG: 50 TABLET ORAL at 09:37

## 2017-06-17 RX ADMIN — LOSARTAN POTASSIUM 50 MG: 50 TABLET ORAL at 09:38

## 2017-06-17 RX ADMIN — FUROSEMIDE 40 MG: 20 TABLET ORAL at 17:11

## 2017-06-17 RX ADMIN — GABAPENTIN 300 MG: 300 CAPSULE ORAL at 21:14

## 2017-06-17 RX ADMIN — THIAMINE HCL TAB 100 MG 100 MG: 100 TAB at 09:39

## 2017-06-17 RX ADMIN — BETAMETHASONE VALERATE: 1 CREAM TOPICAL at 09:42

## 2017-06-17 NOTE — ROUTINE PROCESS
TRANSFER - IN REPORT:    Verbal report received from SOBEIDA Shannon RN(name) on [facility]  [facility] from [facility](unit) for routine progression of care      Report consisted of patients Situation, Background, Assessment and   Recommendations(SBAR). Information from the following report(s) SBAR, Kardex and MAR was reviewed with the receiving nurse. Opportunity for questions and clarification was provided. Assessment completed upon patients arrival to unit and care assumed.

## 2017-06-17 NOTE — ROUTINE PROCESS
Pt transferred via bed accompanied by 2100 unit staff x 2, family also present during transfer, pt alert and oriented x 4, verbally responsive, pt started on O2 @ 2LPM via n/c, pt and family oriented to room and educated on the rehab process, opportunity given to ask questions, none voiced, pt presented with belongings, call bell and telephone within reach, pt educated on using call bell and telephone, voiced understanding, family at bedside

## 2017-06-17 NOTE — PROGRESS NOTES
Nutrition initial assessment/Plan of care      RECOMMENDATIONS:    1. Dental Soft  2. Supplements TID  3. Monitor weight and PO intake  4. RD to follow     GOALS:   1. PO intake meets >75% of protein/calorie needs by 6/23  2. Weight Maintenance by 6/23          ASSESSMENT:   per BMI of 43.5, wt is classified as morbidly obese. Nutrition recommendations listed. RD to follow. Nutrition Diagnoses:   Obesity related to excessive energy intake PTA as evidenced by BMI of 43.5    Nutrition Risk:  [] High  [] Moderate [x]  Low    SUBJECTIVE/OBJECTIVE:      Visited pt, daughters in room also, they state \" Mom ate almost all of breakfast & lunch today. This is the most she has eaten in a long time\". Family very attentive. Pt in good spirits. Familiar with pt from 2 W  Will continue with Ensure Supplements BID. Information Obtained from:    [x] Chart Review   [x] Patient   [x] Family/Caregiver   [] Nurse/Physician   [] Interdisciplinary Meeting/Rounds    Dx: CVA  Diet: Dental Soft  Medications: [x] Reviewed    Allergies: [x] Reviewed   No diagnosis found. Past Medical History:   Diagnosis Date    Atrial fibrillation ([facility]     Chronic kidney disease     unknown what stage    Diabetes ([facility] [address]       Labs:    Lab Results   Component Value Date/Time    Sodium 143 06/16/2017 01:00 AM    Potassium 3.6 06/16/2017 02:16 PM    Chloride 103 06/16/2017 01:00 AM    CO2 32 06/16/2017 01:00 AM    Anion gap 8 06/16/2017 01:00 AM    Glucose 87 06/16/2017 01:00 AM    BUN 13 06/16/2017 01:00 AM    Creatinine 1.27 06/16/2017 01:00 AM    Calcium 8.4 06/16/2017 01:00 AM    Magnesium 2.3 06/16/2017 02:16 PM    Phosphorus 1.8 06/16/2017 01:00 AM    Albumin 2.8 06/14/2017 01:00 AM     Anthropometrics: BMI (calculated): 42.5  Last 3 Recorded Weights in this Encounter    06/16/17 1821   Weight: 95.5 kg (210 lb 8 oz)      Ht Readings from Last 1 Encounters:   06/16/17 4' 11\" (1.499 m)     No data found.       IBW: 108 lb %IBW: 199.43% [x] Weight Loss [] Weight Gain [] Weight Stable    Estimated Nutrition Needs: [x] MSJ  [] Other:  Calories: 1582 kcal Based on:   [x] Actual BW    Protein:   59 g Based on:   [x] Actual BW    Fluid:      1582 ml Based on:   [x] Actual BW      [x] No Cultural, Hindu or ethnic dietary need identified.     [] Cultural, Hindu and ethnic food preferences identified and addressed     Wt Status:  [] Normal (18.6 - 24.9) [] Underweight (<18.5) [] Overweight (25 - 29.9) [] Mild Obesity (30 - 34.9)  [] Moderate Obesity (35 - 39.9) [x] Morbid Obesity (40+)   [] Moderate Malnutrition [] Severe Malnutrition in the context of :     Nutrition Problems Identified:   [x] Suboptimal PO intake  H/O  [] Food Allergies  [x] Difficulty chewing/swallowing/poor dentition  [] Constipation/Diarrhea   [] Nausea/Vomiting   [] None  [] Other:     Plan:   [] Therapeutic Diet  []  Obtained/adjusted food preferences/tolerances and/or snacks options   []  Supplements added   [] Occupational therapy following for feeding techniques  []  HS snack added   [x]  Modify diet texture   []  Modify diet for food allergies   []  Educate patient   [x]  Assist with menu selection   [x]  Monitor PO intake on meal rounds   []  Continue inpatient monitoring and intervention   []  Participated in discharge planning/Interdisciplinary rounds/Team meetings   []  Other:     Education Needs:   [] Not appropriate for teaching at this time due to:   [x] Identified and addressed    Nutrition Monitoring and Evaluation:  [x] Continue ongoing monitoring and intervention  [] Other    [facility]  Pager: [phone]

## 2017-06-17 NOTE — PROGRESS NOTES
[facility]  Follow-up    Pt is now in [address]. I am in ICU over the weekend. Please call for any questions or concerns.      Otherwise, [facility] will officially sign off.     -devan   [phone]

## 2017-06-17 NOTE — PROGRESS NOTES
Bedside shift change report given to LUIS ENRIQUE Benavides RN (oncoming nurse) by ASHLEY Lambert RN (offgoing nurse). Report included the following information SBAR, Kardex and MAR.

## 2017-06-18 LAB
GLUCOSE BLD STRIP.AUTO-MCNC: 129 MG/DL (ref 70–110)
GLUCOSE BLD STRIP.AUTO-MCNC: 213 MG/DL (ref 70–110)
GLUCOSE BLD STRIP.AUTO-MCNC: 98 MG/DL (ref 70–110)

## 2017-06-18 PROCEDURE — 74011636637 HC RX REV CODE- 636/637: Performed by: INTERNAL MEDICINE

## 2017-06-18 PROCEDURE — 77030011256 HC DRSG MEPILEX <16IN NO BORD MOLN -A

## 2017-06-18 PROCEDURE — 82962 GLUCOSE BLOOD TEST: CPT

## 2017-06-18 PROCEDURE — 74011250637 HC RX REV CODE- 250/637: Performed by: INTERNAL MEDICINE

## 2017-06-18 RX ORDER — MAG HYDROX/ALUMINUM HYD/SIMETH 200-200-20
30 SUSPENSION, ORAL (FINAL DOSE FORM) ORAL
Status: DISCONTINUED | OUTPATIENT
Start: 2017-06-18 | End: 2017-07-14 | Stop reason: HOSPADM

## 2017-06-18 RX ADMIN — DORZOLAMIDE HYDROCHLORIDE AND TIMOLOL MALEATE 1 DROP: 20; 5 SOLUTION/ DROPS OPHTHALMIC at 17:01

## 2017-06-18 RX ADMIN — SIMVASTATIN 40 MG: 40 TABLET, FILM COATED ORAL at 21:03

## 2017-06-18 RX ADMIN — ALUMINUM HYDROXIDE, MAGNESIUM HYDROXIDE, AND SIMETHICONE 30 ML: 200; 200; 20 SUSPENSION ORAL at 18:20

## 2017-06-18 RX ADMIN — FOLIC ACID 1 MG: 1 TABLET ORAL at 09:53

## 2017-06-18 RX ADMIN — INSULIN LISPRO 2 UNITS: 100 INJECTION, SOLUTION INTRAVENOUS; SUBCUTANEOUS at 16:56

## 2017-06-18 RX ADMIN — BETAMETHASONE VALERATE: 1 CREAM TOPICAL at 09:59

## 2017-06-18 RX ADMIN — THIAMINE HCL TAB 100 MG 100 MG: 100 TAB at 09:53

## 2017-06-18 RX ADMIN — FUROSEMIDE 40 MG: 20 TABLET ORAL at 09:53

## 2017-06-18 RX ADMIN — GABAPENTIN 300 MG: 300 CAPSULE ORAL at 21:03

## 2017-06-18 RX ADMIN — LEVOTHYROXINE SODIUM 100 MCG: 50 TABLET ORAL at 09:53

## 2017-06-18 RX ADMIN — BETAMETHASONE VALERATE: 1 CREAM TOPICAL at 17:01

## 2017-06-18 RX ADMIN — SPIRONOLACTONE 50 MG: 25 TABLET, FILM COATED ORAL at 17:01

## 2017-06-18 RX ADMIN — DORZOLAMIDE HYDROCHLORIDE AND TIMOLOL MALEATE 1 DROP: 20; 5 SOLUTION/ DROPS OPHTHALMIC at 10:00

## 2017-06-18 RX ADMIN — FUROSEMIDE 40 MG: 20 TABLET ORAL at 16:58

## 2017-06-18 RX ADMIN — Medication 500 MG: at 09:53

## 2017-06-18 RX ADMIN — METOPROLOL TARTRATE 50 MG: 50 TABLET ORAL at 17:02

## 2017-06-18 RX ADMIN — GLIMEPIRIDE 2 MG: 2 TABLET ORAL at 09:53

## 2017-06-18 NOTE — ROUTINE PROCESS
Bedside and Verbal shift change report given to mariam Kelly (oncoming nurse) by Que Santiago RN (offgoing nurse). Report included the following information SBAR and Kardex.

## 2017-06-18 NOTE — ROUTINE PROCESS
Bedside shift change report given to Domenica Alas RN (oncoming nurse) by Oracio Keith RN (offgoing nurse). Report included the following information ED Summary and Recent Results. VISUALLY CHECKED PT Q 1 HR BY NURSING STAFF. 24 hour order check done

## 2017-06-18 NOTE — ROUTINE PROCESS
Bedside and Verbal shift change report given to F. Darylene Eden, RN (oncoming nurse) by LUIS ENRIQUE Benavides RN (offgoing nurse). Report included the following information SBAR, Kardex and MAR.

## 2017-06-18 NOTE — PROGRESS NOTES
Speech Pathology Note    Noted new order for eval, chart reviewed. Pt participated in b/s swallow eval on the acute side and passed for mechanical soft solids (due to dentition) and thin liquids. In-house SLP to f/u Monday.     Vivek Steinberg MS, CCC/SLP  Speech- Language Pathologist

## 2017-06-18 NOTE — ROUTINE PROCESS
Bedside shift change report given to Noel De La Torre RN (oncoming nurse) by Yvonne Banegas RN (offgoing nurse). Report included the following information ED Summary and Recent Results. VISUALLY CHECKED PT Q 1 HR BY NURSING STAFF. 24 hour order check done

## 2017-06-19 LAB
BACTERIA SPEC CULT: NORMAL
GLUCOSE BLD STRIP.AUTO-MCNC: 119 MG/DL (ref 70–110)
GLUCOSE BLD STRIP.AUTO-MCNC: 186 MG/DL (ref 70–110)
SERVICE CMNT-IMP: NORMAL

## 2017-06-19 PROCEDURE — 82962 GLUCOSE BLOOD TEST: CPT

## 2017-06-19 PROCEDURE — 92610 EVALUATE SWALLOWING FUNCTION: CPT

## 2017-06-19 PROCEDURE — 74011250637 HC RX REV CODE- 250/637: Performed by: INTERNAL MEDICINE

## 2017-06-19 PROCEDURE — 77030011256 HC DRSG MEPILEX <16IN NO BORD MOLN -A

## 2017-06-19 RX ADMIN — FOLIC ACID 1 MG: 1 TABLET ORAL at 08:20

## 2017-06-19 RX ADMIN — BETAMETHASONE VALERATE: 1 CREAM TOPICAL at 18:38

## 2017-06-19 RX ADMIN — SIMVASTATIN 40 MG: 40 TABLET, FILM COATED ORAL at 21:55

## 2017-06-19 RX ADMIN — DORZOLAMIDE HYDROCHLORIDE AND TIMOLOL MALEATE 1 DROP: 20; 5 SOLUTION/ DROPS OPHTHALMIC at 18:38

## 2017-06-19 RX ADMIN — GLIMEPIRIDE 2 MG: 2 TABLET ORAL at 08:20

## 2017-06-19 RX ADMIN — FUROSEMIDE 40 MG: 20 TABLET ORAL at 17:37

## 2017-06-19 RX ADMIN — METOPROLOL TARTRATE 50 MG: 50 TABLET ORAL at 17:37

## 2017-06-19 RX ADMIN — LEVOTHYROXINE SODIUM 100 MCG: 50 TABLET ORAL at 08:20

## 2017-06-19 RX ADMIN — BETAMETHASONE VALERATE: 1 CREAM TOPICAL at 08:26

## 2017-06-19 RX ADMIN — ALUMINUM HYDROXIDE, MAGNESIUM HYDROXIDE, AND SIMETHICONE 30 ML: 200; 200; 20 SUSPENSION ORAL at 21:55

## 2017-06-19 RX ADMIN — LOSARTAN POTASSIUM 50 MG: 50 TABLET ORAL at 08:19

## 2017-06-19 RX ADMIN — THIAMINE HCL TAB 100 MG 100 MG: 100 TAB at 08:19

## 2017-06-19 RX ADMIN — SPIRONOLACTONE 50 MG: 25 TABLET, FILM COATED ORAL at 17:37

## 2017-06-19 RX ADMIN — Medication 500 MG: at 08:19

## 2017-06-19 RX ADMIN — DORZOLAMIDE HYDROCHLORIDE AND TIMOLOL MALEATE 1 DROP: 20; 5 SOLUTION/ DROPS OPHTHALMIC at 08:22

## 2017-06-19 RX ADMIN — SPIRONOLACTONE 50 MG: 25 TABLET, FILM COATED ORAL at 08:19

## 2017-06-19 RX ADMIN — FUROSEMIDE 40 MG: 20 TABLET ORAL at 08:19

## 2017-06-19 RX ADMIN — METOPROLOL TARTRATE 50 MG: 50 TABLET ORAL at 08:19

## 2017-06-19 RX ADMIN — GABAPENTIN 300 MG: 300 CAPSULE ORAL at 21:55

## 2017-06-19 NOTE — PROGRESS NOTES
Problem: Mobility Impaired (Adult and Pediatric)  Goal: *Acute Goals and Plan of Care (Insert Text)  PHYSICAL THERAPY STG GOALS :  Initiated 6/19/2017 and to be accomplished within 2 Weeks    1. Patient will move from supine to sit and sit to supine and roll side to side in bed with minimal assistance/contact guard assist.   2. Patient will transfer from bed to chair and chair to bed with minimal assistance/contact guard assist using RW. 3. Patient will perform sit to stand with minimal assistance/contact guard assist with Fair balance and safety awareness. 4. Patient will ambulate with minimal assistance/contact guard assist for 75 feet with RW on level surfaces with 2 turns. 5. Patient will ascend/descend 1 stairs with bilateral handrail(s) with moderate assistance to allow for safe home access/exit. 6. Patient will improve standardized test score for [place] Standing Balance score of 2. PHYSICAL THERAPY LTG GOALS :  Initiated 6/19/2017 and to be accomplished within 4 Weeks    1. Patient will move from supine to sit and sit to supine and roll side to side in bed with supervision/set-up. 2. Patient will transfer from bed to chair and chair to bed with supervision/set-up using RW. 3. Patient will perform sit to stand with supervision/set-up with Fair+ balance and safety awareness. 4. Patient will ambulate with supervision/set-up for 150 feet with RW on level surfaces and be able to maneuver through narrow spaces and obstacles without loss of balance. 5. Patient will ascend/descend 1 stairs with NO handrail(s) with minimal assistance/contact guard assist to allow for safe home access/exit. 6. Patient will improve standardized test score for [place] Standing Balance score of 3 for reduced fall risk.      Physical Therapist: Javier Freed, PT on 6/19/2017   [facility]   PHYSICAL THERAPY EVALUATION     Patient: Souleymane Concepcion (44 y.o. female)                Start of Care Date: 6/19/2017 Referred by : Jackie Wilder MD                                      Precautions: Fall           History:  Patient was admitted to [facility] on 6/10/17 with a Dx of Hemorrhagic stroke . Upon acute d/c she was unsafe to return home and was transferred to [facility]. History of present problem reveals HIGH Complexity :3+ comorbidities / personal factors will impact the outcome/ POC . Past Medical history includes:   Past Medical History:   Diagnosis Date    Atrial [facility] ([facility]      Chronic kidney disease       unknown what stage    Diabetes ([facility]     No past surgical history on file. Cognitive Status:  Mental Status  Neurologic State: Alert   Orientation Level: Oriented X4   Cognition: Appropriate for age attention/concentration; Follows commands   Perception: Appears intact   Perseveration: No perseveration noted   Safety/Judgement: Fall prevention   Barriers to Learning/Limitations: None  Compensate with: visual, verbal, tactile, kinesthetic cues/model  Prior Level of Function/Home Situation and Assitance recieved:  Home Situation  Home Environment: Private residence  # Steps to Enter: 1  Rails to [employer]: No  One/Two Story Residence: Two story  Lift Chair Available: Yes  Living Alone: No  Support Systems: Child(janeth), Family member(s)  Patient Expects to be Discharged to[de-identified] Private residence  Current DME Used/Available at Home: [address], [address], straight, Walker, rollator, Walker, rolling, Commode, bedside, Wheelchair  Tub or Shower Type:  (sponge baths)  Level of Assistance received at Home:   Prior to this current hospitalization, the patient reportedly required assistance with ADLs from daughter, using cane in home and rollator in community. Pt has 24 hour supervision at home.   Justification for Evaluation complexity:  Patient is referred to Skilled Physical Therapy services due a functional decline in strength, endurance, mobility, balance, gait and required an overall HIGH  complexity evaluation examination. Exam:HIGH Complexity : 4+ Standardized tests and measures addressing body structure, function, activity limitation and / or participation in recreation    Clinical Presentation: MEDIUM Complexity : Evolving with changing characteristics   Eval Complexity: History: HIGH Complexity :3+ comorbidities / personal factors will impact the outcome/ POC Exam:HIGH Complexity : 4+ Standardized tests and measures addressing body structure, function, activity limitation and / or participation in recreation  Presentation: MEDIUM Complexity : Evolving with changing characteristics    OBJECTIVE DATA SUMMARY:      Vital Signs:  Resting BP:  BP: 110/58  HR: Pulse (Heart Rate): 65    Pain:     Gross Assessment:  Gross Assessment  AROM: Generally decreased, functional  PROM: Generally decreased, functional  Strength: Generally decreased, functional (BLEs grossly 3+/5)  Coordination: Generally decreased, functional  Tone: Normal (in BLEs)        Bed Mobility:  Bed Mobility  Rolling: Moderate assistance; Additional time;Assist x1  Supine to Sit: Maximum assistance; Additional time;Assist x1  Sit to Supine: Maximum assistance;Assist x2  Scooting: Moderate assistance;Assist x1  Balance:  Balance  Sitting: Impaired; With support  Sitting - Static: Fair (occasional) (-)  Sitting - Dynamic: Poor (constant support) (+)  Standing: Impaired; With support;Pull to stand  Standing - Static: Poor  Transfers:  Sit to Stand: Moderate assistance;Assist x2  Gait:     Gait Description (WDL):  (unable at this time)        Wheelchair Management:      Assessment:   Clinical Decision Making:High Complexity , using standardized patient assessment instrument and /or measurable assessement of functional outcome of [facility] Standing Balance Scale, score of 1.  0: Pt performs 25% or less of standing activity (Max assist) CN, 100% impaired. 1: Pt supports self with upper extremities but requires therapist assistance.  Pt performs 25-50% of effort (Mod assist) CM, 80% to <100% impaired. 1+: Pt supports self with upper extremities but requires therapist assistance. Pt performs >50% effort. (Min assist). CL, 60% to <80% impaired. 2: Pt supports self independently with both upper extremities (walker, crutches, parallel bars). CL, 60% to <80% impaired. 2+: Pt support self independently with 1 upper extremity (cane, crutch, 1 parallel bar). CK, 40% to <60% impaired. 3: Pt stands without upper extremity support for up to 30 seconds. CK, 40% to <60% impaired. 3+: Pt stands without upper extremity support for 30 seconds or greater. CJ, 20% to <40% impaired. 4: Pt independently moves and returns center of gravity 1-2 inches in one plane. CJ, 20% to <40% impaired. 4+: Pt independently moves and returns center of gravity 1-2 inches in multiple planes. CI, 1% to <20% impaired. 5: Pt independently moves and returns center of gravity in all planes greater than 2 inches. CH, 0% impaired. Positioning needs/Additional Comments :  Pt presents in bed with 2L of supplemental O2, alert and oriented x 4. Bed mobility with mod/max A x 1 to achieve supine to sit, max A x 2 to achieve sit to supine. Sit <> stand required multiple attempts with verbal cueing for proper technique, max A x 1 initially, then mod A x 2 for safety. Pt performs stand to sit quickly and unsafely despite verbal cueing for safer techniques, perhaps due to fatigue. Static standing balance x ~1 min x 2 reps, ~45 sec on 3rd rep. Attempted gait training, pt unable to advance LEs with verbal, visual, and manual cueing. Therapeutic exercises rendered to address strength, endurance, and mobility and included: ankle pumps x 10 reps; heel raises, toe raises x 15 reps; SAQ and supine hip abd x 10 reps; verbal and tactile cueing for proper techniques and to control excessive L hip ER. HEP administered to address strength, endurance, and mobility.  Daughters [place]en[place] Stairs and Lala Steuben presented towards end of session. Safety awareness techniques rendered to reduce fall risk. Recommend skilled physical therapy services to address deficits, restore function, and for safe return home. TREATMENT PLAN: Rehab Potential : Good  Skilled Physical Therapy Services may include:   [X]           Bed Mobility Training             [X]    Neuromuscular Re-Education  [X]           Transfer Training                   [X]    Orthotic/Prosthetic Training  [X]           Gait Training                          [X]    Modalities  [X]           Therapeutic Procedures        [X]    Manual Therapy  [X]           Therapeutic Activities            [X]    Patient and Family Training/Education  [ ]           Other (comment):      Frequency/Duration: Patient will be followed by physical therapy for 1-2 times a day for a minimum of 3 days per week for 4 weeks to address goals. Discharge Recommendations: Home Health  Patient's expected Discharge Location:  [X]Private Residence  [ ] DAISY/ILF  [ Juliaetta Long  [ ]Other:       COMMUNICATION/EDUCATION:  Patient/Family Agreement with Treatment Plan :      [X]         The PT evaluation/POC has been reviewed with PT assistant. [X]         Fall prevention education was provided and the patient/caregiver indicated understanding. [X]         Patient/family have participated as able in goal setting and plan of care and Patient/family agree to work toward stated goals and plan of care. [ ]         Patient is unable to participate in goal setting and plan of care. Therapist/SW will contact family/POA. Treatment session:  Overall Complexity: MEDIUM Evaluation:  34 mins./ Treatment:  66 mins.   Physical Therapist:   Kendal Delgado, PT       6/19/2017   Thank you for this referral.

## 2017-06-19 NOTE — PROGRESS NOTES
Late entry: CATHIE met with pt and family members in pt's room to complete the social evaluation. Pt lives in her own home with her daughter Laura Fulton and is expected to return home at d/c. Pt used a cane for mobility and was independent with adl's. Her goal in rehab is to \" get better so I can go home\". CATHIE informed pt and her family of the rehab process. CATHIE informed pt and her relatives [facility] and [facility]ATHIE will send an update on 6/22/17 to request additional time if needed. CATHIE informed pt's family that pt has 20 days covered at 100% if extended by [facility] and that she'll have a copayment on $ 164.50 per day from day . Pt's family inquired about personal care assistance for pt. CATHIE informed pt's family that pt's insurance won't cover private duty and this private pay or pt would need regular medicaid to assist. Pt's family doesn't want to apply for medicaid once CATHIE informed them that pt would have to disclose all assets. Pt's family seems supportive and willing to assist with d/c planning. CATHIE will follow.

## 2017-06-19 NOTE — H&P
[address]    Name:  Cailin An  MR#:  [id number]  :  [id number]  Account #:  [de-identified]  Date of Adm:  2017      This patient is an 80-year-old female admitted to acute hospital  following sudden onset of left-sided weakness and found to have an  intracerebral hemorrhage. She had a preceding diagnosis of: 1) Atrial  fibrillation, on chronic anticoagulation. 2) Chronic kidney disease. 3)  Diabetes mellitus. She was admitted to the hospital with this problem  and found to have an intracerebral hemorrhage. On Coumadin, her  INR was 3.3 at admission. She received fresh frozen plasma and  vitamin K, and close monitoring. During the hospitalization, she was  found to be in respiratory distress and congestive heart failure. She  was diuresed successfully, and stabilized, and is now admitted here. She had an echocardiogram done, which showed an ejection fraction  of 55% to 60%. Neurology has determined the patient can resume  Coumadin in 2 weeks from admission, being 2017. MEDICATIONS ON TRANSFER  Included:  1. Vitamin C 500 mg a day. 2. Valisone to affected area. 3. Cosopt eyedrops to both eyes. 4. Ferrous fumarate. 5. Vitamin C once a day. 6. Folic acid 1 mg a day. 7. Fosamax 40 mg a day. 8. Neurontin 300 mg a day. 9. Amaryl 2 mg before breakfast.  10. Synthroid 100 mcg a day. 11. Tradjenta before breakfast.  12. Cozaar 50 mg a day. 13. Lopressor 50 mg a day. 14. Zocor 40 mg a day. 15. Aldactone 50 mg a day. 16. Thiamine 100 mg a day. 17. Duoneb inhaler p.r.n. 18. Antivert p.r.n. 19. Ambien 5 mg at bedtime p.r.n. REVIEW OF SYSTEMS: The patient currently denies any significant  problems. CONSTITUTIONAL: She denies weight loss or weight gain. GASTROINTESTINAL: She denies nausea or vomiting. GENITOURINARY: She denies difficulty urinating.     PHYSICAL EXAMINATION  GENERAL: Reveals a well-developed female with some degree of left  hemiplegia. HEENT: Head is normocephalic. Eyes are equal and reactive to  light. The extraocular movements are intact. Disks are flat. Fundi are  benign. Tympanic membranes are intact. NECK: Supple. CHEST: Clear. CARDIAC: Regular rate and rhythm at this time. ABDOMEN: Soft. Bowel sounds are normally active. IMPRESSION  1. Intracerebral hemorrhage with left hemiplegia. 2. Chronic atrial fibrillation. 3. Diabetes mellitus, on oral medication. 4. Hypertension.         Abhi Hidalgo MD    [facility]  D:  06/19/2017   13:11  T:  06/19/2017   13:26  Job #:  [id number]

## 2017-06-19 NOTE — ROUTINE PROCESS
Bedside and Verbal shift change report given to LUIS ENRIQUE Santana (oncoming nurse) by DEQUAN Lagos (offgoing nurse). Report included the following information SBAR, Kardex, MAR, Recent Results and Med Rec Status. Visually checked on patient hourly per staff.

## 2017-06-19 NOTE — PROGRESS NOTES
Clinical beside swallow eval completed per MD orders. Pt A&Ox4. Functional communication. Intelligibility > 90%. Cognitive-linguistic function appears intact. OM examination revealed oral motor structures functional for mastication and deglutition. Presented with thin liquid and mech-soft trials. Exhibited minimally bolus cohesion, manipulation and A-P transit. With increased time, pt further exhibited (+) swallow timing/reflex and hyolaryngeal excursion. With increased time, pt able to manipulate and clear with 0 clinical s/s aspiration and/or oropharyngeal dysphagia. Pt safe for mech-soft, thin liquid diet. 0 formal ST needs for dysphagia indicated at this time. SLP educated pt on role of speech therapist in current setting with re: speech/swallow; verbalized comprehension. SLP available for re-evaluation if indicated by MD.     Thank you for this referral.   Ginger Moncada, SLP Intern  Ph: [phone]          Outcome: Resolved/Met Date Met:  06/19/17     [facility] SPEECH LANGUAGE PATHOLOGY   DYSPHAGIA EVALUATION        Patient: Radha Cabello (63 y.o. female)          Start of Care Date: 6/19/2017       Referred by : Isabella Verdugo MD                            Attending Physician: Isabella Verdugo MD  Primary Diagnosis: Hemorrhagic stroke        Treatment Diagnosis  Treatment Diagnosis: muscle weakness  Treatment Diagnosis 2: difficulty in walking      Precautions: Fall   Medical History:   Past Medical History:   Diagnosis Date    Atrial fibrillation ([address]      Chronic kidney disease       unknown what stage    Diabetes ([address]     No past surgical history on file.    Treatment Diagnosis: Oropharyngeal dysphagia                     Onset Date: 6/19/17                Reason for referral:  This patient has been referred for a speech therapy evaluation secondary to hemorraghic stroke  Prior Level of Function/Home Situation/Diet:  Prior to this hospitalization, the patient was living independently with family Home Situation  Home Environment: Private residence  # Steps to Enter: 1  Rails to Enter: No  One/Two Story Residence: Two story  Lift Chair Available: Yes  Living Alone: No  Support Systems: Child(janeth), Family member(s)  Patient Expects to be Discharged to[de-identified] Private residence  Current DME Used/Available at Home: Jolley Servant, straight, Walker, rollator, Walker, rolling, Commode, bedside  Tub or Shower Type:  (sponge baths). OBJECTIVE DATA SUMMARY:      Current Diet:   Mech-soft, thin     Cognitive and Communication Status:  Mental Status  Neurologic State: Alert (Simultaneous filing. User may not have seen previous data.)  Orientation Level: Oriented X4 (Simultaneous filing. User may not have seen previous data.)  Cognition: Appropriate for age attention/concentration, Follows commands (Simultaneous filing. User may not have seen previous data.)  Perception: Appears intact (Simultaneous filing. User may not have seen previous data.)  Perseveration: No perseveration noted (Simultaneous filing. User may not have seen previous data.)  Safety/Judgement: Fall prevention (Simultaneous filing. User may not have seen previous data.)  Pain:           Oral Assessment:  Oral Assessment  Labial: No impairment  Dentition: Intact  Oral Hygiene: Good  Lingual: Decreased rate  Velum: No impairment  Mandible: No impairment  Gag Reflex: No impairment  P.O.  Trials:  Patient Position: HOB 60  Vocal quality prior to P.O.:  Low volume  Consistency Presented: Mechanical soft, Thin liquid  How Presented: Self-fed/presented, Straw, Successive swallows     Bolus Acceptance: No impairment  Bolus Formation/Control: Impaired  Type of Impairment: Delayed, Mastication  Propulsion: No impairment  Oral Residue: None  Initiation of Swallow: No impairment  Laryngeal Elevation: Functional  Aspiration Signs/Symptoms: None  Pharyngeal Phase Characteristics: No impairment, issues, or problems   Effective Modifications: Small sips and bites  Cues for Modifications: None     Oral Phase Severity: Mild  Pharyngeal Phase Severity : No impairment     Findings and Recommendations:  Findings and Recommendations  Evaluation Type: Eval O/P Swallow  Assessment Method(s): Observation;Palpation  Patient Position: HOB 60  Vocal Quality: Low volume  Consistency Presented: Mechanical soft; Thin liquid  How Presented: Self-fed/presented;Straw;Successive swallows  Bolus Acceptance: No impairment  Bolus Formation/Control: Impaired  Type of Impairment: Delayed;Mastication  Propulsion: No impairment  Oral Residue: None  Oral Phase Severity: Mild  Pharyngeal Phase Severity : No impairment  Initiation of Swallow: No impairment  Laryngeal Elevation: Functional  Aspiration Signs/Symptoms: None     TREATMENT PLAN:  Rehab Potential : Good   Skilled speech therapy may include:  [ ] Skilled meal assessment                                               [ ] Diet texture analysis                                              [ ] Oral-motor/laryngeal strengthening exercises  [ ] Compensatory swallow techniques    [ ] Patient/caregiver education        [X]Other: Eval only     Frequency/Duration: N/A - eval only. 0 ST needs ID'd at time of evaluation. Discharge Recommendations: Home Health         COMMUNICATION/EDUCATION:   [X]         Aspiration precautions; compensatory swallow techniques  [X]         Patient/family have participated as able in POC/goal setting  [ ]         Patient/family agree to work toward stated goals and plan of care. [ ]         Patient understands intent/goals of therapy, but is neutral about participation. [ ]         Patient is unable to participate in goal setting and plan of care; will contact                       family/POA. Evaluation: 30 mins.      Speech-Language Pathologist:     Kevin Goodman            6/19/2017     Thank you for this referral.

## 2017-06-19 NOTE — PROGRESS NOTES
I have reviewed this patient's current medication list and recent laboratory results. At this time, I do not suggest any drug therapy adjustments or additional laboratory monitoring. Thank you,  Romy DUDLEY  Ph. M. S.  6/19/2017

## 2017-06-19 NOTE — ROUTINE PROCESS
Bedside and Verbal shift change report given to Padma Giraldo LPN (oncoming nurse) by Armin Bernardo RN (offgoing nurse). Report included the following information SBAR, Kardex and MAR.

## 2017-06-20 LAB
BACTERIA SPEC CULT: NORMAL
GLUCOSE BLD STRIP.AUTO-MCNC: 197 MG/DL (ref 70–110)
GLUCOSE BLD STRIP.AUTO-MCNC: 84 MG/DL (ref 70–110)
SERVICE CMNT-IMP: NORMAL

## 2017-06-20 PROCEDURE — 82962 GLUCOSE BLOOD TEST: CPT

## 2017-06-20 PROCEDURE — 74011250637 HC RX REV CODE- 250/637: Performed by: INTERNAL MEDICINE

## 2017-06-20 RX ORDER — PANTOPRAZOLE SODIUM 40 MG/1
40 TABLET, DELAYED RELEASE ORAL
Status: DISCONTINUED | OUTPATIENT
Start: 2017-06-21 | End: 2017-06-20

## 2017-06-20 RX ORDER — PANTOPRAZOLE SODIUM 40 MG/1
40 TABLET, DELAYED RELEASE ORAL
Status: DISCONTINUED | OUTPATIENT
Start: 2017-06-20 | End: 2017-06-20 | Stop reason: SDUPTHER

## 2017-06-20 RX ORDER — PANTOPRAZOLE SODIUM 40 MG/1
40 TABLET, DELAYED RELEASE ORAL
Status: DISCONTINUED | OUTPATIENT
Start: 2017-06-20 | End: 2017-07-14 | Stop reason: HOSPADM

## 2017-06-20 RX ORDER — GLIMEPIRIDE 2 MG/1
1 TABLET ORAL
Status: DISCONTINUED | OUTPATIENT
Start: 2017-06-20 | End: 2017-06-20

## 2017-06-20 RX ORDER — METFORMIN HYDROCHLORIDE 500 MG/1
500 TABLET ORAL 2 TIMES DAILY WITH MEALS
Status: DISCONTINUED | OUTPATIENT
Start: 2017-06-20 | End: 2017-07-05

## 2017-06-20 RX ADMIN — THIAMINE HCL TAB 100 MG 100 MG: 100 TAB at 10:21

## 2017-06-20 RX ADMIN — PANTOPRAZOLE SODIUM 40 MG: 40 TABLET, DELAYED RELEASE ORAL at 15:09

## 2017-06-20 RX ADMIN — BETAMETHASONE VALERATE: 1 CREAM TOPICAL at 18:30

## 2017-06-20 RX ADMIN — FOLIC ACID 1 MG: 1 TABLET ORAL at 10:22

## 2017-06-20 RX ADMIN — GABAPENTIN 300 MG: 300 CAPSULE ORAL at 21:04

## 2017-06-20 RX ADMIN — DORZOLAMIDE HYDROCHLORIDE AND TIMOLOL MALEATE 1 DROP: 20; 5 SOLUTION/ DROPS OPHTHALMIC at 10:25

## 2017-06-20 RX ADMIN — METFORMIN HYDROCHLORIDE 500 MG: 500 TABLET ORAL at 17:42

## 2017-06-20 RX ADMIN — BETAMETHASONE VALERATE: 1 CREAM TOPICAL at 10:28

## 2017-06-20 RX ADMIN — LEVOTHYROXINE SODIUM 100 MCG: 50 TABLET ORAL at 10:21

## 2017-06-20 RX ADMIN — DORZOLAMIDE HYDROCHLORIDE AND TIMOLOL MALEATE 1 DROP: 20; 5 SOLUTION/ DROPS OPHTHALMIC at 17:43

## 2017-06-20 RX ADMIN — Medication 500 MG: at 10:22

## 2017-06-20 RX ADMIN — SIMVASTATIN 40 MG: 40 TABLET, FILM COATED ORAL at 21:03

## 2017-06-20 NOTE — ROUTINE PROCESS
Bedside and Verbal shift change report given to eboni Edmondson lpn (oncoming nurse) by ronaldo Solis lpn (offgoing nurse). Report included the following information SBAR, Kardex and MAR.  Hourly rounds

## 2017-06-20 NOTE — ROUTINE PROCESS
Bedside and verbal shift report given to LUIS ENRIQUE Hu RN (oncoming nurse) by SHALINI Ivey LPN (off going nurse). Report included SBAR, MAR and Kardex.  Hourly rounds completed

## 2017-06-20 NOTE — ROUTINE PROCESS
BS was 84 this morning. Patient sluggish, slow to respond. Gave cranberry juice and banana. Patient now sitting up in bed alert and talking.

## 2017-06-20 NOTE — PROGRESS NOTES
Problem: Mobility Impaired (Adult and Pediatric)  Goal: *Acute Goals and Plan of Care (Insert Text)  PHYSICAL THERAPY STG GOALS :  Initiated 6/19/2017 and to be accomplished within 2 Weeks    1. Patient will move from supine to sit and sit to supine and roll side to side in bed with minimal assistance/contact guard assist.   2. Patient will transfer from bed to chair and chair to bed with minimal assistance/contact guard assist using RW. 3. Patient will perform sit to stand with minimal assistance/contact guard assist with Fair balance and safety awareness. 4. Patient will ambulate with minimal assistance/contact guard assist for 75 feet with RW on level surfaces with 2 turns. 5. Patient will ascend/descend 1 stairs with bilateral handrail(s) with moderate assistance to allow for safe home access/exit. 6. Patient will improve standardized test score for [place] Standing Balance score of 2. PHYSICAL THERAPY LTG GOALS :  Initiated 6/19/2017 and to be accomplished within 4 Weeks    1. Patient will move from supine to sit and sit to supine and roll side to side in bed with supervision/set-up. 2. Patient will transfer from bed to chair and chair to bed with supervision/set-up using RW. 3. Patient will perform sit to stand with supervision/set-up with Fair+ balance and safety awareness. 4. Patient will ambulate with supervision/set-up for 150 feet with RW on level surfaces and be able to maneuver through narrow spaces and obstacles without loss of balance. 5. Patient will ascend/descend 1 stairs with NO handrail(s) with minimal assistance/contact guard assist to allow for safe home access/exit. 6. Patient will improve standardized test score for [place] Standing Balance score of 3 for reduced fall risk.      Physical Therapist: Marlin Pascual, PT on 6/19/2017    [facility]   PHYSICAL THERAPY DAILY TREATMENT NOTE        Patient: Diomedes Rozina (47 y.o. female)               Date: 6/20/2017 Physician: Sav Castillo MD  Primary Diagnosis: Hemorrhagic stroke           Treatment Diagnosis  Treatment Diagnosis: muscle weakness  Treatment Diagnosis 2: difficulty in walking  Precautions: Fall  Vital Signs  Vital Signs  Temp: 98.1 °F (36.7 °C)  Temp Source: Oral  Pulse (Heart Rate): (!) 54  Resp Rate: 18  Level of Consciousness: Alert  BP: (!) 89/52  MAP (Calculated): (!) 64  BP 1 Method: Automatic  BP 1 Location: Right arm  BP Patient Position: At rest;Supine (HOB elevated to 50 degrees)  MEWS Score: 2     Cognitive Status:  Mental Status  Neurologic State: Alert; Appropriate for age  Orientation Level: Oriented X4  Cognition: Appropriate for age attention/concentration  Pain     Bed Mobility Training     Balance  Sitting: Impaired  Transfer Training        Gait Training                       Therapeutic Exercise:    Nurse notified therapist of pt's glucose and blood pressure this morning, therapist reassessed BP as mentioned above. PT rendered bedside. TE rendered to include: ankle pumps 30 reps x 2 sets; ankle circles, glut squeezes, hip abd (active assistance on L), heel slides (active assistance on L), quad sets 15 reps x 2 sets. Bed placed in chair position to work on \"sitting\" balance. Facilitation of reaching outside ELIDA and across midline, facilitation of activation of core muscles pt required PROM with LUE to reach (pt able to initiate but unable to reach) 10 reps, 5 reps. LUE tactile cueing rendered at L scapula and with additional guidance at elbow, pt demonstrates much more function at L elbow and distal.    Patient/Caregiver Education:   Pt /Caregiver Education on safety and fall prevention to reduce fall risk. HEP handout rendered for daily complince, pt demonstrated knowledge and proper performance, requiring minimal verbal cueing for correction. ASSESSMENT:  Patient continues to benefit from Skilled PT services to improve strength, endurance, mobility, balance.    Progression toward goals:  [ ]      Improving appropriately and progressing toward goals  [x ]      Improving slowly and progressing toward goals  [ ]      Not making progress toward goals and plan of care will be adjusted      Treatment session: 65 minutes.   Therapist:   Florentin Marques, PT,          6/20/2017

## 2017-06-20 NOTE — ROUTINE PROCESS
Bedside and Verbal shift change report given to [facility] LPN (oncoming nurse) by Ferd Libman, RN (offgoing nurse). Report included the following information SBAR, Kardex and MAR. Hourly rounds made.

## 2017-06-21 LAB
GLUCOSE BLD STRIP.AUTO-MCNC: 131 MG/DL (ref 70–110)
GLUCOSE BLD STRIP.AUTO-MCNC: 79 MG/DL (ref 70–110)

## 2017-06-21 PROCEDURE — 82962 GLUCOSE BLOOD TEST: CPT

## 2017-06-21 PROCEDURE — 74011250637 HC RX REV CODE- 250/637: Performed by: INTERNAL MEDICINE

## 2017-06-21 RX ADMIN — Medication 500 MG: at 10:02

## 2017-06-21 RX ADMIN — FOLIC ACID 1 MG: 1 TABLET ORAL at 10:02

## 2017-06-21 RX ADMIN — METOPROLOL TARTRATE 50 MG: 50 TABLET ORAL at 10:03

## 2017-06-21 RX ADMIN — SPIRONOLACTONE 50 MG: 25 TABLET, FILM COATED ORAL at 10:02

## 2017-06-21 RX ADMIN — DORZOLAMIDE HYDROCHLORIDE AND TIMOLOL MALEATE 1 DROP: 20; 5 SOLUTION/ DROPS OPHTHALMIC at 18:09

## 2017-06-21 RX ADMIN — GABAPENTIN 300 MG: 300 CAPSULE ORAL at 21:32

## 2017-06-21 RX ADMIN — THIAMINE HCL TAB 100 MG 100 MG: 100 TAB at 10:02

## 2017-06-21 RX ADMIN — PANTOPRAZOLE SODIUM 40 MG: 40 TABLET, DELAYED RELEASE ORAL at 10:02

## 2017-06-21 RX ADMIN — FUROSEMIDE 40 MG: 20 TABLET ORAL at 10:02

## 2017-06-21 RX ADMIN — METFORMIN HYDROCHLORIDE 500 MG: 500 TABLET ORAL at 18:06

## 2017-06-21 RX ADMIN — FUROSEMIDE 40 MG: 20 TABLET ORAL at 18:08

## 2017-06-21 RX ADMIN — METFORMIN HYDROCHLORIDE 500 MG: 500 TABLET ORAL at 10:03

## 2017-06-21 RX ADMIN — LEVOTHYROXINE SODIUM 100 MCG: 50 TABLET ORAL at 10:02

## 2017-06-21 RX ADMIN — SIMVASTATIN 40 MG: 40 TABLET, FILM COATED ORAL at 21:31

## 2017-06-21 RX ADMIN — BETAMETHASONE VALERATE: 1 CREAM TOPICAL at 10:08

## 2017-06-21 RX ADMIN — DORZOLAMIDE HYDROCHLORIDE AND TIMOLOL MALEATE 1 DROP: 20; 5 SOLUTION/ DROPS OPHTHALMIC at 10:08

## 2017-06-21 RX ADMIN — BETAMETHASONE VALERATE: 1 CREAM TOPICAL at 21:35

## 2017-06-21 NOTE — ROUTINE PROCESS
Bedside and Verbal shift change report given to Valente Rose (oncoming nurse) by Francisco Rios RN (offgoing nurse). Report included the following information SBAR, Kardex and MAR.

## 2017-06-21 NOTE — ROUTINE PROCESS
Bedside and Verbal shift change report given to Marah Grewal RN (oncoming nurse) by Radha Gomez RN (offgoing nurse). Report included the following information SBAR, Kardex and MAR. Hourly rounds made.

## 2017-06-21 NOTE — PROGRESS NOTES
Problem: Mobility Impaired (Adult and Pediatric)  Goal: *Acute Goals and Plan of Care (Insert Text)  PHYSICAL THERAPY STG GOALS :  Initiated 6/19/2017 and to be accomplished within 2 Weeks    1. Patient will move from supine to sit and sit to supine and roll side to side in bed with minimal assistance/contact guard assist.   2. Patient will transfer from bed to chair and chair to bed with minimal assistance/contact guard assist using RW. 3. Patient will perform sit to stand with minimal assistance/contact guard assist with Fair balance and safety awareness. 4. Patient will ambulate with minimal assistance/contact guard assist for 75 feet with RW on level surfaces with 2 turns. 5. Patient will ascend/descend 1 stairs with bilateral handrail(s) with moderate assistance to allow for safe home access/exit. 6. Patient will improve standardized test score for [place] Standing Balance score of 2. PHYSICAL THERAPY LTG GOALS :  Initiated 6/19/2017 and to be accomplished within 4 Weeks    1. Patient will move from supine to sit and sit to supine and roll side to side in bed with supervision/set-up. 2. Patient will transfer from bed to chair and chair to bed with supervision/set-up using RW. 3. Patient will perform sit to stand with supervision/set-up with Fair+ balance and safety awareness. 4. Patient will ambulate with supervision/set-up for 150 feet with RW on level surfaces and be able to maneuver through narrow spaces and obstacles without loss of balance. 5. Patient will ascend/descend 1 stairs with NO handrail(s) with minimal assistance/contact guard assist to allow for safe home access/exit. 6. Patient will improve standardized test score for [place] Standing Balance score of 3 for reduced fall risk.      Physical Therapist: Kendal Delgado, PT on 6/19/2017    [facility]   PHYSICAL THERAPY DAILY TREATMENT NOTE        Patient: Joyce Lewis (10 y.o. female)               Date: 6/21/2017 Physician: Jamshid Marcum MD  Primary Diagnosis: Hemorrhagic stroke           Treatment Diagnosis  Treatment Diagnosis: muscle weakness  Treatment Diagnosis 2: difficulty in walking  Precautions: Fall  Vital Signs  Vital Signs  Temp: 97.8 °F (36.6 °C)  Temp Source: Oral  Pulse (Heart Rate): 65  Resp Rate: 18  O2 Sat (%): 100 %  Level of Consciousness: Alert  BP: 110/49  MAP (Calculated): 69  MEWS Score: 1     Cognitive Status:  Mental Status  Neurologic State: Alert  Orientation Level: Oriented X4  Cognition: Follows commands  Pain     Bed Mobility Training  Bed Mobility Training  Rolling: Moderate assistance  Supine to Sit: Maximum assistance;Assist x2  Balance  Sitting: With support; Impaired  Sitting - Static: Fair (occasional)  Sitting - Dynamic: Fair (occasional) (-/poor +)  Standing: Impaired; With support;Pull to stand  Standing - Static: Poor  Transfer Training  Transfer Training  Sit to Stand: Maximum assistance;Assist x2  Bed to Chair: Moderate assistance;Assist x2  Sit to Stand: Maximum assistance;Assist x2  Gait Training                     Therapeutic Exercise:    Bed mobiltiy and transfers as mentioned above. Sit <> stand transfer training to improve quality of transfers, verbal cueing for techniques to improve and for safety. Static standing balance with facilitation of weight shifting ~2 min x 2 reps. TE to imrpove core strength rolling therapy ball 10 reps x 2 sets, rest breaks required afterwards. Neuro re-ed to improve proprioceptive info through LUE as well as facilitation of reaching outside ELIDA and across midline in order to address balance impairments. Patient/Caregiver Education:   Pt /Caregiver Education on safety and fall prevention to reduce fall risk. Education on compliance with HEP. ASSESSMENT:  Patient continues to benefit from Skilled PT services to improve strength, endurance, balance.    Progression toward goals:  [ ]      Improving appropriately and progressing toward goals  [X]      Improving slowly and progressing toward goals  [ ]      Not making progress toward goals and plan of care will be adjusted      Treatment session: 54 minutes.   Therapist:   Coral Gayle, PT,          6/21/2017

## 2017-06-21 NOTE — PROGRESS NOTES
G[facility]     Patient: Antonio Burton MRN: [id number]  CSN: [id number]    YOB: 1929  Age: 80 y.o. Sex: female    DOA: 6/16/2017 LOS:  LOS: 5 days                    Subjective:     Noted hypotension and will d/c cozar. Still with weakness L arm but better. Feels well.  Blood sugar still tight and discussed tx change to pt with d/c of ameryl    Objective:      Visit Vitals    /49    Pulse 65    Temp 97.8 °F (36.6 °C)    Resp 18    Ht 4' 11\" (1.499 m)    Wt 95.5 kg (210 lb 8 oz)    SpO2 100%    Breastfeeding No    BMI 42.52 kg/m2       Physical Exam:  Chest clear  Cor rr  abd soft  No edema and L hemiparesis noted    Intake and Output:  Current Shift:     Last three shifts:       Recent Results (from the past 24 hour(s))   GLUCOSE, POC    Collection Time: 06/20/17  4:51 PM   Result Value Ref Range    Glucose (POC) 197 (H) 70 - 110 mg/dL   GLUCOSE, POC    Collection Time: 06/21/17  6:08 AM   Result Value Ref Range    Glucose (POC) 79 70 - 110 mg/dL       Current Facility-Administered Medications   Medication Dose Route Frequency    metFORMIN (GLUCOPHAGE) tablet 500 mg  500 mg Oral BID WITH MEALS    pantoprazole (PROTONIX) tablet 40 mg  40 mg Oral ACB    [facility] ANESTHESIA   Other PRN    [facility] EMERGENCY/STAT BOX   Other PRN    alum-mag hydroxide-simeth (MYLANTA) oral suspension 30 mL  30 mL Oral QID PRN    ferrous fumarate-vitamin C 200 mg (65 mg iron)-25 mg TbER 1 Tab (patient's own med)  1 Tab Oral DAILY    linagliptin (TRADJENTA) tablet 5 mg (Patient's own med)  5 mg Oral ACB    albuterol-ipratropium (DUO-NEB) 2.5 MG-0.5 MG/3 ML  3 mL Nebulization Q4H PRN    bisacodyl (DULCOLAX) suppository 10 mg  10 mg Rectal DAILY PRN    insulin lispro (HUMALOG) injection   SubCUTAneous ACB&D    magnesium hydroxide (MILK OF MAGNESIA) 400 mg/5 mL oral suspension 30 mL  30 mL Oral DAILY PRN    dorzolamide-timolol (COSOPT) 22.3-6.8 mg/mL ophthalmic solution 1 Drop  1 Drop Both Eyes BID    sodium phosphate (FLEET'S) enema 118 mL  1 Enema Rectal PRN    ascorbic acid (vitamin C) (VITAMIN C) tablet 500 mg  500 mg Oral DAILY    furosemide (LASIX) tablet 40 mg  40 mg Oral ACB&D    gabapentin (NEURONTIN) capsule 300 mg  300 mg Oral QHS    metoprolol tartrate (LOPRESSOR) tablet 50 mg  50 mg Oral BID    levothyroxine (SYNTHROID) tablet 100 mcg  100 mcg Oral ACB    spironolactone (ALDACTONE) tablet 50 mg  50 mg Oral BID    folic acid (FOLVITE) tablet 1 mg  1 mg Oral DAILY    Thiamine Mononitrate (B-1) tablet 100 mg  100 mg Oral DAILY    meclizine (ANTIVERT) tablet 25 mg  25 mg Oral QID PRN    zolpidem (AMBIEN) tablet 5 mg  5 mg Oral QHS PRN    betamethasone valerate (VALISONE) 0.1 % cream   Topical BID    simvastatin (ZOCOR) tablet 40 mg  40 mg Oral QHS    Please ask patient to bring in linagliptin. We do not carry in pharmacy. 1 Each Other Rx Dosing/Monitoring       Lab Results   Component Value Date/Time    Glucose 87 06/16/2017 01:00 AM    Glucose 84 06/15/2017 04:35 AM    Glucose 198 06/13/2017 04:55 AM    Glucose 206 06/12/2017 02:40 AM    Glucose 252 06/11/2017 01:30 AM        Assessment/Plan     Active Problems:    * No active hospital problems.  Ron Pak MD  6/21/2017, 9:58 AM

## 2017-06-22 LAB
ANION GAP BLD CALC-SCNC: 8 MMOL/L (ref 3–18)
BUN SERPL-MCNC: 36 MG/DL (ref 7–18)
BUN/CREAT SERPL: 24 (ref 12–20)
CALCIUM SERPL-MCNC: 8.7 MG/DL (ref 8.5–10.1)
CHLORIDE SERPL-SCNC: 98 MMOL/L (ref 100–108)
CO2 SERPL-SCNC: 31 MMOL/L (ref 21–32)
CREAT SERPL-MCNC: 1.49 MG/DL (ref 0.6–1.3)
GLUCOSE BLD STRIP.AUTO-MCNC: 179 MG/DL (ref 70–110)
GLUCOSE BLD STRIP.AUTO-MCNC: 77 MG/DL (ref 70–110)
GLUCOSE SERPL-MCNC: 66 MG/DL (ref 74–99)
POTASSIUM SERPL-SCNC: 4.1 MMOL/L (ref 3.5–5.5)
SODIUM SERPL-SCNC: 137 MMOL/L (ref 136–145)

## 2017-06-22 PROCEDURE — 36415 COLL VENOUS BLD VENIPUNCTURE: CPT | Performed by: INTERNAL MEDICINE

## 2017-06-22 PROCEDURE — 80048 BASIC METABOLIC PNL TOTAL CA: CPT | Performed by: INTERNAL MEDICINE

## 2017-06-22 PROCEDURE — 82962 GLUCOSE BLOOD TEST: CPT

## 2017-06-22 PROCEDURE — 74011250637 HC RX REV CODE- 250/637: Performed by: INTERNAL MEDICINE

## 2017-06-22 RX ORDER — FUROSEMIDE 40 MG/1
40 TABLET ORAL DAILY
Status: DISCONTINUED | OUTPATIENT
Start: 2017-06-22 | End: 2017-06-28

## 2017-06-22 RX ADMIN — Medication 500 MG: at 10:00

## 2017-06-22 RX ADMIN — FOLIC ACID 1 MG: 1 TABLET ORAL at 09:59

## 2017-06-22 RX ADMIN — THIAMINE HCL TAB 100 MG 100 MG: 100 TAB at 10:00

## 2017-06-22 RX ADMIN — LEVOTHYROXINE SODIUM 100 MCG: 50 TABLET ORAL at 10:00

## 2017-06-22 RX ADMIN — SPIRONOLACTONE 50 MG: 25 TABLET, FILM COATED ORAL at 18:01

## 2017-06-22 RX ADMIN — PANTOPRAZOLE SODIUM 40 MG: 40 TABLET, DELAYED RELEASE ORAL at 09:59

## 2017-06-22 RX ADMIN — SIMVASTATIN 40 MG: 40 TABLET, FILM COATED ORAL at 22:33

## 2017-06-22 RX ADMIN — DORZOLAMIDE HYDROCHLORIDE AND TIMOLOL MALEATE 1 DROP: 20; 5 SOLUTION/ DROPS OPHTHALMIC at 10:02

## 2017-06-22 RX ADMIN — METFORMIN HYDROCHLORIDE 500 MG: 500 TABLET ORAL at 09:59

## 2017-06-22 RX ADMIN — BETAMETHASONE VALERATE: 1 CREAM TOPICAL at 10:02

## 2017-06-22 RX ADMIN — DORZOLAMIDE HYDROCHLORIDE AND TIMOLOL MALEATE 1 DROP: 20; 5 SOLUTION/ DROPS OPHTHALMIC at 19:13

## 2017-06-22 RX ADMIN — SPIRONOLACTONE 50 MG: 25 TABLET, FILM COATED ORAL at 09:59

## 2017-06-22 RX ADMIN — BETAMETHASONE VALERATE: 1 CREAM TOPICAL at 22:34

## 2017-06-22 RX ADMIN — METOPROLOL TARTRATE 50 MG: 50 TABLET ORAL at 09:59

## 2017-06-22 RX ADMIN — FUROSEMIDE 40 MG: 40 TABLET ORAL at 09:59

## 2017-06-22 RX ADMIN — METFORMIN HYDROCHLORIDE 500 MG: 500 TABLET ORAL at 18:01

## 2017-06-22 RX ADMIN — METOPROLOL TARTRATE 50 MG: 50 TABLET ORAL at 18:01

## 2017-06-22 RX ADMIN — GABAPENTIN 300 MG: 300 CAPSULE ORAL at 22:33

## 2017-06-22 NOTE — PROGRESS NOTES
conducted a Follow up consultation and Spiritual Assessment for Zaida Clarke, who is a 80 y.o.,female. The  provided the following Interventions:  Continued the relationship of care and support. Listened empathically. Offered prayer and assurance of continued prayer on patients behalf. Chart reviewed. The following outcomes were achieved:  Patient expressed gratitude for pastoral care visit. Assessment:  There are no further spiritual or Spiritism issues which require Spiritual Care Services interventions at this time. Plan:  Chaplains will continue to follow and will provide pastoral care on an as needed/requested basis.  recommends bedside caregivers page  on duty if patient shows signs of acute spiritual or emotional distress.      [address] [facility]   Staff [address] [facility]   [phone]

## 2017-06-22 NOTE — PROGRESS NOTES
Problem: Self Care Deficits Care Plan (Adult)  Goal: *Therapy Goal (Edit Goal, Insert Text)  OCCUPATIONAL THERAPY SHORT TERM GOALS   Initiated 6/22/2017 and to be accomplished within 2 Week(s)    1. Patient will perform Upper body ADLs with/without adaptive equipment with moderate assistance . 2. Patient will increase left UE AROM/strength to use functional stabilizer assist with min cues. 3. Patient will perform toileting task with maximal assistance with Fair safety to reduce falls risk. 4. Patient will perform functional transfers with and moderate assistance . 5. Patient will perform dynamic sitting balance activities for improved ADL/IADL function with minimal assistance/contact guard assist and Fair balance and safety awarenes. 6. Patient will improve Barthel index scores to at least 30/100 to improve functional mobility. OCCUPATIONAL THERAPY LONG TERM GOALS   Initiated 6/22/2017 and to be accomplished within 4 Week(s)    1. Patient will perform Upper body ADLs with/without adaptive equipment with supervision/set-up. 2. Patient will perform Lower body ADLs with/without adaptive equipment with moderate assistance . 3. Patient will perform toileting task with minimal assistance/contact guard assist with Good safety to reduce falls risk. 4. Patient will perform functional transfers with supervision/set-up and Good balance and safety awareness. 5. Patient will perform standing static/dynamic activity for improved ADL/IADL function with contact guard an and good safety awareness. 6. Patient will improve Barthel index score to 70/100 to improve independence with mobility. Therapist: Faustino Rivas OT 6/22/2017   [facility]   OCCUPATIONAL THERAPY EVALUATION        Patient: Stiven Berrios (14 y.o. female)            Start of Care Date: 6/22/2017                         Onset Date:    Referred by :  Dariel King MD                        Primary Diagnosis: Hemorrhagic stroke               Treatment Diagnosis  Treatment Diagnosis: left hemiparesis  Treatment Diagnosis 2: difficulty in walking     Precautions: Fall  Eval Complexity: History: MEDIUM Complexity : Expanded review of history including physical, cognitive and psychosocial  history . History of present problem reveals MEDIUM  Complexity : 1-2 comorbidities / personal factors will impact the outcome/ POC . Past Medical history includes:   Past Medical History:   Diagnosis Date    Atrial fibrillation ([facility]      Chronic kidney disease       unknown what stage    Diabetes ([facility]     No past surgical history on file. Cognitive Status:  Mental Status  Neurologic State: Alert  Orientation Level: Oriented X4  Cognition: Follows commands  Perception: Cues to maintain midline in sitting  Perseveration: No perseveration noted  Safety/Judgement: Awareness of environment; Fall prevention  Barriers to Learning/Limitations: None  Compensate with: visual, verbal, tactile, kinesthetic cues/model  Justification for Evaluation complexity:   MEDIUM Complexity : Patient may present with comorbidities that affect occupational performnce. Miniml to moderate modification of tasks or assistance (eg, physical or verbal ) with assesment(s) is necessary to enable patient to complete evaluation .   Patient is referred to [address] [place] [address] due to a functional decline in ADL independence, left UE AROM/strength/coordination, balance, mobility  Prior Level of Function/Home Situation:   Home Situation  Home Environment: Private residence  # Steps to Enter: 1  Rails to Enter: No  Wheelchair Ramp: No  One/Two Story Residence: Two story  # of Interior Steps:  (chair lift)  Lift Chair Available: Yes  Living Alone: No  Support Systems: Child(janeth)  Patient Expects to be Discharged to[de-identified] Private residence  Current DME Used/Available at Home: Gwford Marshallet, straight, Walker  Tub or Shower Type:  (sponge bath)  Level of Assistance received at Home: Prior to this current hospitalization, the patient required assist for LB ADLs      OBJECTIVE DATA SUMMARY:      Vital Signs:  Resting BP:  BP: 116/52  HR: Pulse (Heart Rate): 66     Pain:     Auditory:     Examination:   MEDIUM Complexity : 3-5 performance deficits relating to physical, cognitive , or psychosocial skils that result in activity limitations and / or participation restrictions; Tone and Sensation:                                 Visual Perceptual:  Vision  Tracking: Able to track stimulus in all quadrants w/o difficulty    Coordination:  Coordination  Fine Motor Skills-Upper: Left Impaired;Right Intact  Gross Motor Skills-Upper: Left Impaired;Right Intact     Fine Motor Skills-Upper: Left Impaired;Right Intact    Gross Motor Skills-Upper: Left Impaired;Right Intact  Bed Mobility:  Bed Mobility  Rolling: Moderate assistance  Supine to Sit: Maximum assistance (x2)  Scooting: Moderate assistance  Rolling: Moderate assistance  Transfers:  Functional Transfers  Sit to Stand: Maximum assistance;Assist x2  Stand to Sit: Maximum assistance;Assist x2  Bed to Chair: Maximum assistance;Assist x2  Balance:  Balance  Sitting: With support; Impaired  Sitting - Static: Fair (occasional)  Sitting - Dynamic: Fair (occasional) (-)  Standing: Impaired; With support;Pull to stand  Standing - Static: Poor  Gross Assesment:     ADL self care:  Basic ADL  Upper Body Dressing: Maximum assistance  Lower Body Dressing: Total assistance  Toileting: Total assistance      ASSESSMENT:   A clinical decision making of MEDIUM complexity was conducted, which includes an analysis of the Occupational profile, standardized assessments, data and treatment options.                                             THE BARTHEL INDEX      ACTIVITY    SCORE   FEEDING  0=unable  5=needs help cutting,spreading butter,etc., or modified diet  10= independent    5      BATHING  0=dependent  5=independent (or in shower    0   GROOMING  0=needs help  5=independent face/hair/teeth/shaving (implements provided)    0   DRESSING  0=dependent  5=needs help but can do about half unaided  10=independent(including buttons, zips,laces etc.)    0   BOWELS  0=incontinent  5=occasional accident  10=continent    0   BLADDER  0=incontinent, or catheterized and unable to manage alone  5=occasional accident  10=continent    0   TOILET USE  0=dependent  5=needs some help, but can do something alone  10=independent (on and off, dressing, wiping)    0   TRANSFER (BED TO CHAIR AND BACK)  0=unable, no sitting balance  5=major help(one or two people,physical), can sit  10=minor help(verbal or physical)  15=independent    5   MOBILITY (ON LEVEL SURFACES)  0=immobile or <50 yards  5=wheelchair independent,including corners,>50 yards  10=walkes with help of one person (verbal or physical) >50 yards  15=independent(but may use any aid; for example, stick) >50 yards    0   STAIRS  0=unable  5=needs help (verbal, physical, carrying aid)  10=independent    0              TOTAL:             10/100      Additional comments:  Pt does not use left hand as functional stabilizer/assisit but AROM: shoulder flexion 5*, elbow flexion/extension 3/4 range gravity eliminated, wrist flexion/extension 3/4 range, supination to neutral      TREATMENT PLAN : Rehab Potential : Good  Skilled Occupational Therapy Services may include:   [X] Self Care/Home Mgmt                    [ ]Cognitive Perceptual Re-training                              [X] Adaptive Equip Training                 [X]Therapeutic Exercise                  [ ]Sensory Integration                           [ ]Community Work Integration  [X]Therapeutic Activities                     [ ]Other/modalities  [X]Neuromuscular Re-education         [X] Wheelchair Mgmt/propulsion    [X]Patient/Family/Staff Instruction      :  [X]Orthotics/Prosthetic Fitting & training      Frequency/Duration: Patient will be followed by Occupational therapy for 1-2 times a day for a minimum of 3 days per week for 4 weeks to address goals. .  Discharge Recommendations: Home Health  Patient expected Discharge Location: [X]Private Residence  [ ] [place]  [ Jalen Orion  [ ] [address]       COMMUNICATION/EDUCATION:  Patient/Family Agreement with Treatment Plan :      [X]       OT Evaluation/POC has been reviewed with the [facility]. [ ]        Fall prevention education was provided and the patient/caregiver indicated understanding  [X]        Patient/family have participated as able in goal setting and plan of care. Patient/family agree to work toward stated goals and plan of care. [ ]        Patient is unable to participate in goal setting and plan of care. Therapist will contact SW/POA. Treatment session:   Overall Complexity:MEDIUM Evaluation:  45 mins. Treatment :   15 mins.      Occupational Therapist:   Arnaud Acevedo OT          6/22/2017   Thank You for this referral.

## 2017-06-22 NOTE — PROGRESS NOTES
CATHIE faxed clinical update to [facility] to request an extension of pt's [facility] stay.

## 2017-06-22 NOTE — ROUTINE PROCESS
Bedside and Verbal shift change report given to Valente Rose (oncoming nurse) by Angelina Watkins RN (offgoing nurse). Report included the following information SBAR, Kardex and MAR.

## 2017-06-22 NOTE — PROGRESS NOTES
Problem: Mobility Impaired (Adult and Pediatric)  Goal: *Acute Goals and Plan of Care (Insert Text)  PHYSICAL THERAPY STG GOALS :  Initiated 6/19/2017 and to be accomplished within 2 Weeks    1. Patient will move from supine to sit and sit to supine and roll side to side in bed with minimal assistance/contact guard assist.   2. Patient will transfer from bed to chair and chair to bed with minimal assistance/contact guard assist using RW. 3. Patient will perform sit to stand with minimal assistance/contact guard assist with Fair balance and safety awareness. 4. Patient will ambulate with minimal assistance/contact guard assist for 75 feet with RW on level surfaces with 2 turns. 5. Patient will ascend/descend 1 stairs with bilateral handrail(s) with moderate assistance to allow for safe home access/exit. 6. Patient will improve standardized test score for [place] Standing Balance score of 2. PHYSICAL THERAPY LTG GOALS :  Initiated 6/19/2017 and to be accomplished within 4 Weeks    1. Patient will move from supine to sit and sit to supine and roll side to side in bed with supervision/set-up. 2. Patient will transfer from bed to chair and chair to bed with supervision/set-up using RW. 3. Patient will perform sit to stand with supervision/set-up with Fair+ balance and safety awareness. 4. Patient will ambulate with supervision/set-up for 150 feet with RW on level surfaces and be able to maneuver through narrow spaces and obstacles without loss of balance. 5. Patient will ascend/descend 1 stairs with NO handrail(s) with minimal assistance/contact guard assist to allow for safe home access/exit. 6. Patient will improve standardized test score for [place] Standing Balance score of 3 for reduced fall risk.      Physical Therapist: Neto Rodríguez, PT on 6/19/2017    [facility]   PHYSICAL THERAPY DAILY TREATMENT NOTE        Patient: Brian Verdugo (79 y.o. female)               Date: 6/22/2017 Physician: Devendra Nagel MD  Primary Diagnosis: Hemorrhagic stroke           Treatment Diagnosis  Treatment Diagnosis: muscle weakness  Treatment Diagnosis 2: difficulty in walking  Precautions: Fall  Vital Signs  Vital Signs  Temp: 97.2 °F (36.2 °C)  Temp Source: Oral  Pulse (Heart Rate): 66  Resp Rate: 20  O2 Sat (%): 100 %  Level of Consciousness: Alert  BP: 116/52  MAP (Calculated): 73  MEWS Score: 1  Cognitive Status:  Mental Status  Neurologic State: Alert  Orientation Level: Oriented X4  Cognition: Appropriate safety awareness  Pain  Bed Mobility Training  Bed Mobility Training  Rolling: Moderate assistance  Supine to Sit: Maximum assistance (x2)  Scooting: Moderate assistance  Balance  Sitting: With support; Impaired  Sitting - Static: Fair (occasional)  Sitting - Dynamic: Fair (occasional) (-)  Standing: Impaired; With support;Pull to stand  Standing - Static: Poor  Transfer Training  Transfer Training  Sit to Stand: Maximum assistance;Assist x2  Stand to Sit: Maximum assistance;Assist x2  Stand Pivot Transfers: Maximum assistance (x2)  Bed to Chair: Maximum assistance;Assist x2  Interventions: Safety awareness training; Tactile cues; Verbal cues;Manual cues  Sit to Stand: Maximum assistance;Assist x2  Gait Training  Therapeutic Exercise: Pt presented supine in bed. Bed mobility training provided to include rolling L<>R and supine>sit. Pt able to use R UE to assist with pulling to sidelying with bed rails, but unable to use L UE for assistance. Supine>sit with Max A x2, pt was able to move B LE's towards EOB with verbal cues and additional time. Seated EOB balance with 1 UE support and noted L lateral lean. Pt attempted to self correct posture, but required Min A to complete task. SPT form EOB>w/c with Max A x2. Seated upright posture encouraged with use of mirror for visual input.  Seated B LE TE rendered to promote strength, endurance and flexibility for improved functional mobility: HR/TR, LAQ, hip flexion x10, ball squeezes 2x15. Patient/Caregiver Education:   Pt /Caregiver Education on safety and fall prevention and upright posture was provided to reduce risk of falls. ASSESSMENT:  Patient continues to benefit from Skilled PT services to improve bed mobility, transfers, strength, balance and gait. Progression toward goals:  [ ]      Improving appropriately and progressing toward goals  [X]      Improving slowly and progressing toward goals  [ ]      Not making progress toward goals and plan of care will be adjusted      Treatment session: 60 minutes.   Therapist:   Chucky Castillo PTA,          6/22/2017

## 2017-06-22 NOTE — PROGRESS NOTES
Nutrition follow up - [facility]/  Plan of care      RECOMMENDATIONS:     1. Dental Soft  2. Monitor weight and PO intake  3. RD to follow     GOALS:     1. Met/Ongoing: PO intake meets >75% of protein/calorie needs by 6/29  2. Met/Ongoing: Weight Maintenance/Gradual weight loss (1-2 lb by 6/29)    ASSESSMENT:     Weight status is classified as obese per BMI of 42.5. Weight stable. PO intake is adequate. Labs noted. BG range from  over past 24 hours. Nutrition recommendations listed. RD to follow. Nutrition Risk:  [] High  [] Moderate [x]  Low    SUBJECTIVE/OBJECTIVE:      Patient appears well nourished. Patient with improved appetite. Observed 75% intake of breakfast meal and 50% intake of milk this morning. Weight stable from admission. Will monitor.     Information Obtained from:    [x] Chart Review   [x] Patient   [] Family/Caregiver   [] Nurse/Physician   [] Interdisciplinary Meeting/Rounds    Dx: CVA  Diet: Dental Soft  Medications: [x] Reviewed    Allergies: [x] Reviewed     Patient Active Problem List   Diagnosis Code    Hemorrhagic stroke ([facility] I61.9    Chronic a-fib ([facility] I48.2    Supratherapeutic INR R79.1    Left-sided weakness R53.1    Hypokalemia E87.6    CKD (chronic kidney disease) stage 3, GFR 30-59 ml/min N18.3    Obesity E66.9    SOB (shortness of breath) R06.02    Constipation K59.00     Past Medical History:   Diagnosis Date    Atrial fibrillation ([facility]     Chronic kidney disease     unknown what stage    Diabetes ([facility]       Labs:    Lab Results   Component Value Date/Time    Sodium 137 06/22/2017 03:50 AM    Potassium 4.1 06/22/2017 03:50 AM    Chloride 98 06/22/2017 03:50 AM    CO2 31 06/22/2017 03:50 AM    Anion gap 8 06/22/2017 03:50 AM    Glucose 66 06/22/2017 03:50 AM    BUN 36 06/22/2017 03:50 AM    Creatinine 1.49 06/22/2017 03:50 AM    Calcium 8.7 06/22/2017 03:50 AM    Magnesium 2.3 06/16/2017 02:16 PM    Phosphorus 1.8 06/16/2017 01:00 AM    Albumin 2.8 06/14/2017 01:00 AM     Anthropometrics: BMI (calculated): 42.5  Last 3 Recorded Weights in this Encounter    06/16/17 1821 06/21/17 1321   Weight: 95.5 kg (210 lb 8 oz) 95.5 kg (210 lb 8 oz)      Ht Readings from Last 1 Encounters:   06/21/17 4' 11\" (1.499 m)     No data found.    [] Weight Loss [] Weight Gain [x] Weight Stable    Nutrition Needs:   Calories: 1582 Kcal  Protein:   59 g      [x] No Cultural, Restorationist or ethnic dietary need identified.     [] Cultural, Restorationist and ethnic food preferences identified and addressed     Wt Status:  [] Normal (18.6 - 24.9) [] Underweight (<18.5) [] Overweight (25 - 29.9) [] Mild Obesity (30 - 34.9)  [] Moderate Obesity (35 - 39.9) [x] Morbid Obesity (40+)     Nutrition Problems Identified:   [] Suboptimal PO intake  H/O  [] Food Allergies  [x] Difficulty chewing/swallowing/poor dentition  [] Constipation/Diarrhea   [] Nausea/Vomiting   [] None  [] Other:     Plan:   [] Therapeutic Diet  []  Obtained/adjusted food preferences/tolerances and/or snacks options   []  Supplements added   [] Occupational therapy following for feeding techniques  []  HS snack added   [x]  Modify diet texture   []  Modify diet for food allergies   []  Educate patient   []  Assist with menu selection   [x]  Monitor PO intake on meal rounds   [x]  Continue inpatient monitoring and intervention   [x]  Participated in discharge planning/Interdisciplinary rounds/Team meetings   []  Other:     Education Needs:   [] Not appropriate for teaching at this time due to:   [x] Identified and addressed    Nutrition Monitoring and Evaluation:  [x] Continue ongoing monitoring and intervention  [] Other    Lincoln Bass  Pager: [phone]

## 2017-06-23 LAB
GLUCOSE BLD STRIP.AUTO-MCNC: 157 MG/DL (ref 70–110)
GLUCOSE BLD STRIP.AUTO-MCNC: 89 MG/DL (ref 70–110)

## 2017-06-23 PROCEDURE — 82962 GLUCOSE BLOOD TEST: CPT

## 2017-06-23 PROCEDURE — 74011250637 HC RX REV CODE- 250/637: Performed by: INTERNAL MEDICINE

## 2017-06-23 RX ADMIN — METFORMIN HYDROCHLORIDE 500 MG: 500 TABLET ORAL at 08:47

## 2017-06-23 RX ADMIN — DORZOLAMIDE HYDROCHLORIDE AND TIMOLOL MALEATE 1 DROP: 20; 5 SOLUTION/ DROPS OPHTHALMIC at 08:46

## 2017-06-23 RX ADMIN — SPIRONOLACTONE 50 MG: 25 TABLET, FILM COATED ORAL at 17:05

## 2017-06-23 RX ADMIN — LEVOTHYROXINE SODIUM 100 MCG: 50 TABLET ORAL at 08:48

## 2017-06-23 RX ADMIN — FOLIC ACID 1 MG: 1 TABLET ORAL at 08:47

## 2017-06-23 RX ADMIN — PANTOPRAZOLE SODIUM 40 MG: 40 TABLET, DELAYED RELEASE ORAL at 08:48

## 2017-06-23 RX ADMIN — SIMVASTATIN 40 MG: 40 TABLET, FILM COATED ORAL at 21:38

## 2017-06-23 RX ADMIN — METFORMIN HYDROCHLORIDE 500 MG: 500 TABLET ORAL at 17:05

## 2017-06-23 RX ADMIN — MAGNESIUM HYDROXIDE 30 ML: 400 SUSPENSION ORAL at 12:27

## 2017-06-23 RX ADMIN — SPIRONOLACTONE 50 MG: 25 TABLET, FILM COATED ORAL at 08:48

## 2017-06-23 RX ADMIN — FUROSEMIDE 40 MG: 40 TABLET ORAL at 08:47

## 2017-06-23 RX ADMIN — ZOLPIDEM TARTRATE 5 MG: 5 TABLET, COATED ORAL at 21:37

## 2017-06-23 RX ADMIN — Medication 500 MG: at 08:47

## 2017-06-23 RX ADMIN — DORZOLAMIDE HYDROCHLORIDE AND TIMOLOL MALEATE 1 DROP: 20; 5 SOLUTION/ DROPS OPHTHALMIC at 17:06

## 2017-06-23 RX ADMIN — BETAMETHASONE VALERATE: 1 CREAM TOPICAL at 08:47

## 2017-06-23 RX ADMIN — METOPROLOL TARTRATE 50 MG: 50 TABLET ORAL at 09:00

## 2017-06-23 RX ADMIN — THIAMINE HCL TAB 100 MG 100 MG: 100 TAB at 08:48

## 2017-06-23 RX ADMIN — METOPROLOL TARTRATE 50 MG: 50 TABLET ORAL at 17:05

## 2017-06-23 RX ADMIN — GABAPENTIN 300 MG: 300 CAPSULE ORAL at 21:37

## 2017-06-23 RX ADMIN — BETAMETHASONE VALERATE: 1 CREAM TOPICAL at 17:07

## 2017-06-23 NOTE — ROUTINE PROCESS
Verbal and shift report given to MARY ALICE Joe (oncoming nurse) by SHALINI Kirk LPN (off going nurse). Report included SBAR, MAR and Kardex.  Hourly rounds completed

## 2017-06-23 NOTE — ROUTINE PROCESS
Bedside and Verbal shift change report given to eboni Roman lpn(oncoming nurse) by ronaldo Solis lpn (offgoing nurse). Report included the following information SBAR, Kardex and MAR.  Hourly rounds

## 2017-06-23 NOTE — PROGRESS NOTES
Problem: Self Care Deficits Care Plan (Adult)  Goal: *Therapy Goal (Edit Goal, Insert Text)  OCCUPATIONAL THERAPY SHORT TERM GOALS   Initiated 6/22/2017 and to be accomplished within 2 Week(s)    1. Patient will perform Upper body ADLs with/without adaptive equipment with moderate assistance . 2. Patient will increase left UE AROM/strength to use functional stabilizer assist with min cues. 3. Patient will perform toileting task with maximal assistance with Fair safety to reduce falls risk. 4. Patient will perform functional transfers with and moderate assistance . 5. Patient will perform dynamic sitting balance activities for improved ADL/IADL function with minimal assistance/contact guard assist and Fair balance and safety awarenes. 6. Patient will improve Barthel index scores to at least 30/100 to improve functional mobility. OCCUPATIONAL THERAPY LONG TERM GOALS   Initiated 6/22/2017 and to be accomplished within 4 Week(s)    1. Patient will perform Upper body ADLs with/without adaptive equipment with supervision/set-up. 2. Patient will perform Lower body ADLs with/without adaptive equipment with moderate assistance . 3. Patient will perform toileting task with minimal assistance/contact guard assist with Good safety to reduce falls risk. 4. Patient will perform functional transfers with supervision/set-up and Good balance and safety awareness. 5. Patient will perform standing static/dynamic activity for improved ADL/IADL function with contact guard an and good safety awareness. 6. Patient will improve Barthel index score to 70/100 to improve independence with mobility.       Therapist: Dusty Lundy OT 6/22/2017   [facility]   OCCUPATIONAL THERAPY DAILY TREATMENT NOTE        Patient: Joyce Lewis (69 y.o. female)                         Date: 6/23/2017  Attending Physician: Luis Dennison MD  Primary Diagnosis: Hemorrhagic stroke     Treatment Diagnosis  Treatment Diagnosis: left hemiparesis  Treatment Diagnosis 2: difficulty in walking   Precautions : Precautions at Admission: Fall  Vital Signs:  Vital Signs  Temp: 97.6 °F (36.4 °C)  Temp Source: Oral  Pulse (Heart Rate): 64  Resp Rate: 18  O2 Sat (%): 97 %  Level of Consciousness: Alert  BP: 124/46  MAP (Calculated): 72  MEWS Score: 1     Cognitive Status:  Mental Status  Neurologic State: Alert  Orientation Level: Oriented X4  Cognition: Follows commands  Pain:        Gross Assessment:     Coordination:     Bed Mobility:  Bed Mobility  Rolling: Moderate assistance  Supine to Sit: Maximum assistance  Transfers:  Functional Transfers  Sit to Stand: Moderate assistance;Maximum assistance  Stand to Sit: Maximum assistance  Bed to Chair: Moderate assistance     Balance:  Balance  Sitting: With support  Sitting - Static: Fair (occasional)  Sitting - Dynamic: Fair (occasional) (-)  Standing: Impaired; With support;Pull to stand  Standing - Static: Poor (+)  Standing - Dynamic : Poor        Therapeutic Activities:  TYLER educated patient and patient's daughters on continuing L UE ROM throughout day for optimal strengthening. Patient and patient's daughters verbalized understanding. Therapeutic Exercises:  AROM of all joints on L UE, 1 set x 10 reps, in order to increase UB muscle strength and ROM needed for UB ADLS. Minimum rest breaks needed. Patient/Caregiver Education:    Oscar Thompson Education on see above.         ASSESSMENT:  Patient continues to demonstrate the need for skilled Occupational Therapy services to improve L UE muscle strength needed for upper body dressing  Progression toward goals:  [X]      Improving appropriately and progressing toward goals  [ ]      Improving slowly and progressing toward goals  [ ]      Not making progress toward goals and plan of care will be adjusted      Treatment session:  45 minutes     Therapist:    TYLER Mcmullen,  6/23/2017

## 2017-06-23 NOTE — PROGRESS NOTES
Bedside shift change report given to GERA Gerardo LPN (oncoming nurse) by ASHLEY Fisher RN (offgoing nurse). Report included the following information SBAR, Kardex and MAR.

## 2017-06-23 NOTE — PROGRESS NOTES
Problem: Mobility Impaired (Adult and Pediatric)  Goal: *Acute Goals and Plan of Care (Insert Text)  PHYSICAL THERAPY STG GOALS :  Initiated 6/19/2017 and to be accomplished within 2 Weeks (updated 6/23/17)    1. Patient will move from supine to sit and sit to supine and roll side to side in bed with minimal assistance/contact guard assist.   (Progressing; mod A)   2. Patient will transfer from bed to chair and chair to bed with minimal assistance/contact guard assist using RW. (Progressing; mod A using Stand pivot transfer)  3. Patient will perform sit to stand with minimal assistance/contact guard assist with Fair balance and safety awareness. (Progressing; varying between mod A to max A x 2 with poor/poor + balance)  4. Patient will ambulate with minimal assistance/contact guard assist for 75 feet with RW on level surfaces with 2 turns. (Progressing; min/mod A x 5 ft using // bars)  5. Patient will ascend/descend 1 stairs with bilateral handrail(s) with moderate assistance to allow for safe home access/exit. (Not addressed)  6. Patient will improve standardized test score for [place] Standing Balance score of 2. (Progressing; 1)      PHYSICAL THERAPY LTG GOALS :  Initiated 6/19/2017 and to be accomplished within 4 Weeks    1. Patient will move from supine to sit and sit to supine and roll side to side in bed with supervision/set-up. 2. Patient will transfer from bed to chair and chair to bed with supervision/set-up using RW. 3. Patient will perform sit to stand with supervision/set-up with Fair+ balance and safety awareness. 4. Patient will ambulate with supervision/set-up for 150 feet with RW on level surfaces and be able to maneuver through narrow spaces and obstacles without loss of balance. 5. Patient will ascend/descend 1 stairs with NO handrail(s) with minimal assistance/contact guard assist to allow for safe home access/exit.   6. Patient will improve standardized test score for [facility] Balance score of 3 for reduced fall risk. Physical Therapist: Nova Ricardo PT on 6/19/2017    [facility]   PHYSICAL THERAPY WEEKLY PROGRESS REPORT  Reporting Period:  Date:   6/19/17  to 6/23/17        Patient: Francesca Richards (24 y.o. female)                         Date: 6/23/2017    Primary Diagnosis: Hemorrhagic stroke                         Attending Physician: Emi Barney MD   Treatment Diagnosis  Treatment Diagnosis: left hemiparesis  Treatment Diagnosis 2: difficulty in walking  Precautions:  Fall  Rehab Potential : Good:     Skill interventions and education provided with clinical rationale (include individualized treatment techniques and standardized tests):   Skilled Physical Therapy services were provided with: TE rendered to address strength, endurance, and mobility. TA rendered to improve bed mobility and transfers. Neuromuscular re-education rendered to address balance impairments. Gait training to improve gait deficits and weight shifting. Using a comparative statement, summarize significant progress toward goals as a result of skilled intervention provided:  Patient has made Fair progress towards their Physical Therapy goals in the areas of bed mobility, transfers, ambulation using // bars. Pt has not achieved any of established goals but is slowly progressing. Identify remaining functional areas, impairments limiting progress and/or barriers to improvement:  Patient would benefit from continues PT services to address the following functional deficits in mobility, strength, endurance, balance, gait/ambulation. Barriers to improvement include: L hemiparesis, impaired balance, impaired endurance. OBJECTIVE DATA SUMMARY:       INITIAL ASSESSMENT WEEKLY ASSESSMENT   COGNITIVE STATUS COGNITIVE STATUS   Neurologic State: Alert  Orientation Level: Oriented X4  Cognition: Follows commands  Perception: Appears intact (Simultaneous filing.  User may not have seen previous data.)  Perseveration: No perseveration noted (Simultaneous filing. User may not have seen previous data.)  Safety/Judgement: Fall prevention (Simultaneous filing. User may not have seen previous data.) Neurologic State: Alert  Orientation Level: Oriented X4  Cognition: Follows commands  Perception: Cues to maintain midline in sitting  Perseveration: No perseveration noted  Safety/Judgement: Awareness of environment, Fall prevention   PAIN PAIN   Pain Scale 1: Numeric (0 - 10)  Pain Intensity 1: 0  Patient Stated Pain Goal: 0 Pain Scale 1: Numeric (0 - 10)  Pain Intensity 1: 0  Patient Stated Pain Goal: 0   GROSS ASSESSMENT GROSS ASSESSMENT   AROM: Generally decreased, functional  PROM: Generally decreased, functional  Strength: Generally decreased, functional (BLEs grossly 3+/5)  Coordination: Generally decreased, functional  Tone: Normal (in BLEs)  Sensation: Intact (to light touch on BLEs) AROM: Generally decreased, functional  PROM: Generally decreased, functional  Strength: Generally decreased, functional (BLEs grossly 3+/5)  Coordination: Generally decreased, functional  Tone: Normal (in BLEs)  Sensation: Intact (to light touch on BLEs)   BED MOBILITY BED MOBILITY   Rolling: Moderate assistance, Additional time, Assist x1  Supine to Sit: Maximum assistance, Additional time, Assist x1  Sit to Supine: Maximum assistance, Assist x2  Scooting: Moderate assistance, Assist x1 Rolling: Moderate assistance  Supine to Sit: Maximum assistance  Sit to Supine: Maximum assistance, Assist x2  Scooting: Moderate assistance   GAIT GAIT          (unable at this time)  (unable at this time)               TRANSFERS TRANSFERS   Sit to Stand: Moderate assistance, Assist x2  Stand to Sit: Moderate assistance, Assist x2  Bed to Chair: Moderate assistance, Assist x2 Sit to Stand:  Moderate assistance, Maximum assistance  Stand to Sit: Maximum assistance  Bed to Chair: Moderate assistance   BALANCE BALANCE   Sitting: Impaired, With support  Sitting - Static: Fair (occasional) (-)  Sitting - Dynamic: Poor (constant support) (+)  Standing: Impaired, With support, Pull to stand  Standing - Static: Poor  Standing - Dynamic : Poor Sitting: With support  Sitting - Static: Fair (occasional)  Sitting - Dynamic: Fair (occasional) (-)  Standing: Impaired, With support, Pull to stand  Standing - Static: Poor (+)  Standing - Dynamic : Poor   WHEELCHAIR MOBILITY/MGMT WHEELCHAIR MOBILITY/MGMT         Activity Tolerance:  Fair Activity Tolerance: Fair   Visual/Perceptual   Tracking: Able to track stimulus in all quadrants w/o difficulty        Visual/Perceptual   Vision  Tracking: Able to track stimulus in all quadrants w/o difficulty         Auditory:   Auditory Impairment: None      Auditory:   Auditory  Auditory Impairment: None         Steps: none   Clinical Decision makin Clinical Decision makin on [facility] Standing Balance Scale       Treatment:   Bed mobility and mod/max A, transfers from sit <> stand varied from mod A/max A x 1 to 2 people, bed to w/c transfer with mod A. Gait training began with weight shifting in standing in // bars, performed from 3 reps of ~30 sec, ~1 min x 2 reps. Gait training furthered x 5 ft using // bars with min A x 1 + manual cueing for knee extension during stance phase with very close w/c follow; discontinued when pt's L knee \"gave out\" requiring immediate w/c. Pt continues to demonstrate slow progression towards goals with strong family support and motivation to return to PLOF. Recommend continuing skilled PT services for safe return home. Patient's response to treatment rendered:  Fair+     Patient expected Discharge Location:  [X]Private Residence  [ ] DAISY/ILF  [ [facility]  [ ]Other:     Plan: Continue Skilled PT services as established by the Plan of Care for 5-6 times a week.      PT and Assistant have had a weekly case conference regarding the above treatment:  [X] Yes     [ ] No Treatment session:  45 minutes. Therapist: Oj Churchill, PT       Date:6/23/2017  Forward to PT for co-signature when completed.

## 2017-06-23 NOTE — PROGRESS NOTES
CATHIE spoke with pt's 2 daughters re: extension by [facility] [employer] and that Sw will send an update on 6/29/17 to request additional time. CATHIE informed pt while she was in therapy re: the extension.

## 2017-06-23 NOTE — PROGRESS NOTES
CATHIE received a fax from Alberto Chan RN from [facility] re: extension request. Pt was extended to 6/29/17 with an update due on  6/29/17. CATHIE will inform pt and her family of the extension.

## 2017-06-23 NOTE — PROGRESS NOTES
Bedside shift change report given to B. Georgeann Riedel, LPN (oncoming nurse) by ASHLEY Xiong RN (offgoing nurse). Report included the following information SBAR, Kardex and MAR.

## 2017-06-24 LAB
ANION GAP BLD CALC-SCNC: 7 MMOL/L (ref 3–18)
BUN SERPL-MCNC: 29 MG/DL (ref 7–18)
BUN/CREAT SERPL: 18 (ref 12–20)
CALCIUM SERPL-MCNC: 9 MG/DL (ref 8.5–10.1)
CHLORIDE SERPL-SCNC: 99 MMOL/L (ref 100–108)
CO2 SERPL-SCNC: 34 MMOL/L (ref 21–32)
CREAT SERPL-MCNC: 1.62 MG/DL (ref 0.6–1.3)
GLUCOSE BLD STRIP.AUTO-MCNC: 129 MG/DL (ref 70–110)
GLUCOSE BLD STRIP.AUTO-MCNC: 94 MG/DL (ref 70–110)
GLUCOSE SERPL-MCNC: 83 MG/DL (ref 74–99)
POTASSIUM SERPL-SCNC: 4.1 MMOL/L (ref 3.5–5.5)
SODIUM SERPL-SCNC: 140 MMOL/L (ref 136–145)

## 2017-06-24 PROCEDURE — 36415 COLL VENOUS BLD VENIPUNCTURE: CPT | Performed by: INTERNAL MEDICINE

## 2017-06-24 PROCEDURE — 80048 BASIC METABOLIC PNL TOTAL CA: CPT | Performed by: INTERNAL MEDICINE

## 2017-06-24 PROCEDURE — 82962 GLUCOSE BLOOD TEST: CPT

## 2017-06-24 PROCEDURE — 74011250637 HC RX REV CODE- 250/637: Performed by: INTERNAL MEDICINE

## 2017-06-24 RX ADMIN — LEVOTHYROXINE SODIUM 100 MCG: 50 TABLET ORAL at 09:19

## 2017-06-24 RX ADMIN — GABAPENTIN 300 MG: 300 CAPSULE ORAL at 21:39

## 2017-06-24 RX ADMIN — METFORMIN HYDROCHLORIDE 500 MG: 500 TABLET ORAL at 17:08

## 2017-06-24 RX ADMIN — SPIRONOLACTONE 50 MG: 25 TABLET, FILM COATED ORAL at 17:08

## 2017-06-24 RX ADMIN — METOPROLOL TARTRATE 50 MG: 50 TABLET ORAL at 09:19

## 2017-06-24 RX ADMIN — THIAMINE HCL TAB 100 MG 100 MG: 100 TAB at 09:19

## 2017-06-24 RX ADMIN — DORZOLAMIDE HYDROCHLORIDE AND TIMOLOL MALEATE 1 DROP: 20; 5 SOLUTION/ DROPS OPHTHALMIC at 09:19

## 2017-06-24 RX ADMIN — BETAMETHASONE VALERATE: 1 CREAM TOPICAL at 09:00

## 2017-06-24 RX ADMIN — FUROSEMIDE 40 MG: 40 TABLET ORAL at 09:19

## 2017-06-24 RX ADMIN — Medication 500 MG: at 09:19

## 2017-06-24 RX ADMIN — ZOLPIDEM TARTRATE 5 MG: 5 TABLET, COATED ORAL at 21:39

## 2017-06-24 RX ADMIN — METFORMIN HYDROCHLORIDE 500 MG: 500 TABLET ORAL at 09:19

## 2017-06-24 RX ADMIN — DORZOLAMIDE HYDROCHLORIDE AND TIMOLOL MALEATE 1 DROP: 20; 5 SOLUTION/ DROPS OPHTHALMIC at 18:00

## 2017-06-24 RX ADMIN — FOLIC ACID 1 MG: 1 TABLET ORAL at 09:19

## 2017-06-24 RX ADMIN — SIMVASTATIN 40 MG: 40 TABLET, FILM COATED ORAL at 21:39

## 2017-06-24 RX ADMIN — PANTOPRAZOLE SODIUM 40 MG: 40 TABLET, DELAYED RELEASE ORAL at 09:19

## 2017-06-24 RX ADMIN — SPIRONOLACTONE 50 MG: 25 TABLET, FILM COATED ORAL at 09:19

## 2017-06-24 RX ADMIN — BETAMETHASONE VALERATE: 1 CREAM TOPICAL at 18:00

## 2017-06-24 NOTE — PROGRESS NOTES
Problem: Self Care Deficits Care Plan (Adult)  Goal: *Therapy Goal (Edit Goal, Insert Text)  OCCUPATIONAL THERAPY SHORT TERM GOALS   Initiated 6/22/2017 and to be accomplished within 2 Week(s)    1. Patient will perform Upper body ADLs with/without adaptive equipment with moderate assistance . 2. Patient will increase left UE AROM/strength to use functional stabilizer assist with min cues. 3. Patient will perform toileting task with maximal assistance with Fair safety to reduce falls risk. 4. Patient will perform functional transfers with and moderate assistance . 5. Patient will perform dynamic sitting balance activities for improved ADL/IADL function with minimal assistance/contact guard assist and Fair balance and safety awarenes. 6. Patient will improve Barthel index scores to at least 30/100 to improve functional mobility. OCCUPATIONAL THERAPY LONG TERM GOALS   Initiated 6/22/2017 and to be accomplished within 4 Week(s)    1. Patient will perform Upper body ADLs with/without adaptive equipment with supervision/set-up. 2. Patient will perform Lower body ADLs with/without adaptive equipment with moderate assistance . 3. Patient will perform toileting task with minimal assistance/contact guard assist with Good safety to reduce falls risk. 4. Patient will perform functional transfers with supervision/set-up and Good balance and safety awareness. 5. Patient will perform standing static/dynamic activity for improved ADL/IADL function with contact guard an and good safety awareness. 6. Patient will improve Barthel index score to 70/100 to improve independence with mobility.       Therapist: Matthias Villeda OT 6/22/2017   [facility]   OCCUPATIONAL THERAPY DAILY TREATMENT NOTE        Patient: Bud Cornejo (81 y.o. female)                         Date: 6/24/2017  Attending Physician: Christian Alonso MD  Primary Diagnosis: Hemorrhagic stroke     Treatment Diagnosis  Treatment Diagnosis: left hemiparesis  Treatment Diagnosis 2: difficulty in walking   Precautions : Precautions at Admission: Fall  Vital Signs:  Vital Signs  Pulse (Heart Rate): 65  Heart Rate Source: Monitor  Resp Rate: 16  O2 Sat (%): 100 %  Level of Consciousness: Alert  BP: 111/53  MAP (Calculated): 72  BP 1 Method: Automatic  BP 1 Location: Right arm  BP Patient Position: At rest;Sitting     Cognitive Status:  Mental Status  Neurologic State: Alert  Orientation Level: Oriented X4  Cognition: Appropriate for age attention/concentration; Follows commands  Pain:  Pain Screen  Pain Scale 1: Numeric (0 - 10)  Pain Intensity 1: 0  Patient Stated Pain Goal: 0  Pain Scale 1: Numeric (0 - 10)  Bed Mobility:  Bed Mobility  Rolling: Moderate assistance  Balance:  Balance  Sitting: With support  Sitting - Static: Fair (occasional)  Therapeutic Activities:  FM tasks with L UE to increase finger dexterity and strength for increased fnxl performance. Pt's daughter present for entire tx session. Therapeutic Exercises:   AROM/AAROM with L UE 2 x 10 and 1.5 hand gripper with L UE x 10 to increase L UB/hand strength to aid with all ADL tasks. Patient/Caregiver Education:    Pt and daughter educated on HEP with L UE, returned understanding through verbalization. ASSESSMENT:  Patient continues to demonstrate the need for skilled Occupational Therapy services to improve strength, balance, and endurance.    Progression toward goals:  [ ]      Improving appropriately and progressing toward goals  [X]      Improving slowly and progressing toward goals  [ ]      Not making progress toward goals and plan of care will be adjusted      Treatment session:   30 minutes     Therapist:    TYLER Butler,  6/24/2017

## 2017-06-24 NOTE — ROUTINE PROCESS
Bedside and Verbal shift change report given to JANET Hopkins RN (oncoming nurse) by Melina Burton RN (offgoing nurse). Report included the following information SBAR, Kardex and MAR.

## 2017-06-25 LAB
GLUCOSE BLD STRIP.AUTO-MCNC: 108 MG/DL (ref 70–110)
GLUCOSE BLD STRIP.AUTO-MCNC: 174 MG/DL (ref 70–110)

## 2017-06-25 PROCEDURE — 82962 GLUCOSE BLOOD TEST: CPT

## 2017-06-25 PROCEDURE — 74011250637 HC RX REV CODE- 250/637: Performed by: INTERNAL MEDICINE

## 2017-06-25 PROCEDURE — 77030012890

## 2017-06-25 RX ADMIN — PANTOPRAZOLE SODIUM 40 MG: 40 TABLET, DELAYED RELEASE ORAL at 09:40

## 2017-06-25 RX ADMIN — BETAMETHASONE VALERATE: 1 CREAM TOPICAL at 09:00

## 2017-06-25 RX ADMIN — METFORMIN HYDROCHLORIDE 500 MG: 500 TABLET ORAL at 09:40

## 2017-06-25 RX ADMIN — LEVOTHYROXINE SODIUM 100 MCG: 50 TABLET ORAL at 09:40

## 2017-06-25 RX ADMIN — SIMVASTATIN 40 MG: 40 TABLET, FILM COATED ORAL at 21:05

## 2017-06-25 RX ADMIN — FOLIC ACID 1 MG: 1 TABLET ORAL at 09:40

## 2017-06-25 RX ADMIN — METFORMIN HYDROCHLORIDE 500 MG: 500 TABLET ORAL at 17:11

## 2017-06-25 RX ADMIN — DORZOLAMIDE HYDROCHLORIDE AND TIMOLOL MALEATE 1 DROP: 20; 5 SOLUTION/ DROPS OPHTHALMIC at 18:00

## 2017-06-25 RX ADMIN — ZOLPIDEM TARTRATE 5 MG: 5 TABLET, COATED ORAL at 21:04

## 2017-06-25 RX ADMIN — METOPROLOL TARTRATE 50 MG: 50 TABLET ORAL at 09:40

## 2017-06-25 RX ADMIN — METOPROLOL TARTRATE 50 MG: 50 TABLET ORAL at 17:11

## 2017-06-25 RX ADMIN — Medication 500 MG: at 09:40

## 2017-06-25 RX ADMIN — FUROSEMIDE 40 MG: 40 TABLET ORAL at 09:40

## 2017-06-25 RX ADMIN — ALUMINUM HYDROXIDE, MAGNESIUM HYDROXIDE, AND SIMETHICONE 30 ML: 200; 200; 20 SUSPENSION ORAL at 14:55

## 2017-06-25 RX ADMIN — BETAMETHASONE VALERATE: 1 CREAM TOPICAL at 18:00

## 2017-06-25 RX ADMIN — DORZOLAMIDE HYDROCHLORIDE AND TIMOLOL MALEATE 1 DROP: 20; 5 SOLUTION/ DROPS OPHTHALMIC at 09:00

## 2017-06-25 RX ADMIN — SPIRONOLACTONE 50 MG: 25 TABLET, FILM COATED ORAL at 17:10

## 2017-06-25 RX ADMIN — THIAMINE HCL TAB 100 MG 100 MG: 100 TAB at 09:40

## 2017-06-25 RX ADMIN — SPIRONOLACTONE 50 MG: 25 TABLET, FILM COATED ORAL at 09:40

## 2017-06-25 RX ADMIN — GABAPENTIN 300 MG: 300 CAPSULE ORAL at 21:04

## 2017-06-25 NOTE — ROUTINE PROCESS
Bedside shift change report given to [facility] (oncoming nurse) by Geovanny Jarquin RN (offgoing nurse). Report included the following information SBAR, Kardex, MAR and Recent Results. VISUALLY CHECKED PT Q 1 HR BY NURSING STAFF. 24 hour order chart heck done.

## 2017-06-26 LAB
GLUCOSE BLD STRIP.AUTO-MCNC: 106 MG/DL (ref 70–110)
GLUCOSE BLD STRIP.AUTO-MCNC: 194 MG/DL (ref 70–110)

## 2017-06-26 PROCEDURE — 82962 GLUCOSE BLOOD TEST: CPT

## 2017-06-26 PROCEDURE — 74011250637 HC RX REV CODE- 250/637: Performed by: INTERNAL MEDICINE

## 2017-06-26 RX ADMIN — FOLIC ACID 1 MG: 1 TABLET ORAL at 09:05

## 2017-06-26 RX ADMIN — SPIRONOLACTONE 50 MG: 25 TABLET, FILM COATED ORAL at 09:05

## 2017-06-26 RX ADMIN — FUROSEMIDE 40 MG: 40 TABLET ORAL at 09:05

## 2017-06-26 RX ADMIN — Medication 500 MG: at 09:05

## 2017-06-26 RX ADMIN — ZOLPIDEM TARTRATE 5 MG: 5 TABLET, COATED ORAL at 21:15

## 2017-06-26 RX ADMIN — METFORMIN HYDROCHLORIDE 500 MG: 500 TABLET ORAL at 09:05

## 2017-06-26 RX ADMIN — METFORMIN HYDROCHLORIDE 500 MG: 500 TABLET ORAL at 17:28

## 2017-06-26 RX ADMIN — DORZOLAMIDE HYDROCHLORIDE AND TIMOLOL MALEATE 1 DROP: 20; 5 SOLUTION/ DROPS OPHTHALMIC at 17:33

## 2017-06-26 RX ADMIN — BETAMETHASONE VALERATE: 1 CREAM TOPICAL at 17:35

## 2017-06-26 RX ADMIN — METOPROLOL TARTRATE 50 MG: 50 TABLET ORAL at 09:05

## 2017-06-26 RX ADMIN — GABAPENTIN 300 MG: 300 CAPSULE ORAL at 21:11

## 2017-06-26 RX ADMIN — SPIRONOLACTONE 50 MG: 25 TABLET, FILM COATED ORAL at 17:29

## 2017-06-26 RX ADMIN — SIMVASTATIN 40 MG: 40 TABLET, FILM COATED ORAL at 21:11

## 2017-06-26 RX ADMIN — PANTOPRAZOLE SODIUM 40 MG: 40 TABLET, DELAYED RELEASE ORAL at 09:05

## 2017-06-26 RX ADMIN — DORZOLAMIDE HYDROCHLORIDE AND TIMOLOL MALEATE 1 DROP: 20; 5 SOLUTION/ DROPS OPHTHALMIC at 09:05

## 2017-06-26 RX ADMIN — BETAMETHASONE VALERATE: 1 CREAM TOPICAL at 09:00

## 2017-06-26 RX ADMIN — METOPROLOL TARTRATE 50 MG: 50 TABLET ORAL at 17:29

## 2017-06-26 RX ADMIN — LEVOTHYROXINE SODIUM 100 MCG: 50 TABLET ORAL at 09:05

## 2017-06-26 RX ADMIN — THIAMINE HCL TAB 100 MG 100 MG: 100 TAB at 09:05

## 2017-06-26 NOTE — PROGRESS NOTES
GIM     Patient: Francesca Richards MRN: [id number]  CSN: [id number]    YOB: 1929  Age: 80 y.o. Sex: female    DOA: 6/16/2017 LOS:  LOS: 10 days                    Subjective:     Pt with hx of ICH and hemiparesis noted and resolving. DM now well controlled. Discussed care with pt daughters.     Objective:      Visit Vitals    /63 (BP 1 Location: Right arm, BP Patient Position: At rest;Sitting)    Pulse 69    Temp 98.1 °F (36.7 °C)    Resp 16    Ht 4' 11\" (1.499 m)    Wt 95.5 kg (210 lb 8 oz)    SpO2 100%    Breastfeeding No    BMI 42.52 kg/m2       Physical Exam:  Chest clear  Cor rr  abd soft  No edema    Intake and Output:  Current Shift:     Last three shifts:       Recent Results (from the past 24 hour(s))   GLUCOSE, POC    Collection Time: 06/25/17  5:15 PM   Result Value Ref Range    Glucose (POC) 174 (H) 70 - 110 mg/dL   GLUCOSE, POC    Collection Time: 06/26/17  5:43 AM   Result Value Ref Range    Glucose (POC) 106 70 - 110 mg/dL       Current Facility-Administered Medications   Medication Dose Route Frequency    furosemide (LASIX) tablet 40 mg  40 mg Oral DAILY    metFORMIN (GLUCOPHAGE) tablet 500 mg  500 mg Oral BID WITH MEALS    pantoprazole (PROTONIX) tablet 40 mg  40 mg Oral ACB    [facility] ANESTHESIA   Other PRN    [facility] EMERGENCY/STAT BOX   Other PRN    alum-mag hydroxide-simeth (MYLANTA) oral suspension 30 mL  30 mL Oral QID PRN    ferrous fumarate-vitamin C 200 mg (65 mg iron)-25 mg TbER 1 Tab (patient's own med)  1 Tab Oral DAILY    linagliptin (TRADJENTA) tablet 5 mg (Patient's own med)  5 mg Oral ACB    albuterol-ipratropium (DUO-NEB) 2.5 MG-0.5 MG/3 ML  3 mL Nebulization Q4H PRN    bisacodyl (DULCOLAX) suppository 10 mg  10 mg Rectal DAILY PRN    insulin lispro (HUMALOG) injection   SubCUTAneous ACB&D    magnesium hydroxide (MILK OF MAGNESIA) 400 mg/5 mL oral suspension 30 mL  30 mL Oral DAILY PRN    dorzolamide-timolol (COSOPT) 22.3-6.8 mg/mL ophthalmic solution 1 Drop  1 Drop Both Eyes BID    sodium phosphate (FLEET'S) enema 118 mL  1 Enema Rectal PRN    ascorbic acid (vitamin C) (VITAMIN C) tablet 500 mg  500 mg Oral DAILY    gabapentin (NEURONTIN) capsule 300 mg  300 mg Oral QHS    metoprolol tartrate (LOPRESSOR) tablet 50 mg  50 mg Oral BID    levothyroxine (SYNTHROID) tablet 100 mcg  100 mcg Oral ACB    spironolactone (ALDACTONE) tablet 50 mg  50 mg Oral BID    folic acid (FOLVITE) tablet 1 mg  1 mg Oral DAILY    Thiamine Mononitrate (B-1) tablet 100 mg  100 mg Oral DAILY    meclizine (ANTIVERT) tablet 25 mg  25 mg Oral QID PRN    zolpidem (AMBIEN) tablet 5 mg  5 mg Oral QHS PRN    betamethasone valerate (VALISONE) 0.1 % cream   Topical BID    simvastatin (ZOCOR) tablet 40 mg  40 mg Oral QHS    Please ask patient to bring in linagliptin. We do not carry in pharmacy. 1 Each Other Rx Dosing/Monitoring       Lab Results   Component Value Date/Time    Glucose 83 06/24/2017 04:40 AM    Glucose 66 06/22/2017 03:50 AM    Glucose 87 06/16/2017 01:00 AM    Glucose 84 06/15/2017 04:35 AM    Glucose 198 06/13/2017 04:55 AM        Assessment/Plan     Active Problems:    * No active hospital problems.  Kenyatta Lopez MD  6/26/2017, 11:34 AM

## 2017-06-26 NOTE — ROUTINE PROCESS
Written shift change report given to Maggy Moffett RN (oncoming nurse) by Jaden An RN (offgoing nurse). Report included the following information SBAR, Kardex and MAR.

## 2017-06-26 NOTE — PROGRESS NOTES
Problem: Mobility Impaired (Adult and Pediatric)  Goal: *Acute Goals and Plan of Care (Insert Text)  PHYSICAL THERAPY STG GOALS :  Initiated 6/19/2017 and to be accomplished within 2 Weeks (updated 6/23/17)    1. Patient will move from supine to sit and sit to supine and roll side to side in bed with minimal assistance/contact guard assist.   (Progressing; mod A)   2. Patient will transfer from bed to chair and chair to bed with minimal assistance/contact guard assist using RW. (Progressing; mod A using Stand pivot transfer)  3. Patient will perform sit to stand with minimal assistance/contact guard assist with Fair balance and safety awareness. (Progressing; varying between mod A to max A x 2 with poor/poor + balance)  4. Patient will ambulate with minimal assistance/contact guard assist for 75 feet with RW on level surfaces with 2 turns. (Progressing; min/mod A x 5 ft using // bars)  5. Patient will ascend/descend 1 stairs with bilateral handrail(s) with moderate assistance to allow for safe home access/exit. (Not addressed)  6. Patient will improve standardized test score for [place] Standing Balance score of 2. (Progressing; 1)      PHYSICAL THERAPY LTG GOALS :  Initiated 6/19/2017 and to be accomplished within 4 Weeks    1. Patient will move from supine to sit and sit to supine and roll side to side in bed with supervision/set-up. 2. Patient will transfer from bed to chair and chair to bed with supervision/set-up using RW. 3. Patient will perform sit to stand with supervision/set-up with Fair+ balance and safety awareness. 4. Patient will ambulate with supervision/set-up for 150 feet with RW on level surfaces and be able to maneuver through narrow spaces and obstacles without loss of balance. 5. Patient will ascend/descend 1 stairs with NO handrail(s) with minimal assistance/contact guard assist to allow for safe home access/exit.   6. Patient will improve standardized test score for [facility] Balance score of 3 for reduced fall risk. Physical Therapist: Kendal Claire, PT on 6/19/2017    [facility]   PHYSICAL THERAPY DAILY TREATMENT NOTE        Patient: Roma Licona (33 y.o. female)               Date: 6/26/2017    Physician: Savannah Shea MD  Primary Diagnosis: Hemorrhagic stroke           Treatment Diagnosis  Treatment Diagnosis: left hemiparesis  Treatment Diagnosis 2: difficulty in walking  Precautions: Fall  Vital Signs  Vital Signs  Pulse (Heart Rate): 69  Heart Rate Source: Monitor  Resp Rate: 16  O2 Sat (%): 100 %  Level of Consciousness: Alert  BP: 120/63  MAP (Calculated): 82  BP 1 Method: Automatic  BP 1 Location: Right arm  BP Patient Position: At rest;Sitting     Cognitive Status:  Mental Status  Neurologic State: Alert  Orientation Level: Oriented X4  Cognition: Appropriate safety awareness; Appropriate for age attention/concentration; Follows commands  Pain  Pain Screen  Pain Scale 1: Numeric (0 - 10)  Pain Intensity 1: 0  Patient Stated Pain Goal: 0  Bed Mobility Training  Bed Mobility Training  Supine to Sit: Maximum assistance  Scooting: Moderate assistance  Balance  Sitting: With support  Sitting - Static: Fair (occasional)  Sitting - Dynamic: Fair (occasional) (-)  Standing: Impaired; With support;Pull to stand  Standing - Static: Fair (-)  Standing - Dynamic : Poor (=)  Transfer Training  Transfer Training  Sit to Stand: Maximum assistance; Moderate assistance  Stand to Sit: Moderate assistance  Stand Pivot Transfers: Maximum assistance  Bed to Chair: Maximum assistance; Moderate assistance  Interventions: Safety awareness training;Verbal cues  Sit to Stand: Maximum assistance; Moderate assistance  Gait Training  Gait  Base of Support: Center of gravity altered;Narrowed; Shift to right  Speed/Aura: Pace decreased (<100 feet/min)  Step Length: Right shortened;Left shortened  Gait Abnormalities: Decreased step clearance; Step to gait  Ambulation - Level of Assistance: Minimal assistance  Distance (ft): 6 Feet (ft) (x2)  Assistive Device: Other (comment) (at //)  Interventions: Safety awareness training;Verbal cues; Tactile cues;Manual cues  Gait Abnormalities: Decreased step clearance; Step to gait   With 0 turns. Therapeutic Exercise: Bed mobility rendered as noted above. Pt with improvement noted in B LE's to side of bed, but continues to require Max A for upper body with supine>sit. SPT from EOB>w/c with Max A. Pt attempted to move B LE's to assist with transfer. Sit<>stand with pull-to-stand method at // with Max A and verbal cues for technique. Weight shifting in preparation for gait training. During weight shifting activities, noted some slight buckling on L knee. Therapist applied ace wrap to L knee with ~50-75% pull in preparation for gait training. Gait training in // with B UE support and close w/c follow for safety. No noted L knee buckling, though much weakness noted on L LE. Seated B LE TE rendered to promote strength, endurance and flexibility for improved functional mobility: HR/TR, LAQ, hip flexion 2x15, ball squeezes, resisted hip abd (orange band) x15. Patient/Caregiver Education:   Pt /Caregiver Education on safety and fall prevention with transfers and gait was provided to reduce risk of falls. ASSESSMENT:  Patient continues to benefit from Skilled PT services to improve bed mobility, transfers, strength, balance, and gait. Progression toward goals:  [ ]      Improving appropriately and progressing toward goals  [X]      Improving slowly and progressing toward goals  [ ]      Not making progress toward goals and plan of care will be adjusted      Treatment session: 55 minutes.   Therapist:   Aj Obando PTA,          6/26/2017

## 2017-06-26 NOTE — PROGRESS NOTES
I have reviewed this patient's current medication list and recent laboratory results. Ms. Ela Simon is receiving metformin 500 mg twice daily. Serum creatinine on 6/24 is 1.6 (1.5 on 6/22). Estimated creatinine clearance is around 30 ml/min. Please reassess current metformin therapy needs. Thank you,  Milagros DUDLEY  Ph. M. S.  6/26/2017

## 2017-06-26 NOTE — PROGRESS NOTES
Problem: Self Care Deficits Care Plan (Adult)  Goal: *Therapy Goal (Edit Goal, Insert Text)  OCCUPATIONAL THERAPY SHORT TERM GOALS   Initiated 6/22/2017 and to be accomplished within 2 Week(s)    1. Patient will perform Upper body ADLs with/without adaptive equipment with moderate assistance . 2. Patient will increase left UE AROM/strength to use functional stabilizer assist with min cues. 3. Patient will perform toileting task with maximal assistance with Fair safety to reduce falls risk. 4. Patient will perform functional transfers with and moderate assistance . 5. Patient will perform dynamic sitting balance activities for improved ADL/IADL function with minimal assistance/contact guard assist and Fair balance and safety awarenes. 6. Patient will improve Barthel index scores to at least 30/100 to improve functional mobility. OCCUPATIONAL THERAPY LONG TERM GOALS   Initiated 6/22/2017 and to be accomplished within 4 Week(s)    1. Patient will perform Upper body ADLs with/without adaptive equipment with supervision/set-up. 2. Patient will perform Lower body ADLs with/without adaptive equipment with moderate assistance . 3. Patient will perform toileting task with minimal assistance/contact guard assist with Good safety to reduce falls risk. 4. Patient will perform functional transfers with supervision/set-up and Good balance and safety awareness. 5. Patient will perform standing static/dynamic activity for improved ADL/IADL function with contact guard an and good safety awareness. 6. Patient will improve Barthel index score to 70/100 to improve independence with mobility.       Therapist: Phil Garcia OT 6/22/2017   [facility]   OCCUPATIONAL THERAPY DAILY TREATMENT NOTE        Patient: Shirley Mak (30 y.o. female)                         Date: 6/26/2017  Attending Physician: Karin Richey MD  Primary Diagnosis: Hemorrhagic stroke     Treatment Diagnosis  Treatment Diagnosis: left hemiparesis  Treatment Diagnosis 2: difficulty in walking   Precautions : Precautions at Admission: Fall  Vital Signs:  Vital Signs  Pulse (Heart Rate): 69  Heart Rate Source: Monitor  Resp Rate: 16  O2 Sat (%): 100 %  Level of Consciousness: Alert  BP: 120/63  MAP (Calculated): 82  BP 1 Method: Automatic  BP 1 Location: Right arm  BP Patient Position: At rest;Sitting     Cognitive Status:  Mental Status  Neurologic State: Alert  Orientation Level: Oriented X4  Cognition: Appropriate safety awareness; Appropriate for age attention/concentration; Follows commands  Pain:  Pain Screen  Pain Scale 1: Numeric (0 - 10)  Pain Intensity 1: 0  Patient Stated Pain Goal: 0  Pain Scale 1: Numeric (0 - 10)  Gross Assessment:     Coordination:     Bed Mobility:  Bed Mobility  Supine to Sit: Maximum assistance  Sit to Supine: Maximum assistance;Assist x1  Scooting: Moderate assistance  Transfers:  Functional Transfers  Sit to Stand: Maximum assistance; Moderate assistance  Stand to Sit: Moderate assistance  Bed to Chair: Maximum assistance; Moderate assistance     Balance:  Balance  Sitting: With support  Sitting - Static: Fair (occasional)  Sitting - Dynamic: Fair (occasional) (-)  Standing: Impaired; With support;Pull to stand  Standing - Static: Fair (-)  Standing - Dynamic : Poor (=)        Therapeutic Activities:  Assisted patient with bed mobility and bed <w/c transfers in order to assess safety and independence during task. See above for levels of A needed  Therapeutic Exercises:   AROM of L UE, 2 sets x 10 reps in order to increase UB muscle strength and ROM needed for ADLS. Table slides, 2 sets x 5 reps, in order to increase UB muscle strength and ROM needed for UB ADLS. Patient/Caregiver Education:    Oscar Jewell Education on safe hand placement during SPT for optimal safety.       ASSESSMENT:  Patient continues to demonstrate the need for skilled Occupational Therapy services to improve static standing balance needed for toileting  Progression toward goals:  [ ]      Improving appropriately and progressing toward goals  [X]      Improving slowly and progressing toward goals  [ ]      Not making progress toward goals and plan of care will be adjusted      Treatment session:   40 minutes     Therapist:    TYLER Rebolledo,  6/26/2017

## 2017-06-27 LAB
GLUCOSE BLD STRIP.AUTO-MCNC: 100 MG/DL (ref 70–110)
GLUCOSE BLD STRIP.AUTO-MCNC: 170 MG/DL (ref 70–110)

## 2017-06-27 PROCEDURE — 82962 GLUCOSE BLOOD TEST: CPT

## 2017-06-27 PROCEDURE — 74011250637 HC RX REV CODE- 250/637: Performed by: INTERNAL MEDICINE

## 2017-06-27 RX ADMIN — GABAPENTIN 300 MG: 300 CAPSULE ORAL at 20:54

## 2017-06-27 RX ADMIN — METFORMIN HYDROCHLORIDE 500 MG: 500 TABLET ORAL at 17:39

## 2017-06-27 RX ADMIN — Medication 500 MG: at 08:41

## 2017-06-27 RX ADMIN — ZOLPIDEM TARTRATE 5 MG: 5 TABLET, COATED ORAL at 22:38

## 2017-06-27 RX ADMIN — FUROSEMIDE 40 MG: 40 TABLET ORAL at 08:42

## 2017-06-27 RX ADMIN — LEVOTHYROXINE SODIUM 100 MCG: 50 TABLET ORAL at 08:41

## 2017-06-27 RX ADMIN — FOLIC ACID 1 MG: 1 TABLET ORAL at 08:41

## 2017-06-27 RX ADMIN — SPIRONOLACTONE 50 MG: 25 TABLET, FILM COATED ORAL at 08:43

## 2017-06-27 RX ADMIN — METOPROLOL TARTRATE 50 MG: 50 TABLET ORAL at 17:39

## 2017-06-27 RX ADMIN — BETAMETHASONE VALERATE: 1 CREAM TOPICAL at 17:42

## 2017-06-27 RX ADMIN — METFORMIN HYDROCHLORIDE 500 MG: 500 TABLET ORAL at 08:41

## 2017-06-27 RX ADMIN — PANTOPRAZOLE SODIUM 40 MG: 40 TABLET, DELAYED RELEASE ORAL at 08:41

## 2017-06-27 RX ADMIN — DORZOLAMIDE HYDROCHLORIDE AND TIMOLOL MALEATE 1 DROP: 20; 5 SOLUTION/ DROPS OPHTHALMIC at 08:46

## 2017-06-27 RX ADMIN — METOPROLOL TARTRATE 50 MG: 50 TABLET ORAL at 08:43

## 2017-06-27 RX ADMIN — SPIRONOLACTONE 50 MG: 25 TABLET, FILM COATED ORAL at 17:38

## 2017-06-27 RX ADMIN — DORZOLAMIDE HYDROCHLORIDE AND TIMOLOL MALEATE 1 DROP: 20; 5 SOLUTION/ DROPS OPHTHALMIC at 17:41

## 2017-06-27 RX ADMIN — SIMVASTATIN 40 MG: 40 TABLET, FILM COATED ORAL at 21:00

## 2017-06-27 RX ADMIN — BETAMETHASONE VALERATE: 1 CREAM TOPICAL at 10:20

## 2017-06-27 NOTE — PROGRESS NOTES
Problem: Mobility Impaired (Adult and Pediatric)  Goal: *Acute Goals and Plan of Care (Insert Text)  PHYSICAL THERAPY STG GOALS :  Initiated 6/19/2017 and to be accomplished within 2 Weeks (updated 6/23/17)    1. Patient will move from supine to sit and sit to supine and roll side to side in bed with minimal assistance/contact guard assist.   (Progressing; mod A)   2. Patient will transfer from bed to chair and chair to bed with minimal assistance/contact guard assist using RW. (Progressing; mod A using Stand pivot transfer)  3. Patient will perform sit to stand with minimal assistance/contact guard assist with Fair balance and safety awareness. (Progressing; varying between mod A to max A x 2 with poor/poor + balance)  4. Patient will ambulate with minimal assistance/contact guard assist for 75 feet with RW on level surfaces with 2 turns. (Progressing; min/mod A x 5 ft using // bars)  5. Patient will ascend/descend 1 stairs with bilateral handrail(s) with moderate assistance to allow for safe home access/exit. (Not addressed)  6. Patient will improve standardized test score for [place] Standing Balance score of 2. (Progressing; 1)      PHYSICAL THERAPY LTG GOALS :  Initiated 6/19/2017 and to be accomplished within 4 Weeks    1. Patient will move from supine to sit and sit to supine and roll side to side in bed with supervision/set-up. 2. Patient will transfer from bed to chair and chair to bed with supervision/set-up using RW. 3. Patient will perform sit to stand with supervision/set-up with Fair+ balance and safety awareness. 4. Patient will ambulate with supervision/set-up for 150 feet with RW on level surfaces and be able to maneuver through narrow spaces and obstacles without loss of balance. 5. Patient will ascend/descend 1 stairs with NO handrail(s) with minimal assistance/contact guard assist to allow for safe home access/exit.   6. Patient will improve standardized test score for [facility] Balance score of 3 for reduced fall risk. Physical Therapist: Wing Stone PT on 6/19/2017    [facility]   PHYSICAL THERAPY DAILY TREATMENT NOTE        Patient: Stiven Berrios (81 y.o. female)               Date: 6/27/2017    Physician: Dariel King MD  Primary Diagnosis: Hemorrhagic stroke           Treatment Diagnosis  Treatment Diagnosis: left hemiparesis  Treatment Diagnosis 2: difficulty in walking  Precautions: Fall  Vital Signs  Vital Signs  Pulse (Heart Rate): 63  BP: 128/61  Cognitive Status:  Mental Status  Neurologic State: Alert; Appropriate for age  Orientation Level: Oriented X4  Pain  Bed Mobility Training  Bed Mobility Training  Supine to Sit: Moderate assistance  Scooting: Moderate assistance  Balance  Sitting: With support  Sitting - Static: Fair (occasional)  Sitting - Dynamic: Fair (occasional)  Standing: Impaired;Pull to stand; With support  Standing - Static: Fair (-)  Standing - Dynamic : Poor  Transfer Training  Transfer Training  Sit to Stand: Maximum assistance  Stand to Sit: Maximum assistance  Stand Pivot Transfers: Maximum assistance  Interventions: Safety awareness training;Verbal cues  Sit to Stand: Maximum assistance  Gait Training  Gait  Base of Support: Center of gravity altered  Speed/Aura: Pace decreased (<100 feet/min)  Step Length: Right shortened;Left shortened  Gait Abnormalities: Decreased step clearance; Step to gait  Ambulation - Level of Assistance: Minimal assistance  Distance (ft): 6 Feet (ft)  Assistive Device: Other (comment) (at //)  Interventions: Safety awareness training;Verbal cues;Manual cues  Gait Abnormalities: Decreased step clearance; Step to gait    With 0 turns. Therapeutic Exercise: partial co-treat with OT to maximize gains with bed mobility, transfer training and standing balance activities. Bed mobility as noted above. Pt able to sit unsupported at EOB with 1 UE support and slight L lean noted.  SPT from EOB>w/c with Max A and verbal cues for B LE advancement. Therapist applied ace wrap with 50-75% pull to L knee in preparation for gait training. Gait training rendered in // with abovementioned details and close w/c follow for safety. Pt ambulated ~6ft before L knee buckled. Pt had no report of pain. Static standing at // with R UE support while performing L UE activities with OT. Pt with noted decreases WB on L LE and required verbal cues for upright posture and L knee extension. Seated B LE TE rendered to build strength and endurance for improved functional mobility: LAQ, hip flexion 2x15. Therapist instructed pt and pt's daughter in [address] [place] [address] for [address] and hip flexion to maximize gains. Patient/Caregiver Education:   Pt /Caregiver Education on safety and fall prevention, and HEP was provided to reduce risk of falls and maximize gains. ASSESSMENT:  Patient continues to benefit from Skilled PT services to improve bed mobility, transfers, strength, balance, gait and stair negotiation. Progression toward goals:  [ ]      Improving appropriately and progressing toward goals  [X]      Improving slowly and progressing toward goals  [ ]      Not making progress toward goals and plan of care will be adjusted      Treatment session: 55 minutes.   Therapist:   Chucky Castillo PTA,          6/27/2017

## 2017-06-27 NOTE — ROUTINE PROCESS
Bedside and Verbal shift change report given to enrike yoon lpn(oncoming nurse) by ronaldo Solis lpn (offgoing nurse). Report included the following information SBAR, Kardex and MAR.  hourly rounds

## 2017-06-27 NOTE — PROGRESS NOTES
conducted a Follow up consultation and Spiritual Assessment for Joyce Lewis, who is a 80 y.o.,female. The  provided the following Interventions:  Continued the relationship of care and support. Listened empathically. Offered prayer and assurance of continued prayer on patients behalf. Chart reviewed. The following outcomes were achieved:  Patient expressed gratitude for pastoral care visit. Assessment:  There are no further spiritual or [employer] issues which require Spiritual Care Services interventions at this time. Plan:  Chaplains will continue to follow and will provide pastoral care on an as needed/requested basis.  recommends bedside caregivers page  on duty if patient shows signs of acute spiritual or emotional distress.      [address] [facility]   Staff [address] [facility]   [phone]

## 2017-06-27 NOTE — PROGRESS NOTES
Problem: Self Care Deficits Care Plan (Adult)  Goal: *Therapy Goal (Edit Goal, Insert Text)  OCCUPATIONAL THERAPY SHORT TERM GOALS   Initiated 6/22/2017 and to be accomplished within 2 Week(s)    1. Patient will perform Upper body ADLs with/without adaptive equipment with moderate assistance . 2. Patient will increase left UE AROM/strength to use functional stabilizer assist with min cues. 3. Patient will perform toileting task with maximal assistance with Fair safety to reduce falls risk. 4. Patient will perform functional transfers with and moderate assistance . 5. Patient will perform dynamic sitting balance activities for improved ADL/IADL function with minimal assistance/contact guard assist and Fair balance and safety awarenes. 6. Patient will improve Barthel index scores to at least 30/100 to improve functional mobility. OCCUPATIONAL THERAPY LONG TERM GOALS   Initiated 6/22/2017 and to be accomplished within 4 Week(s)    1. Patient will perform Upper body ADLs with/without adaptive equipment with supervision/set-up. 2. Patient will perform Lower body ADLs with/without adaptive equipment with moderate assistance . 3. Patient will perform toileting task with minimal assistance/contact guard assist with Good safety to reduce falls risk. 4. Patient will perform functional transfers with supervision/set-up and Good balance and safety awareness. 5. Patient will perform standing static/dynamic activity for improved ADL/IADL function with contact guard an and good safety awareness. 6. Patient will improve Barthel index score to 70/100 to improve independence with mobility.       Therapist: Ralph Toribio OT 6/22/2017   [facility]   OCCUPATIONAL THERAPY DAILY TREATMENT NOTE        Patient: Kaylee Webber (45 y.o. female)                         Date: 6/27/2017  Attending Physician: Peyton Rios MD  Primary Diagnosis: Hemorrhagic stroke     Treatment Diagnosis  Treatment Diagnosis: left hemiparesis  Treatment Diagnosis 2: difficulty in walking   Precautions : Precautions at Admission: Fall  Vital Signs:  Vital Signs  Pulse (Heart Rate): 63  BP: 128/61     Cognitive Status:  Mental Status  Neurologic State: Alert; Appropriate for age  Orientation Level: Oriented X4  Pain:        Gross Assessment:     Coordination:     Bed Mobility:  Bed Mobility  Supine to Sit: Moderate assistance  Scooting: Moderate assistance  Transfers:  Functional Transfers  Sit to Stand: Maximum assistance  Stand to Sit: Maximum assistance     Balance:  Balance  Sitting: With support  Sitting - Static: Fair (occasional)  Sitting - Dynamic: Fair (occasional)  Standing: Impaired;Pull to stand; With support  Standing - Static: Fair (-)  Standing - Dynamic : Poor  ADL Self Care:     ADL Intervention:                                Therapeutic Activities:  Co-treated with PT for optimal functional gains with static standing balance needed for ADL tasks. Assisted patient with bed mobility and bed >w/c transfers in order to assess safety and independence during task. See above for levels of A needed. Static standing activity with one hand release in order to increase standing balance and tolerance needed for ADLS. Patient was able to complete two trials of standing, 3 mins each with Mod A x 2 and cueing for lateral leaning towards L. Patient demonstrated increased balance with completing task with L Ue and R LE utilize for support within parellel bars. Patient/Caregiver Education:    Pt. Gail Eddy Education on see above.         ASSESSMENT:  Patient continues to demonstrate the need for skilled Occupational Therapy services to improve static standing balance needed for toilet transfer  Progression toward goals:  [ ]      Improving appropriately and progressing toward goals  [X]      Improving slowly and progressing toward goals  [ ]      Not making progress toward goals and plan of care will be adjusted      Treatment session:  40 minutes     Therapist:    TYLER Gomez,  6/27/2017

## 2017-06-27 NOTE — ROUTINE PROCESS
Bedside and Verbal shift change report given to SHALINI Hough LPN (oncoming nurse) by HELDER Beckford LPN (offgoing nurse). Report included the following information SBAR, Kardex and MAR. Visiual Checks completed by nursing staff per facility protocol.

## 2017-06-28 LAB
GLUCOSE BLD STRIP.AUTO-MCNC: 116 MG/DL (ref 70–110)
GLUCOSE BLD STRIP.AUTO-MCNC: 143 MG/DL (ref 70–110)

## 2017-06-28 PROCEDURE — 82962 GLUCOSE BLOOD TEST: CPT

## 2017-06-28 PROCEDURE — 74011250637 HC RX REV CODE- 250/637: Performed by: INTERNAL MEDICINE

## 2017-06-28 RX ORDER — FUROSEMIDE 20 MG/1
20 TABLET ORAL DAILY
Status: DISCONTINUED | OUTPATIENT
Start: 2017-06-29 | End: 2017-07-14 | Stop reason: HOSPADM

## 2017-06-28 RX ADMIN — METFORMIN HYDROCHLORIDE 500 MG: 500 TABLET ORAL at 09:11

## 2017-06-28 RX ADMIN — FUROSEMIDE 40 MG: 40 TABLET ORAL at 09:11

## 2017-06-28 RX ADMIN — SIMVASTATIN 40 MG: 40 TABLET, FILM COATED ORAL at 22:00

## 2017-06-28 RX ADMIN — PANTOPRAZOLE SODIUM 40 MG: 40 TABLET, DELAYED RELEASE ORAL at 09:11

## 2017-06-28 RX ADMIN — BETAMETHASONE VALERATE: 1 CREAM TOPICAL at 09:14

## 2017-06-28 RX ADMIN — DORZOLAMIDE HYDROCHLORIDE AND TIMOLOL MALEATE 1 DROP: 20; 5 SOLUTION/ DROPS OPHTHALMIC at 18:00

## 2017-06-28 RX ADMIN — METFORMIN HYDROCHLORIDE 500 MG: 500 TABLET ORAL at 17:10

## 2017-06-28 RX ADMIN — Medication 500 MG: at 09:11

## 2017-06-28 RX ADMIN — GABAPENTIN 300 MG: 300 CAPSULE ORAL at 20:53

## 2017-06-28 RX ADMIN — LEVOTHYROXINE SODIUM 100 MCG: 50 TABLET ORAL at 09:11

## 2017-06-28 RX ADMIN — FOLIC ACID 1 MG: 1 TABLET ORAL at 09:11

## 2017-06-28 RX ADMIN — SPIRONOLACTONE 50 MG: 25 TABLET, FILM COATED ORAL at 17:10

## 2017-06-28 RX ADMIN — DORZOLAMIDE HYDROCHLORIDE AND TIMOLOL MALEATE 1 DROP: 20; 5 SOLUTION/ DROPS OPHTHALMIC at 09:09

## 2017-06-28 RX ADMIN — THIAMINE HCL TAB 100 MG 100 MG: 100 TAB at 09:11

## 2017-06-28 RX ADMIN — SPIRONOLACTONE 50 MG: 25 TABLET, FILM COATED ORAL at 09:11

## 2017-06-28 RX ADMIN — ZOLPIDEM TARTRATE 5 MG: 5 TABLET, COATED ORAL at 22:19

## 2017-06-28 RX ADMIN — BETAMETHASONE VALERATE: 1 CREAM TOPICAL at 18:00

## 2017-06-28 RX ADMIN — METOPROLOL TARTRATE 50 MG: 50 TABLET ORAL at 09:10

## 2017-06-28 NOTE — PROGRESS NOTES
Problem: Self Care Deficits Care Plan (Adult)  Goal: *Therapy Goal (Edit Goal, Insert Text)  OCCUPATIONAL THERAPY SHORT TERM GOALS     Updated 6/28/17    1. Patient will perform Upper body ADLs with/without adaptive equipment with moderate assistance . 2. Patient will increase left UE AROM/strength to use minimal assist with min cues. 3. Patient will perform toileting task with maximal assistance with Fair safety to reduce falls risk. 4. Patient will perform functional transfers with and moderate assistance . 5. Patient will perform dynamic sitting balance activities for improved ADL/IADL function with minimal assistance/contact guard assist and Fair balance and safety awarenes. 6. Patient will improve Barthel index scores to at least 30/100 to improve functional mobility. Initiated 6/22/2017 and to be accomplished within 2 Week(s)    1. Patient will perform Upper body ADLs with/without adaptive equipment with moderate assistance .(progressing-currently Max A)  2. Patient will increase left UE AROM/strength to use functional stabilizer assist with min cues. (goal met-UPG)  3. Patient will perform toileting task with maximal assistance with Fair safety to reduce falls risk. (progressing)  4. Patient will perform functional transfers with and moderate assistance . (progressing)  5. Patient will perform dynamic sitting balance activities for improved ADL/IADL function with minimal assistance/contact guard assist and Fair balance and safety awarenes. (progressing)   6. Patient will improve Barthel index scores to at least 30/100 to improve functional mobility. (progressing0      OCCUPATIONAL THERAPY LONG TERM GOALS   Initiated 6/22/2017 and to be accomplished within 4 Week(s)    1. Patient will perform Upper body ADLs with/without adaptive equipment with supervision/set-up. 2. Patient will perform Lower body ADLs with/without adaptive equipment with moderate assistance . 3.  Patient will perform toileting task with minimal assistance/contact guard assist with Good safety to reduce falls risk. 4. Patient will perform functional transfers with supervision/set-up and Good balance and safety awareness. 5. Patient will perform standing static/dynamic activity for improved ADL/IADL function with contact guard an and good safety awareness. 6. Patient will improve Barthel index score to 70/100 to improve independence with mobility. Therapist: Mercedes Vega OT 6/22/2017   [facility]  OCCUPATIONAL THERAPY WEEKLY SUMMARY   Reporting period:  from 6/22/17 through 6/28/17         Patient: Lizzette Shannon (57 y.o. female)                                Date: 6/28/2017    Primary Diagnosis: Hemorrhagic stroke                                     Attending Physician: Sakina Cooper MD Treatment Diagnosis  Treatment Diagnosis: left hemiparesis  Treatment Diagnosis 2: difficulty in walking  Precautions: Fall  Rehab Potential : [address] interventions and education provided with clinical rationale (include individualized treatment techniques and standardized tests):  Skilled Occupational services were provided utilizing therapeutic exercises, therapeutic activities, functional mobility, functional transfers, and EC/WS. Using a comparative statement, summarize significant progress toward goals as a result of skilled intervention provided:  Patient has made Fair progress towards their Occupational Therapy goals in the following areas: increased independence withgrooming and bathing. Patient has met 2/6 STGS and making slow but steady progression towards LTGS. Identify remaining functional areas, impairments limiting progress and/or barriers to improvement:  Patient would benefit from continued skilled Occupational Therapy Services to address the following functional deficits in decreased static and dynamic standing balance and tolerance needed for safely complete ADL/IADLS.  Barriers to improvement are decreased L side weakness limiting safety and independence needed for ADL tasks. OBJECTIVE DATA SUMMARY:          INITIAL ASSESSMENT WEEKLY PROGRESS   COGNITIVE STATUS: COGNITIVE STATUS:   Neurologic State: Alert  Orientation Level: Oriented X4  Cognition: Follows commands  Perception: Appears intact (Simultaneous filing. User may not have seen previous data.)  Perseveration: No perseveration noted (Simultaneous filing. User may not have seen previous data.)  Safety/Judgement: Fall prevention (Simultaneous filing. User may not have seen previous data.) Neurologic State: Alert, Appropriate for age  Orientation Level: Oriented X4  Cognition: Follows commands  Perception: Cues to maintain midline in sitting  Perseveration: No perseveration noted  Safety/Judgement: Awareness of environment, Fall prevention   PAIN: PAIN:   Pain Scale 1: Numeric (0 - 10)  Pain Intensity 1: 0  Patient Stated Pain Goal: 0       Pain Scale 1: Numeric (0 - 10)  Pain Intensity 1: 0  Patient Stated Pain Goal: 0   BED MOBILITY BED MOBILITY   Rolling: Moderate assistance, Additional time, Assist x1  Supine to Sit: Maximum assistance, Additional time, Assist x1  Sit to Supine: Maximum assistance, Assist x2  Scooting: Moderate assistance, Assist x1 Rolling: Moderate assistance  Supine to Sit: Minimum assistance, Additional time  Sit to Supine: Maximum assistance, Assist x1  Scooting: Moderate assistance   ADL SELF CARE ADL SELF CARE   Upper Body Dressing: Maximum assistance  Lower Body Dressing: Total assistance  Toileting: Total assistance       Dressing Assistance: Maximum assistance  Front Opened Shirt: Maximum assistance                                 TRANSFERS TRANSFERS   Sit to Stand:  Moderate assistance, Assist x2  Stand to Sit: Moderate assistance, Assist x2  Bed to Chair: Moderate assistance, Assist x2 Sit to Stand: Maximum assistance  Stand to Sit: Maximum assistance  Bed to Chair: Moderate assistance, Additional time         BALANCE BALANCE   Sitting: Impaired, With support  Sitting - Static: Fair (occasional) (-)  Sitting - Dynamic: Poor (constant support) (+)  Standing: Impaired, With support, Pull to stand  Standing - Static: Poor  Standing - Dynamic : Poor Sitting: With support  Sitting - Static: Fair (occasional)  Sitting - Dynamic: Fair (occasional) (-)  Standing: Pull to stand, With support  Standing - Static: Fair (-)  Standing - Dynamic : Poor         GROSS ASSESSMENT  GROSS ASSESSMENT   AROM: Generally decreased, functional  PROM: Generally decreased, functional  Strength: Generally decreased, functional (BLEs grossly 3+/5)  Coordination: Generally decreased, functional  Tone: Normal (in BLEs)  Sensation: Intact (to light touch on BLEs) AROM: Generally decreased, functional  PROM: Generally decreased, functional  Strength: Generally decreased, functional (BLEs grossly 3+/5)  Coordination: Generally decreased, functional  Tone: Normal (in BLEs)  Sensation: Intact (to light touch on BLEs)   COORDINATION COORDINATION   Fine Motor Skills-Upper: Left Impaired, Right Intact  Gross Motor Skills-Upper: Left Impaired, Right Intact Fine Motor Skills-Upper: Left Impaired, Right Intact  Gross Motor Skills-Upper: Left Impaired, Right Intact   VISUAL/PERCEPTUAL VISUAL/PERCEPTUAL   Tracking: Able to track stimulus in all quadrants w/o difficulty   Tracking: Able to track stimulus in all quadrants w/o difficulty     AUDITORY: AUDITORY:   Auditory Impairment: None Auditory Impairment: None         INSTRUMENTAL  ADL'S:    INSTRUMENTAL ADL'S:            THE BARTHEL INDEX  ACTIVITY    SCORE   FEEDING  0=unable  5=needs help cutting,spreading butter,etc., or modified diet  10= independent    5   BATHING  0=dependent  5=independent (or in shower    0   GROOMING  0=needs help  5=independent face/hair/teeth/shaving (implements provided)    0   DRESSING  0=dependent  5=needs help but can do about half unaided  10=independent(including buttons, zips,laces etc.)    0   BOWELS  0=incontinent  5=occasional accident  10=continent    10   BLADDER  0=incontinent, or catheterized and unable to manage alone  5=occasional accident  10=continent    10   TOILET USE  0=dependent  5=needs some help, but can do something alone  10=independent (on and off, dressing, wiping)    0   TRANSFER (BED TO CHAIR AND BACK)  0=unable, no sitting balance  5=major help(one or two people,physical), can sit  10=minor help(verbal or physical)  15=independent    5   MOBILITY (ON LEVEL SURFACES)  0=immobile or <50 yards  5=wheelchair independent,including corners,>50 yards  10=walkes with help of one person (verbal or physical) >50 yards  15=independent(but may use any aid; for example, stick) >50 yards    0   STAIRS  0=unable  5=needs help (verbal, physical, carrying aid)  10=independent    0             TOTAL:                  30/100      Treatment:  Assisted patient with bed mobility and bed <>w/c transfers in order to assess safety and independence during task. See above for levels of A needed. Patient demonstrated increased independence with supine to sit today with allotted time as well as increased balance during SPT with bilateral UE support. Functional reach activity with B UE in order to increase core strengthening as well as L UE ROM needed for UB dressing routine. Patient's O2 stats range from 95-99% on room air during task. [place] T-Putty exercises in order to increase hand dexerity and  strengthening needed for ADLS. Patient's response to Treatment rendered:  Patient is pleasant and receptive to therapist cueing. Patient expected Discharge Location:  [X]Private Residence  [ ] custodial/ILF  [ Ardia Pack  [ ]Other:     Plan: Continue OT services as established on the Plan of Care for 5 times a week.      Treatment Minutes:  40  OT and Assistant have had a weekly case conference regarding the above treatment:  [X] Yes     [ ] No    Therapist:   Regina Mckeon Date:6/28/2017      Forward to OT for co-signature when completed

## 2017-06-28 NOTE — PROGRESS NOTES
Problem: Mobility Impaired (Adult and Pediatric)  Goal: *Acute Goals and Plan of Care (Insert Text)  PHYSICAL THERAPY STG GOALS :  Initiated 6/19/2017 and to be accomplished within 2 Weeks (updated 6/23/17)    1. Patient will move from supine to sit and sit to supine and roll side to side in bed with minimal assistance/contact guard assist.   (Progressing; mod A)   2. Patient will transfer from bed to chair and chair to bed with minimal assistance/contact guard assist using RW. (Progressing; mod A using Stand pivot transfer)  3. Patient will perform sit to stand with minimal assistance/contact guard assist with Fair balance and safety awareness. (Progressing; varying between mod A to max A x 2 with poor/poor + balance)  4. Patient will ambulate with minimal assistance/contact guard assist for 75 feet with RW on level surfaces with 2 turns. (Progressing; min/mod A x 5 ft using // bars)  5. Patient will ascend/descend 1 stairs with bilateral handrail(s) with moderate assistance to allow for safe home access/exit. (Not addressed)  6. Patient will improve standardized test score for [place] Standing Balance score of 2. (Progressing; 1)      PHYSICAL THERAPY LTG GOALS :  Initiated 6/19/2017 and to be accomplished within 4 Weeks    1. Patient will move from supine to sit and sit to supine and roll side to side in bed with supervision/set-up. 2. Patient will transfer from bed to chair and chair to bed with supervision/set-up using RW. 3. Patient will perform sit to stand with supervision/set-up with Fair+ balance and safety awareness. 4. Patient will ambulate with supervision/set-up for 150 feet with RW on level surfaces and be able to maneuver through narrow spaces and obstacles without loss of balance. 5. Patient will ascend/descend 1 stairs with NO handrail(s) with minimal assistance/contact guard assist to allow for safe home access/exit.   6. Patient will improve standardized test score for [place] Standing Balance score of 3 for reduced fall risk. Physical Therapist: Chaitanya Mclean PT on 6/19/2017    [facility]   PHYSICAL THERAPY DAILY TREATMENT NOTE        Patient: Calista Flores (71 y.o. female)               Date: 6/28/2017    Physician: Jackie Wilder MD  Primary Diagnosis: Hemorrhagic stroke           Treatment Diagnosis  Treatment Diagnosis: left hemiparesis  Treatment Diagnosis 2: difficulty in walking  Precautions: Fall  Vital Signs  Vital Signs  Pulse (Heart Rate): 61  BP: 108/59  MAP (Calculated): 71  BP 1 Method: Automatic  BP 1 Location: Right arm  BP Patient Position: Supine  Cognitive Status:  Mental Status  Neurologic State: Alert; Appropriate for age  Orientation Level: Oriented X4  Cognition: Follows commands  Pain  Pain Screen  Pain Scale 1: Numeric (0 - 10)  Pain Intensity 1: 0  Patient Stated Pain Goal: 0  Bed Mobility Training  Bed Mobility Training  Supine to Sit: Minimum assistance; Additional time  Balance  Sitting: With support  Sitting - Static: Fair (occasional)  Sitting - Dynamic: Fair (occasional) (-)  Standing: Pull to stand; With support  Standing - Static: Fair (-)  Standing - Dynamic : Poor  Transfer Training  Transfer Training  Sit to Stand: Maximum assistance  Stand to Sit: Maximum assistance  Bed to Chair: Moderate assistance; Additional time  Sit to Stand: Maximum assistance  Gait Training  Gait  Base of Support: Center of gravity altered  Speed/Aura: Pace decreased (<100 feet/min)  Step Length: Right shortened;Left shortened  Gait Abnormalities: Decreased step clearance; Step to gait  Ambulation - Level of Assistance: Minimal assistance  Distance (ft): 8 Feet (ft)  Assistive Device: Other (comment) (at //)  Interventions: Safety awareness training;Verbal cues; Tactile cues  Gait Abnormalities: Decreased step clearance; Step to gait    With 0 turns. Therapeutic Exercise: pt presented sitting in w/c at bedside.  Gait training with abovementioned details and close w/c follow for safety. Ace wrap applied to L knee with ~75% pull to provide support for improved quality of ambulation. Pt continues to have buckling of L knee with ambulation. Pt had no c/o of pain or discomfort. Pt reported not walking much at home, but that she used a cane with assistance from her daughter when she did walk. Therapist educated pt on risk of falls with L knee buckling and that use of a cane would not be recommended. Transfer training from w/c<>mat table via SPT and Max A. Supine B LE TE's rendered to build strength for improved quality of transfers and gait: heel slides, hip abd, quad sets (5\" holds), SAQ, SLR (assisted on L LE) 2x10. Seated LAQ, hip flexion 2x15. Pt may benefit from knee brace for L knee for improved ambulation. Patient/Caregiver Education:   Pt /Caregiver Education on safety and fall prevention and education on L knee buckling was provided to reduce risk of falls. ASSESSMENT:  Patient continues to benefit from Skilled PT services to improve bed mobility, transfers, strength, balance, and gait. Progression toward goals:  [ ]      Improving appropriately and progressing toward goals  [X]      Improving slowly and progressing toward goals  [ ]      Not making progress toward goals and plan of care will be adjusted      Treatment session: 55 minutes.   Therapist:   Hector Babb PTA,          6/28/2017

## 2017-06-28 NOTE — ROUTINE PROCESS
Bedside and Verbal shift change report given to OfeliaRN (oncoming nurse) by HELDER Beckford LPN (offgoing nurse). Report included the following information SBAR, Kardex and MAR. Visual hourly rounds completed by nursing staff per facility protocol.

## 2017-06-28 NOTE — PROGRESS NOTES
GIM     Patient: Kan Cowan MRN: [id number]  CSN: [id number]    YOB: 1929  Age: 80 y.o. Sex: female    DOA: 6/16/2017 LOS:  LOS: 12 days                    Subjective:     Pt stable and blood sugar controlled. Cr. Sl inc and will dec lasix. Follow bmp.  Still with L hemiplegia    Objective:      Visit Vitals    /59    Pulse 61    Temp 98.3 °F (36.8 °C)    Resp 18    Ht 4' 11\" (1.499 m)    Wt 95.5 kg (210 lb 8 oz)    SpO2 94%    Breastfeeding No    BMI 42.52 kg/m2       Physical Exam:  Chest clear  Cor rr  abd soft  No edema    Intake and Output:  Current Shift:     Last three shifts:       Recent Results (from the past 24 hour(s))   GLUCOSE, POC    Collection Time: 06/27/17  4:40 PM   Result Value Ref Range    Glucose (POC) 170 (H) 70 - 110 mg/dL   GLUCOSE, POC    Collection Time: 06/28/17  5:48 AM   Result Value Ref Range    Glucose (POC) 116 (H) 70 - 110 mg/dL       Current Facility-Administered Medications   Medication Dose Route Frequency    [START ON 6/29/2017] furosemide (LASIX) tablet 20 mg  20 mg Oral DAILY    metFORMIN (GLUCOPHAGE) tablet 500 mg  500 mg Oral BID WITH MEALS    pantoprazole (PROTONIX) tablet 40 mg  40 mg Oral ACB    [facility] ANESTHESIA   Other PRN    [facility] EMERGENCY/STAT BOX   Other PRN    alum-mag hydroxide-simeth (MYLANTA) oral suspension 30 mL  30 mL Oral QID PRN    ferrous fumarate-vitamin C 200 mg (65 mg iron)-25 mg TbER 1 Tab (patient's own med)  1 Tab Oral DAILY    linagliptin (TRADJENTA) tablet 5 mg (Patient's own med)  5 mg Oral ACB    albuterol-ipratropium (DUO-NEB) 2.5 MG-0.5 MG/3 ML  3 mL Nebulization Q4H PRN    bisacodyl (DULCOLAX) suppository 10 mg  10 mg Rectal DAILY PRN    insulin lispro (HUMALOG) injection   SubCUTAneous ACB&D    magnesium hydroxide (MILK OF MAGNESIA) 400 mg/5 mL oral suspension 30 mL  30 mL Oral DAILY PRN    dorzolamide-timolol (COSOPT) 22.3-6.8 mg/mL ophthalmic solution 1 Drop  1 Drop Both Eyes BID    sodium phosphate (FLEET'S) enema 118 mL  1 Enema Rectal PRN    ascorbic acid (vitamin C) (VITAMIN C) tablet 500 mg  500 mg Oral DAILY    gabapentin (NEURONTIN) capsule 300 mg  300 mg Oral QHS    metoprolol tartrate (LOPRESSOR) tablet 50 mg  50 mg Oral BID    levothyroxine (SYNTHROID) tablet 100 mcg  100 mcg Oral ACB    spironolactone (ALDACTONE) tablet 50 mg  50 mg Oral BID    folic acid (FOLVITE) tablet 1 mg  1 mg Oral DAILY    Thiamine Mononitrate (B-1) tablet 100 mg  100 mg Oral DAILY    meclizine (ANTIVERT) tablet 25 mg  25 mg Oral QID PRN    zolpidem (AMBIEN) tablet 5 mg  5 mg Oral QHS PRN    betamethasone valerate (VALISONE) 0.1 % cream   Topical BID    simvastatin (ZOCOR) tablet 40 mg  40 mg Oral QHS    Please ask patient to bring in linagliptin. We do not carry in pharmacy. 1 Each Other Rx Dosing/Monitoring       Lab Results   Component Value Date/Time    Glucose 83 06/24/2017 04:40 AM    Glucose 66 06/22/2017 03:50 AM    Glucose 87 06/16/2017 01:00 AM    Glucose 84 06/15/2017 04:35 AM    Glucose 198 06/13/2017 04:55 AM        Assessment/Plan     Active Problems:    * No active hospital problems.  Amanda Diaz MD  6/28/2017, 9:45 AM

## 2017-06-28 NOTE — ROUTINE PROCESS
Bedside and verbal shift report given to GERA Solis LPN (oncoming nurse) by SHALINI South LPN (off going nurse). REport included SBAR, MAR and Kardex. Hourly rounds complete.

## 2017-06-28 NOTE — ROUTINE PROCESS
Bedside and Verbal shift change report given to enrike yoon lpn (oncoming nurse) by ronaldo Solis lpn (offgoing nurse). Report included the following information SBAR, Kardex and MAR.  Hourly rounds

## 2017-06-29 LAB
GLUCOSE BLD STRIP.AUTO-MCNC: 147 MG/DL (ref 70–110)
GLUCOSE BLD STRIP.AUTO-MCNC: 99 MG/DL (ref 70–110)

## 2017-06-29 PROCEDURE — 74011250637 HC RX REV CODE- 250/637: Performed by: INTERNAL MEDICINE

## 2017-06-29 PROCEDURE — 82962 GLUCOSE BLOOD TEST: CPT

## 2017-06-29 RX ADMIN — BETAMETHASONE VALERATE: 1 CREAM TOPICAL at 17:31

## 2017-06-29 RX ADMIN — GABAPENTIN 300 MG: 300 CAPSULE ORAL at 21:23

## 2017-06-29 RX ADMIN — SIMVASTATIN 40 MG: 40 TABLET, FILM COATED ORAL at 21:23

## 2017-06-29 RX ADMIN — DORZOLAMIDE HYDROCHLORIDE AND TIMOLOL MALEATE 1 DROP: 20; 5 SOLUTION/ DROPS OPHTHALMIC at 09:25

## 2017-06-29 RX ADMIN — ZOLPIDEM TARTRATE 5 MG: 5 TABLET, COATED ORAL at 22:36

## 2017-06-29 RX ADMIN — Medication 500 MG: at 09:32

## 2017-06-29 RX ADMIN — BETAMETHASONE VALERATE: 1 CREAM TOPICAL at 09:23

## 2017-06-29 RX ADMIN — THIAMINE HCL TAB 100 MG 100 MG: 100 TAB at 09:32

## 2017-06-29 RX ADMIN — FUROSEMIDE 20 MG: 20 TABLET ORAL at 09:32

## 2017-06-29 RX ADMIN — LEVOTHYROXINE SODIUM 100 MCG: 50 TABLET ORAL at 09:32

## 2017-06-29 RX ADMIN — METFORMIN HYDROCHLORIDE 500 MG: 500 TABLET ORAL at 09:32

## 2017-06-29 RX ADMIN — METOPROLOL TARTRATE 50 MG: 50 TABLET ORAL at 17:27

## 2017-06-29 RX ADMIN — METFORMIN HYDROCHLORIDE 500 MG: 500 TABLET ORAL at 17:26

## 2017-06-29 RX ADMIN — METOPROLOL TARTRATE 50 MG: 50 TABLET ORAL at 09:32

## 2017-06-29 RX ADMIN — FOLIC ACID 1 MG: 1 TABLET ORAL at 09:32

## 2017-06-29 RX ADMIN — SPIRONOLACTONE 50 MG: 25 TABLET, FILM COATED ORAL at 17:26

## 2017-06-29 RX ADMIN — SPIRONOLACTONE 50 MG: 25 TABLET, FILM COATED ORAL at 09:31

## 2017-06-29 RX ADMIN — PANTOPRAZOLE SODIUM 40 MG: 40 TABLET, DELAYED RELEASE ORAL at 09:32

## 2017-06-29 RX ADMIN — DORZOLAMIDE HYDROCHLORIDE AND TIMOLOL MALEATE 1 DROP: 20; 5 SOLUTION/ DROPS OPHTHALMIC at 17:28

## 2017-06-29 NOTE — PROGRESS NOTES
NUTRITION follow-up/Plan of care    RECOMMENDATIONS:   1. Dental Soft  2. Monitor weight and PO intake  3. RD to follow    GOALS:   1. Met/Ongoing: PO intake meets >75% of protein/calorie needs by 7/6  2. Met/ Ongoing: Maintain weight (+/- 1-2 lb) by 7/6    ASSESSMENT:   Weight status is classified as obese per BMI of 42.5. Weight stable. PO intake is adequate. Labs noted. BG range from  over past 24 hours. . Nutrition recommendations listed. RD to follow. Nutrition Risk:  []   High []  Moderate [x] Low    SUBJECTIVE/OBJECTIVE:     Visited with pt this AM eating breakfast, usual PO intake 50-75%, diet consistency appropriate fror pt. Some mild chewing problems. Skin intact. Information Obtained From:   [x] Chart Review  [x] Patient  [] Family/Caregiver  [] Nurse/Physician   [] Patient Rounds/Interdisciplinary Meeting    Diet: Dental Soft  No data found. Medications: [] Reviewed   No diagnosis found.   Past Medical History:   Diagnosis Date    Atrial fibrillation ([facility]     Chronic kidney disease     unknown what stage    Diabetes ([facility]      Labs:    Lab Results   Component Value Date/Time    Sodium 140 06/24/2017 04:40 AM    Potassium 4.1 06/24/2017 04:40 AM    Chloride 99 06/24/2017 04:40 AM    CO2 34 06/24/2017 04:40 AM    Anion gap 7 06/24/2017 04:40 AM    Glucose 83 06/24/2017 04:40 AM    BUN 29 06/24/2017 04:40 AM    Creatinine 1.62 06/24/2017 04:40 AM    Calcium 9.0 06/24/2017 04:40 AM    Magnesium 2.3 06/16/2017 02:16 PM    Phosphorus 1.8 06/16/2017 01:00 AM    Albumin 2.8 06/14/2017 01:00 AM     Anthropometrics: BMI (calculated): 42.1   Last 3 Recorded Weights in this Encounter    06/16/17 1821 06/21/17 1321 06/28/17 1013   Weight: 95.5 kg (210 lb 8 oz) 95.5 kg (210 lb 8 oz) 94.7 kg (208 lb 12.8 oz)      Ht Readings from Last 1 Encounters:   06/21/17 4' 11\" (1.499 m)     []  Weight Loss  []  Weight Gain  [x]  Weight Stable   []  New wt n/a on record     Estimated Nutrition Needs: Calories: 1582 Kcal  Protein:   59 g        Nutrition Problems Identified:   [] Suboptimal PO intake   [] Food Allergies  [x] Difficulty chewing/swallowing/poor dentition  [] Constipation/Diarrhea   [] Nausea/Vomiting   [] None  [] Other:     Plan:   [] Therapeutic Diet  []  Obtained/adjusted food preferences/tolerances and/or snacks options   []  Supplements added   [] Occupational therapy following for feeding techniques  []  HS snack added   [x]  Modify diet texture   []  Modify diet for food allergies   []  Educate patient   [x]  Assist with menu selection   [x]  Monitor PO intake on meal rounds   []  Continue inpatient monitoring and intervention   []  Participated in discharge planning/Interdisciplinary rounds/Team meetings   []  Other:     Education Needs:   [] Not appropriate for teaching at this time due to:   [x] Identified and addressed    Nutrition Monitoring and Evaluation:   [x] Continue inpatient monitoring and interventions    [] Other:     Anthony Gunn  Pager: [phone]

## 2017-06-29 NOTE — PROGRESS NOTES
Problem: Mobility Impaired (Adult and Pediatric)  Goal: *Acute Goals and Plan of Care (Insert Text)  PHYSICAL THERAPY STG GOALS :  Initiated 6/19/2017 and to be accomplished within 2 Weeks (updated 6/23/17)    1. Patient will move from supine to sit and sit to supine and roll side to side in bed with minimal assistance/contact guard assist.   (Progressing; mod A)   2. Patient will transfer from bed to chair and chair to bed with minimal assistance/contact guard assist using RW. (Progressing; mod A using Stand pivot transfer)  3. Patient will perform sit to stand with minimal assistance/contact guard assist with Fair balance and safety awareness. (Progressing; varying between mod A to max A x 2 with poor/poor + balance)  4. Patient will ambulate with minimal assistance/contact guard assist for 75 feet with RW on level surfaces with 2 turns. (Progressing; min/mod A x 5 ft using // bars)  5. Patient will ascend/descend 1 stairs with bilateral handrail(s) with moderate assistance to allow for safe home access/exit. (Not addressed)  6. Patient will improve standardized test score for [place] Standing Balance score of 2. (Progressing; 1)      PHYSICAL THERAPY LTG GOALS :  Initiated 6/19/2017 and to be accomplished within 4 Weeks    1. Patient will move from supine to sit and sit to supine and roll side to side in bed with supervision/set-up. 2. Patient will transfer from bed to chair and chair to bed with supervision/set-up using RW. 3. Patient will perform sit to stand with supervision/set-up with Fair+ balance and safety awareness. 4. Patient will ambulate with supervision/set-up for 150 feet with RW on level surfaces and be able to maneuver through narrow spaces and obstacles without loss of balance. 5. Patient will ascend/descend 1 stairs with NO handrail(s) with minimal assistance/contact guard assist to allow for safe home access/exit.   6. Patient will improve standardized test score for [facility] Balance score of 3 for reduced fall risk. Physical Therapist: Kendal Claire, PT on 6/19/2017    [facility]   PHYSICAL THERAPY DAILY TREATMENT NOTE        Patient: Roma Licona (68 y.o. female)               Date: 6/29/2017    Physician: Savannha Shea MD  Primary Diagnosis: Hemorrhagic stroke           Treatment Diagnosis  Treatment Diagnosis: left hemiparesis  Treatment Diagnosis 2: difficulty in walking  Precautions: Fall  Vital Signs  Vital Signs  Temp: 97 °F (36.1 °C)  Temp Source: Oral  Pulse (Heart Rate): 67  Heart Rate Source: Monitor  Resp Rate: 16  O2 Sat (%): 94 %  Level of Consciousness: Alert  BP: 109/67  MAP (Calculated): 81  BP 1 Method: Automatic  BP 1 Location: Right arm  BP Patient Position: At rest;Supine  MEWS Score: 1  Cognitive Status:  Pain  Pain Screen  Pain Scale 1: Numeric (0 - 10)  Pain Intensity 1: 0  Patient Stated Pain Goal: 0  Bed Mobility Training  Bed Mobility Training  Supine to Sit: Minimum assistance; Additional time  Scooting: Minimum assistance; Additional time  Balance  Sitting: With support  Sitting - Static: Fair (occasional)  Sitting - Dynamic: Fair (occasional)  Standing: Pull to stand; With support  Standing - Static: Fair  Standing - Dynamic : Fair (-)  Transfer Training  Transfer Training  Sit to Stand: Moderate assistance;Minimum assistance;Assist x2  Stand to Sit: Minimum assistance;Assist x2  Bed to Chair: Minimum assistance;Assist x2  Interventions: Safety awareness training;Verbal cues; Tactile cues  Sit to Stand: Moderate assistance;Minimum assistance;Assist x2  Gait Training  Gait  Base of Support: Center of gravity altered  Speed/Aura: Pace decreased (<100 feet/min)  Step Length: Right shortened;Left shortened  Gait Abnormalities: Decreased step clearance; Step to gait  Ambulation - Level of Assistance: Minimal assistance  Distance (ft): 6 Feet (ft)  Assistive Device: Gait belt;Walker, rolling  Interventions: Safety awareness training;Verbal cues; Tactile cues  Gait Abnormalities: Decreased step clearance; Step to gait   With 0 turns. Therapeutic Exercise: pt presented supine in bed. Pt with noted improved supine>sit, and only required Min A for upper body and scooting to EOB. Pt instructed in stand-step transfer with use of RW. Following training pt completed transfer with Min A x2, verbal/tactile cues for turning RW and safety. Gait training with abovementioned details and close w/c follow for safety. Therapist applied ace wrap to L knee with ~75% pull for added stability and reduced risk of L knee buckling during ambulation. Upon second attempt at ambulation, pt took ~3-4 steps with noted weakness of L LE and slight L knee buckling. Partial co-treat with OT to allow for toilet transfer training. Pt completed w/c<>toilet transfers with use of grab bar and Min A with verbal cues for sequencing and safety. Pt able to stand with B UE support and CGA for self care. Seated B LE TE rendered to promote strength and endurance for improved functional mobility: LAQ, hip flexion 2x20. Patient/Caregiver Education:   Pt /Caregiver Education on safety and fall prevention with stand-step and toilet transfers was provided to reduce risk of falls. ASSESSMENT:  Patient continues to benefit from Skilled PT services to improve bed mobility, transfers, strength, balance, gait and stair negotiation. Progression toward goals:  [ ]      Improving appropriately and progressing toward goals  [X]      Improving slowly and progressing toward goals  [ ]      Not making progress toward goals and plan of care will be adjusted      Treatment session: 55 minutes.   Therapist:   Christian Mares PTA,          6/29/2017

## 2017-06-29 NOTE — PROGRESS NOTES
Florencio faxed clinical update to Petey Perez RN at [facility] to request an extension of pt's SNF stay.

## 2017-06-29 NOTE — PROGRESS NOTES
Problem: Self Care Deficits Care Plan (Adult)  Goal: *Therapy Goal (Edit Goal, Insert Text)  OCCUPATIONAL THERAPY SHORT TERM GOALS     Updated 6/28/17    1. Patient will perform Upper body ADLs with/without adaptive equipment with moderate assistance . 2. Patient will increase left UE AROM/strength to use minimal assist with min cues. 3. Patient will perform toileting task with maximal assistance with Fair safety to reduce falls risk. 4. Patient will perform functional transfers with and moderate assistance . 5. Patient will perform dynamic sitting balance activities for improved ADL/IADL function with minimal assistance/contact guard assist and Fair balance and safety awarenes. 6. Patient will improve Barthel index scores to at least 30/100 to improve functional mobility. Initiated 6/22/2017 and to be accomplished within 2 Week(s)    1. Patient will perform Upper body ADLs with/without adaptive equipment with moderate assistance .(progressing-currently Max A)  2. Patient will increase left UE AROM/strength to use functional stabilizer assist with min cues. (goal met-UPG)  3. Patient will perform toileting task with maximal assistance with Fair safety to reduce falls risk. (progressing)  4. Patient will perform functional transfers with and moderate assistance . (progressing)  5. Patient will perform dynamic sitting balance activities for improved ADL/IADL function with minimal assistance/contact guard assist and Fair balance and safety awarenes. (progressing)   6. Patient will improve Barthel index scores to at least 30/100 to improve functional mobility. (progressing0      OCCUPATIONAL THERAPY LONG TERM GOALS   Initiated 6/22/2017 and to be accomplished within 4 Week(s)    1. Patient will perform Upper body ADLs with/without adaptive equipment with supervision/set-up. 2. Patient will perform Lower body ADLs with/without adaptive equipment with moderate assistance . 3.  Patient will perform toileting task with minimal assistance/contact guard assist with Good safety to reduce falls risk. 4. Patient will perform functional transfers with supervision/set-up and Good balance and safety awareness. 5. Patient will perform standing static/dynamic activity for improved ADL/IADL function with contact guard an and good safety awareness. 6. Patient will improve Barthel index score to 70/100 to improve independence with mobility. Therapist: Yazmin Dozier OT 6/22/2017   [facility]   OCCUPATIONAL THERAPY DAILY TREATMENT NOTE        Patient: Anali Zhang (89 y.o. female)                         Date: 6/29/2017  Attending Physician: Antonio Jimenez MD  Primary Diagnosis: Hemorrhagic stroke     Treatment Diagnosis  Treatment Diagnosis: left hemiparesis  Treatment Diagnosis 2: difficulty in walking   Precautions : Precautions at Admission: Fall  Vital Signs:  Vital Signs  Temp: 97 °F (36.1 °C)  Temp Source: Oral  Pulse (Heart Rate): 67  Heart Rate Source: Monitor  Resp Rate: 16  O2 Sat (%): 94 %  Level of Consciousness: Alert  BP: 109/67  MAP (Calculated): 81  BP 1 Method: Automatic  BP 1 Location: Right arm  BP Patient Position: At rest;Supine  MEWS Score: 1     Cognitive Status:     Pain:  Pain Screen  Pain Scale 1: Numeric (0 - 10)  Pain Intensity 1: 0  Patient Stated Pain Goal: 0  Pain Scale 1: Numeric (0 - 10)  Gross Assessment:     Coordination:     Bed Mobility:  Bed Mobility  Supine to Sit: Minimum assistance; Additional time  Scooting: Minimum assistance; Additional time  Transfers:  Functional Transfers  Sit to Stand:  Moderate assistance;Minimum assistance;Assist x2  Stand to Sit: Minimum assistance;Assist x2  Bed to Chair: Minimum assistance;Assist x2  Toilet Transfer : Minimum assistance;Assist x2  Functional Transfers  Toilet Transfer : Minimum assistance;Assist x2  Balance:  Balance  Sitting: With support  Sitting - Static: Fair (occasional)  Sitting - Dynamic: Fair (occasional)  Standing: Pull to stand; With support  Standing - Static: Fair  Standing - Dynamic : Fair (-)  ADL Self Care:     ADL Intervention:           Toileting  Toileting Assistance: Maximum assistance  Bowel Hygiene: Maximum assistance  Clothing Management: Maximum assistance                 Therapeutic Activities:  Partially co-treated with PT for optimal functional gains with toileting transfers and routine. Assisted patient with toileting routine in order to assess safety and independence during routine. See above for levels of A needed. Patient demonstrated increased independence with transfer and standing aspects of routine today. Therapeutic Exercises:  UB strengthening with 1#, 2 sets x 7 reps in order to increase L UB muscle strength needed for UB ADLs. Educated patient on self pacing during exercises for optimal strengthening in L UE. Patient verbalized understanding. Patient/Caregiver Education:    Pt. Anibal Jewell Education on see above.         ASSESSMENT:  Patient continues to demonstrate the need for skilled Occupational Therapy services to improve static standing balance needed for toileting  Progression toward goals:  [ ]      Improving appropriately and progressing toward goals  [X]      Improving slowly and progressing toward goals  [ ]      Not making progress toward goals and plan of care will be adjusted      Treatment session:   40 minutes     Therapist:    TYLER Benavidez,  6/29/2017

## 2017-06-30 LAB
GLUCOSE BLD STRIP.AUTO-MCNC: 122 MG/DL (ref 70–110)
GLUCOSE BLD STRIP.AUTO-MCNC: 206 MG/DL (ref 70–110)

## 2017-06-30 PROCEDURE — 74011636637 HC RX REV CODE- 636/637: Performed by: INTERNAL MEDICINE

## 2017-06-30 PROCEDURE — 74011250637 HC RX REV CODE- 250/637: Performed by: INTERNAL MEDICINE

## 2017-06-30 PROCEDURE — 82962 GLUCOSE BLOOD TEST: CPT

## 2017-06-30 RX ADMIN — THIAMINE HCL TAB 100 MG 100 MG: 100 TAB at 08:47

## 2017-06-30 RX ADMIN — DORZOLAMIDE HYDROCHLORIDE AND TIMOLOL MALEATE 1 DROP: 20; 5 SOLUTION/ DROPS OPHTHALMIC at 17:58

## 2017-06-30 RX ADMIN — METFORMIN HYDROCHLORIDE 500 MG: 500 TABLET ORAL at 16:33

## 2017-06-30 RX ADMIN — FUROSEMIDE 20 MG: 20 TABLET ORAL at 08:48

## 2017-06-30 RX ADMIN — SIMVASTATIN 40 MG: 40 TABLET, FILM COATED ORAL at 22:03

## 2017-06-30 RX ADMIN — METFORMIN HYDROCHLORIDE 500 MG: 500 TABLET ORAL at 08:48

## 2017-06-30 RX ADMIN — LEVOTHYROXINE SODIUM 100 MCG: 50 TABLET ORAL at 08:47

## 2017-06-30 RX ADMIN — INSULIN LISPRO 2 UNITS: 100 INJECTION, SOLUTION INTRAVENOUS; SUBCUTANEOUS at 17:55

## 2017-06-30 RX ADMIN — PANTOPRAZOLE SODIUM 40 MG: 40 TABLET, DELAYED RELEASE ORAL at 08:47

## 2017-06-30 RX ADMIN — BETAMETHASONE VALERATE: 1 CREAM TOPICAL at 08:43

## 2017-06-30 RX ADMIN — SPIRONOLACTONE 50 MG: 25 TABLET, FILM COATED ORAL at 08:47

## 2017-06-30 RX ADMIN — FOLIC ACID 1 MG: 1 TABLET ORAL at 08:48

## 2017-06-30 RX ADMIN — DORZOLAMIDE HYDROCHLORIDE AND TIMOLOL MALEATE 1 DROP: 20; 5 SOLUTION/ DROPS OPHTHALMIC at 08:49

## 2017-06-30 RX ADMIN — ALUMINUM HYDROXIDE, MAGNESIUM HYDROXIDE, AND SIMETHICONE 30 ML: 200; 200; 20 SUSPENSION ORAL at 16:33

## 2017-06-30 RX ADMIN — BETAMETHASONE VALERATE: 1 CREAM TOPICAL at 18:02

## 2017-06-30 RX ADMIN — METOPROLOL TARTRATE 50 MG: 50 TABLET ORAL at 08:48

## 2017-06-30 RX ADMIN — Medication 500 MG: at 08:47

## 2017-06-30 RX ADMIN — GABAPENTIN 300 MG: 300 CAPSULE ORAL at 22:04

## 2017-06-30 RX ADMIN — SPIRONOLACTONE 50 MG: 25 TABLET, FILM COATED ORAL at 18:06

## 2017-06-30 RX ADMIN — ZOLPIDEM TARTRATE 5 MG: 5 TABLET, COATED ORAL at 22:03

## 2017-06-30 RX ADMIN — METOPROLOL TARTRATE 50 MG: 50 TABLET ORAL at 18:07

## 2017-06-30 NOTE — PROGRESS NOTES
CATHIE spoke with pt and her 2 daughters re: extension by [employer]. Pt has been extended to 7/6/17. SW discussed pt's d/c disposition with her daughters and the plan remains for pt  to return home at d/c. Milka Cervantes will send updated clinical information on 7/6/17.

## 2017-06-30 NOTE — ROUTINE PROCESS
Bedside and Verbal shift change report given to enrike yoon lpn (oncoming nurse) by richard jimenez (offgoing nurse). Report included the following information SBAR, Kardex and MAR.

## 2017-06-30 NOTE — PROGRESS NOTES
Problem: Mobility Impaired (Adult and Pediatric)  Goal: *Acute Goals and Plan of Care (Insert Text)  PHYSICAL THERAPY STG GOALS :  Initiated 6/19/2017 and to be accomplished within 2 Weeks (updated 6/30/17)    1. Patient will move from supine to sit and sit to supine and roll side to side in bed with minimal assistance/contact guard assist.   (Achieved)   2. Patient will transfer from bed to chair and chair to bed with minimal assistance/contact guard assist using RW. (Achieved with min A)  3. Patient will perform sit to stand with minimal assistance/contact guard assist with Fair balance and safety awareness. (Progressing; varying Shun x 1 to min A x 2)  4. Patient will ambulate with minimal assistance/contact guard assist for 75 feet with RW on level surfaces with 2 turns. (Progressing; CGA/min A x 6 ft with 1 turn)  5. Patient will ascend/descend 1 stairs with bilateral handrail(s) with moderate assistance to allow for safe home access/exit. (Not addressed)  6. Patient will improve standardized test score for [place] Standing Balance score of 2. (Achieved)    PHYSICAL THERAPY STG GOALS :  Initiated 6/19/2017 and to be accomplished within 2 Weeks (updated 6/23/17)    1. Patient will move from supine to sit and sit to supine and roll side to side in bed with minimal assistance/contact guard assist.   (Progressing; mod A)   2. Patient will transfer from bed to chair and chair to bed with minimal assistance/contact guard assist using RW. (Progressing; mod A using Stand pivot transfer)  3. Patient will perform sit to stand with minimal assistance/contact guard assist with Fair balance and safety awareness. (Progressing; varying between mod A to max A x 2 with poor/poor + balance)  4. Patient will ambulate with minimal assistance/contact guard assist for 75 feet with RW on level surfaces with 2 turns. (Progressing; min/mod A x 5 ft using // bars)  5.  Patient will ascend/descend 1 stairs with bilateral handrail(s) with moderate assistance to allow for safe home access/exit. (Not addressed)  6. Patient will improve standardized test score for [place] Standing Balance score of 2. (Progressing; 1)      PHYSICAL THERAPY LTG GOALS :  Initiated 6/19/2017 and to be accomplished within 4 Weeks    1. Patient will move from supine to sit and sit to supine and roll side to side in bed with supervision/set-up. 2. Patient will transfer from bed to chair and chair to bed with supervision/set-up using RW. 3. Patient will perform sit to stand with supervision/set-up with Fair+ balance and safety awareness. 4. Patient will ambulate with supervision/set-up for 150 feet with RW on level surfaces and be able to maneuver through narrow spaces and obstacles without loss of balance. 5. Patient will ascend/descend 1 stairs with NO handrail(s) with minimal assistance/contact guard assist to allow for safe home access/exit. 6. Patient will improve standardized test score for [place] Standing Balance score of 3 for reduced fall risk. Physical Therapist: Neto Rodríguez PT on 6/19/2017    [facility]   PHYSICAL THERAPY WEEKLY PROGRESS REPORT  Reporting Period:  Date:   6/23/17  to 6/30/17        Patient: Brian Verdugo (89 y.o. female)                         Date: 6/30/2017    Primary Diagnosis: Hemorrhagic stroke                         Attending Physician: Samira Rodriguez MD   Treatment Diagnosis  Treatment Diagnosis: left hemiparesis  Treatment Diagnosis 2: difficulty in walking  Precautions:  Fall  Rehab Potential : Good:     Skill interventions and education provided with clinical rationale (include individualized treatment techniques and standardized tests):   Skilled Physical Therapy services were provided with: TE rendered to address strength, endurance, and mobility. TA rendered to address bed mobility and transfers. Neuromuscular re-education rendered to address balance impairments.  Gait training rendered to address gait deficits. Safety awareness techniques to reduce fall risk. Using a comparative statement, summarize significant progress toward goals as a result of skilled intervention provided:  Patient has made Fair progress towards their Physical Therapy goals in the areas of bed mobility, transfers, ambulation/gait training using RW. Pt has achieved 50% of STGs. Identify remaining functional areas, impairments limiting progress and/or barriers to improvement:  Patient would benefit from continues PT services to address the following functional deficits in: balance, endurance/acitivity tolerance, transfers, ambulation using RW. Barriers to improvement include: L hemiparesis, impaired balance. OBJECTIVE DATA SUMMARY:       INITIAL ASSESSMENT WEEKLY ASSESSMENT   COGNITIVE STATUS COGNITIVE STATUS   Neurologic State: Alert  Orientation Level: Oriented X4  Cognition: Follows commands  Perception: Appears intact (Simultaneous filing. User may not have seen previous data.)  Perseveration: No perseveration noted (Simultaneous filing. User may not have seen previous data.)  Safety/Judgement: Fall prevention (Simultaneous filing.  User may not have seen previous data.) Neurologic State: Alert, Appropriate for age  Orientation Level: Oriented X4  Cognition: Follows commands, Appropriate safety awareness, Appropriate for age attention/concentration  Perception: Cues to maintain midline in sitting  Perseveration: No perseveration noted  Safety/Judgement: Awareness of environment, Fall prevention   PAIN PAIN   Pain Scale 1: Numeric (0 - 10)  Pain Intensity 1: 0  Patient Stated Pain Goal: 0 Pain Scale 1: Numeric (0 - 10)  Pain Intensity 1: 0  Patient Stated Pain Goal: 0   GROSS ASSESSMENT GROSS ASSESSMENT   AROM: Generally decreased, functional  PROM: Generally decreased, functional  Strength: Generally decreased, functional (BLEs grossly 3+/5)  Coordination: Generally decreased, functional  Tone: Normal (in BLEs)  Sensation: Intact (to light touch on BLEs) AROM: Generally decreased, functional  PROM: Generally decreased, functional  Strength: Generally decreased, functional (BLEs grossly 3+/5)  Coordination: Generally decreased, functional  Tone: Normal (in BLEs)  Sensation: Intact (to light touch on BLEs)   BED MOBILITY BED MOBILITY   Rolling: Moderate assistance, Additional time, Assist x1  Supine to Sit: Maximum assistance, Additional time, Assist x1  Sit to Supine: Maximum assistance, Assist x2  Scooting: Moderate assistance, Assist x1 Rolling: Moderate assistance  Supine to Sit: Stand-by asssistance  Sit to Supine: Maximum assistance, Assist x1  Scooting: Minimum assistance, Additional time   GAIT GAIT   Base of Support: Center of gravity altered, Narrowed, Shift to right  Speed/Aura: Pace decreased (<100 feet/min)  Step Length: Right shortened, Left shortened  Gait Abnormalities: Decreased step clearance, Step to gait  Ambulation - Level of Assistance: Minimal assistance  Distance (ft): 6 Feet (ft) (x2)  Assistive Device: Other (comment) (at //)  Interventions: Safety awareness training, Verbal cues, Tactile cues, Manual cues Base of Support: Center of gravity altered  Speed/Aura: Delayed  Step Length: Left shortened, Right shortened  Gait Abnormalities: Decreased step clearance  Ambulation - Level of Assistance: Contact guard assistance  Distance (ft): 4 Feet (ft)  Assistive Device: Gait belt, Walker, rolling  Interventions: Safety awareness training, Verbal cues    (unable at this time)  (unable at this time)               TRANSFERS TRANSFERS   Sit to Stand:  Moderate assistance, Assist x2  Stand to Sit: Moderate assistance, Assist x2  Bed to Chair: Moderate assistance, Assist x2 Sit to Stand: Minimum assistance, Assist x2, Additional time  Stand to Sit: Contact guard assistance  Bed to Chair: Minimum assistance, Assist x1, Assist x2   BALANCE BALANCE   Sitting: Impaired, With support  Sitting - Static: Fair (occasional) (-)  Sitting - Dynamic: Poor (constant support) (+)  Standing: Impaired, With support, Pull to stand  Standing - Static: Poor  Standing - Dynamic : Poor Sitting: With support  Sitting - Static: Fair (occasional) (+)  Sitting - Dynamic: Fair (occasional) (+)  Standing: With support, Impaired  Standing - Static: Fair  Standing - Dynamic : Fair (-)   WHEELCHAIR MOBILITY/MGMT WHEELCHAIR MOBILITY/MGMT         Activity Tolerance:  Poor Activity Tolerance: Fair   Visual/Perceptual   Tracking: Able to track stimulus in all quadrants w/o difficulty        Visual/Perceptual   Vision  Tracking: Able to track stimulus in all quadrants w/o difficulty         Auditory:   Auditory Impairment: None      Auditory:   Auditory  Auditory Impairment: None         Steps: none   Clinical Decision making:   Clinical Decision making:        Treatment:   Pt performed bed mobility with improved technique and quality of movement, requiring SBA. Gait training x 5 ft using RW with CGA, no L knee buckling noted with no wrap donned. Dynamic standing balance 2 min 10 sec, 2 min 35 sec with LUE activity. TE rendered to address strength and included: LAQ, marching 20 reps x 2 sets. Pt progressing well. Continue POC. Patient's response to treatment rendered:  Fair+     Patient expected Discharge Location:  [X]Private Residence  [ ] [facility]  [ [facility] Y[facility]ly  [ ]Other:     Plan: Continue Skilled PT services as established by the Plan of Care for 5-6 times a week. PT and Assistant have had a weekly case conference regarding the above treatment:  [X] Yes     [ ] No        Treatment session:  55 minutes. Therapist: Oj Churchill, PT       Date:6/30/2017  Forward to PT for co-signature when completed.

## 2017-06-30 NOTE — ROUTINE PROCESS
Nurse observed patient not wearing her NC. O2 above 90% on room air. Patient denies having any SOB or chest pain.  Will continue to monitor

## 2017-06-30 NOTE — PROGRESS NOTES
Problem: Self Care Deficits Care Plan (Adult)  Goal: *Therapy Goal (Edit Goal, Insert Text)  OCCUPATIONAL THERAPY SHORT TERM GOALS     Updated 6/28/17    1. Patient will perform Upper body ADLs with/without adaptive equipment with moderate assistance . 2. Patient will increase left UE AROM/strength to use minimal assist with min cues. 3. Patient will perform toileting task with maximal assistance with Fair safety to reduce falls risk. 4. Patient will perform functional transfers with and moderate assistance . 5. Patient will perform dynamic sitting balance activities for improved ADL/IADL function with minimal assistance/contact guard assist and Fair balance and safety awarenes. 6. Patient will improve Barthel index scores to at least 30/100 to improve functional mobility. Initiated 6/22/2017 and to be accomplished within 2 Week(s)    1. Patient will perform Upper body ADLs with/without adaptive equipment with moderate assistance .(progressing-currently Max A)  2. Patient will increase left UE AROM/strength to use functional stabilizer assist with min cues. (goal met-UPG)  3. Patient will perform toileting task with maximal assistance with Fair safety to reduce falls risk. (progressing)  4. Patient will perform functional transfers with and moderate assistance . (progressing)  5. Patient will perform dynamic sitting balance activities for improved ADL/IADL function with minimal assistance/contact guard assist and Fair balance and safety awarenes. (progressing)   6. Patient will improve Barthel index scores to at least 30/100 to improve functional mobility. (progressing0      OCCUPATIONAL THERAPY LONG TERM GOALS   Initiated 6/22/2017 and to be accomplished within 4 Week(s)    1. Patient will perform Upper body ADLs with/without adaptive equipment with supervision/set-up. 2. Patient will perform Lower body ADLs with/without adaptive equipment with moderate assistance . 3.  Patient will perform toileting task with minimal assistance/contact guard assist with Good safety to reduce falls risk. 4. Patient will perform functional transfers with supervision/set-up and Good balance and safety awareness. 5. Patient will perform standing static/dynamic activity for improved ADL/IADL function with contact guard an and good safety awareness. 6. Patient will improve Barthel index score to 70/100 to improve independence with mobility. Therapist: Chaparro Dempsey OT 6/22/2017   [facility]   OCCUPATIONAL THERAPY DAILY TREATMENT NOTE        Patient: Rene Hartman (63 y.o. female)                         Date: 6/30/2017  Attending Physician: Jan Vyas MD  Primary Diagnosis: Hemorrhagic stroke     Treatment Diagnosis  Treatment Diagnosis: left hemiparesis  Treatment Diagnosis 2: difficulty in walking   Precautions : Precautions at Admission: Fall  Vital Signs:  Vital Signs  Pulse (Heart Rate): (!) 51  Heart Rate Source: Monitor  Resp Rate: 16  O2 Sat (%): 99 %  Level of Consciousness: Alert  BP: 126/58  MAP (Calculated): 81  BP 1 Method: Automatic  BP 1 Location: Right arm  BP Patient Position: At rest     Cognitive Status:  Mental Status  Neurologic State: Alert; Appropriate for age  Orientation Level: Oriented X4  Pain:  Pain Screen  Pain Scale 1: Numeric (0 - 10)  Pain Intensity 1: 0  Patient Stated Pain Goal: 0  Pain Scale 1: Numeric (0 - 10)  Gross Assessment:     Coordination:     Bed Mobility:  Bed Mobility  Supine to Sit: Stand-by asssistance  Transfers:  Functional Transfers  Sit to Stand: Minimum assistance;Assist x2  Bed to Chair: Minimum assistance;Assist x2     Balance:  Balance  Sitting: With support  Sitting - Static: Fair (occasional) (+)  Sitting - Dynamic: Fair (occasional) (+)  Standing: With support;Pull to stand  Standing - Static: Fair  Standing - Dynamic : Fair (-)  Therapeutic Activities:  Partially co-treated with PT for optimal functional gains with functional mobility utilizing RW. Assisted patient with bed mobility and bed>w/c transfer in order to assess safety and independence during routine. See above for levels of A needed. Patient demonstrated increased independence and performance with transfers utilizing RW today. Therapeutic Exercises:  UB strengthening with 1#, 2 sets x 10 reps in order to increase L UE muscle strength and ROM needed for ADLS. TYLER noticed patient to have increase scapular compensation with shoulder mobility vs. Shoulder. Patient/Caregiver Education:    Pt. Ruthann Szymanski Education on see above.         ASSESSMENT:  Patient continues to demonstrate the need for skilled Occupational Therapy services to improve static standing balance needed for bathing  Progression toward goals:  [X]      Improving appropriately and progressing toward goals  [ ]      Improving slowly and progressing toward goals  [ ]      Not making progress toward goals and plan of care will be adjusted      Treatment session:   40 minutes     Therapist:    TYLER Storey,  6/30/2017

## 2017-06-30 NOTE — ROUTINE PROCESS
Bedside and Verbal shift change report given to [facility] LPN (oncoming nurse) by Cathryne Bernheim, RN (offgoing nurse). Report included the following information SBAR, Kardex and MAR. Pt visually check every hour by staff.

## 2017-07-01 LAB
GLUCOSE BLD STRIP.AUTO-MCNC: 130 MG/DL (ref 70–110)
GLUCOSE BLD STRIP.AUTO-MCNC: 135 MG/DL (ref 70–110)

## 2017-07-01 PROCEDURE — 82962 GLUCOSE BLOOD TEST: CPT

## 2017-07-01 PROCEDURE — 74011250637 HC RX REV CODE- 250/637: Performed by: INTERNAL MEDICINE

## 2017-07-01 RX ADMIN — THIAMINE HCL TAB 100 MG 100 MG: 100 TAB at 09:59

## 2017-07-01 RX ADMIN — SPIRONOLACTONE 50 MG: 25 TABLET, FILM COATED ORAL at 10:00

## 2017-07-01 RX ADMIN — METOPROLOL TARTRATE 50 MG: 50 TABLET ORAL at 17:56

## 2017-07-01 RX ADMIN — GABAPENTIN 300 MG: 300 CAPSULE ORAL at 21:20

## 2017-07-01 RX ADMIN — DORZOLAMIDE HYDROCHLORIDE AND TIMOLOL MALEATE 1 DROP: 20; 5 SOLUTION/ DROPS OPHTHALMIC at 17:56

## 2017-07-01 RX ADMIN — FOLIC ACID 1 MG: 1 TABLET ORAL at 09:59

## 2017-07-01 RX ADMIN — BETAMETHASONE VALERATE: 1 CREAM TOPICAL at 21:22

## 2017-07-01 RX ADMIN — ZOLPIDEM TARTRATE 5 MG: 5 TABLET, COATED ORAL at 21:20

## 2017-07-01 RX ADMIN — SIMVASTATIN 40 MG: 40 TABLET, FILM COATED ORAL at 21:20

## 2017-07-01 RX ADMIN — SPIRONOLACTONE 50 MG: 25 TABLET, FILM COATED ORAL at 17:55

## 2017-07-01 RX ADMIN — METOPROLOL TARTRATE 50 MG: 50 TABLET ORAL at 10:00

## 2017-07-01 RX ADMIN — BETAMETHASONE VALERATE: 1 CREAM TOPICAL at 10:05

## 2017-07-01 RX ADMIN — LEVOTHYROXINE SODIUM 100 MCG: 50 TABLET ORAL at 09:59

## 2017-07-01 RX ADMIN — DORZOLAMIDE HYDROCHLORIDE AND TIMOLOL MALEATE 1 DROP: 20; 5 SOLUTION/ DROPS OPHTHALMIC at 10:04

## 2017-07-01 RX ADMIN — METFORMIN HYDROCHLORIDE 500 MG: 500 TABLET ORAL at 17:54

## 2017-07-01 RX ADMIN — PANTOPRAZOLE SODIUM 40 MG: 40 TABLET, DELAYED RELEASE ORAL at 10:00

## 2017-07-01 RX ADMIN — FUROSEMIDE 20 MG: 20 TABLET ORAL at 10:00

## 2017-07-01 RX ADMIN — METFORMIN HYDROCHLORIDE 500 MG: 500 TABLET ORAL at 09:59

## 2017-07-01 RX ADMIN — Medication 500 MG: at 09:59

## 2017-07-01 NOTE — ROUTINE PROCESS
Bedside and verbal shift report given to GERA Alvarado LPN (on coming nurse) by SHALINI Billings LPN (off going nurse). Report included SBAR, MAR and Kardex.

## 2017-07-01 NOTE — PROGRESS NOTES
Problem: Self Care Deficits Care Plan (Adult)  Goal: *Therapy Goal (Edit Goal, Insert Text)  OCCUPATIONAL THERAPY SHORT TERM GOALS     Updated 6/28/17    1. Patient will perform Upper body ADLs with/without adaptive equipment with moderate assistance . 2. Patient will increase left UE AROM/strength to use minimal assist with min cues. 3. Patient will perform toileting task with maximal assistance with Fair safety to reduce falls risk. 4. Patient will perform functional transfers with and moderate assistance . 5. Patient will perform dynamic sitting balance activities for improved ADL/IADL function with minimal assistance/contact guard assist and Fair balance and safety awarenes. 6. Patient will improve Barthel index scores to at least 30/100 to improve functional mobility. Initiated 6/22/2017 and to be accomplished within 2 Week(s)    1. Patient will perform Upper body ADLs with/without adaptive equipment with moderate assistance .(progressing-currently Max A)  2. Patient will increase left UE AROM/strength to use functional stabilizer assist with min cues. (goal met-UPG)  3. Patient will perform toileting task with maximal assistance with Fair safety to reduce falls risk. (progressing)  4. Patient will perform functional transfers with and moderate assistance . (progressing)  5. Patient will perform dynamic sitting balance activities for improved ADL/IADL function with minimal assistance/contact guard assist and Fair balance and safety awarenes. (progressing)   6. Patient will improve Barthel index scores to at least 30/100 to improve functional mobility. (progressing0      OCCUPATIONAL THERAPY LONG TERM GOALS   Initiated 6/22/2017 and to be accomplished within 4 Week(s)    1. Patient will perform Upper body ADLs with/without adaptive equipment with supervision/set-up. 2. Patient will perform Lower body ADLs with/without adaptive equipment with moderate assistance . 3.  Patient will perform toileting task with minimal assistance/contact guard assist with Good safety to reduce falls risk. 4. Patient will perform functional transfers with supervision/set-up and Good balance and safety awareness. 5. Patient will perform standing static/dynamic activity for improved ADL/IADL function with contact guard an and good safety awareness. 6. Patient will improve Barthel index score to 70/100 to improve independence with mobility. Therapist: Guera Hassan OT 6/22/2017   [facility]   OCCUPATIONAL THERAPY DAILY TREATMENT NOTE        Patient: Susan Kyle (89 y.o. female)                         Date: 7/1/2017  Attending Physician: Daphney Encarnacion MD  Primary Diagnosis: Hemorrhagic stroke     Treatment Diagnosis  Treatment Diagnosis: left hemiparesis  Treatment Diagnosis 2: difficulty in walking   Precautions : Precautions at Admission: Fall  Vital Signs:  Vital Signs  Temp: 97.5 °F (36.4 °C)  Temp Source: Oral  Pulse (Heart Rate): (!) 54  Heart Rate Source: Monitor  Resp Rate: 18  O2 Sat (%): 98 %  Level of Consciousness: Alert  BP: 105/51  MAP (Calculated): 69  BP 1 Method: Automatic  BP 1 Location: Right arm  BP Patient Position: At rest  MEWS Score: 1     Cognitive Status:  Mental Status  Neurologic State: Alert; Appropriate for age  Orientation Level: Oriented X4  Cognition: Appropriate for age attention/concentration  Balance:  Balance  Sitting: With support  Sitting - Static: Fair (occasional) (+)  Sitting - Dynamic: Fair (occasional)  Therapeutic Activities:  FM tasks with emphasis and L UE to increase finger dexterity and hand strength to aid with all fnxl transfers. Therapeutic Exercises:   AROM with L UE 2 x 10 and 1.5 hand gripper 2 reps x 10 to increase L UE strength for ADL carryover. Patient/Caregiver Education:    Pt educated on importance of utilizing L UE for optimal gains during ADL performance.        ASSESSMENT:  Patient continues to demonstrate the need for skilled Occupational Therapy services to improve strength, balance, and endurance.    Progression toward goals:  [X]      Improving appropriately and progressing toward goals  [ ]      Improving slowly and progressing toward goals  [ ]      Not making progress toward goals and plan of care will be adjusted      Treatment session:   50 minutes     Therapist:    TYLER Sanchez,  7/1/2017

## 2017-07-01 NOTE — ROUTINE PROCESS
Bedside and Verbal shift change report given to eboni An lpn (oncoming nurse) by ronaldo Solis lpn (offgoing nurse). Report included the following information SBAR, Kardex and MAR.  Hourly rounds

## 2017-07-02 LAB
GLUCOSE BLD STRIP.AUTO-MCNC: 128 MG/DL (ref 70–110)
GLUCOSE BLD STRIP.AUTO-MCNC: 170 MG/DL (ref 70–110)

## 2017-07-02 PROCEDURE — 74011250637 HC RX REV CODE- 250/637: Performed by: INTERNAL MEDICINE

## 2017-07-02 PROCEDURE — 82962 GLUCOSE BLOOD TEST: CPT

## 2017-07-02 RX ADMIN — DORZOLAMIDE HYDROCHLORIDE AND TIMOLOL MALEATE 1 DROP: 20; 5 SOLUTION/ DROPS OPHTHALMIC at 17:44

## 2017-07-02 RX ADMIN — PANTOPRAZOLE SODIUM 40 MG: 40 TABLET, DELAYED RELEASE ORAL at 09:03

## 2017-07-02 RX ADMIN — SPIRONOLACTONE 50 MG: 25 TABLET, FILM COATED ORAL at 17:45

## 2017-07-02 RX ADMIN — FUROSEMIDE 20 MG: 20 TABLET ORAL at 09:03

## 2017-07-02 RX ADMIN — METFORMIN HYDROCHLORIDE 500 MG: 500 TABLET ORAL at 09:03

## 2017-07-02 RX ADMIN — SIMVASTATIN 40 MG: 40 TABLET, FILM COATED ORAL at 21:17

## 2017-07-02 RX ADMIN — METOPROLOL TARTRATE 50 MG: 50 TABLET ORAL at 09:03

## 2017-07-02 RX ADMIN — Medication 500 MG: at 09:02

## 2017-07-02 RX ADMIN — GABAPENTIN 300 MG: 300 CAPSULE ORAL at 20:59

## 2017-07-02 RX ADMIN — BETAMETHASONE VALERATE: 1 CREAM TOPICAL at 09:03

## 2017-07-02 RX ADMIN — DORZOLAMIDE HYDROCHLORIDE AND TIMOLOL MALEATE 1 DROP: 20; 5 SOLUTION/ DROPS OPHTHALMIC at 09:03

## 2017-07-02 RX ADMIN — FOLIC ACID 1 MG: 1 TABLET ORAL at 09:03

## 2017-07-02 RX ADMIN — BETAMETHASONE VALERATE: 1 CREAM TOPICAL at 17:48

## 2017-07-02 RX ADMIN — ZOLPIDEM TARTRATE 5 MG: 5 TABLET, COATED ORAL at 20:59

## 2017-07-02 RX ADMIN — SPIRONOLACTONE 50 MG: 25 TABLET, FILM COATED ORAL at 09:02

## 2017-07-02 RX ADMIN — METFORMIN HYDROCHLORIDE 500 MG: 500 TABLET ORAL at 17:45

## 2017-07-02 RX ADMIN — LEVOTHYROXINE SODIUM 100 MCG: 50 TABLET ORAL at 09:02

## 2017-07-02 RX ADMIN — THIAMINE HCL TAB 100 MG 100 MG: 100 TAB at 09:03

## 2017-07-02 RX ADMIN — METOPROLOL TARTRATE 50 MG: 50 TABLET ORAL at 17:47

## 2017-07-02 NOTE — ROUTINE PROCESS
Bedside and verbal shift report given to GERA Solis LPN (oncomng nurse) by SHALINI Luna LPN (off going nurse).  Report included SBAR, MAR and Kardex

## 2017-07-03 LAB
ANION GAP BLD CALC-SCNC: 7 MMOL/L (ref 3–18)
BUN SERPL-MCNC: 27 MG/DL (ref 7–18)
BUN/CREAT SERPL: 17 (ref 12–20)
CALCIUM SERPL-MCNC: 8.5 MG/DL (ref 8.5–10.1)
CHLORIDE SERPL-SCNC: 104 MMOL/L (ref 100–108)
CO2 SERPL-SCNC: 30 MMOL/L (ref 21–32)
CREAT SERPL-MCNC: 1.58 MG/DL (ref 0.6–1.3)
GLUCOSE BLD STRIP.AUTO-MCNC: 146 MG/DL (ref 70–110)
GLUCOSE BLD STRIP.AUTO-MCNC: 167 MG/DL (ref 70–110)
GLUCOSE SERPL-MCNC: 121 MG/DL (ref 74–99)
POTASSIUM SERPL-SCNC: 3.9 MMOL/L (ref 3.5–5.5)
SODIUM SERPL-SCNC: 141 MMOL/L (ref 136–145)

## 2017-07-03 PROCEDURE — 74011250637 HC RX REV CODE- 250/637: Performed by: INTERNAL MEDICINE

## 2017-07-03 PROCEDURE — 80048 BASIC METABOLIC PNL TOTAL CA: CPT | Performed by: INTERNAL MEDICINE

## 2017-07-03 PROCEDURE — 36415 COLL VENOUS BLD VENIPUNCTURE: CPT | Performed by: INTERNAL MEDICINE

## 2017-07-03 PROCEDURE — 82962 GLUCOSE BLOOD TEST: CPT

## 2017-07-03 RX ADMIN — DORZOLAMIDE HYDROCHLORIDE AND TIMOLOL MALEATE 1 DROP: 20; 5 SOLUTION/ DROPS OPHTHALMIC at 09:05

## 2017-07-03 RX ADMIN — THIAMINE HCL TAB 100 MG 100 MG: 100 TAB at 08:58

## 2017-07-03 RX ADMIN — LEVOTHYROXINE SODIUM 100 MCG: 50 TABLET ORAL at 08:58

## 2017-07-03 RX ADMIN — METOPROLOL TARTRATE 50 MG: 50 TABLET ORAL at 18:37

## 2017-07-03 RX ADMIN — FOLIC ACID 1 MG: 1 TABLET ORAL at 08:58

## 2017-07-03 RX ADMIN — PANTOPRAZOLE SODIUM 40 MG: 40 TABLET, DELAYED RELEASE ORAL at 09:02

## 2017-07-03 RX ADMIN — ZOLPIDEM TARTRATE 5 MG: 5 TABLET, COATED ORAL at 21:28

## 2017-07-03 RX ADMIN — SPIRONOLACTONE 50 MG: 25 TABLET, FILM COATED ORAL at 08:59

## 2017-07-03 RX ADMIN — SIMVASTATIN 40 MG: 40 TABLET, FILM COATED ORAL at 21:28

## 2017-07-03 RX ADMIN — SPIRONOLACTONE 50 MG: 25 TABLET, FILM COATED ORAL at 18:32

## 2017-07-03 RX ADMIN — FUROSEMIDE 20 MG: 20 TABLET ORAL at 09:02

## 2017-07-03 RX ADMIN — Medication 500 MG: at 08:58

## 2017-07-03 RX ADMIN — METFORMIN HYDROCHLORIDE 500 MG: 500 TABLET ORAL at 08:58

## 2017-07-03 RX ADMIN — METOPROLOL TARTRATE 50 MG: 50 TABLET ORAL at 09:02

## 2017-07-03 RX ADMIN — BETAMETHASONE VALERATE: 1 CREAM TOPICAL at 18:00

## 2017-07-03 RX ADMIN — DORZOLAMIDE HYDROCHLORIDE AND TIMOLOL MALEATE 1 DROP: 20; 5 SOLUTION/ DROPS OPHTHALMIC at 18:00

## 2017-07-03 RX ADMIN — METFORMIN HYDROCHLORIDE 500 MG: 500 TABLET ORAL at 18:33

## 2017-07-03 RX ADMIN — BETAMETHASONE VALERATE: 1 CREAM TOPICAL at 09:05

## 2017-07-03 RX ADMIN — GABAPENTIN 300 MG: 300 CAPSULE ORAL at 21:28

## 2017-07-03 NOTE — PROGRESS NOTES
Problem: Self Care Deficits Care Plan (Adult)  Goal: *Therapy Goal (Edit Goal, Insert Text)  OCCUPATIONAL THERAPY SHORT TERM GOALS     Updated 6/28/17    1. Patient will perform Upper body ADLs with/without adaptive equipment with moderate assistance . 2. Patient will increase left UE AROM/strength to use minimal assist with min cues. 3. Patient will perform toileting task with maximal assistance with Fair safety to reduce falls risk. 4. Patient will perform functional transfers with and moderate assistance . 5. Patient will perform dynamic sitting balance activities for improved ADL/IADL function with minimal assistance/contact guard assist and Fair balance and safety awarenes. 6. Patient will improve Barthel index scores to at least 30/100 to improve functional mobility. Initiated 6/22/2017 and to be accomplished within 2 Week(s)    1. Patient will perform Upper body ADLs with/without adaptive equipment with moderate assistance .(progressing-currently Max A)  2. Patient will increase left UE AROM/strength to use functional stabilizer assist with min cues. (goal met-UPG)  3. Patient will perform toileting task with maximal assistance with Fair safety to reduce falls risk. (progressing)  4. Patient will perform functional transfers with and moderate assistance . (progressing)  5. Patient will perform dynamic sitting balance activities for improved ADL/IADL function with minimal assistance/contact guard assist and Fair balance and safety awarenes. (progressing)   6. Patient will improve Barthel index scores to at least 30/100 to improve functional mobility. (progressing0      OCCUPATIONAL THERAPY LONG TERM GOALS   Initiated 6/22/2017 and to be accomplished within 4 Week(s)    1. Patient will perform Upper body ADLs with/without adaptive equipment with supervision/set-up. 2. Patient will perform Lower body ADLs with/without adaptive equipment with moderate assistance . 3.  Patient will perform toileting task with minimal assistance/contact guard assist with Good safety to reduce falls risk. 4. Patient will perform functional transfers with supervision/set-up and Good balance and safety awareness. 5. Patient will perform standing static/dynamic activity for improved ADL/IADL function with contact guard an and good safety awareness. 6. Patient will improve Barthel index score to 70/100 to improve independence with mobility. Therapist: Mino Vides OT 6/22/2017   [facility]   OCCUPATIONAL THERAPY DAILY TREATMENT NOTE        Patient: Ryan Mark (35 y.o. female)                         Date: 7/3/2017  Attending Physician: Amy Ventura MD  Primary Diagnosis: Hemorrhagic stroke     Treatment Diagnosis  Treatment Diagnosis: left hemiparesis  Treatment Diagnosis 2: difficulty in walking   Precautions : Precautions at Admission: Fall  Vital Signs:  Vital Signs  Pulse (Heart Rate): (!) 54  Heart Rate Source: Monitor  Resp Rate: 16  Level of Consciousness: Alert  BP: 124/68  MAP (Calculated): 87  BP 1 Method: Automatic  BP 1 Location: Right arm  BP Patient Position: At rest;Head of bed elevated (Comment degrees)     Cognitive Status:     Pain:        Gross Assessment:     Coordination:     Bed Mobility:  Bed Mobility  Supine to Sit: Stand-by asssistance  Transfers:  Functional Transfers  Sit to Stand: Maximum assistance  Bed to Chair: Minimum assistance; Additional time     Balance:  Balance  Sitting: With support  Sitting - Static: Fair (occasional)  Sitting - Dynamic: Fair (occasional)  Standing: With support;Pull to stand  Standing - Static: Fair  Standing - Dynamic : Fair (-)  ADL Self Care:     ADL Intervention:     Upper Body Dressing Assistance  Dressing Assistance:  Moderate assistance  Pullover Shirt: Moderate assistance           Lower Body Dressing Assistance  Shoes with Velcro: Maximum assistance           Therapeutic Activities:   Assisted patient with bed mobility, donning house coat and bed <>w/c transfers in order to assess safety and independence during task. See above for levels of A needed. Patient demonstrates increase hiking of L UE when lifting shoulder during UB dressing today. Therapeutic Exercises:  Attempted pulleys in order to increase L shoulder ROM and muscle strength needed for ADLS. Patient compensated more with L elbow vs shoulder during task. UB skate, 3 sets x 10 reps, flexion/extension as well as horizontal abd/add to decrease stiffness in L shoulder. Patient/Caregiver Education:    Oscar Hendricks Education on increasing proper movement of L UE was provided for optimal strengthening and independence with UB ADLS.         ASSESSMENT:  Patient continues to demonstrate the need for skilled Occupational Therapy services to improve L UE ROM with upper body dressing  Progression toward goals:  [X]      Improving appropriately and progressing toward goals  [ ]      Improving slowly and progressing toward goals  [ ]      Not making progress toward goals and plan of care will be adjusted      Treatment session:   40 minutes     Therapist:    Varinder Rodriguez,  7/3/2017

## 2017-07-03 NOTE — ROUTINE PROCESS
Bedside and Verbal shift change report given to A Shakeel NNION (oncoming nurse) by Yeni Roldan RN (offgoing nurse). Report included the following information SBAR, Kardex and MAR.

## 2017-07-03 NOTE — ROUTINE PROCESS
O2:96 on RA. Nasimarcelia denies having any complaints of SOB or difficulty breathing.  Will continue to monitor

## 2017-07-03 NOTE — PROGRESS NOTES
Problem: Mobility Impaired (Adult and Pediatric)  Goal: *Acute Goals and Plan of Care (Insert Text)  PHYSICAL THERAPY STG GOALS :  Initiated 6/19/2017 and to be accomplished within 2 Weeks (updated 6/30/17)    1. Patient will move from supine to sit and sit to supine and roll side to side in bed with minimal assistance/contact guard assist.   (Achieved)   2. Patient will transfer from bed to chair and chair to bed with minimal assistance/contact guard assist using RW. (Achieved with min A)  3. Patient will perform sit to stand with minimal assistance/contact guard assist with Fair balance and safety awareness. (Progressing; varying Shun x 1 to min A x 2)  4. Patient will ambulate with minimal assistance/contact guard assist for 75 feet with RW on level surfaces with 2 turns. (Progressing; CGA/min A x 6 ft with 1 turn)  5. Patient will ascend/descend 1 stairs with bilateral handrail(s) with moderate assistance to allow for safe home access/exit. (Not addressed)  6. Patient will improve standardized test score for [place] Standing Balance score of 2. (Achieved)    PHYSICAL THERAPY STG GOALS :  Initiated 6/19/2017 and to be accomplished within 2 Weeks (updated 6/23/17)    1. Patient will move from supine to sit and sit to supine and roll side to side in bed with minimal assistance/contact guard assist.   (Progressing; mod A)   2. Patient will transfer from bed to chair and chair to bed with minimal assistance/contact guard assist using RW. (Progressing; mod A using Stand pivot transfer)  3. Patient will perform sit to stand with minimal assistance/contact guard assist with Fair balance and safety awareness. (Progressing; varying between mod A to max A x 2 with poor/poor + balance)  4. Patient will ambulate with minimal assistance/contact guard assist for 75 feet with RW on level surfaces with 2 turns. (Progressing; min/mod A x 5 ft using // bars)  5.  Patient will ascend/descend 1 stairs with bilateral handrail(s) with moderate assistance to allow for safe home access/exit. (Not addressed)  6. Patient will improve standardized test score for [place] Standing Balance score of 2. (Progressing; 1)      PHYSICAL THERAPY LTG GOALS :  Initiated 6/19/2017 and to be accomplished within 4 Weeks    1. Patient will move from supine to sit and sit to supine and roll side to side in bed with supervision/set-up. 2. Patient will transfer from bed to chair and chair to bed with supervision/set-up using RW. 3. Patient will perform sit to stand with supervision/set-up with Fair+ balance and safety awareness. 4. Patient will ambulate with supervision/set-up for 150 feet with RW on level surfaces and be able to maneuver through narrow spaces and obstacles without loss of balance. 5. Patient will ascend/descend 1 stairs with NO handrail(s) with minimal assistance/contact guard assist to allow for safe home access/exit. 6. Patient will improve standardized test score for [place] Standing Balance score of 3 for reduced fall risk.      Physical Therapist: Coral Gayle, PT on 6/19/2017    [facility]   PHYSICAL THERAPY DAILY TREATMENT NOTE        Patient: Rene Hartman (89 y.o. female)               Date: 7/3/2017    Physician: Jan Vyas MD  Primary Diagnosis: Hemorrhagic stroke           Treatment Diagnosis  Treatment Diagnosis: left hemiparesis  Treatment Diagnosis 2: difficulty in walking  Precautions: Fall  Vital Signs  Vital Signs  Pulse (Heart Rate): (!) 54  Heart Rate Source: Monitor  Resp Rate: 16  Level of Consciousness: Alert  BP: 124/68  MAP (Calculated): 87  BP 1 Method: Automatic  BP 1 Location: Right arm  BP Patient Position: At rest;Head of bed elevated (Comment degrees)     Cognitive Status:  Mental Status  Neurologic State: Alert  Orientation Level: Oriented X4  Cognition: Appropriate for age attention/concentration  Pain     Bed Mobility Training  Bed Mobility Training  Supine to Sit: Stand-by asssistance  Balance  Sitting: With support  Sitting - Static: Fair (occasional) (+)  Sitting - Dynamic: Fair (occasional) (+)  Standing: With support  Standing - Static: Fair  Standing - Dynamic : Fair  Transfer Training  Transfer Training  Sit to Stand: Maximum assistance;Assist x2  Stand to Sit: Maximum assistance  Bed to Chair: Minimum assistance; Additional time  Sit to Stand: Maximum assistance;Assist x2  Gait Training  Gait  Base of Support: Center of gravity altered  Speed/Aura: Slow  Step Length: Left shortened;Right shortened  Gait Abnormalities: Decreased step clearance  Ambulation - Level of Assistance: Contact guard assistance;Minimal assistance  Distance (ft): 11 Feet (ft) (7)  Assistive Device: Gait belt;Walker, rolling  Interventions: Safety awareness training;Verbal cues     Gait Abnormalities: Decreased step clearance            With 1 turns. Therapeutic Exercise:    Gait training as mentioned above, last attempt ended with patient's L knee buckling, requiring max A to achieve w/c that was closely following. Transfers requiring additional assistance of max A x 2. TE rendered to include TKE in standing, LAQ 15 reps x 2 sets. Patient/Caregiver Education:   Pt /Caregiver Education on safety and fall prevention to reduce fall risk. ASSESSMENT:  Patient continues to benefit from Skilled PT services to improve strength, endurance, gait, balance. Progression toward goals:  [ ]      Improving appropriately and progressing toward goals  [x ]      Improving slowly and progressing toward goals  [ ]      Not making progress toward goals and plan of care will be adjusted      Treatment session: 55 minutes.   Therapist:   Lucina Mendosa, PT,          7/3/2017

## 2017-07-04 LAB
GLUCOSE BLD STRIP.AUTO-MCNC: 126 MG/DL (ref 70–110)
GLUCOSE BLD STRIP.AUTO-MCNC: 145 MG/DL (ref 70–110)

## 2017-07-04 PROCEDURE — 82962 GLUCOSE BLOOD TEST: CPT

## 2017-07-04 PROCEDURE — 74011250637 HC RX REV CODE- 250/637: Performed by: INTERNAL MEDICINE

## 2017-07-04 RX ORDER — LOPERAMIDE HYDROCHLORIDE 2 MG/1
2 CAPSULE ORAL
Status: DISCONTINUED | OUTPATIENT
Start: 2017-07-04 | End: 2017-07-14 | Stop reason: HOSPADM

## 2017-07-04 RX ADMIN — LOPERAMIDE HYDROCHLORIDE 2 MG: 2 CAPSULE ORAL at 14:45

## 2017-07-04 RX ADMIN — DORZOLAMIDE HYDROCHLORIDE AND TIMOLOL MALEATE 1 DROP: 20; 5 SOLUTION/ DROPS OPHTHALMIC at 08:56

## 2017-07-04 RX ADMIN — GABAPENTIN 300 MG: 300 CAPSULE ORAL at 21:13

## 2017-07-04 RX ADMIN — FUROSEMIDE 20 MG: 20 TABLET ORAL at 08:55

## 2017-07-04 RX ADMIN — PANTOPRAZOLE SODIUM 40 MG: 40 TABLET, DELAYED RELEASE ORAL at 08:55

## 2017-07-04 RX ADMIN — SPIRONOLACTONE 50 MG: 25 TABLET, FILM COATED ORAL at 17:43

## 2017-07-04 RX ADMIN — FOLIC ACID 1 MG: 1 TABLET ORAL at 08:55

## 2017-07-04 RX ADMIN — LEVOTHYROXINE SODIUM 100 MCG: 50 TABLET ORAL at 08:55

## 2017-07-04 RX ADMIN — METFORMIN HYDROCHLORIDE 500 MG: 500 TABLET ORAL at 08:55

## 2017-07-04 RX ADMIN — SPIRONOLACTONE 50 MG: 25 TABLET, FILM COATED ORAL at 08:54

## 2017-07-04 RX ADMIN — DORZOLAMIDE HYDROCHLORIDE AND TIMOLOL MALEATE 1 DROP: 20; 5 SOLUTION/ DROPS OPHTHALMIC at 19:39

## 2017-07-04 RX ADMIN — METOPROLOL TARTRATE 50 MG: 50 TABLET ORAL at 08:55

## 2017-07-04 RX ADMIN — SIMVASTATIN 40 MG: 40 TABLET, FILM COATED ORAL at 21:13

## 2017-07-04 RX ADMIN — METFORMIN HYDROCHLORIDE 500 MG: 500 TABLET ORAL at 17:45

## 2017-07-04 RX ADMIN — METOPROLOL TARTRATE 50 MG: 50 TABLET ORAL at 17:45

## 2017-07-04 RX ADMIN — THIAMINE HCL TAB 100 MG 100 MG: 100 TAB at 08:55

## 2017-07-04 RX ADMIN — ZOLPIDEM TARTRATE 5 MG: 5 TABLET, COATED ORAL at 21:13

## 2017-07-04 RX ADMIN — Medication 500 MG: at 08:55

## 2017-07-04 NOTE — PROGRESS NOTES
Problem: Mobility Impaired (Adult and Pediatric)  Goal: *Acute Goals and Plan of Care (Insert Text)  PHYSICAL THERAPY STG GOALS :  Initiated 6/19/2017 and to be accomplished within 2 Weeks (updated 6/30/17)    1. Patient will move from supine to sit and sit to supine and roll side to side in bed with minimal assistance/contact guard assist.   (Achieved)   2. Patient will transfer from bed to chair and chair to bed with minimal assistance/contact guard assist using RW. (Achieved with min A)  3. Patient will perform sit to stand with minimal assistance/contact guard assist with Fair balance and safety awareness. (Progressing; varying Shun x 1 to min A x 2)  4. Patient will ambulate with minimal assistance/contact guard assist for 75 feet with RW on level surfaces with 2 turns. (Progressing; CGA/min A x 6 ft with 1 turn)  5. Patient will ascend/descend 1 stairs with bilateral handrail(s) with moderate assistance to allow for safe home access/exit. (Not addressed)  6. Patient will improve standardized test score for [place] Standing Balance score of 2. (Achieved)    PHYSICAL THERAPY STG GOALS :  Initiated 6/19/2017 and to be accomplished within 2 Weeks (updated 6/23/17)    1. Patient will move from supine to sit and sit to supine and roll side to side in bed with minimal assistance/contact guard assist.   (Progressing; mod A)   2. Patient will transfer from bed to chair and chair to bed with minimal assistance/contact guard assist using RW. (Progressing; mod A using Stand pivot transfer)  3. Patient will perform sit to stand with minimal assistance/contact guard assist with Fair balance and safety awareness. (Progressing; varying between mod A to max A x 2 with poor/poor + balance)  4. Patient will ambulate with minimal assistance/contact guard assist for 75 feet with RW on level surfaces with 2 turns. (Progressing; min/mod A x 5 ft using // bars)  5.  Patient will ascend/descend 1 stairs with bilateral handrail(s) with moderate assistance to allow for safe home access/exit. (Not addressed)  6. Patient will improve standardized test score for [place] Standing Balance score of 2. (Progressing; 1)      PHYSICAL THERAPY LTG GOALS :  Initiated 6/19/2017 and to be accomplished within 4 Weeks    1. Patient will move from supine to sit and sit to supine and roll side to side in bed with supervision/set-up. 2. Patient will transfer from bed to chair and chair to bed with supervision/set-up using RW. 3. Patient will perform sit to stand with supervision/set-up with Fair+ balance and safety awareness. 4. Patient will ambulate with supervision/set-up for 150 feet with RW on level surfaces and be able to maneuver through narrow spaces and obstacles without loss of balance. 5. Patient will ascend/descend 1 stairs with NO handrail(s) with minimal assistance/contact guard assist to allow for safe home access/exit. 6. Patient will improve standardized test score for [place] Standing Balance score of 3 for reduced fall risk. Physical Therapist: Yaron Salomon PT on 6/19/2017    [facility]   PHYSICAL THERAPY DAILY TREATMENT NOTE        Patient: Shirley Mak (52 y.o. female)               Date: 7/4/2017    Physician: Karin Richey MD  Primary Diagnosis: Hemorrhagic stroke           Treatment Diagnosis  Treatment Diagnosis: left hemiparesis  Treatment Diagnosis 2: difficulty in walking  Precautions: Fall  Vital Signs       Cognitive Status:  Mental Status  Neurologic State: Alert; Appropriate for age  Orientation Level: Disoriented to situation  Cognition: Appropriate for age attention/concentration  Pain     Bed Mobility Training  Bed Mobility Training  Rolling: Stand-by asssistance  Supine to Sit: Stand-by asssistance  Sit to Supine:  Moderate assistance  Balance  Sitting: Without support  Sitting - Static: Fair (occasional)  Sitting - Dynamic: Fair (occasional) (-)  Transfer Training        Gait Training                     Therapeutic Exercise:   Session One: Pt initially declined participation this morning as she had not had her bath yet; but then agreeable to tx in room. TE rendered at EOB with 2# ankle weights to include LAQ, resisted HS curls with orange t-band, heel raises, toe raises 20 reps x 2 sets each. Pt expressed her strong desire to d/c home, reporting, \"I had my hopes built on going home this week\", therapist educated pt on need for safe d/c home. Session Two: Therapist spoke with family in an impromptu family meeting with nu Loja and Zohaib José and ashutosh Ying, to educate family on pt's desire to d/c home, assistance that will be required upon return home. Family is not opposed to d/c on 7/14 and have agreed to participate in family training. Patient/Caregiver Education:   Pt /Caregiver Education on safety and fall prevention to reduce fall risk. ASSESSMENT:  Patient continues to benefit from Skilled PT services to improve strength, endurance, and mobility. Progression toward goals:  [ ]      Improving appropriately and progressing toward goals  [X]      Improving slowly and progressing toward goals  [ ]      Not making progress toward goals and plan of care will be adjusted      Treatment session: 55 minutes.   Therapist:   Niharika Tejada, PT,          7/4/2017

## 2017-07-04 NOTE — ROUTINE PROCESS
Patient continues to have soft stools despite offering apple juice and bland items to eat.  Errol LAWS.  [address] orders received   1955: no more reports of having loose stools

## 2017-07-04 NOTE — PROGRESS NOTES
Problem: Self Care Deficits Care Plan (Adult)  Goal: *Therapy Goal (Edit Goal, Insert Text)  OCCUPATIONAL THERAPY SHORT TERM GOALS     Updated 6/28/17    1. Patient will perform Upper body ADLs with/without adaptive equipment with moderate assistance . 2. Patient will increase left UE AROM/strength to use minimal assist with min cues. 3. Patient will perform toileting task with maximal assistance with Fair safety to reduce falls risk. 4. Patient will perform functional transfers with and moderate assistance . 5. Patient will perform dynamic sitting balance activities for improved ADL/IADL function with minimal assistance/contact guard assist and Fair balance and safety awarenes. 6. Patient will improve Barthel index scores to at least 30/100 to improve functional mobility. Initiated 6/22/2017 and to be accomplished within 2 Week(s)    1. Patient will perform Upper body ADLs with/without adaptive equipment with moderate assistance .(progressing-currently Max A)  2. Patient will increase left UE AROM/strength to use functional stabilizer assist with min cues. (goal met-UPG)  3. Patient will perform toileting task with maximal assistance with Fair safety to reduce falls risk. (progressing)  4. Patient will perform functional transfers with and moderate assistance . (progressing)  5. Patient will perform dynamic sitting balance activities for improved ADL/IADL function with minimal assistance/contact guard assist and Fair balance and safety awarenes. (progressing)   6. Patient will improve Barthel index scores to at least 30/100 to improve functional mobility. (progressing0      OCCUPATIONAL THERAPY LONG TERM GOALS   Initiated 6/22/2017 and to be accomplished within 4 Week(s)    1. Patient will perform Upper body ADLs with/without adaptive equipment with supervision/set-up. 2. Patient will perform Lower body ADLs with/without adaptive equipment with moderate assistance . 3.  Patient will perform toileting task with minimal assistance/contact guard assist with Good safety to reduce falls risk. 4. Patient will perform functional transfers with supervision/set-up and Good balance and safety awareness. 5. Patient will perform standing static/dynamic activity for improved ADL/IADL function with contact guard an and good safety awareness. 6. Patient will improve Barthel index score to 70/100 to improve independence with mobility. Therapist: Guera Hassan OT 6/22/2017   [facility]   OCCUPATIONAL THERAPY DAILY TREATMENT NOTE        Patient: Susan Kyle (76 y.o. female)                         Date: 7/4/2017  Attending Physician: Daphney Encarnacion MD  Primary Diagnosis: Hemorrhagic stroke     Treatment Diagnosis  Treatment Diagnosis: left hemiparesis  Treatment Diagnosis 2: difficulty in walking   Precautions : Precautions at Admission: Fall  Vital Signs:  Vital Signs  Pulse (Heart Rate): (!) 56  BP: 127/56     Cognitive Status:  Mental Status  Neurologic State: Alert; Appropriate for age  Orientation Level: Disoriented to situation  Cognition: Appropriate for age attention/concentration  Pain:        Gross Assessment:     Coordination:     Bed Mobility:  Bed Mobility  Rolling: Stand-by asssistance  Supine to Sit: Stand-by asssistance  Sit to Supine: Moderate assistance  Transfers:        Balance:  Balance  Sitting: Without support  Sitting - Static: Fair (occasional)  Sitting - Dynamic: Fair (occasional) (-)     Therapeutic Exercises:  AROM of L UE, focus on shoulder, to increase UB muscle strength as well as ROM for optimal strengthening and decrease hicking during UB ADLS. Patient/Caregiver Education:    PtEdgar Alicea Education on completing ROM without hicking for optimal strengthening and ROM.         ASSESSMENT:  Patient continues to demonstrate the need for skilled Occupational Therapy services to improve static standing balance needed for toileting  Progression toward goals:  [ ]      Improving appropriately and progressing toward goals  [X]      Improving slowly and progressing toward goals  [ ]      Not making progress toward goals and plan of care will be adjusted      Treatment session:   30 minutes     Therapist:    TYLER Grewal,  7/4/2017

## 2017-07-05 LAB
GLUCOSE BLD STRIP.AUTO-MCNC: 116 MG/DL (ref 70–110)
GLUCOSE BLD STRIP.AUTO-MCNC: 181 MG/DL (ref 70–110)

## 2017-07-05 PROCEDURE — 82962 GLUCOSE BLOOD TEST: CPT

## 2017-07-05 PROCEDURE — 74011250637 HC RX REV CODE- 250/637: Performed by: INTERNAL MEDICINE

## 2017-07-05 RX ORDER — GLIMEPIRIDE 2 MG/1
1 TABLET ORAL
Status: DISCONTINUED | OUTPATIENT
Start: 2017-07-06 | End: 2017-07-08

## 2017-07-05 RX ORDER — BETAMETHASONE VALERATE 1.2 MG/G
CREAM TOPICAL
Status: DISCONTINUED | OUTPATIENT
Start: 2017-07-05 | End: 2017-07-14 | Stop reason: HOSPADM

## 2017-07-05 RX ADMIN — LEVOTHYROXINE SODIUM 100 MCG: 50 TABLET ORAL at 09:52

## 2017-07-05 RX ADMIN — DORZOLAMIDE HYDROCHLORIDE AND TIMOLOL MALEATE 1 DROP: 20; 5 SOLUTION/ DROPS OPHTHALMIC at 17:36

## 2017-07-05 RX ADMIN — METOPROLOL TARTRATE 50 MG: 50 TABLET ORAL at 17:36

## 2017-07-05 RX ADMIN — FUROSEMIDE 20 MG: 20 TABLET ORAL at 09:52

## 2017-07-05 RX ADMIN — THIAMINE HCL TAB 100 MG 100 MG: 100 TAB at 09:53

## 2017-07-05 RX ADMIN — FOLIC ACID 1 MG: 1 TABLET ORAL at 09:52

## 2017-07-05 RX ADMIN — Medication 500 MG: at 09:52

## 2017-07-05 RX ADMIN — SPIRONOLACTONE 50 MG: 25 TABLET, FILM COATED ORAL at 17:36

## 2017-07-05 RX ADMIN — ZOLPIDEM TARTRATE 5 MG: 5 TABLET, COATED ORAL at 21:08

## 2017-07-05 RX ADMIN — DORZOLAMIDE HYDROCHLORIDE AND TIMOLOL MALEATE 1 DROP: 20; 5 SOLUTION/ DROPS OPHTHALMIC at 09:54

## 2017-07-05 RX ADMIN — SIMVASTATIN 40 MG: 40 TABLET, FILM COATED ORAL at 21:07

## 2017-07-05 RX ADMIN — GABAPENTIN 300 MG: 300 CAPSULE ORAL at 21:07

## 2017-07-05 RX ADMIN — PANTOPRAZOLE SODIUM 40 MG: 40 TABLET, DELAYED RELEASE ORAL at 09:53

## 2017-07-05 RX ADMIN — BETAMETHASONE VALERATE: 1 CREAM TOPICAL at 09:58

## 2017-07-05 RX ADMIN — METOPROLOL TARTRATE 50 MG: 50 TABLET ORAL at 09:53

## 2017-07-05 RX ADMIN — SPIRONOLACTONE 50 MG: 25 TABLET, FILM COATED ORAL at 09:51

## 2017-07-05 NOTE — PROGRESS NOTES
Sw informed by therapy that pt's daughter would like for pt to be discharged on 7/14/17. SW spoke with pt's daughter earlier and she informed SW that she will be coming in for family training and that they want to make sure things are arranged before pt comes home. She informed SW that she was informed that [facility] will pay for assistance at home. SW informed pt's daughter that [facility] doesn't cover for private duty assistance at home.  SW informed pt's daughter that SW will get back with re: extension request.

## 2017-07-05 NOTE — ROUTINE PROCESS
Bedside and Verbal shift change report given to GERA Solis LPN (oncoming nurse) by HELDER Beckford LPN (offgoing nurse). Report included the following information SBAR, Kardex and MAR. Visial checks completed by nursing staff per faculty protocol.

## 2017-07-05 NOTE — PROGRESS NOTES
I have reviewed this patient's current medication list and recent laboratory results. At this time, I do not suggest any drug therapy adjustments or additional laboratory monitoring. Thank you,  Galen Cowden R.  Ph. M. S.  7/5/2017

## 2017-07-05 NOTE — ROUTINE PROCESS
Bedside and Verbal shift change report given to ronaldo Solis lpn (oncoming nurse) by ronaldo Solis lpn (offgoing nurse). Report included the following information SBAR, Kardex and MAR.  Hourly rounds

## 2017-07-05 NOTE — PROGRESS NOTES
Problem: Mobility Impaired (Adult and Pediatric)  Goal: *Acute Goals and Plan of Care (Insert Text)  PHYSICAL THERAPY STG GOALS :  Initiated 6/19/2017 and to be accomplished within 2 Weeks (updated 6/30/17)    1. Patient will move from supine to sit and sit to supine and roll side to side in bed with minimal assistance/contact guard assist.   (Achieved)   2. Patient will transfer from bed to chair and chair to bed with minimal assistance/contact guard assist using RW. (Achieved with min A)  3. Patient will perform sit to stand with minimal assistance/contact guard assist with Fair balance and safety awareness. (Progressing; varying Shun x 1 to min A x 2)  4. Patient will ambulate with minimal assistance/contact guard assist for 75 feet with RW on level surfaces with 2 turns. (Progressing; CGA/min A x 6 ft with 1 turn)  5. Patient will ascend/descend 1 stairs with bilateral handrail(s) with moderate assistance to allow for safe home access/exit. (Not addressed)  6. Patient will improve standardized test score for [place] Standing Balance score of 2. (Achieved)    PHYSICAL THERAPY STG GOALS :  Initiated 6/19/2017 and to be accomplished within 2 Weeks (updated 6/23/17)    1. Patient will move from supine to sit and sit to supine and roll side to side in bed with minimal assistance/contact guard assist.   (Progressing; mod A)   2. Patient will transfer from bed to chair and chair to bed with minimal assistance/contact guard assist using RW. (Progressing; mod A using Stand pivot transfer)  3. Patient will perform sit to stand with minimal assistance/contact guard assist with Fair balance and safety awareness. (Progressing; varying between mod A to max A x 2 with poor/poor + balance)  4. Patient will ambulate with minimal assistance/contact guard assist for 75 feet with RW on level surfaces with 2 turns. (Progressing; min/mod A x 5 ft using // bars)  5.  Patient will ascend/descend 1 stairs with bilateral handrail(s) with moderate assistance to allow for safe home access/exit. (Not addressed)  6. Patient will improve standardized test score for [place] Standing Balance score of 2. (Progressing; 1)      PHYSICAL THERAPY LTG GOALS :  Initiated 6/19/2017 and to be accomplished within 4 Weeks    1. Patient will move from supine to sit and sit to supine and roll side to side in bed with supervision/set-up. 2. Patient will transfer from bed to chair and chair to bed with supervision/set-up using RW. 3. Patient will perform sit to stand with supervision/set-up with Fair+ balance and safety awareness. 4. Patient will ambulate with supervision/set-up for 150 feet with RW on level surfaces and be able to maneuver through narrow spaces and obstacles without loss of balance. 5. Patient will ascend/descend 1 stairs with NO handrail(s) with minimal assistance/contact guard assist to allow for safe home access/exit. 6. Patient will improve standardized test score for [place] Standing Balance score of 3 for reduced fall risk. Physical Therapist: Simone Mendoza PT on 6/19/2017    [facility]   PHYSICAL THERAPY DAILY TREATMENT NOTE        Patient: Shakeel Colón (54 y.o. female)               Date: 7/5/2017    Physician: Sav Castillo MD  Primary Diagnosis: Hemorrhagic stroke           Treatment Diagnosis  Treatment Diagnosis: left hemiparesis  Treatment Diagnosis 2: difficulty in walking  Precautions: Fall  Vital Signs  Vital Signs  Pulse (Heart Rate): (!) 58  BP: 118/53  Cognitive Status:  Mental Status  Neurologic State: Alert; Appropriate for age  Orientation Level: Oriented X4  Pain  Bed Mobility Training  Bed Mobility Training  Supine to Sit: Stand-by asssistance  Balance  Sitting: With support  Sitting - Static: Fair (occasional)  Sitting - Dynamic: Fair (occasional)  Standing: With support;Pull to stand  Standing - Static: Fair  Standing - Dynamic : Fair  Transfer Training  Transfer Training  Sit to Stand: Maximum assistance;Assist x2  Stand to Sit: Moderate assistance  Bed to Chair: Contact guard assistance  Interventions: Safety awareness training;Verbal cues  Sit to Stand: Maximum assistance;Assist x2  Gait Training  Gait  Base of Support: Center of gravity altered  Speed/Aura: Slow  Step Length: Right shortened;Left shortened  Gait Abnormalities: Decreased step clearance  Ambulation - Level of Assistance: Contact guard assistance;Minimal assistance  Distance (ft): 12 Feet (ft)  Assistive Device: Gait belt;Walker, rolling  Interventions: Safety awareness training;Verbal cues; Tactile cues  Gait Abnormalities: Decreased step clearance    With 1 turns. Therapeutic Exercise: Gait training rendered with abovementioned details and very close w/c follow for safety. Pt required Max a x2 for sit>stand today. Sit>stand attempted with pushing up from w/c. Pt was unable to complete this task safety and required total A. Pt with improved sit>stand with pull-to-stand method as noted above. Static standing balance with B UE support 2x1' with CGA and verbal cues for upright posture. Seated B LE TE rendered to promote strength, endurance and flexibility for improved functional mobility: HR/TR, LAQ, hip flexion, ball squeezes, resisted hip abd, hamstring curls (orange band) 2x20. Attempted standing TKE for L LE. Upon standing, pt with buckling of L knee. TKE not rendered today due to safety concerns. Pt had no report of pain with L knee buckling. Therapist consulted with evaluating therapist in regards to L knee brace. PT requested order for knee brace. Therapist informed pt's daughter. Patient/Caregiver Education:   Pt /Caregiver Education on safety and fall prevention with sit<>stand and ambulation was provided to reduce risk of falls. ASSESSMENT:  Patient continues to benefit from Skilled PT services to improve bed mobility, transfers, balance, strength, gait and step negotiation. Progression toward goals:  [ ]      Improving appropriately and progressing toward goals  [X]      Improving slowly and progressing toward goals  [ ]      Not making progress toward goals and plan of care will be adjusted      Treatment session: 55 minutes.   Therapist:   Albino Mccarty PTA,          7/5/2017

## 2017-07-05 NOTE — PROGRESS NOTES
conducted a Follow up consultation and Spiritual Assessment for Sandi Johnson, who is a 80 y.o.,female. The  provided the following Interventions:  Continued the relationship of care and support. Listened empathically. Offered prayer and assurance of continued prayer on patients behalf. Chart reviewed. The following outcomes were achieved:  Patient expressed gratitude for pastoral care visit. Assessment:  There are no further spiritual or [place] issues which require Spiritual Care Services interventions at this time. Plan:  Chaplains will continue to follow and will provide pastoral care on an as needed/requested basis.  recommends bedside caregivers page  on duty if patient shows signs of acute spiritual or emotional distress.      [address] [facility]   Staff [address]   [phone]

## 2017-07-05 NOTE — PROGRESS NOTES
Problem: Self Care Deficits Care Plan (Adult)  Goal: *Therapy Goal (Edit Goal, Insert Text)  OCCUPATIONAL THERAPY SHORT TERM GOALS     Updated 7/05/17    1. Patient will perform Upper body ADLs with/without adaptive equipment with Min assistance . 2. Patient will increase left UE AROM/strength to use CGA with min cues. 3. Patient will perform toileting task with Mod assistance with Fair safety to reduce falls risk. 4. Patient will perform functional transfers with and moderate assistance. 5. Patient will perform dynamic sitting balance activities for improved ADL/IADL function with SBA and Fair balance and safety awarenes. (goal met-UPG SBA)  6. Patient will improve Barthel index scores to at least 50/100 to improve functional mobility. Updated 6/28/17    1. Patient will perform Upper body ADLs with/without adaptive equipment with moderate assistance . (goal met-UPG Min A)  2. Patient will increase left UE AROM/strength to use minimal assist with min cues. (goal met-UPG CGA)  3. Patient will perform toileting task with maximal assistance with Fair safety to reduce falls risk. (goal met-UPG Mod A)  4. Patient will perform functional transfers with and moderate assistance. (progressing)  5. Patient will perform dynamic sitting balance activities for improved ADL/IADL function with minimal assistance/contact guard assist and Fair balance and safety awarenes. (goal met-UPG SBA)  6. Patient will improve Barthel index scores to at least 30/100 to improve functional mobility. (goal met-UPG 50/100)     Initiated 6/22/2017 and to be accomplished within 2 Week(s)    1. Patient will perform Upper body ADLs with/without adaptive equipment with moderate assistance .(progressing-currently Max A)  2. Patient will increase left UE AROM/strength to use functional stabilizer assist with min cues. (goal met-UPG)  3. Patient will perform toileting task with maximal assistance with Fair safety to reduce falls risk. (progressing)  4. Patient will perform functional transfers with and moderate assistance . (progressing)  5. Patient will perform dynamic sitting balance activities for improved ADL/IADL function with minimal assistance/contact guard assist and Fair balance and safety awarenes. (progressing)   6. Patient will improve Barthel index scores to at least 30/100 to improve functional mobility. (progressing)      OCCUPATIONAL THERAPY LONG TERM GOALS   Initiated 6/22/2017 and to be accomplished within 4 Week(s)    1. Patient will perform Upper body ADLs with/without adaptive equipment with supervision/set-up. 2. Patient will perform Lower body ADLs with/without adaptive equipment with moderate assistance . 3. Patient will perform toileting task with minimal assistance/contact guard assist with Good safety to reduce falls risk. 4. Patient will perform functional transfers with supervision/set-up and Good balance and safety awareness. 5. Patient will perform standing static/dynamic activity for improved ADL/IADL function with contact guard an and good safety awareness. 6. Patient will improve Barthel index score to 70/100 to improve independence with mobility.       Therapist: Sergei Li OT 6/22/2017   [facility]  OCCUPATIONAL THERAPY WEEKLY SUMMARY   Reporting period:  from 6/28/17 through 7/05/17         Patient: Jose Berry (25 y.o. female)                                Date: 7/5/2017    Primary Diagnosis: Hemorrhagic stroke                                     Attending Physician: Lorenzo Madera MD Treatment Diagnosis  Treatment Diagnosis: left hemiparesis  Treatment Diagnosis 2: difficulty in walking  Precautions: Fall  Rehab Potential : [address]     Skill interventions and education provided with clinical rationale (include individualized treatment techniques and standardized tests):  Skilled Occupational services were provided utilizing therapeutic exercises, therapeutic activities, functional mobility, functional transfers, and EC/WS. Using a comparative statement, summarize significant progress toward goals as a result of skilled intervention provided:  Patient has made Good progress towards their Occupational Therapy goals in the following areas: increased independence, performance and L UE ROM needed for grooming, bathing, upper body dressing, lower body dressing, toileting and toilet transfer. Patient has met 5/6 STGS and making good progression towards LTGS. Identify remaining functional areas, impairments limiting progress and/or barriers to improvement:  Patient would benefit from continued skilled Occupational Therapy Services to address the following functional deficits in  decreased static and dynamic standing balance and tolerance needed for safely complete ADL/IADLS. Barriers to improvement are decreased L side weakness. OBJECTIVE DATA SUMMARY:          INITIAL ASSESSMENT WEEKLY PROGRESS   COGNITIVE STATUS: COGNITIVE STATUS:   Neurologic State: Alert  Orientation Level: Oriented X4  Cognition: Follows commands  Perception: Appears intact (Simultaneous filing. User may not have seen previous data.)  Perseveration: No perseveration noted (Simultaneous filing. User may not have seen previous data.)  Safety/Judgement: Fall prevention (Simultaneous filing. User may not have seen previous data.) Neurologic State: Alert, Appropriate for age  Orientation Level: Oriented X4  Cognition: Appropriate for age attention/concentration  Perception: Cues to maintain midline in sitting  Perseveration: No perseveration noted  Safety/Judgement: Awareness of environment, Fall prevention   PAIN: PAIN:   Pain Scale 1: Numeric (0 - 10)  Pain Intensity 1: 0  Patient Stated Pain Goal: 0       Pain Scale 1: Numeric (0 - 10)  Pain Intensity 1: 0  Patient Stated Pain Goal: 0   BED MOBILITY BED MOBILITY   Rolling:  Moderate assistance, Additional time, Assist x1  Supine to Sit: Maximum assistance, Additional time, Assist x1  Sit to Supine: Maximum assistance, Assist x2  Scooting: Moderate assistance, Assist x1 Rolling: Stand-by asssistance  Supine to Sit: Stand-by asssistance  Sit to Supine: Moderate assistance  Scooting: Minimum assistance, Additional time   ADL SELF CARE ADL SELF CARE   Upper Body Dressing: Maximum assistance  Lower Body Dressing: Total assistance  Toileting: Total assistance       Dressing Assistance: Minimum assistance  Pullover Shirt: Minimum assistance  Front Opened Shirt: Maximum assistance           Toileting Assistance: Maximum assistance  Bowel Hygiene: Maximum assistance  Clothing Management: Maximum assistance           Shoes with Velcro: Maximum assistance         TRANSFERS TRANSFERS   Sit to Stand:  Moderate assistance, Assist x2  Stand to Sit: Moderate assistance, Assist x2  Bed to Chair: Moderate assistance, Assist x2  Toilet Transfer : Minimum assistance, Assist x2 Sit to Stand: Maximum assistance  Stand to Sit: Maximum assistance  Bed to Chair: Contact guard assistance  Toilet Transfer : Minimum assistance, Assist x2   Toilet Transfer : Minimum assistance, Assist x2 Toilet Transfer : Minimum assistance, Assist x2   BALANCE BALANCE   Sitting: Impaired, With support  Sitting - Static: Fair (occasional) (-)  Sitting - Dynamic: Poor (constant support) (+)  Standing: Impaired, With support, Pull to stand  Standing - Static: Poor  Standing - Dynamic : Poor Sitting: Without support  Sitting - Static: Fair (occasional)  Sitting - Dynamic: Fair (occasional)  Standing: Pull to stand, With support  Standing - Static: Fair  Standing - Dynamic : Fair         GROSS ASSESSMENT  GROSS ASSESSMENT   AROM: Generally decreased, functional  PROM: Generally decreased, functional  Strength: Generally decreased, functional (BLEs grossly 3+/5)  Coordination: Generally decreased, functional  Tone: Normal (in BLEs)  Sensation: Intact (to light touch on BLEs) AROM: Generally decreased, functional  PROM: Generally decreased, functional  Strength: Generally decreased, functional (BLEs grossly 3+/5)  Coordination: Generally decreased, functional  Tone: Normal (in BLEs)  Sensation: Intact (to light touch on BLEs)   COORDINATION COORDINATION   Fine Motor Skills-Upper: Left Impaired, Right Intact  Gross Motor Skills-Upper: Left Impaired, Right Intact Fine Motor Skills-Upper: Left Impaired, Right Intact  Gross Motor Skills-Upper: Left Impaired, Right Intact   VISUAL/PERCEPTUAL VISUAL/PERCEPTUAL   Tracking: Able to track stimulus in all quadrants w/o difficulty   Tracking: Able to track stimulus in all quadrants w/o difficulty     AUDITORY: AUDITORY:   Auditory Impairment: None Auditory Impairment: None         INSTRUMENTAL  ADL'S:    INSTRUMENTAL ADL'S:            THE BARTHEL INDEX  ACTIVITY    SCORE   FEEDING  0=unable  5=needs help cutting,spreading butter,etc., or modified diet  10= independent    10   BATHING  0=dependent  5=independent (or in shower    0   GROOMING  0=needs help  5=independent face/hair/teeth/shaving (implements provided)    5   DRESSING  0=dependent  5=needs help but can do about half unaided  10=independent(including buttons, zips,laces etc.)    5   BOWELS  0=incontinent  5=occasional accident  10=continent    10   BLADDER  0=incontinent, or catheterized and unable to manage alone  5=occasional accident  10=continent    10   TOILET USE  0=dependent  5=needs some help, but can do something alone  10=independent (on and off, dressing, wiping)    0   TRANSFER (BED TO CHAIR AND BACK)  0=unable, no sitting balance  5=major help(one or two people,physical), can sit  10=minor help(verbal or physical)  15=independent    5   MOBILITY (ON LEVEL SURFACES)  0=immobile or <50 yards  5=wheelchair independent,including corners,>50 yards  10=walkes with help of one person (verbal or physical) >50 yards  15=independent(but may use any aid; for example, stick) >50 yards    0 STAIRS  0=unable  5=needs help (verbal, physical, carrying aid)  10=independent    0             TOTAL:                  45/100      Treatment:  Assisted patient with bed mobility and donning house coat and shoes, seated EOB, in order to assess safety and independence during routine. See above for levels of A needed. Assisted patient with SPT utilizing RW from bed to w/c in order to assess safety and independence during task. No buckling of L knee during transfer today. Functional reach activity with L UE with physical cueing to assist with hicking of shoulder, for optimal strengthening and ROM. Table slides as well as AROM of L UE, focus on shoulder, to increase UB muscle strength as well as ROM for optimal strengthening and decrease hicking during UB ADLS. Patient's response to Treatment rendered:  Patient is pleasant and receptive to therapist cueing and recommendations. Patient expected Discharge Location:  [ [facility]  [ ] [facility]  [ Stephie Bolus  [ ]Other:     Plan: Continue OT services as established on the Plan of Care for 5 times a week.      Treatment Minutes:  40  OT and Assistant have had a weekly case conference regarding the above treatment:  [ Sukhwinder Olivarez Yes     [ ] No    Therapist:   Claudio Alfredo,         Date:7/5/2017      Forward to OT for co-signature when completed

## 2017-07-05 NOTE — PROGRESS NOTES
GIM     Patient: Brian Verdugo MRN: [id number]  CSN: [id number]    YOB: 1929  Age: 80 y.o. Sex: female    DOA: 6/16/2017 LOS:  LOS: 19 days                    Subjective:     Pt with ICH and L hemiparesis. Dm and controlled with metformen but starting to have diarrhea and will d/c and start amaryl back again.  L arm better    Objective:      Visit Vitals    /53    Pulse (!) 58    Temp 98 °F (36.7 °C)    Resp 18    Ht 4' 11\" (1.499 m)    Wt 94.7 kg (208 lb 12.8 oz)    SpO2 99%    Breastfeeding No    BMI 42.17 kg/m2       Physical Exam:  Skin: Skin color, texture, turgor normal. No rashes or lesions or  Chest clear  Cor rr  abd soft  No edema  L hemiplegia noted    Intake and Output:  Current Shift:     Last three shifts:       Recent Results (from the past 24 hour(s))   GLUCOSE, POC    Collection Time: 07/04/17  4:06 PM   Result Value Ref Range    Glucose (POC) 145 (H) 70 - 110 mg/dL   GLUCOSE, POC    Collection Time: 07/05/17  5:49 AM   Result Value Ref Range    Glucose (POC) 116 (H) 70 - 110 mg/dL       Current Facility-Administered Medications   Medication Dose Route Frequency    [START ON 7/6/2017] glimepiride (AMARYL) tablet 1 mg  1 mg Oral ACB    loperamide (IMODIUM) capsule 2 mg  2 mg Oral Q4H PRN    furosemide (LASIX) tablet 20 mg  20 mg Oral DAILY    sodium phosphate (FLEET'S) enema 118 mL  1 Enema Rectal DAILY PRN    pantoprazole (PROTONIX) tablet 40 mg  40 mg Oral ACB    [facility] ANESTHESIA   Other PRN    [facility] EMERGENCY/STAT BOX   Other PRN    alum-mag hydroxide-simeth (MYLANTA) oral suspension 30 mL  30 mL Oral QID PRN    ferrous fumarate-vitamin C 200 mg (65 mg iron)-25 mg TbER 1 Tab (patient's own med)  1 Tab Oral DAILY    linagliptin (TRADJENTA) tablet 5 mg (Patient's own med)  5 mg Oral ACB    albuterol-ipratropium (DUO-NEB) 2.5 MG-0.5 MG/3 ML  3 mL Nebulization Q4H PRN    bisacodyl (DULCOLAX) suppository 10 mg  10 mg Rectal DAILY PRN    insulin lispro (HUMALOG) injection   SubCUTAneous ACB&D    magnesium hydroxide (MILK OF MAGNESIA) 400 mg/5 mL oral suspension 30 mL  30 mL Oral DAILY PRN    dorzolamide-timolol (COSOPT) 22.3-6.8 mg/mL ophthalmic solution 1 Drop  1 Drop Both Eyes BID    ascorbic acid (vitamin C) (VITAMIN C) tablet 500 mg  500 mg Oral DAILY    gabapentin (NEURONTIN) capsule 300 mg  300 mg Oral QHS    metoprolol tartrate (LOPRESSOR) tablet 50 mg  50 mg Oral BID    levothyroxine (SYNTHROID) tablet 100 mcg  100 mcg Oral ACB    spironolactone (ALDACTONE) tablet 50 mg  50 mg Oral BID    folic acid (FOLVITE) tablet 1 mg  1 mg Oral DAILY    Thiamine Mononitrate (B-1) tablet 100 mg  100 mg Oral DAILY    meclizine (ANTIVERT) tablet 25 mg  25 mg Oral QID PRN    zolpidem (AMBIEN) tablet 5 mg  5 mg Oral QHS PRN    betamethasone valerate (VALISONE) 0.1 % cream   Topical BID    simvastatin (ZOCOR) tablet 40 mg  40 mg Oral QHS    Please ask patient to bring in linagliptin. We do not carry in pharmacy. 1 Each Other Rx Dosing/Monitoring       Lab Results   Component Value Date/Time    Glucose 121 07/03/2017 05:09 AM    Glucose 83 06/24/2017 04:40 AM    Glucose 66 06/22/2017 03:50 AM    Glucose 87 06/16/2017 01:00 AM    Glucose 84 06/15/2017 04:35 AM        Assessment/Plan     Active Problems:    * No active hospital problems.  Bronwen Epley, MD  7/5/2017, 10:13 AM

## 2017-07-06 LAB
ANION GAP BLD CALC-SCNC: 10 MMOL/L (ref 3–18)
BUN SERPL-MCNC: 24 MG/DL (ref 7–18)
BUN/CREAT SERPL: 15 (ref 12–20)
CALCIUM SERPL-MCNC: 8.7 MG/DL (ref 8.5–10.1)
CHLORIDE SERPL-SCNC: 101 MMOL/L (ref 100–108)
CO2 SERPL-SCNC: 28 MMOL/L (ref 21–32)
CREAT SERPL-MCNC: 1.63 MG/DL (ref 0.6–1.3)
GLUCOSE BLD STRIP.AUTO-MCNC: 135 MG/DL (ref 70–110)
GLUCOSE BLD STRIP.AUTO-MCNC: 173 MG/DL (ref 70–110)
GLUCOSE SERPL-MCNC: 193 MG/DL (ref 74–99)
POTASSIUM SERPL-SCNC: 3.7 MMOL/L (ref 3.5–5.5)
SODIUM SERPL-SCNC: 139 MMOL/L (ref 136–145)

## 2017-07-06 PROCEDURE — 82962 GLUCOSE BLOOD TEST: CPT

## 2017-07-06 PROCEDURE — 74011250637 HC RX REV CODE- 250/637: Performed by: INTERNAL MEDICINE

## 2017-07-06 PROCEDURE — 36415 COLL VENOUS BLD VENIPUNCTURE: CPT | Performed by: INTERNAL MEDICINE

## 2017-07-06 PROCEDURE — 80048 BASIC METABOLIC PNL TOTAL CA: CPT | Performed by: INTERNAL MEDICINE

## 2017-07-06 RX ADMIN — Medication 500 MG: at 08:33

## 2017-07-06 RX ADMIN — SPIRONOLACTONE 50 MG: 25 TABLET, FILM COATED ORAL at 18:35

## 2017-07-06 RX ADMIN — PANTOPRAZOLE SODIUM 40 MG: 40 TABLET, DELAYED RELEASE ORAL at 08:33

## 2017-07-06 RX ADMIN — THIAMINE HCL TAB 100 MG 100 MG: 100 TAB at 08:33

## 2017-07-06 RX ADMIN — SIMVASTATIN 40 MG: 40 TABLET, FILM COATED ORAL at 21:43

## 2017-07-06 RX ADMIN — LEVOTHYROXINE SODIUM 100 MCG: 50 TABLET ORAL at 08:33

## 2017-07-06 RX ADMIN — GABAPENTIN 300 MG: 300 CAPSULE ORAL at 21:43

## 2017-07-06 RX ADMIN — METOPROLOL TARTRATE 50 MG: 50 TABLET ORAL at 18:36

## 2017-07-06 RX ADMIN — GLIMEPIRIDE 2 MG: 2 TABLET ORAL at 08:33

## 2017-07-06 RX ADMIN — FUROSEMIDE 20 MG: 20 TABLET ORAL at 08:33

## 2017-07-06 RX ADMIN — DORZOLAMIDE HYDROCHLORIDE AND TIMOLOL MALEATE 1 DROP: 20; 5 SOLUTION/ DROPS OPHTHALMIC at 21:44

## 2017-07-06 RX ADMIN — DORZOLAMIDE HYDROCHLORIDE AND TIMOLOL MALEATE 1 DROP: 20; 5 SOLUTION/ DROPS OPHTHALMIC at 08:34

## 2017-07-06 RX ADMIN — ZOLPIDEM TARTRATE 5 MG: 5 TABLET, COATED ORAL at 22:58

## 2017-07-06 RX ADMIN — SPIRONOLACTONE 50 MG: 25 TABLET, FILM COATED ORAL at 08:33

## 2017-07-06 RX ADMIN — FOLIC ACID 1 MG: 1 TABLET ORAL at 08:33

## 2017-07-06 NOTE — ROUTINE PROCESS
Bedside and Verbal shift change report given to ASHLEY Johnson (oncoming nurse) by BLANCA Lagos (offgoing nurse). Report included the following information SBAR, Kardex, MAR, Recent Results and Med Rec Status. Visually Checked on patient hourly.

## 2017-07-06 NOTE — ROUTINE PROCESS
1231: Patient sitting on side of bed with therapy present. Patient c/o of being dizzy, lightheaded and just feeling weak. VS:140/62, 57apical, 96%, 17. Patient stated she had diarrhea the other day very bad. Due to decrease in heart rate of 47 this am metoprolol and timolol eye drops were held, this allowed an increase in heart rate to 61. Notified MD Ramsey about current situation, awaiting further advisement    1240: Call back from [facility] telephone order:[facility] for electrolyte check and continue to monitor at this time.

## 2017-07-06 NOTE — ROUTINE PROCESS
Bedside and Verbal shift change report given to pradip Burroughs rn (oncoming nurse) by ronaldo Solis lpn (offgoing nurse). Report included the following information SBAR, Kardex and MAR.  Hourly rounds

## 2017-07-06 NOTE — PROGRESS NOTES
Problem: Mobility Impaired (Adult and Pediatric)  Goal: *Acute Goals and Plan of Care (Insert Text)  PHYSICAL THERAPY STG GOALS :  Initiated 6/19/2017 and to be accomplished within 2 Weeks (updated 6/30/17)    1. Patient will move from supine to sit and sit to supine and roll side to side in bed with minimal assistance/contact guard assist.   (Achieved)   2. Patient will transfer from bed to chair and chair to bed with minimal assistance/contact guard assist using RW. (Achieved with min A)  3. Patient will perform sit to stand with minimal assistance/contact guard assist with Fair balance and safety awareness. (Progressing; varying Shun x 1 to min A x 2)  4. Patient will ambulate with minimal assistance/contact guard assist for 75 feet with RW on level surfaces with 2 turns. (Progressing; CGA/min A x 6 ft with 1 turn)  5. Patient will ascend/descend 1 stairs with bilateral handrail(s) with moderate assistance to allow for safe home access/exit. (Not addressed)  6. Patient will improve standardized test score for [place] Standing Balance score of 2. (Achieved)    PHYSICAL THERAPY STG GOALS :  Initiated 6/19/2017 and to be accomplished within 2 Weeks (updated 6/23/17)    1. Patient will move from supine to sit and sit to supine and roll side to side in bed with minimal assistance/contact guard assist.   (Progressing; mod A)   2. Patient will transfer from bed to chair and chair to bed with minimal assistance/contact guard assist using RW. (Progressing; mod A using Stand pivot transfer)  3. Patient will perform sit to stand with minimal assistance/contact guard assist with Fair balance and safety awareness. (Progressing; varying between mod A to max A x 2 with poor/poor + balance)  4. Patient will ambulate with minimal assistance/contact guard assist for 75 feet with RW on level surfaces with 2 turns. (Progressing; min/mod A x 5 ft using // bars)  5.  Patient will ascend/descend 1 stairs with bilateral handrail(s) with moderate assistance to allow for safe home access/exit. (Not addressed)  6. Patient will improve standardized test score for [place] Standing Balance score of 2. (Progressing; 1)      PHYSICAL THERAPY LTG GOALS :  Initiated 6/19/2017 and to be accomplished within 4 Weeks    1. Patient will move from supine to sit and sit to supine and roll side to side in bed with supervision/set-up. 2. Patient will transfer from bed to chair and chair to bed with supervision/set-up using RW. 3. Patient will perform sit to stand with supervision/set-up with Fair+ balance and safety awareness. 4. Patient will ambulate with supervision/set-up for 150 feet with RW on level surfaces and be able to maneuver through narrow spaces and obstacles without loss of balance. 5. Patient will ascend/descend 1 stairs with NO handrail(s) with minimal assistance/contact guard assist to allow for safe home access/exit. 6. Patient will improve standardized test score for [place] Standing Balance score of 3 for reduced fall risk. Physical Therapist: Theodore Sarkar PT on 6/19/2017    [facility]   PHYSICAL THERAPY DAILY TREATMENT NOTE        Patient: Susan Kyle (89 y.o. female)               Date: 7/6/2017    Physician: Daphney Encarnacion MD  Primary Diagnosis: Hemorrhagic stroke           Treatment Diagnosis  Treatment Diagnosis: left hemiparesis  Treatment Diagnosis 2: difficulty in walking  Precautions: Fall  Vital Signs  Vital Signs  Pulse (Heart Rate): (!) 57  Heart Rate Source: Monitor  Resp Rate: 16  O2 Sat (%): 96 %  Level of Consciousness: Alert  BP: 140/62  MAP (Calculated): 88  BP 1 Method: Automatic  BP 1 Location: Right arm  BP Patient Position: At rest;Sitting     Cognitive Status:  Mental Status  Neurologic State: Alert; Appropriate for age  Orientation Level: Oriented X4  Cognition: Follows commands; Appropriate safety awareness; Appropriate for age attention/concentration  Pain  Pain Screen  Pain Scale 1: Numeric (0 - 10)  Pain Intensity 1: 0  Patient Stated Pain Goal: 0  Bed Mobility Training  Bed Mobility Training  Supine to Sit: Supervision  Balance  Sitting: With support  Sitting - Static: Fair (occasional)  Sitting - Dynamic: Fair (occasional)  Standing: Pull to stand; With support  Standing - Static: Fair  Standing - Dynamic : Fair  Transfer Training  Transfer Training  Sit to Stand: Moderate assistance;Assist x2; Additional time  Bed to Chair: Contact guard assistance  Sit to Stand: Moderate assistance;Assist x2; Additional time  Gait Training                       Therapeutic Exercise:    Upon presentation, pt reported feeling tired but was agreeable to participation in tx. Nurse presented and assessed vitals, pt c/o dizziness during said assessment; nurse recommended chair level exercise and activities for today. TE rendered to address strength, endurance, and mobility and included: resisted hip add, resisted hip abd with orange t-band, LAQ, marching, heel raises, toe raises 20 reps x 2 sets. Dynamic sitting balance x ~2 minutes with facilitation of reaching using LUE; minimal swaying noted. Patient/Caregiver Education:   Pt /Caregiver Education on safety and fall prevention to reduce fall risk. ASSESSMENT:  Patient continues to benefit from Skilled PT services to improve strength, endurance, mobility. Progression toward goals:  [ ]      Improving appropriately and progressing toward goals  [X]      Improving slowly and progressing toward goals  [ ]      Not making progress toward goals and plan of care will be adjusted      Treatment session: 65 minutes, co-tx with OT.    Therapist:   Niharika Tejada, PT,          7/6/2017

## 2017-07-06 NOTE — PROGRESS NOTES
NUTRITION follow-up/Plan of care    RECOMMENDATIONS:   1. Dental Soft  2. Monitor weight and PO intake  3. RD to follow    GOALS:   1. Ongoing: PO intake meets >75% of protein/calorie needs by 7/13  2. Met/Ongoing: Maintain weight (+/- 1-2 lb) by 7/13    ASSESSMENT:   Weight status is classified as obese per BMI of 42.1. Weight stable. PO intake is adequate. Labs noted. BG range from 116-181 over past 24 hours. . Nutrition recommendations listed. RD to follow. Nutrition Risk:  []   High []  Moderate [x] Low    SUBJECTIVE/OBJECTIVE:   Visited pt, daughters were in room. PO intake remains good for this pt at 50-75%. No complaints voiced. Good family support. Information Obtained From:   [x] Chart Review  [x] Patient  [x] Family/Caregiver  [] Nurse/Physician   [x] Patient Rounds/Interdisciplinary Meeting    Diet: Dental soft  No data found. Medications: [x] Reviewed   No diagnosis found.   Past Medical History:   Diagnosis Date    Atrial fibrillation ([facility]     Chronic kidney disease     unknown what stage    Diabetes ([facility]      Labs:    Lab Results   Component Value Date/Time    Sodium 141 07/03/2017 05:09 AM    Potassium 3.9 07/03/2017 05:09 AM    Chloride 104 07/03/2017 05:09 AM    CO2 30 07/03/2017 05:09 AM    Anion gap 7 07/03/2017 05:09 AM    Glucose 121 07/03/2017 05:09 AM    BUN 27 07/03/2017 05:09 AM    Creatinine 1.58 07/03/2017 05:09 AM    Calcium 8.5 07/03/2017 05:09 AM    Magnesium 2.3 06/16/2017 02:16 PM    Phosphorus 1.8 06/16/2017 01:00 AM    Albumin 2.8 06/14/2017 01:00 AM     Anthropometrics: BMI (calculated): 42.1   Last 3 Recorded Weights in this Encounter    06/21/17 1321 06/28/17 1013 07/04/17 1521   Weight: 95.5 kg (210 lb 8 oz) 94.7 kg (208 lb 12.8 oz) 94.7 kg (208 lb 12.8 oz)      Ht Readings from Last 1 Encounters:   06/21/17 4' 11\" (1.499 m)     []  Weight Loss  []  Weight Gain  [x]  Weight Stable   []  New wt n/a on record     Estimated Nutrition Needs:     Calories: 1582 Kcal  Protein:   59 g        Nutrition Problems Identified:   [] Suboptimal PO intake   [] Food Allergies  [x] Difficulty chewing/swallowing/poor dentition  [] Constipation/Diarrhea   [] Nausea/Vomiting   [] None  [] Other:     Plan:   [] Therapeutic Diet  []  Obtained/adjusted food preferences/tolerances and/or snacks options   [x]  Supplements ordered  [] Occupational therapy following for feeding techniques  []  HS snack added   [x]  Modify diet texture   []  Modify diet for food allergies   []  Educate patient   []  Assist with menu selection   [x]  Monitor PO intake on meal rounds   [x]  Continue inpatient monitoring and intervention   []  Participated in discharge planning/Interdisciplinary rounds/Team meetings   []  Other:     Education Needs:   [] Not appropriate for teaching at this time due to:   [x] Identified and addressed    Nutrition Monitoring and Evaluation:   [x] Continue inpatient monitoring and interventions    [] Other:     Ata Luna  Pager: [phone]

## 2017-07-06 NOTE — PROGRESS NOTES
Problem: Self Care Deficits Care Plan (Adult)  Goal: *Therapy Goal (Edit Goal, Insert Text)  OCCUPATIONAL THERAPY SHORT TERM GOALS     Updated 7/05/17    1. Patient will perform Upper body ADLs with/without adaptive equipment with Min assistance . 2. Patient will increase left UE AROM/strength to use CGA with min cues. 3. Patient will perform toileting task with Mod assistance with Fair safety to reduce falls risk. 4. Patient will perform functional transfers with and moderate assistance. 5. Patient will perform dynamic sitting balance activities for improved ADL/IADL function with SBA and Fair balance and safety awarenes. (goal met-UPG SBA)  6. Patient will improve Barthel index scores to at least 50/100 to improve functional mobility. Updated 6/28/17    1. Patient will perform Upper body ADLs with/without adaptive equipment with moderate assistance . (goal met-UPG Min A)  2. Patient will increase left UE AROM/strength to use minimal assist with min cues. (goal met-UPG CGA)  3. Patient will perform toileting task with maximal assistance with Fair safety to reduce falls risk. (goal met-UPG Mod A)  4. Patient will perform functional transfers with and moderate assistance. (progressing)  5. Patient will perform dynamic sitting balance activities for improved ADL/IADL function with minimal assistance/contact guard assist and Fair balance and safety awarenes. (goal met-UPG SBA)  6. Patient will improve Barthel index scores to at least 30/100 to improve functional mobility. (goal met-UPG 50/100)     Initiated 6/22/2017 and to be accomplished within 2 Week(s)    1. Patient will perform Upper body ADLs with/without adaptive equipment with moderate assistance .(progressing-currently Max A)  2. Patient will increase left UE AROM/strength to use functional stabilizer assist with min cues. (goal met-UPG)  3. Patient will perform toileting task with maximal assistance with Fair safety to reduce falls risk. (progressing)  4. Patient will perform functional transfers with and moderate assistance . (progressing)  5. Patient will perform dynamic sitting balance activities for improved ADL/IADL function with minimal assistance/contact guard assist and Fair balance and safety awarenes. (progressing)   6. Patient will improve Barthel index scores to at least 30/100 to improve functional mobility. (progressing)      OCCUPATIONAL THERAPY LONG TERM GOALS   Initiated 6/22/2017 and to be accomplished within 4 Week(s)    1. Patient will perform Upper body ADLs with/without adaptive equipment with supervision/set-up. 2. Patient will perform Lower body ADLs with/without adaptive equipment with moderate assistance . 3. Patient will perform toileting task with minimal assistance/contact guard assist with Good safety to reduce falls risk. 4. Patient will perform functional transfers with supervision/set-up and Good balance and safety awareness. 5. Patient will perform standing static/dynamic activity for improved ADL/IADL function with contact guard an and good safety awareness. 6. Patient will improve Barthel index score to 70/100 to improve independence with mobility.       Therapist: Phil Garcia OT 6/22/2017   [facility]   OCCUPATIONAL THERAPY DAILY TREATMENT NOTE        Patient: Shirley Mak (81 y.o. female)                         Date: 7/6/2017  Attending Physician: Karin Richey MD  Primary Diagnosis: Hemorrhagic stroke     Treatment Diagnosis  Treatment Diagnosis: left hemiparesis  Treatment Diagnosis 2: difficulty in walking   Precautions : Precautions at Admission: Fall  Vital Signs:  Vital Signs  Pulse (Heart Rate): (!) 57  Heart Rate Source: Monitor  Resp Rate: 16  O2 Sat (%): 96 %  Level of Consciousness: Alert  BP: 140/62  MAP (Calculated): 88  BP 1 Method: Automatic  BP 1 Location: Right arm  BP Patient Position: At rest;Sitting     Cognitive Status:  Mental Status  Neurologic State: Alert; Appropriate for age  Orientation Level: Oriented X4  Cognition: Follows commands; Appropriate safety awareness; Appropriate for age attention/concentration  Pain:  Pain Screen  Pain Scale 1: Numeric (0 - 10)  Pain Intensity 1: 0  Patient Stated Pain Goal: 0  Pain Scale 1: Numeric (0 - 10)  Gross Assessment:     Coordination:     Bed Mobility:  Bed Mobility  Supine to Sit: Supervision  Transfers:  Functional Transfers  Sit to Stand: Moderate assistance;Assist x2; Additional time  Bed to Chair: Contact guard assistance     Balance:  Balance  Sitting: With support  Sitting - Static: Fair (occasional)  Sitting - Dynamic: Fair (occasional)  Standing: Pull to stand; With support  Standing - Static: Fair  Standing - Dynamic : Fair        Therapeutic Activities:  Co-treated with PT for optimal functional gains with dynamic standing balance needed for ADL tasks. Assisted patient with bed mobility a assess safety and independence during task. See above for levels of A needed. Patient reports she doesn't feel well today. Patient's nurse Bobbi George came and assessed patient. Vital signs taken, see flowsheet. Ilda advised to complete seated activities today for optimal safety. Assisted patient with bed>w/c transfers in order to increase independence and performance during task. Patient/Caregiver Education:    Pt. Olivier Smith Education on importance of patient informed therapist if she feels dizzy or lightheaded during activities was provided for optimal safety.        ASSESSMENT:  Patient continues to demonstrate the need for skilled Occupational Therapy services to improve R UE ROM needed for upper body dressing  Progression toward goals:  [ ]      Improving appropriately and progressing toward goals  [X]      Improving slowly and progressing toward goals  [ ]      Not making progress toward goals and plan of care will be adjusted      Treatment session: 30 minutes     Therapist:    Jilda Peabody, COTA,  7/6/2017

## 2017-07-06 NOTE — PROGRESS NOTES
CATHIE faxed clinical update to Madiha Ramirez at [facility] to request an extension of pt's SNF stay.

## 2017-07-07 LAB
GLUCOSE BLD STRIP.AUTO-MCNC: 109 MG/DL (ref 70–110)
GLUCOSE BLD STRIP.AUTO-MCNC: 147 MG/DL (ref 70–110)

## 2017-07-07 PROCEDURE — 74011000250 HC RX REV CODE- 250: Performed by: INTERNAL MEDICINE

## 2017-07-07 PROCEDURE — 74011250637 HC RX REV CODE- 250/637: Performed by: INTERNAL MEDICINE

## 2017-07-07 PROCEDURE — 82962 GLUCOSE BLOOD TEST: CPT

## 2017-07-07 RX ADMIN — SPIRONOLACTONE 50 MG: 25 TABLET, FILM COATED ORAL at 08:13

## 2017-07-07 RX ADMIN — THIAMINE HCL TAB 100 MG 100 MG: 100 TAB at 08:14

## 2017-07-07 RX ADMIN — DORZOLAMIDE HYDROCHLORIDE AND TIMOLOL MALEATE 1 DROP: 20; 5 SOLUTION/ DROPS OPHTHALMIC at 17:16

## 2017-07-07 RX ADMIN — LEVOTHYROXINE SODIUM 100 MCG: 50 TABLET ORAL at 08:13

## 2017-07-07 RX ADMIN — GABAPENTIN 300 MG: 300 CAPSULE ORAL at 22:06

## 2017-07-07 RX ADMIN — FUROSEMIDE 20 MG: 20 TABLET ORAL at 08:14

## 2017-07-07 RX ADMIN — PANTOPRAZOLE SODIUM 40 MG: 40 TABLET, DELAYED RELEASE ORAL at 08:13

## 2017-07-07 RX ADMIN — ZOLPIDEM TARTRATE 5 MG: 5 TABLET, COATED ORAL at 22:06

## 2017-07-07 RX ADMIN — Medication 500 MG: at 08:13

## 2017-07-07 RX ADMIN — GLIMEPIRIDE 1 MG: 2 TABLET ORAL at 08:14

## 2017-07-07 RX ADMIN — SIMVASTATIN 40 MG: 40 TABLET, FILM COATED ORAL at 22:06

## 2017-07-07 RX ADMIN — SPIRONOLACTONE 50 MG: 25 TABLET, FILM COATED ORAL at 17:16

## 2017-07-07 RX ADMIN — FOLIC ACID 1 MG: 1 TABLET ORAL at 08:13

## 2017-07-07 NOTE — ROUTINE PROCESS
Bedside and Verbal shift change report given to pradip Zarate rn (oncoming nurse) by ronaldo Solis lpn (offgoing nurse). Report included the following information SBAR, Kardex and MAR.  Hourly rounds

## 2017-07-07 NOTE — PROGRESS NOTES
Bedside shift change report given to GERA Lema LPN (oncoming nurse) by ASHLEY Rosa, RN (offgoing nurse). Report included the following information SBAR, Kardex and MAR.

## 2017-07-07 NOTE — PROGRESS NOTES
FLORENCIO received a [facility] that [facility] until 7/13/17 with an update due on 7/13/17. FLORENCIO informed pt and her daughters of the extension and that Florencio will send update on 7/13/17. Pt is scheduled for d/c on 7/14/17.

## 2017-07-07 NOTE — PROGRESS NOTES
Problem: Self Care Deficits Care Plan (Adult)  Goal: *Therapy Goal (Edit Goal, Insert Text)  OCCUPATIONAL THERAPY SHORT TERM GOALS     Updated 7/05/17    1. Patient will perform Upper body ADLs with/without adaptive equipment with Min assistance . 2. Patient will increase left UE AROM/strength to use CGA with min cues. 3. Patient will perform toileting task with Mod assistance with Fair safety to reduce falls risk. 4. Patient will perform functional transfers with and moderate assistance. 5. Patient will perform dynamic sitting balance activities for improved ADL/IADL function with SBA and Fair balance and safety awarenes. (goal met-UPG SBA)  6. Patient will improve Barthel index scores to at least 50/100 to improve functional mobility. Updated 6/28/17    1. Patient will perform Upper body ADLs with/without adaptive equipment with moderate assistance . (goal met-UPG Min A)  2. Patient will increase left UE AROM/strength to use minimal assist with min cues. (goal met-UPG CGA)  3. Patient will perform toileting task with maximal assistance with Fair safety to reduce falls risk. (goal met-UPG Mod A)  4. Patient will perform functional transfers with and moderate assistance. (progressing)  5. Patient will perform dynamic sitting balance activities for improved ADL/IADL function with minimal assistance/contact guard assist and Fair balance and safety awarenes. (goal met-UPG SBA)  6. Patient will improve Barthel index scores to at least 30/100 to improve functional mobility. (goal met-UPG 50/100)     Initiated 6/22/2017 and to be accomplished within 2 Week(s)    1. Patient will perform Upper body ADLs with/without adaptive equipment with moderate assistance .(progressing-currently Max A)  2. Patient will increase left UE AROM/strength to use functional stabilizer assist with min cues. (goal met-UPG)  3. Patient will perform toileting task with maximal assistance with Fair safety to reduce falls risk. (progressing)  4. Patient will perform functional transfers with and moderate assistance . (progressing)  5. Patient will perform dynamic sitting balance activities for improved ADL/IADL function with minimal assistance/contact guard assist and Fair balance and safety awarenes. (progressing)   6. Patient will improve Barthel index scores to at least 30/100 to improve functional mobility. (progressing)      OCCUPATIONAL THERAPY LONG TERM GOALS   Initiated 6/22/2017 and to be accomplished within 4 Week(s)    1. Patient will perform Upper body ADLs with/without adaptive equipment with supervision/set-up. 2. Patient will perform Lower body ADLs with/without adaptive equipment with moderate assistance . 3. Patient will perform toileting task with minimal assistance/contact guard assist with Good safety to reduce falls risk. 4. Patient will perform functional transfers with supervision/set-up and Good balance and safety awareness. 5. Patient will perform standing static/dynamic activity for improved ADL/IADL function with contact guard an and good safety awareness. 6. Patient will improve Barthel index score to 70/100 to improve independence with mobility.       Therapist: Devota Aase, OT 6/22/2017   [facility]   OCCUPATIONAL THERAPY DAILY TREATMENT NOTE        Patient: Kan Cowan (11 y.o. female)                         Date: 7/7/2017  Attending Physician: Charlie Villalobos MD  Primary Diagnosis: Hemorrhagic stroke     Treatment Diagnosis  Treatment Diagnosis: left hemiparesis  Treatment Diagnosis 2: difficulty in walking   Precautions : Precautions at Admission: Fall  Vital Signs:  Vital Signs  Pulse (Heart Rate): (!) 57  Heart Rate Source: Apical  Resp Rate: 16  O2 Sat (%): 100 %  Level of Consciousness: Alert  BP: 121/49  MAP (Calculated): 73  BP 1 Method: Automatic  BP 1 Location: Right arm  BP Patient Position: At rest;Supine     Cognitive Status:  Mental Status  Neurologic State: Alert; Appropriate for age  Orientation Level: Oriented X4  Cognition: Appropriate safety awareness; Appropriate for age attention/concentration; Follows commands  Pain:  Pain Screen  Pain Scale 1: Numeric (0 - 10)  Pain Intensity 1: 0  Patient Stated Pain Goal: 0  Pain Scale 1: Numeric (0 - 10)  Gross Assessment:     Coordination:     Bed Mobility:  Bed Mobility  Supine to Sit: Supervision; Additional time  Scooting: Stand-by asssistance  Transfers:  Functional Transfers  Sit to Stand: Maximum assistance  Stand to Sit: Minimum assistance  Bed to Chair: Maximum assistance     Balance:  Balance  Sitting: With support  Sitting - Static: Fair (occasional)  Sitting - Dynamic: Fair (occasional)  Standing: Pull to stand; With support  Standing - Static: Fair  Standing - Dynamic : Fair           Therapeutic Exercises:  UB restorator x 5 mins in order to increase  strengthening as well as UB muscle strength needed for ADLS. Cueing needed for upright posture during exericises to reduce hicking of L UE. Red digiflex, 2 sets x 20 reps in order to increase  strengthening and hand dexerity needed for UB ADLS. Patient/Caregiver Education:    Oscar Mehta Education on see above.         ASSESSMENT:  Patient continues to demonstrate the need for skilled Occupational Therapy services to improve static standing balance needed for toilet transfer  Progression toward goals:  [ ]      Improving appropriately and progressing toward goals  [X]      Improving slowly and progressing toward goals  [ ]      Not making progress toward goals and plan of care will be adjusted      Treatment session:   30 minutes     Therapist:    TYLER Carter,  7/7/2017

## 2017-07-07 NOTE — PROGRESS NOTES
Pt was participating in therapy, when RT came to the room to conduct initial activity interview. Pt family was present. Pt family stated \"All she does is watch TV, she knows when one show comes on and the one after that. \" \"She is focused on getting out of here next week. \" RT educated family members about leisure cabinet and unit activities. RT will follow up with pt and encourage pt to participate in activities of choice.

## 2017-07-07 NOTE — ROUTINE PROCESS
Bedside and Verbal shift change report given to MARY ALICE Lazo (oncoming nurse) by BLANCA Lagos (offgoing nurse). Report included the following information SBAR, Kardex, Intake/Output, MAR and Recent Results.  Visually checked patent hourly

## 2017-07-07 NOTE — PROGRESS NOTES
Follow up with patient initial activity interview. Pt stated \"I just watch this (referring to the TV). \" RT prompted pt inquiring about past leisure interests pt stated \"No not really. \" RT educated pt on leisure cabinet and unit activities. Maintain current leisure interests.

## 2017-07-07 NOTE — PROGRESS NOTES
Problem: Mobility Impaired (Adult and Pediatric)  Goal: *Acute Goals and Plan of Care (Insert Text)  PHYSICAL THERAPY STG GOALS :  Initiated 6/19/2017 and to be accomplished within 2 Weeks (updated 6/30/17)    1. Patient will move from supine to sit and sit to supine and roll side to side in bed with minimal assistance/contact guard assist.   (Achieved)   2. Patient will transfer from bed to chair and chair to bed with minimal assistance/contact guard assist using RW. (Achieved)  3. Patient will perform sit to stand with minimal assistance/contact guard assist with Fair balance and safety awareness. (Progressing; varying Max A-Min A)  4. Patient will ambulate with minimal assistance/contact guard assist for 75 feet with RW on level surfaces with 2 turns. (Progressing; CGA/min A x 12 ft with 1 turn)  5. Patient will ascend/descend 1 stairs with bilateral handrail(s) with moderate assistance to allow for safe home access/exit. (Not addressed)  6. Patient will improve standardized test score for [place] Standing Balance score of 2. (Achieved)    PHYSICAL THERAPY STG GOALS :  Initiated 6/19/2017 and to be accomplished within 2 Weeks (updated 6/23/17)    1. Patient will move from supine to sit and sit to supine and roll side to side in bed with minimal assistance/contact guard assist.   (Progressing; mod A)   2. Patient will transfer from bed to chair and chair to bed with minimal assistance/contact guard assist using RW. (Progressing; mod A using Stand pivot transfer)  3. Patient will perform sit to stand with minimal assistance/contact guard assist with Fair balance and safety awareness. (Progressing; varying between mod A to max A x 2 with poor/poor + balance)  4. Patient will ambulate with minimal assistance/contact guard assist for 75 feet with RW on level surfaces with 2 turns. (Progressing; min/mod A x 5 ft using // bars)  5.  Patient will ascend/descend 1 stairs with bilateral handrail(s) with moderate assistance to allow for safe home access/exit. (Not addressed)  6. Patient will improve standardized test score for [place] Standing Balance score of 2. (Progressing; 1)      PHYSICAL THERAPY LTG GOALS :  Initiated 6/19/2017 and to be accomplished within 4 Weeks    1. Patient will move from supine to sit and sit to supine and roll side to side in bed with supervision/set-up. 2. Patient will transfer from bed to chair and chair to bed with supervision/set-up using RW. 3. Patient will perform sit to stand with supervision/set-up with Fair+ balance and safety awareness. 4. Patient will ambulate with supervision/set-up for 150 feet with RW on level surfaces and be able to maneuver through narrow spaces and obstacles without loss of balance. 5. Patient will ascend/descend 1 stairs with NO handrail(s) with minimal assistance/contact guard assist to allow for safe home access/exit. 6. Patient will improve standardized test score for [place] Standing Balance score of 3 for reduced fall risk.      Physical Therapist: Mejia Phipps PT on 6/19/2017    [facility]   PHYSICAL THERAPY WEEKLY PROGRESS REPORT  Reporting Period:  Date: 6/30/17 to 7/7/17        Patient: Antonio Burton (60 y.o. female)                         Date: 7/7/2017    Primary Diagnosis: Hemorrhagic stroke                         Attending Physician: Mich Mora MD   Treatment Diagnosis  Treatment Diagnosis: left hemiparesis  Treatment Diagnosis 2: difficulty in walking  Precautions:  Fall  Rehab Potential : Fair:     Skill interventions and education provided with clinical rationale (include individualized treatment techniques and standardized tests):   Skilled Physical Therapy services were provided with TA to promote greater independence with bed mobility and transfers, TE to build strength, endurance and flexibility for improved functional mobility, Gait training with use of RW to address deficits and allow greater level of independence and ease on caregivers. Stair training not addressed due to safety concerns and continued B LE weakness with L knee buckling, Neuromuscular reeducation of movement, coordination and balance for reduced risk of falls. Using a comparative statement, summarize significant progress toward goals as a result of skilled intervention provided:  Patient has made Fair progress towards their Physical Therapy goals in the areas of bed mobility, transfers. Identify remaining functional areas, impairments limiting progress and/or barriers to improvement:  Patient would benefit from continues PT services to address the following functional deficits in gait, balance, strength and stair negotiation. Pt limited by continued buckling of L LE that limits ambulation and ability to participate in stair training. OBJECTIVE DATA SUMMARY:       INITIAL ASSESSMENT WEEKLY ASSESSMENT   COGNITIVE STATUS COGNITIVE STATUS   Neurologic State: Alert  Orientation Level: Oriented X4  Cognition: Follows commands  Perception: Appears intact (Simultaneous filing. User may not have seen previous data.)  Perseveration: No perseveration noted (Simultaneous filing. User may not have seen previous data.)  Safety/Judgement: Fall prevention (Simultaneous filing.  User may not have seen previous data.) Neurologic State: Alert, Appropriate for age  Orientation Level: Oriented X4  Cognition: Appropriate safety awareness, Appropriate for age attention/concentration, Follows commands  Perception: Cues to maintain midline in sitting  Perseveration: No perseveration noted  Safety/Judgement: Awareness of environment, Fall prevention   PAIN PAIN   Pain Scale 1: Numeric (0 - 10)  Pain Intensity 1: 0  Patient Stated Pain Goal: 0 Pain Scale 1: Numeric (0 - 10)  Pain Intensity 1: 0  Patient Stated Pain Goal: 0   GROSS ASSESSMENT GROSS ASSESSMENT   AROM: Generally decreased, functional  PROM: Generally decreased, functional  Strength: Generally decreased, functional (BLEs grossly 3+/5)  Coordination: Generally decreased, functional  Tone: Normal (in BLEs)  Sensation: Intact (to light touch on BLEs) AROM: Generally decreased, functional  PROM: Generally decreased, functional  Strength: Generally decreased, functional (BLEs grossly 3+/5)  Coordination: Generally decreased, functional  Tone: Normal (in BLEs)  Sensation: Intact (to light touch on BLEs)   BED MOBILITY BED MOBILITY   Rolling: Moderate assistance, Additional time, Assist x1  Supine to Sit: Maximum assistance, Additional time, Assist x1  Sit to Supine: Maximum assistance, Assist x2  Scooting: Moderate assistance, Assist x1 Rolling: Stand-by asssistance  Supine to Sit: Supervision, Additional time  Sit to Supine: Moderate assistance  Scooting: Stand-by asssistance   GAIT GAIT   Base of Support: Center of gravity altered, Narrowed, Shift to right  Speed/Aura: Pace decreased (<100 feet/min)  Step Length: Right shortened, Left shortened  Gait Abnormalities: Decreased step clearance, Step to gait  Ambulation - Level of Assistance: Minimal assistance  Distance (ft): 6 Feet (ft) (x2)  Assistive Device: Other (comment) (at //)  Interventions: Safety awareness training, Verbal cues, Tactile cues, Manual cues Base of Support: Center of gravity altered  Speed/Aura: Slow  Step Length: Right shortened, Left shortened  Gait Abnormalities: Decreased step clearance  Ambulation - Level of Assistance: Contact guard assistance  Distance (ft): 12 Feet (ft)  Assistive Device: Gait belt, Walker, rolling  Interventions: Safety awareness training, Verbal cues    (unable at this time)  (unable at this time)               TRANSFERS TRANSFERS   Sit to Stand:  Moderate assistance, Assist x2  Stand to Sit: Moderate assistance, Assist x2  Bed to Chair: Moderate assistance, Assist x2 Sit to Stand: Maximum assistance  Stand to Sit: Minimum assistance  Bed to Chair: Maximum assistance   BALANCE BALANCE   Sitting: Impaired, With support  Sitting - Static: Fair (occasional) (-)  Sitting - Dynamic: Poor (constant support) (+)  Standing: Impaired, With support, Pull to stand  Standing - Static: Poor  Standing - Dynamic : Poor Sitting: With support  Sitting - Static: Fair (occasional)  Sitting - Dynamic: Fair (occasional)  Standing: Pull to stand, With support  Standing - Static: Fair  Standing - Dynamic : Fair   WHEELCHAIR MOBILITY/MGMT WHEELCHAIR MOBILITY/MGMT         Activity Tolerance:  Fair Activity Tolerance: Fair   Visual/Perceptual   Tracking: Able to track stimulus in all quadrants w/o difficulty        Visual/Perceptual   Vision  Tracking: Able to track stimulus in all quadrants w/o difficulty         Auditory:   Auditory Impairment: None      Auditory:   Auditory  Auditory Impairment: None         Steps: Not addressed due to safety concerns   Clinical Decision making: [place] standing balance: 2 Clinical Decision making:  [place] standing balance score: 2      Treatment:  Bed mobility assessed. Pt with (S) for supine>sit and rolling, with additional time and effort for supine>sit and scooting at EOB. Pt able to sit with 1 UE support and SBA with no noted LOB. Pt required Max A for sit>stand throughout session today, and stand>sit with Min A and verbal cues for hand placement and to sit slowly for safety. Pt ambulated 12ft with RW and close w/c follow for safety. No noted buckling of L knee today with ambulation. Static standing x3' with CGA and verbal cues for upright posture. Seated B LE TE rendered to promote strength, endurance and flexibility for improved functional mobility: HR/TR, LAQ, hip flexion, resisted hip abd (orange band) ball squeezes 2x20.       Patient's response to treatment rendered:  Fair      Patient expected Discharge Location:  [X]Private Residence  [ ] [facility]Y/ILF  [ [facility]  [ ]Other:     Plan: Continue Skilled PT services as established by the Plan of Care for 6-7 additional sessions with DC planned for 7/14/17     PT and Assistant have had a weekly case conference regarding the above treatment:  [X] Yes     [ ] No        Treatment session:  55 minutes. Therapist: Albino Mccarty PTA       Date:7/7/2017  Forward to PT for co-signature when completed.

## 2017-07-08 LAB
GLUCOSE BLD STRIP.AUTO-MCNC: 105 MG/DL (ref 70–110)
GLUCOSE BLD STRIP.AUTO-MCNC: 115 MG/DL (ref 70–110)
GLUCOSE BLD STRIP.AUTO-MCNC: 67 MG/DL (ref 70–110)

## 2017-07-08 PROCEDURE — 82962 GLUCOSE BLOOD TEST: CPT

## 2017-07-08 PROCEDURE — 74011250637 HC RX REV CODE- 250/637: Performed by: INTERNAL MEDICINE

## 2017-07-08 RX ORDER — GLIMEPIRIDE 1 MG/1
0.5 TABLET ORAL
Status: DISCONTINUED | OUTPATIENT
Start: 2017-07-09 | End: 2017-07-11

## 2017-07-08 RX ADMIN — SIMVASTATIN 40 MG: 40 TABLET, FILM COATED ORAL at 21:56

## 2017-07-08 RX ADMIN — METOPROLOL TARTRATE 50 MG: 50 TABLET ORAL at 09:00

## 2017-07-08 RX ADMIN — DORZOLAMIDE HYDROCHLORIDE AND TIMOLOL MALEATE 1 DROP: 20; 5 SOLUTION/ DROPS OPHTHALMIC at 18:00

## 2017-07-08 RX ADMIN — GABAPENTIN 300 MG: 300 CAPSULE ORAL at 21:56

## 2017-07-08 RX ADMIN — LEVOTHYROXINE SODIUM 100 MCG: 50 TABLET ORAL at 09:37

## 2017-07-08 RX ADMIN — THIAMINE HCL TAB 100 MG 100 MG: 100 TAB at 09:38

## 2017-07-08 RX ADMIN — DORZOLAMIDE HYDROCHLORIDE AND TIMOLOL MALEATE 1 DROP: 20; 5 SOLUTION/ DROPS OPHTHALMIC at 10:40

## 2017-07-08 RX ADMIN — PANTOPRAZOLE SODIUM 40 MG: 40 TABLET, DELAYED RELEASE ORAL at 09:38

## 2017-07-08 RX ADMIN — FUROSEMIDE 20 MG: 20 TABLET ORAL at 09:39

## 2017-07-08 RX ADMIN — SPIRONOLACTONE 50 MG: 25 TABLET, FILM COATED ORAL at 09:37

## 2017-07-08 RX ADMIN — FOLIC ACID 1 MG: 1 TABLET ORAL at 09:37

## 2017-07-08 RX ADMIN — METOPROLOL TARTRATE 50 MG: 50 TABLET ORAL at 18:00

## 2017-07-08 RX ADMIN — Medication 500 MG: at 09:38

## 2017-07-08 RX ADMIN — GLIMEPIRIDE 1 MG: 2 TABLET ORAL at 09:38

## 2017-07-08 RX ADMIN — SPIRONOLACTONE 50 MG: 25 TABLET, FILM COATED ORAL at 17:37

## 2017-07-08 NOTE — ROUTINE PROCESS
Written shift change report given to [address] (oncoming nurse) by Killian Barron RN (offgoing nurse). Report included the following information SBAR, Kardex and Accordion.  24 hour chart check completed and pt visually checked q 1 hour by nursing staff

## 2017-07-08 NOTE — PROGRESS NOTES
Problem: Mobility Impaired (Adult and Pediatric)  Goal: *Acute Goals and Plan of Care (Insert Text)  PHYSICAL THERAPY STG GOALS :  Initiated 6/19/2017 and to be accomplished within 2 Weeks (updated 6/30/17)    1. Patient will move from supine to sit and sit to supine and roll side to side in bed with minimal assistance/contact guard assist.   (Achieved)   2. Patient will transfer from bed to chair and chair to bed with minimal assistance/contact guard assist using RW. (Achieved)  3. Patient will perform sit to stand with minimal assistance/contact guard assist with Fair balance and safety awareness. (Progressing; varying Max A-Min A)  4. Patient will ambulate with minimal assistance/contact guard assist for 75 feet with RW on level surfaces with 2 turns. (Progressing; CGA/min A x 12 ft with 1 turn)  5. Patient will ascend/descend 1 stairs with bilateral handrail(s) with moderate assistance to allow for safe home access/exit. (Not addressed)  6. Patient will improve standardized test score for [place] Standing Balance score of 2. (Achieved)    PHYSICAL THERAPY STG GOALS :  Initiated 6/19/2017 and to be accomplished within 2 Weeks (updated 6/23/17)    1. Patient will move from supine to sit and sit to supine and roll side to side in bed with minimal assistance/contact guard assist.   (Progressing; mod A)   2. Patient will transfer from bed to chair and chair to bed with minimal assistance/contact guard assist using RW. (Progressing; mod A using Stand pivot transfer)  3. Patient will perform sit to stand with minimal assistance/contact guard assist with Fair balance and safety awareness. (Progressing; varying between mod A to max A x 2 with poor/poor + balance)  4. Patient will ambulate with minimal assistance/contact guard assist for 75 feet with RW on level surfaces with 2 turns. (Progressing; min/mod A x 5 ft using // bars)  5.  Patient will ascend/descend 1 stairs with bilateral handrail(s) with moderate assistance to allow for safe home access/exit. (Not addressed)  6. Patient will improve standardized test score for [place] Standing Balance score of 2. (Progressing; 1)      PHYSICAL THERAPY LTG GOALS :  Initiated 6/19/2017 and to be accomplished within 4 Weeks    1. Patient will move from supine to sit and sit to supine and roll side to side in bed with supervision/set-up. 2. Patient will transfer from bed to chair and chair to bed with supervision/set-up using RW. 3. Patient will perform sit to stand with supervision/set-up with Fair+ balance and safety awareness. 4. Patient will ambulate with supervision/set-up for 150 feet with RW on level surfaces and be able to maneuver through narrow spaces and obstacles without loss of balance. 5. Patient will ascend/descend 1 stairs with NO handrail(s) with minimal assistance/contact guard assist to allow for safe home access/exit. 6. Patient will improve standardized test score for [place] Standing Balance score of 3 for reduced fall risk.      Physical Therapist: Wing Stone, PT on 6/19/2017    [facility]   PHYSICAL THERAPY DAILY TREATMENT NOTE        Patient: Stiven Berrios (38 y.o. female)               Date: 7/8/2017    Physician: Dariel King MD  Primary Diagnosis: Hemorrhagic stroke           Treatment Diagnosis  Treatment Diagnosis: left hemiparesis  Treatment Diagnosis 2: difficulty in walking  Precautions: Fall  Vital Signs  Vital Signs  Pulse (Heart Rate): 65  BP: 126/61  MAP (Calculated): 83     Cognitive Status:  Mental Status  Neurologic State: Alert  Orientation Level: Oriented X4  Cognition: Appropriate for age attention/concentration  Pain     Bed Mobility Training  Bed Mobility Training  Supine to Sit: Supervision  Scooting: Stand-by asssistance  Balance  Sitting: With support  Sitting - Static: Fair (occasional)  Sitting - Dynamic: Fair (occasional)  Standing - Static: Fair  Transfer Training  Transfer Training  Sit to Stand: Maximum assistance; Moderate assistance  Stand to Sit: Minimum assistance;Contact guard assistance  Sit to Stand: Maximum assistance; Moderate assistance  Gait Training  Gait  Step Length: Right shortened;Left shortened  Gait Abnormalities: Decreased step clearance  Ambulation - Level of Assistance: Minimal assistance;Contact guard assistance  Distance (ft): 6 Feet (ft)  Assistive Device: Walker, rolling;Gait belt     Gait Abnormalities: Decreased step clearance            With 0 turns. Therapeutic Exercise:   Patient completed seated thera-ex: ankle PF-DF, LAQ's, hip flexion x 20 reps. Sit<->stand x 3 reps with mod-max a of 1; patient was able to maintain static stance with b UE support c ~2.5 minutes, verbal/tactile cues for posture. Transfer and gait training completed, see above for details. Patient/Caregiver Education:   Pt /Caregiver Education on safety and fall prevention was provided to  pt. ASSESSMENT:  Patient continues to benefit from Skilled PT services to improve strength, standing balance/tolerance, (I) with transfers/ambulation  Progression toward goals:  [X]      Improving appropriately and progressing toward goals  [ ]      Improving slowly and progressing toward goals  [ ]      Not making progress toward goals and plan of care will be adjusted      Treatment session: 35 minutes.   Therapist:   Ryan Fagan PT,          7/8/2017

## 2017-07-08 NOTE — PROGRESS NOTES
Problem: Self Care Deficits Care Plan (Adult)  Goal: *Therapy Goal (Edit Goal, Insert Text)  OCCUPATIONAL THERAPY SHORT TERM GOALS     Updated 7/05/17    1. Patient will perform Upper body ADLs with/without adaptive equipment with Min assistance . 2. Patient will increase left UE AROM/strength to use CGA with min cues. 3. Patient will perform toileting task with Mod assistance with Fair safety to reduce falls risk. 4. Patient will perform functional transfers with and moderate assistance. 5. Patient will perform dynamic sitting balance activities for improved ADL/IADL function with SBA and Fair balance and safety awarenes. (goal met-UPG SBA)  6. Patient will improve Barthel index scores to at least 50/100 to improve functional mobility. Updated 6/28/17    1. Patient will perform Upper body ADLs with/without adaptive equipment with moderate assistance . (goal met-UPG Min A)  2. Patient will increase left UE AROM/strength to use minimal assist with min cues. (goal met-UPG CGA)  3. Patient will perform toileting task with maximal assistance with Fair safety to reduce falls risk. (goal met-UPG Mod A)  4. Patient will perform functional transfers with and moderate assistance. (progressing)  5. Patient will perform dynamic sitting balance activities for improved ADL/IADL function with minimal assistance/contact guard assist and Fair balance and safety awarenes. (goal met-UPG SBA)  6. Patient will improve Barthel index scores to at least 30/100 to improve functional mobility. (goal met-UPG 50/100)     Initiated 6/22/2017 and to be accomplished within 2 Week(s)    1. Patient will perform Upper body ADLs with/without adaptive equipment with moderate assistance .(progressing-currently Max A)  2. Patient will increase left UE AROM/strength to use functional stabilizer assist with min cues. (goal met-UPG)  3. Patient will perform toileting task with maximal assistance with Fair safety to reduce falls risk. (progressing)  4. Patient will perform functional transfers with and moderate assistance . (progressing)  5. Patient will perform dynamic sitting balance activities for improved ADL/IADL function with minimal assistance/contact guard assist and Fair balance and safety awarenes. (progressing)   6. Patient will improve Barthel index scores to at least 30/100 to improve functional mobility. (progressing)      OCCUPATIONAL THERAPY LONG TERM GOALS   Initiated 6/22/2017 and to be accomplished within 4 Week(s)    1. Patient will perform Upper body ADLs with/without adaptive equipment with supervision/set-up. 2. Patient will perform Lower body ADLs with/without adaptive equipment with moderate assistance . 3. Patient will perform toileting task with minimal assistance/contact guard assist with Good safety to reduce falls risk. 4. Patient will perform functional transfers with supervision/set-up and Good balance and safety awareness. 5. Patient will perform standing static/dynamic activity for improved ADL/IADL function with contact guard an and good safety awareness. 6. Patient will improve Barthel index score to 70/100 to improve independence with mobility.       Therapist: Shira Morris OT 6/22/2017   [facility]   OCCUPATIONAL THERAPY DAILY TREATMENT NOTE        Patient: Santosh Knapp (54 y.o. female)                         Date: 7/8/2017  Attending Physician: Devendra Nagel MD  Primary Diagnosis: Hemorrhagic stroke     Treatment Diagnosis  Treatment Diagnosis: left hemiparesis  Treatment Diagnosis 2: difficulty in walking   Precautions : Precautions at Admission: Fall  Bed Mobility:  Bed Mobility  Supine to Sit: Supervision  Scooting: Stand-by asssistance  Balance:  Balance  Sitting: With support  Sitting - Static: Fair (occasional)  Sitting - Dynamic: Fair (occasional)  Therapeutic Activities:  Pt demo mult FM tasks with  LUE to increase hand strength and finger dexterity needed for ADL tasks.   Therapeutic Exercises:    AAROM 2 x 10 with L UE and 1.5 hand gripper x 25 to increase L UE/hand strength and ROM for improved fnxl performance with UB ADL's. Patient/Caregiver Education:    Pt educated on importance of performing UE HEP throughout day for increased strength and ROM. ASSESSMENT:  Patient continues to demonstrate the need for skilled Occupational Therapy services to improve strength and balance. Pt is motivated to return home.    Progression toward goals:  [X]      Improving appropriately and progressing toward goals  [ ]      Improving slowly and progressing toward goals  [ ]      Not making progress toward goals and plan of care will be adjusted      Treatment session:   35 minutes     Therapist:    TYLER Uriarte,  7/8/2017

## 2017-07-08 NOTE — PROGRESS NOTES
Blood sugar 67, pt alert and verbally responsive, skin warm and dry, resp even and unlabored, snack given, blood sugar recheck 115

## 2017-07-09 LAB
GLUCOSE BLD STRIP.AUTO-MCNC: 149 MG/DL (ref 70–110)
GLUCOSE BLD STRIP.AUTO-MCNC: 84 MG/DL (ref 70–110)

## 2017-07-09 PROCEDURE — 74011250637 HC RX REV CODE- 250/637: Performed by: INTERNAL MEDICINE

## 2017-07-09 PROCEDURE — 82962 GLUCOSE BLOOD TEST: CPT

## 2017-07-09 RX ADMIN — THIAMINE HCL TAB 100 MG 100 MG: 100 TAB at 08:57

## 2017-07-09 RX ADMIN — FOLIC ACID 1 MG: 1 TABLET ORAL at 08:57

## 2017-07-09 RX ADMIN — METOPROLOL TARTRATE 50 MG: 50 TABLET ORAL at 18:12

## 2017-07-09 RX ADMIN — SPIRONOLACTONE 50 MG: 25 TABLET, FILM COATED ORAL at 08:56

## 2017-07-09 RX ADMIN — Medication 500 MG: at 08:57

## 2017-07-09 RX ADMIN — FUROSEMIDE 20 MG: 20 TABLET ORAL at 08:56

## 2017-07-09 RX ADMIN — ZOLPIDEM TARTRATE 5 MG: 5 TABLET, COATED ORAL at 21:33

## 2017-07-09 RX ADMIN — DORZOLAMIDE HYDROCHLORIDE AND TIMOLOL MALEATE 1 DROP: 20; 5 SOLUTION/ DROPS OPHTHALMIC at 08:55

## 2017-07-09 RX ADMIN — LEVOTHYROXINE SODIUM 100 MCG: 50 TABLET ORAL at 08:56

## 2017-07-09 RX ADMIN — GLIMEPIRIDE 0.5 MG: 1 TABLET ORAL at 08:57

## 2017-07-09 RX ADMIN — SIMVASTATIN 40 MG: 40 TABLET, FILM COATED ORAL at 21:33

## 2017-07-09 RX ADMIN — GABAPENTIN 300 MG: 300 CAPSULE ORAL at 21:33

## 2017-07-09 RX ADMIN — SPIRONOLACTONE 50 MG: 25 TABLET, FILM COATED ORAL at 18:12

## 2017-07-09 RX ADMIN — BETAMETHASONE VALERATE: 1.2 CREAM TOPICAL at 08:55

## 2017-07-09 RX ADMIN — METOPROLOL TARTRATE 50 MG: 50 TABLET ORAL at 08:57

## 2017-07-09 RX ADMIN — DORZOLAMIDE HYDROCHLORIDE AND TIMOLOL MALEATE 1 DROP: 20; 5 SOLUTION/ DROPS OPHTHALMIC at 18:14

## 2017-07-09 RX ADMIN — PANTOPRAZOLE SODIUM 40 MG: 40 TABLET, DELAYED RELEASE ORAL at 08:56

## 2017-07-09 NOTE — ROUTINE PROCESS
Bedside and Verbal shift change report given to BLANCA Perez (oncoming nurse) by BLANCA Lagos (offgoing nurse). Report included the following information SBAR, Kardex, MAR, Recent Results and Med Rec Status. Visually checked patient hourly.

## 2017-07-09 NOTE — ROUTINE PROCESS
Bedside shift change report given to Nehemiah Ross (oncoming nurse) by Mick Marques RN (offgoing nurse). Report included the following information SBAR, Kardex, MAR and Med Rec Status. VISUALLY CHECKED PT Q 1 HR BY NURSING STAFF. 24 hour chart check done

## 2017-07-09 NOTE — ROUTINE PROCESS
Bedside and Verbal shift change report given to MARY ALICE Zhou (oncoming nurse) by HELDER Beckford LPN (offgoing nurse). Report included the following information SBAR, Kardex and MAR. Nursing rounds completed per facility protocol.

## 2017-07-09 NOTE — PROGRESS NOTES
Problem: Mobility Impaired (Adult and Pediatric)  Goal: *Acute Goals and Plan of Care (Insert Text)  PHYSICAL THERAPY STG GOALS :  Initiated 6/19/2017 and to be accomplished within 2 Weeks (updated 6/30/17)    1. Patient will move from supine to sit and sit to supine and roll side to side in bed with minimal assistance/contact guard assist.   (Achieved)   2. Patient will transfer from bed to chair and chair to bed with minimal assistance/contact guard assist using RW. (Achieved)  3. Patient will perform sit to stand with minimal assistance/contact guard assist with Fair balance and safety awareness. (Progressing; varying Max A-Min A)  4. Patient will ambulate with minimal assistance/contact guard assist for 75 feet with RW on level surfaces with 2 turns. (Progressing; CGA/min A x 12 ft with 1 turn)  5. Patient will ascend/descend 1 stairs with bilateral handrail(s) with moderate assistance to allow for safe home access/exit. (Not addressed)  6. Patient will improve standardized test score for [place] Standing Balance score of 2. (Achieved)    PHYSICAL THERAPY STG GOALS :  Initiated 6/19/2017 and to be accomplished within 2 Weeks (updated 6/23/17)    1. Patient will move from supine to sit and sit to supine and roll side to side in bed with minimal assistance/contact guard assist.   (Progressing; mod A)   2. Patient will transfer from bed to chair and chair to bed with minimal assistance/contact guard assist using RW. (Progressing; mod A using Stand pivot transfer)  3. Patient will perform sit to stand with minimal assistance/contact guard assist with Fair balance and safety awareness. (Progressing; varying between mod A to max A x 2 with poor/poor + balance)  4. Patient will ambulate with minimal assistance/contact guard assist for 75 feet with RW on level surfaces with 2 turns. (Progressing; min/mod A x 5 ft using // bars)  5.  Patient will ascend/descend 1 stairs with bilateral handrail(s) with moderate assistance to allow for safe home access/exit. (Not addressed)  6. Patient will improve standardized test score for [place] Standing Balance score of 2. (Progressing; 1)      PHYSICAL THERAPY LTG GOALS :  Initiated 6/19/2017 and to be accomplished within 4 Weeks    1. Patient will move from supine to sit and sit to supine and roll side to side in bed with supervision/set-up. 2. Patient will transfer from bed to chair and chair to bed with supervision/set-up using RW. 3. Patient will perform sit to stand with supervision/set-up with Fair+ balance and safety awareness. 4. Patient will ambulate with supervision/set-up for 150 feet with RW on level surfaces and be able to maneuver through narrow spaces and obstacles without loss of balance. 5. Patient will ascend/descend 1 stairs with NO handrail(s) with minimal assistance/contact guard assist to allow for safe home access/exit. 6. Patient will improve standardized test score for [place] Standing Balance score of 3 for reduced fall risk. Physical Therapist: Marlin Pascual PT on 6/19/2017    [facility]   PHYSICAL THERAPY DAILY TREATMENT NOTE        Patient: Diomedes Handy (12 y.o. female)               Date: 7/9/2017    Physician: Saran Barry MD  Primary Diagnosis: Hemorrhagic stroke           Treatment Diagnosis  Treatment Diagnosis: left hemiparesis  Treatment Diagnosis 2: difficulty in walking  Precautions: Fall  Vital Signs  Vital Signs  Pulse (Heart Rate): 69  BP: 136/68  Cognitive Status:  Mental Status  Neurologic State: Alert; Appropriate for age  Orientation Level: Oriented X4  Cognition: Appropriate for age attention/concentration  Pain  Pain Screen  Pain Scale 1: Numeric (0 - 10)  Pain Intensity 1: 0  Patient Stated Pain Goal: 0  Bed Mobility Training     Balance  Sitting: With support  Sitting - Static: Fair (occasional)  Sitting - Dynamic: Fair (occasional)  Standing: Impaired; With support  Standing - Static: Fair  Standing - Dynamic : Fair (-)  Transfer Training  Transfer Training  Sit to Stand: Moderate assistance;Maximum assistance; Additional time;Assist x1  Stand to Sit: Minimum assistance  Interventions: Safety awareness training;Verbal cues  Sit to Stand: Moderate assistance;Maximum assistance; Additional time;Assist x1  Gait Training  Therapeutic Exercise:   Pt is slowly progressing towards reaching functional goals, requiring mod A/max A of 1 with sit<->stand t/f from w/c with cues for proper hand placement and anterior forward weight shifting upon standing. Pt performed static/dynamic standing CGA with FWW while performing weight shifting L<->R 2x10 reps and alternating standing marches x10 reps to increase standing tolerance and prep for gait. Pt required min cues for proper technique, execution, and tactile cues for maintaining upright posture throughout standing task. Pt also completed seated LE exercises 1.5#: ankle pumps, LAQ's, hip flexion marches, and orange TB (hamstring curls) 2x20 reps to increase LE strength and endurance. Patient/Caregiver Education:   Pt /Caregiver Education on benefits of exercise and importance of increase participation in OOB activities to maximize gains in strength and functional mobility upon d/c.       ASSESSMENT:  Patient continues to benefit from Skilled PT services to improve strength, balance, endurance, and functional (I) with transfers/gait. Progression toward goals:  [ ]      Improving appropriately and progressing toward goals  [X]      Improving slowly and progressing toward goals  [ ]      Not making progress toward goals and plan of care will be adjusted      Treatment session: 55 minutes.   Therapist:   Tiffanie Flor PTA,          7/9/2017

## 2017-07-09 NOTE — ROUTINE PROCESS
Patient observed not wearing her NC, denies any SOB or having any hard time breathing when trying to complete a sentence. Nurse informed patient to alert nursing staff if SOB s/s arise, patient verbalized  understanding and nodded her head.

## 2017-07-10 LAB
GLUCOSE BLD STRIP.AUTO-MCNC: 100 MG/DL (ref 70–110)
GLUCOSE BLD STRIP.AUTO-MCNC: 209 MG/DL (ref 70–110)

## 2017-07-10 PROCEDURE — 74011636637 HC RX REV CODE- 636/637: Performed by: INTERNAL MEDICINE

## 2017-07-10 PROCEDURE — 74011250637 HC RX REV CODE- 250/637: Performed by: INTERNAL MEDICINE

## 2017-07-10 PROCEDURE — 82962 GLUCOSE BLOOD TEST: CPT

## 2017-07-10 RX ADMIN — GLIMEPIRIDE 0.5 MG: 1 TABLET ORAL at 08:59

## 2017-07-10 RX ADMIN — PANTOPRAZOLE SODIUM 40 MG: 40 TABLET, DELAYED RELEASE ORAL at 08:59

## 2017-07-10 RX ADMIN — GABAPENTIN 300 MG: 300 CAPSULE ORAL at 21:58

## 2017-07-10 RX ADMIN — METOPROLOL TARTRATE 50 MG: 50 TABLET ORAL at 09:00

## 2017-07-10 RX ADMIN — DORZOLAMIDE HYDROCHLORIDE AND TIMOLOL MALEATE 1 DROP: 20; 5 SOLUTION/ DROPS OPHTHALMIC at 09:00

## 2017-07-10 RX ADMIN — ZOLPIDEM TARTRATE 5 MG: 5 TABLET, COATED ORAL at 22:16

## 2017-07-10 RX ADMIN — DORZOLAMIDE HYDROCHLORIDE AND TIMOLOL MALEATE 1 DROP: 20; 5 SOLUTION/ DROPS OPHTHALMIC at 17:32

## 2017-07-10 RX ADMIN — SPIRONOLACTONE 50 MG: 25 TABLET, FILM COATED ORAL at 17:32

## 2017-07-10 RX ADMIN — METOPROLOL TARTRATE 50 MG: 50 TABLET ORAL at 17:32

## 2017-07-10 RX ADMIN — THIAMINE HCL TAB 100 MG 100 MG: 100 TAB at 09:00

## 2017-07-10 RX ADMIN — INSULIN LISPRO 2 UNITS: 100 INJECTION, SOLUTION INTRAVENOUS; SUBCUTANEOUS at 17:29

## 2017-07-10 RX ADMIN — SPIRONOLACTONE 50 MG: 25 TABLET, FILM COATED ORAL at 08:59

## 2017-07-10 RX ADMIN — SIMVASTATIN 40 MG: 40 TABLET, FILM COATED ORAL at 21:58

## 2017-07-10 RX ADMIN — LEVOTHYROXINE SODIUM 100 MCG: 50 TABLET ORAL at 09:00

## 2017-07-10 RX ADMIN — FOLIC ACID 1 MG: 1 TABLET ORAL at 09:00

## 2017-07-10 RX ADMIN — Medication 500 MG: at 08:59

## 2017-07-10 RX ADMIN — FUROSEMIDE 20 MG: 20 TABLET ORAL at 09:00

## 2017-07-10 NOTE — ROUTINE PROCESS
Written shift change report given to Jane Castro RN (oncoming nurse) by Juan F Young RN (offgoing nurse). Report included the following information SBAR, Kardex and MAR.  24 hour chart check completed, pt visually checked q 1 hour by nursing staff

## 2017-07-10 NOTE — PROGRESS NOTES
GIM     Patient: Santosh Knapp MRN: [id number]  CSN: [id number]    YOB: 1929  Age: 80 y.o. Sex: female    DOA: 6/16/2017 LOS:  LOS: 24 days                    Subjective:     Reviewed case with pt and daughters. Blood sugar ok on ameryl . Will need to eval for when to restart coumadin/ ASA.  Will ask neurology to eval.    Objective:      Visit Vitals    /57 (BP 1 Location: Right arm, BP Patient Position: At rest;Head of bed elevated (Comment degrees))    Pulse 62    Temp 97.7 °F (36.5 °C)    Resp 16    Ht 4' 11\" (1.499 m)    Wt 95.3 kg (210 lb)    SpO2 99%    Breastfeeding No    BMI 42.41 kg/m2       Physical Exam:  Chest clear  Cor rr  abd soft  L hemiplegia persists    Intake and Output:  Current Shift:     Last three shifts:       Recent Results (from the past 24 hour(s))   GLUCOSE, POC    Collection Time: 07/09/17  4:03 PM   Result Value Ref Range    Glucose (POC) 149 (H) 70 - 110 mg/dL   GLUCOSE, POC    Collection Time: 07/10/17  6:01 AM   Result Value Ref Range    Glucose (POC) 100 70 - 110 mg/dL       Current Facility-Administered Medications   Medication Dose Route Frequency    glimepiride (AMARYL) tablet 0.5 mg  0.5 mg Oral ACB    betamethasone valerate (VALISONE) 0.1 % cream   Topical BID PRN    loperamide (IMODIUM) capsule 2 mg  2 mg Oral Q4H PRN    furosemide (LASIX) tablet 20 mg  20 mg Oral DAILY    sodium phosphate (FLEET'S) enema 118 mL  1 Enema Rectal DAILY PRN    pantoprazole (PROTONIX) tablet 40 mg  40 mg Oral ACB    [facility] TCC ANESTHESIA   Other PRN    [facility] EMERGENCY/STAT BOX   Other PRN    alum-mag hydroxide-simeth (MYLANTA) oral suspension 30 mL  30 mL Oral QID PRN    ferrous fumarate-vitamin C 200 mg (65 mg iron)-25 mg TbER 1 Tab (patient's own med)  1 Tab Oral DAILY    linagliptin (TRADJENTA) tablet 5 mg (Patient's own med)  5 mg Oral ACB    albuterol-ipratropium (DUO-NEB) 2.5 MG-0.5 MG/3 ML  3 mL Nebulization Q4H PRN    bisacodyl (DULCOLAX) suppository 10 mg  10 mg Rectal DAILY PRN    insulin lispro (HUMALOG) injection   SubCUTAneous ACB&D    magnesium hydroxide (MILK OF MAGNESIA) 400 mg/5 mL oral suspension 30 mL  30 mL Oral DAILY PRN    dorzolamide-timolol (COSOPT) 22.3-6.8 mg/mL ophthalmic solution 1 Drop  1 Drop Both Eyes BID    ascorbic acid (vitamin C) (VITAMIN C) tablet 500 mg  500 mg Oral DAILY    gabapentin (NEURONTIN) capsule 300 mg  300 mg Oral QHS    metoprolol tartrate (LOPRESSOR) tablet 50 mg  50 mg Oral BID    levothyroxine (SYNTHROID) tablet 100 mcg  100 mcg Oral ACB    spironolactone (ALDACTONE) tablet 50 mg  50 mg Oral BID    folic acid (FOLVITE) tablet 1 mg  1 mg Oral DAILY    Thiamine Mononitrate (B-1) tablet 100 mg  100 mg Oral DAILY    meclizine (ANTIVERT) tablet 25 mg  25 mg Oral QID PRN    zolpidem (AMBIEN) tablet 5 mg  5 mg Oral QHS PRN    simvastatin (ZOCOR) tablet 40 mg  40 mg Oral QHS    Please ask patient to bring in linagliptin. We do not carry in pharmacy. 1 Each Other Rx Dosing/Monitoring       Lab Results   Component Value Date/Time    Glucose 193 07/06/2017 02:09 PM    Glucose 121 07/03/2017 05:09 AM    Glucose 83 06/24/2017 04:40 AM    Glucose 66 06/22/2017 03:50 AM    Glucose 87 06/16/2017 01:00 AM        Assessment/Plan     Active Problems:    * No active hospital problems.  Vikash Reynolds MD  7/10/2017, 11:48 AM

## 2017-07-10 NOTE — PROGRESS NOTES
I have reviewed this patient's current medication list and recent laboratory results. At this time, I do not suggest any drug therapy adjustments or additional laboratory monitoring. Thank you,  Cole DUDLEY  Ph. M. S.  7/10/2017

## 2017-07-10 NOTE — PROGRESS NOTES
Problem: Self Care Deficits Care Plan (Adult)  Goal: *Therapy Goal (Edit Goal, Insert Text)  OCCUPATIONAL THERAPY SHORT TERM GOALS     Updated 7/05/17    1. Patient will perform Upper body ADLs with/without adaptive equipment with Min assistance . 2. Patient will increase left UE AROM/strength to use CGA with min cues. 3. Patient will perform toileting task with Mod assistance with Fair safety to reduce falls risk. 4. Patient will perform functional transfers with and moderate assistance. 5. Patient will perform dynamic sitting balance activities for improved ADL/IADL function with SBA and Fair balance and safety awarenes. (goal met-UPG SBA)  6. Patient will improve Barthel index scores to at least 50/100 to improve functional mobility. Updated 6/28/17    1. Patient will perform Upper body ADLs with/without adaptive equipment with moderate assistance . (goal met-UPG Min A)  2. Patient will increase left UE AROM/strength to use minimal assist with min cues. (goal met-UPG CGA)  3. Patient will perform toileting task with maximal assistance with Fair safety to reduce falls risk. (goal met-UPG Mod A)  4. Patient will perform functional transfers with and moderate assistance. (progressing)  5. Patient will perform dynamic sitting balance activities for improved ADL/IADL function with minimal assistance/contact guard assist and Fair balance and safety awarenes. (goal met-UPG SBA)  6. Patient will improve Barthel index scores to at least 30/100 to improve functional mobility. (goal met-UPG 50/100)     Initiated 6/22/2017 and to be accomplished within 2 Week(s)    1. Patient will perform Upper body ADLs with/without adaptive equipment with moderate assistance .(progressing-currently Max A)  2. Patient will increase left UE AROM/strength to use functional stabilizer assist with min cues. (goal met-UPG)  3. Patient will perform toileting task with maximal assistance with Fair safety to reduce falls risk. (progressing)  4. Patient will perform functional transfers with and moderate assistance . (progressing)  5. Patient will perform dynamic sitting balance activities for improved ADL/IADL function with minimal assistance/contact guard assist and Fair balance and safety awarenes. (progressing)   6. Patient will improve Barthel index scores to at least 30/100 to improve functional mobility. (progressing)      OCCUPATIONAL THERAPY LONG TERM GOALS   Initiated 6/22/2017 and to be accomplished within 4 Week(s)    1. Patient will perform Upper body ADLs with/without adaptive equipment with supervision/set-up. 2. Patient will perform Lower body ADLs with/without adaptive equipment with moderate assistance . 3. Patient will perform toileting task with minimal assistance/contact guard assist with Good safety to reduce falls risk. 4. Patient will perform functional transfers with supervision/set-up and Good balance and safety awareness. 5. Patient will perform standing static/dynamic activity for improved ADL/IADL function with contact guard an and good safety awareness. 6. Patient will improve Barthel index score to 70/100 to improve independence with mobility. Therapist: Sergei Li OT 6/22/2017   [facility]   OCCUPATIONAL THERAPY DAILY TREATMENT NOTE        Patient: Jose Berry (89 y.o. female)                         Date: 7/10/2017  Attending Physician: Lorenzo Madera MD  Primary Diagnosis: Hemorrhagic stroke     Treatment Diagnosis  Treatment Diagnosis: left hemiparesis  Treatment Diagnosis 2: difficulty in walking   Precautions : Precautions at Admission: Fall  Vital Signs:        Cognitive Status:     Pain:        Gross Assessment:     Coordination:     Bed Mobility:  Bed Mobility  Supine to Sit: Supervision; Additional time  Scooting: Stand-by asssistance  Transfers:  Functional Transfers  Sit to Stand:  Moderate assistance  Stand to Sit: Minimum assistance;Contact guard assistance  Bed to Chair: Minimum assistance     Balance:  Balance  Sitting: With support  Sitting - Static: Fair (occasional)  Sitting - Dynamic: Fair (occasional)  Standing: With support  Standing - Static: Fair  Standing - Dynamic : Fair (-)        Therapeutic Activities:  Provided patient with self AROM in order to increase L UE ROM and muscle strengthening needed for UB ADLS. Practiced each exercise to increase independence and performance to gain strengthening and ROM. Patient/Caregiver Education:    Oscar Thompson Education on see above.         ASSESSMENT:  Patient continues to demonstrate the need for skilled Occupational Therapy services to improve static standing balance needed for toileting  Progression toward goals:  [X]      Improving appropriately and progressing toward goals  [ ]      Improving slowly and progressing toward goals  [ ]      Not making progress toward goals and plan of care will be adjusted      Treatment session:  30 minutes     Therapist:    TYLER Mcmullen,  7/10/2017

## 2017-07-10 NOTE — PROGRESS NOTES
Problem: Mobility Impaired (Adult and Pediatric)  Goal: *Acute Goals and Plan of Care (Insert Text)  PHYSICAL THERAPY STG GOALS :  Initiated 6/19/2017 and to be accomplished within 2 Weeks (updated 6/30/17)    1. Patient will move from supine to sit and sit to supine and roll side to side in bed with minimal assistance/contact guard assist.   (Achieved)   2. Patient will transfer from bed to chair and chair to bed with minimal assistance/contact guard assist using RW. (Achieved)  3. Patient will perform sit to stand with minimal assistance/contact guard assist with Fair balance and safety awareness. (Progressing; varying Max A-Min A)  4. Patient will ambulate with minimal assistance/contact guard assist for 75 feet with RW on level surfaces with 2 turns. (Progressing; CGA/min A x 12 ft with 1 turn)  5. Patient will ascend/descend 1 stairs with bilateral handrail(s) with moderate assistance to allow for safe home access/exit. (Not addressed)  6. Patient will improve standardized test score for [place] Standing Balance score of 2. (Achieved)    PHYSICAL THERAPY STG GOALS :  Initiated 6/19/2017 and to be accomplished within 2 Weeks (updated 6/23/17)    1. Patient will move from supine to sit and sit to supine and roll side to side in bed with minimal assistance/contact guard assist.   (Progressing; mod A)   2. Patient will transfer from bed to chair and chair to bed with minimal assistance/contact guard assist using RW. (Progressing; mod A using Stand pivot transfer)  3. Patient will perform sit to stand with minimal assistance/contact guard assist with Fair balance and safety awareness. (Progressing; varying between mod A to max A x 2 with poor/poor + balance)  4. Patient will ambulate with minimal assistance/contact guard assist for 75 feet with RW on level surfaces with 2 turns. (Progressing; min/mod A x 5 ft using // bars)  5.  Patient will ascend/descend 1 stairs with bilateral handrail(s) with moderate assistance to allow for safe home access/exit. (Not addressed)  6. Patient will improve standardized test score for [place] Standing Balance score of 2. (Progressing; 1)      PHYSICAL THERAPY LTG GOALS :  Initiated 6/19/2017 and to be accomplished within 4 Weeks    1. Patient will move from supine to sit and sit to supine and roll side to side in bed with supervision/set-up. 2. Patient will transfer from bed to chair and chair to bed with supervision/set-up using RW. 3. Patient will perform sit to stand with supervision/set-up with Fair+ balance and safety awareness. 4. Patient will ambulate with supervision/set-up for 150 feet with RW on level surfaces and be able to maneuver through narrow spaces and obstacles without loss of balance. 5. Patient will ascend/descend 1 stairs with NO handrail(s) with minimal assistance/contact guard assist to allow for safe home access/exit. 6. Patient will improve standardized test score for [place] Standing Balance score of 3 for reduced fall risk. Physical Therapist: Filomena Flor, PT on 6/19/2017    [facility]   PHYSICAL THERAPY DAILY TREATMENT NOTE        Patient: Santosh Knapp (2 y.o. female)               Date: 7/10/2017    Physician: Devendra Nagel MD  Primary Diagnosis: Hemorrhagic stroke           Treatment Diagnosis  Treatment Diagnosis: left hemiparesis  Treatment Diagnosis 2: difficulty in walking  Precautions: Fall  Vital Signs  Cognitive Status:  Pain  Bed Mobility Training  Bed Mobility Training  Supine to Sit: Supervision; Additional time  Scooting: Stand-by asssistance  Balance  Sitting: With support  Sitting - Static: Fair (occasional)  Sitting - Dynamic: Fair (occasional)  Standing: With support  Standing - Static: Fair  Standing - Dynamic : Fair (-)  Transfer Training  Transfer Training  Sit to Stand:  Moderate assistance  Stand to Sit: Minimum assistance;Contact guard assistance  Bed to Chair: Minimum assistance  Interventions: Safety awareness training;Verbal cues  Sit to Stand: Moderate assistance  Gait Training  Gait  Base of Support: Center of gravity altered  Speed/Aura: Slow  Step Length: Right shortened;Left shortened  Gait Abnormalities: Decreased step clearance  Ambulation - Level of Assistance: Contact guard assistance;Minimal assistance  Distance (ft): 24 Feet (ft)  Assistive Device: Gait belt;Walker, rolling  Interventions: Safety awareness training;Verbal cues  Gait Abnormalities: Decreased step clearance   With 1 turns. Therapeutic Exercise: Pt presented supine in bed. Bed mobility and transfers as noted above. Gait training with abovementioned details and close w/c follow for safety. Pt required cues for upright posture and to rest as needed. No noted buckling of L knee with ambulation today. Standing mini marches x20 with CGA and cues for proper technique and posture. Seated B LE TE rendered to promote strength, endurance, flexibility: HR, TR, LAQ, hip flexion, ball squeezes, resisted hip abd (yellow band) 2x20. Patient/Caregiver Education:   Pt /Caregiver Education on safety and fall prevention with tranfers using RW was provided to reduce risk of falls. ASSESSMENT:  Patient continues to benefit from Skilled PT services to improve strength, balance, transfers, gait and step negotiation. Progression toward goals:  [ ]      Improving appropriately and progressing toward goals  [X]      Improving slowly and progressing toward goals  [ ]      Not making progress toward goals and plan of care will be adjusted      Treatment session: 50 minutes.   Therapist:   Albino Mccarty PTA,          7/10/2017

## 2017-07-11 LAB
GLUCOSE BLD STRIP.AUTO-MCNC: 162 MG/DL (ref 70–110)
GLUCOSE BLD STRIP.AUTO-MCNC: 84 MG/DL (ref 70–110)

## 2017-07-11 PROCEDURE — 82962 GLUCOSE BLOOD TEST: CPT

## 2017-07-11 PROCEDURE — 74011250637 HC RX REV CODE- 250/637: Performed by: INTERNAL MEDICINE

## 2017-07-11 RX ADMIN — ZOLPIDEM TARTRATE 5 MG: 5 TABLET, COATED ORAL at 22:04

## 2017-07-11 RX ADMIN — DORZOLAMIDE HYDROCHLORIDE AND TIMOLOL MALEATE 1 DROP: 20; 5 SOLUTION/ DROPS OPHTHALMIC at 17:13

## 2017-07-11 RX ADMIN — PANTOPRAZOLE SODIUM 40 MG: 40 TABLET, DELAYED RELEASE ORAL at 08:53

## 2017-07-11 RX ADMIN — LEVOTHYROXINE SODIUM 100 MCG: 50 TABLET ORAL at 08:53

## 2017-07-11 RX ADMIN — SIMVASTATIN 40 MG: 40 TABLET, FILM COATED ORAL at 21:50

## 2017-07-11 RX ADMIN — DORZOLAMIDE HYDROCHLORIDE AND TIMOLOL MALEATE 1 DROP: 20; 5 SOLUTION/ DROPS OPHTHALMIC at 08:57

## 2017-07-11 RX ADMIN — MAGNESIUM HYDROXIDE 30 ML: 400 SUSPENSION ORAL at 07:40

## 2017-07-11 RX ADMIN — BETAMETHASONE VALERATE: 1.2 CREAM TOPICAL at 08:49

## 2017-07-11 RX ADMIN — FOLIC ACID 1 MG: 1 TABLET ORAL at 08:53

## 2017-07-11 RX ADMIN — Medication 500 MG: at 08:53

## 2017-07-11 RX ADMIN — THIAMINE HCL TAB 100 MG 100 MG: 100 TAB at 08:53

## 2017-07-11 RX ADMIN — GABAPENTIN 300 MG: 300 CAPSULE ORAL at 21:50

## 2017-07-11 RX ADMIN — METOPROLOL TARTRATE 50 MG: 50 TABLET ORAL at 17:11

## 2017-07-11 RX ADMIN — FUROSEMIDE 20 MG: 20 TABLET ORAL at 08:53

## 2017-07-11 RX ADMIN — SPIRONOLACTONE 50 MG: 25 TABLET, FILM COATED ORAL at 08:52

## 2017-07-11 RX ADMIN — METOPROLOL TARTRATE 50 MG: 50 TABLET ORAL at 08:53

## 2017-07-11 RX ADMIN — SPIRONOLACTONE 50 MG: 25 TABLET, FILM COATED ORAL at 17:12

## 2017-07-11 NOTE — PROGRESS NOTES
Problem: Mobility Impaired (Adult and Pediatric)  Goal: *Acute Goals and Plan of Care (Insert Text)  PHYSICAL THERAPY STG GOALS :  Initiated 6/19/2017 and to be accomplished within 2 Weeks (updated 6/30/17)    1. Patient will move from supine to sit and sit to supine and roll side to side in bed with minimal assistance/contact guard assist.   (Achieved)   2. Patient will transfer from bed to chair and chair to bed with minimal assistance/contact guard assist using RW. (Achieved)  3. Patient will perform sit to stand with minimal assistance/contact guard assist with Fair balance and safety awareness. (Progressing; varying Max A-Min A)  4. Patient will ambulate with minimal assistance/contact guard assist for 75 feet with RW on level surfaces with 2 turns. (Progressing; CGA/min A x 12 ft with 1 turn)  5. Patient will ascend/descend 1 stairs with bilateral handrail(s) with moderate assistance to allow for safe home access/exit. (Not addressed)  6. Patient will improve standardized test score for [place] Standing Balance score of 2. (Achieved)    PHYSICAL THERAPY STG GOALS :  Initiated 6/19/2017 and to be accomplished within 2 Weeks (updated 6/23/17)    1. Patient will move from supine to sit and sit to supine and roll side to side in bed with minimal assistance/contact guard assist.   (Progressing; mod A)   2. Patient will transfer from bed to chair and chair to bed with minimal assistance/contact guard assist using RW. (Progressing; mod A using Stand pivot transfer)  3. Patient will perform sit to stand with minimal assistance/contact guard assist with Fair balance and safety awareness. (Progressing; varying between mod A to max A x 2 with poor/poor + balance)  4. Patient will ambulate with minimal assistance/contact guard assist for 75 feet with RW on level surfaces with 2 turns. (Progressing; min/mod A x 5 ft using // bars)  5.  Patient will ascend/descend 1 stairs with bilateral handrail(s) with moderate assistance to allow for safe home access/exit. (Not addressed)  6. Patient will improve standardized test score for [place] Standing Balance score of 2. (Progressing; 1)      PHYSICAL THERAPY LTG GOALS :  Initiated 6/19/2017 and to be accomplished within 4 Weeks    1. Patient will move from supine to sit and sit to supine and roll side to side in bed with supervision/set-up. 2. Patient will transfer from bed to chair and chair to bed with supervision/set-up using RW. 3. Patient will perform sit to stand with supervision/set-up with Fair+ balance and safety awareness. 4. Patient will ambulate with supervision/set-up for 150 feet with RW on level surfaces and be able to maneuver through narrow spaces and obstacles without loss of balance. 5. Patient will ascend/descend 1 stairs with NO handrail(s) with minimal assistance/contact guard assist to allow for safe home access/exit. 6. Patient will improve standardized test score for [place] Standing Balance score of 3 for reduced fall risk. Physical Therapist: Abebe Schofield PT on 6/19/2017    [facility]   PHYSICAL THERAPY DAILY TREATMENT NOTE        Patient: Erin Strong (89 y.o. female)               Date: 7/11/2017    Physician: Rebeca Green MD  Primary Diagnosis: Hemorrhagic stroke           Treatment Diagnosis  Treatment Diagnosis: left hemiparesis  Treatment Diagnosis 2: difficulty in walking  Precautions: Fall  Vital Signs  Vital Signs  Pulse (Heart Rate): (!) 51  Heart Rate Source: Monitor  Resp Rate: 18  O2 Sat (%): 96 %  BP: 123/65     Cognitive Status:  Mental Status  Neurologic State: Alert; Appropriate for age  Orientation Level: Oriented X4  Pain  Bed Mobility Training  Balance  Sitting: With support  Sitting - Static: Fair (occasional)  Sitting - Dynamic: Fair (occasional)  Standing: With support  Standing - Static: Fair  Standing - Dynamic : Fair  Transfer Training  Transfer Training  Sit to Stand: Minimum assistance;Contact guard assistance  Stand to Sit: Minimum assistance;Contact guard assistance  Bed to Chair: Contact guard assistance  Interventions: Safety awareness training;Verbal cues  Sit to Stand: Minimum assistance;Contact guard assistance  Gait Training  Therapeutic Exercise: Co-treat with OT for family training session. Pt's daughter Melissa Morin was present during session. Transfer training provided with use of RW and abovementioned details. Pt's daughter declined car transfer training, and reported that her pt's son or grandson assist her with car transfers. Pt's daughter reported that they have a lift chair for pt and stair lift at home. Patient/Caregiver Education:   Pt /Caregiver Education on safety and fall prevention, and family training (see above) was provided to reduce risk of falls. ASSESSMENT:  Patient continues to benefit from Skilled PT services for 2-3 additional sessions with DC planned for 7/14/17  Progression toward goals:  [X]      Improving appropriately and progressing toward goals  [ ]      Improving slowly and progressing toward goals  [ ]      Not making progress toward goals and plan of care will be adjusted      Treatment session: 30 minutes.   Therapist:   Gabo Flynn PTA,          7/11/2017

## 2017-07-11 NOTE — PROGRESS NOTES
Patient offered Milk of mag. Patient refused. Patient states that she would rather take medication in the morning. Family at bedside and agreed with patients decision.

## 2017-07-11 NOTE — PROGRESS NOTES
Neurology Progress Note    Admit Date: 6/16/2017  Primary Care: Mike Ruvalcaba MD     Subjective:   Principle Problem: <principal problem not specified>   Awake and alert. No headache. Eating ok. Moving left side better. Using walker. Son at bedside    Objective:  Patient Vitals for the past 8 hrs:   BP Pulse Resp SpO2   07/11/17 0855 - (!) 51 18 96 %   07/11/17 0852 123/65 (!) 48 - -       Physical Exam:  Awake, alert, NAD  EOM's intact, Speech ok  VII ok, mild left sided weakess, lifts arm and leg easily off bed, but slower      Assessment:     <principal problem not specified>    Plan:       Right thalamic hemorrhage. Hospital chart reviewed. Plan was to restart coumadin after two weeks. GIven it was a relatively small deep bleed, agree ok to restart coumadin, aim for 2-2.5 INR. Needs good BP control as well.   Available if needed      Medications:      Current Facility-Administered Medications   Medication Dose Route Frequency    betamethasone valerate (VALISONE) 0.1 % cream   Topical BID PRN    loperamide (IMODIUM) capsule 2 mg  2 mg Oral Q4H PRN    furosemide (LASIX) tablet 20 mg  20 mg Oral DAILY    sodium phosphate (FLEET'S) enema 118 mL  1 Enema Rectal DAILY PRN    pantoprazole (PROTONIX) tablet 40 mg  40 mg Oral ACB    [facility] ANESTHESIA   Other PRN    [facility] EMERGENCY/STAT BOX   Other PRN    alum-mag hydroxide-simeth (MYLANTA) oral suspension 30 mL  30 mL Oral QID PRN    ferrous fumarate-vitamin C 200 mg (65 mg iron)-25 mg TbER 1 Tab (patient's own med)  1 Tab Oral DAILY    linagliptin (TRADJENTA) tablet 5 mg (Patient's own med)  5 mg Oral ACB    albuterol-ipratropium (DUO-NEB) 2.5 MG-0.5 MG/3 ML  3 mL Nebulization Q4H PRN    bisacodyl (DULCOLAX) suppository 10 mg  10 mg Rectal DAILY PRN    insulin lispro (HUMALOG) injection   SubCUTAneous ACB&D    magnesium hydroxide (MILK OF MAGNESIA) 400 mg/5 mL oral suspension 30 mL  30 mL Oral DAILY PRN    dorzolamide-timolol (COSOPT) 22.3-6.8 mg/mL ophthalmic solution 1 Drop  1 Drop Both Eyes BID    ascorbic acid (vitamin C) (VITAMIN C) tablet 500 mg  500 mg Oral DAILY    gabapentin (NEURONTIN) capsule 300 mg  300 mg Oral QHS    metoprolol tartrate (LOPRESSOR) tablet 50 mg  50 mg Oral BID    levothyroxine (SYNTHROID) tablet 100 mcg  100 mcg Oral ACB    spironolactone (ALDACTONE) tablet 50 mg  50 mg Oral BID    folic acid (FOLVITE) tablet 1 mg  1 mg Oral DAILY    Thiamine Mononitrate (B-1) tablet 100 mg  100 mg Oral DAILY    meclizine (ANTIVERT) tablet 25 mg  25 mg Oral QID PRN    zolpidem (AMBIEN) tablet 5 mg  5 mg Oral QHS PRN    simvastatin (ZOCOR) tablet 40 mg  40 mg Oral QHS    Please ask patient to bring in linagliptin. We do not carry in pharmacy.   1 Each Other Rx Dosing/Monitoring      Data:     Recent Results (from the past 24 hour(s))   GLUCOSE, POC    Collection Time: 07/10/17  4:28 PM   Result Value Ref Range    Glucose (POC) 209 (H) 70 - 110 mg/dL   GLUCOSE, POC    Collection Time: 07/11/17  5:39 AM   Result Value Ref Range    Glucose (POC) 84 70 - 110 mg/dL           Richard Manning MD  7/11/2017  4:23 PM

## 2017-07-11 NOTE — PROGRESS NOTES
SW faxed order for rolling walker and wheelchair to [employer]. FLORENCIO requested that pt's daughter be contacted re: delivery. Florencio requested that dme provider call SW if there are problems with dme provider.

## 2017-07-11 NOTE — PROGRESS NOTES
Problem: Self Care Deficits Care Plan (Adult)  Goal: *Therapy Goal (Edit Goal, Insert Text)  OCCUPATIONAL THERAPY SHORT TERM GOALS     Updated 7/05/17    1. Patient will perform Upper body ADLs with/without adaptive equipment with Min assistance . 2. Patient will increase left UE AROM/strength to use CGA with min cues. 3. Patient will perform toileting task with Mod assistance with Fair safety to reduce falls risk. 4. Patient will perform functional transfers with and moderate assistance. 5. Patient will perform dynamic sitting balance activities for improved ADL/IADL function with SBA and Fair balance and safety awarenes. (goal met-UPG SBA)  6. Patient will improve Barthel index scores to at least 50/100 to improve functional mobility. Updated 6/28/17    1. Patient will perform Upper body ADLs with/without adaptive equipment with moderate assistance . (goal met-UPG Min A)  2. Patient will increase left UE AROM/strength to use minimal assist with min cues. (goal met-UPG CGA)  3. Patient will perform toileting task with maximal assistance with Fair safety to reduce falls risk. (goal met-UPG Mod A)  4. Patient will perform functional transfers with and moderate assistance. (progressing)  5. Patient will perform dynamic sitting balance activities for improved ADL/IADL function with minimal assistance/contact guard assist and Fair balance and safety awarenes. (goal met-UPG SBA)  6. Patient will improve Barthel index scores to at least 30/100 to improve functional mobility. (goal met-UPG 50/100)     Initiated 6/22/2017 and to be accomplished within 2 Week(s)    1. Patient will perform Upper body ADLs with/without adaptive equipment with moderate assistance .(progressing-currently Max A)  2. Patient will increase left UE AROM/strength to use functional stabilizer assist with min cues. (goal met-UPG)  3. Patient will perform toileting task with maximal assistance with Fair safety to reduce falls risk. (progressing)  4. Patient will perform functional transfers with and moderate assistance . (progressing)  5. Patient will perform dynamic sitting balance activities for improved ADL/IADL function with minimal assistance/contact guard assist and Fair balance and safety awarenes. (progressing)   6. Patient will improve Barthel index scores to at least 30/100 to improve functional mobility. (progressing)      OCCUPATIONAL THERAPY LONG TERM GOALS   Initiated 6/22/2017 and to be accomplished within 4 Week(s)    1. Patient will perform Upper body ADLs with/without adaptive equipment with supervision/set-up. 2. Patient will perform Lower body ADLs with/without adaptive equipment with moderate assistance . 3. Patient will perform toileting task with minimal assistance/contact guard assist with Good safety to reduce falls risk. 4. Patient will perform functional transfers with supervision/set-up and Good balance and safety awareness. 5. Patient will perform standing static/dynamic activity for improved ADL/IADL function with contact guard an and good safety awareness. 6. Patient will improve Barthel index score to 70/100 to improve independence with mobility. Therapist: Shira Morris OT 6/22/2017   [facility]   OCCUPATIONAL THERAPY DAILY TREATMENT NOTE        Patient: Santosh Knapp (12 y.o. female)                         Date: 7/11/2017  Attending Physician: Devendra Nagel MD  Primary Diagnosis: Hemorrhagic stroke     Treatment Diagnosis  Treatment Diagnosis: left hemiparesis  Treatment Diagnosis 2: difficulty in walking   Precautions : Precautions at Admission: Fall  Vital Signs:  Vital Signs  Pulse (Heart Rate): (!) 51  Heart Rate Source: Monitor  Resp Rate: 18  O2 Sat (%): 96 %  BP: 123/65     Cognitive Status:  Mental Status  Neurologic State: Alert; Appropriate for age  Orientation Level: Oriented X4  Pain:        Gross Assessment:     Coordination:     Bed Mobility: Transfers:  Functional Transfers  Sit to Stand: Minimum assistance;Contact guard assistance  Stand to Sit: Minimum assistance;Contact guard assistance  Bed to Chair: Contact guard assistance     Balance:  Balance  Sitting: With support  Sitting - Static: Fair (occasional)  Sitting - Dynamic: Fair (occasional)  Standing: With support  Standing - Static: Fair  Standing - Dynamic : Fair        Therapeutic Activities:  Co-treated with PT for optimal functional gains with family training with functional transfers in order to increase safety and independence during task. Family training on bed<>w/c transfers utilizing RW in order to increase safety and independence during routine. See above for level of A needed. TYLER reviewed bathing routine and toileting routine and AD in order to assess safety and independence during task. Patient/Caregiver Education:    Pt. Gaile Medico Education on see above.         ASSESSMENT:  Patient continues to demonstrate the need for skilled Occupational Therapy services to improve dynamic standing balance needed for bathing  Progression toward goals:  [X]      Improving appropriately and progressing toward goals  [ ]      Improving slowly and progressing toward goals  [ ]      Not making progress toward goals and plan of care will be adjusted      Treatment session:   30 minutes     Therapist:    TYLER Rabago,  7/11/2017

## 2017-07-11 NOTE — ROUTINE PROCESS
Bedside and Verbal shift change report given to LUIS ENRIQUE LopezRN (oncoming nurse) by HELDER Beckford LPN (offgoing nurse). Report included the following information SBAR, Kardex and MAR. Nursing rounds completed by nursing staff per facility protocol.

## 2017-07-12 LAB
GLUCOSE BLD STRIP.AUTO-MCNC: 186 MG/DL (ref 70–110)
GLUCOSE BLD STRIP.AUTO-MCNC: 97 MG/DL (ref 70–110)

## 2017-07-12 PROCEDURE — 74011250637 HC RX REV CODE- 250/637: Performed by: INTERNAL MEDICINE

## 2017-07-12 PROCEDURE — 82962 GLUCOSE BLOOD TEST: CPT

## 2017-07-12 RX ORDER — GUAIFENESIN 100 MG/5ML
81 LIQUID (ML) ORAL DAILY
Status: DISCONTINUED | OUTPATIENT
Start: 2017-07-12 | End: 2017-07-14 | Stop reason: HOSPADM

## 2017-07-12 RX ADMIN — ZOLPIDEM TARTRATE 5 MG: 5 TABLET, COATED ORAL at 22:32

## 2017-07-12 RX ADMIN — METOPROLOL TARTRATE 50 MG: 50 TABLET ORAL at 17:44

## 2017-07-12 RX ADMIN — THIAMINE HCL TAB 100 MG 100 MG: 100 TAB at 08:51

## 2017-07-12 RX ADMIN — GABAPENTIN 300 MG: 300 CAPSULE ORAL at 21:52

## 2017-07-12 RX ADMIN — DORZOLAMIDE HYDROCHLORIDE AND TIMOLOL MALEATE 1 DROP: 20; 5 SOLUTION/ DROPS OPHTHALMIC at 17:45

## 2017-07-12 RX ADMIN — BETAMETHASONE VALERATE: 1.2 CREAM TOPICAL at 08:53

## 2017-07-12 RX ADMIN — ASPIRIN 81 MG 81 MG: 81 TABLET ORAL at 12:32

## 2017-07-12 RX ADMIN — PANTOPRAZOLE SODIUM 40 MG: 40 TABLET, DELAYED RELEASE ORAL at 08:51

## 2017-07-12 RX ADMIN — DORZOLAMIDE HYDROCHLORIDE AND TIMOLOL MALEATE 1 DROP: 20; 5 SOLUTION/ DROPS OPHTHALMIC at 08:53

## 2017-07-12 RX ADMIN — SIMVASTATIN 40 MG: 40 TABLET, FILM COATED ORAL at 21:52

## 2017-07-12 RX ADMIN — SPIRONOLACTONE 50 MG: 25 TABLET, FILM COATED ORAL at 08:50

## 2017-07-12 RX ADMIN — LEVOTHYROXINE SODIUM 100 MCG: 50 TABLET ORAL at 08:51

## 2017-07-12 RX ADMIN — SPIRONOLACTONE 50 MG: 25 TABLET, FILM COATED ORAL at 17:44

## 2017-07-12 RX ADMIN — FUROSEMIDE 20 MG: 20 TABLET ORAL at 08:52

## 2017-07-12 RX ADMIN — Medication 500 MG: at 08:51

## 2017-07-12 RX ADMIN — FOLIC ACID 1 MG: 1 TABLET ORAL at 08:51

## 2017-07-12 NOTE — PROGRESS NOTES
SW informed by pt's daughter that Jalen Hua called her on yesterday and the dme will be delivered tomorrow to pt's home.

## 2017-07-12 NOTE — PROGRESS NOTES
conducted a Follow up consultation and Spiritual Assessment for Signa Babinski, who is a 80 y.o.,female. The  provided the following Interventions:  Continued the relationship of care and support. Listened empathically. Offered prayer and assurance of continued prayer on patients behalf. Chart reviewed. The following outcomes were achieved:  Patient expressed gratitude for pastoral care visit. Assessment:  There are no further spiritual or Sabianist issues which require Spiritual Care Services interventions at this time. Plan:  Chaplains will continue to follow and will provide pastoral care on an as needed/requested basis.  recommends bedside caregivers page  on duty if patient shows signs of acute spiritual or emotional distress.      [address] [facility]   Staff [address] [facility]   [phone]

## 2017-07-12 NOTE — PROGRESS NOTES
Problem: Self Care Deficits Care Plan (Adult)  Goal: *Therapy Goal (Edit Goal, Insert Text)  OCCUPATIONAL THERAPY SHORT TERM GOALS     Updated 7/12/17    1. Patient will perform Upper body ADLs with/without adaptive equipment with Mod I .   2. Patient will increase left UE AROM/strength to use Supervision with min cues. 3. Patient will perform toileting task with Mod assistance with Fair safety to reduce falls risk. 4. Patient will perform functional transfers with and CGA. 5. Patient will perform dynamic sitting balance activities for improved ADL/IADL function with Supervision and Fair balance and safety awarenes. 6. Patient will improve Barthel index scores to at least 50/100 to improve functional mobility. Updated 7/05/17    1. Patient will perform Upper body ADLs with/without adaptive equipment with Min assistance . (goal met-UPG Mod I)  2. Patient will increase left UE AROM/strength to use CGA with min cues. (goal met-UPG Supervision)  3. Patient will perform toileting task with Mod assistance with Fair safety to reduce falls risk. (progressing)  4. Patient will perform functional transfers with and moderate assistance. (goal met-UPG CGA)  5. Patient will perform dynamic sitting balance activities for improved ADL/IADL function with SBA and Fair balance and safety awarenes. (goal met-UPG Supervision)  6. Patient will improve Barthel index scores to at least 50/100 to improve functional mobility. (progressing)    Updated 6/28/17    1. Patient will perform Upper body ADLs with/without adaptive equipment with moderate assistance . (goal met-UPG Min A)  2. Patient will increase left UE AROM/strength to use minimal assist with min cues. (goal met-UPG CGA)  3. Patient will perform toileting task with maximal assistance with Fair safety to reduce falls risk. (goal met-UPG Mod A)  4. Patient will perform functional transfers with and moderate assistance. (progressing)  5.  Patient will perform dynamic sitting balance activities for improved ADL/IADL function with minimal assistance/contact guard assist and Fair balance and safety awarenes. (goal met-UPG SBA)  6. Patient will improve Barthel index scores to at least 30/100 to improve functional mobility. (goal met-UPG 50/100)     Initiated 6/22/2017 and to be accomplished within 2 Week(s)    1. Patient will perform Upper body ADLs with/without adaptive equipment with moderate assistance .(progressing-currently Max A)  2. Patient will increase left UE AROM/strength to use functional stabilizer assist with min cues. (goal met-UPG)  3. Patient will perform toileting task with maximal assistance with Fair safety to reduce falls risk. (progressing)  4. Patient will perform functional transfers with and moderate assistance . (progressing)  5. Patient will perform dynamic sitting balance activities for improved ADL/IADL function with minimal assistance/contact guard assist and Fair balance and safety awarenes. (progressing)   6. Patient will improve Barthel index scores to at least 30/100 to improve functional mobility. (progressing)      OCCUPATIONAL THERAPY LONG TERM GOALS   Initiated 6/22/2017 and to be accomplished within 4 Week(s)    1. Patient will perform Upper body ADLs with/without adaptive equipment with supervision/set-up. 2. Patient will perform Lower body ADLs with/without adaptive equipment with moderate assistance . 3. Patient will perform toileting task with minimal assistance/contact guard assist with Good safety to reduce falls risk. 4. Patient will perform functional transfers with supervision/set-up and Good balance and safety awareness. 5. Patient will perform standing static/dynamic activity for improved ADL/IADL function with contact guard an and good safety awareness. 6. Patient will improve Barthel index score to 70/100 to improve independence with mobility.       Therapist: Lion Miranda OT 6/22/2017   TR[facility] [place]  OCCUPATIONAL THERAPY WEEKLY SUMMARY   Reporting period:  from 7/05/17 through 7/12/17         Patient: Radha Cabello (21 y.o. female)                                Date: 7/12/2017    Primary Diagnosis: Hemorrhagic stroke                                     Attending Physician: Isabella Verdugo MD Treatment Diagnosis  Treatment Diagnosis: left hemiparesis  Treatment Diagnosis 2: difficulty in walking  Precautions: Fall  Rehab Potential : [address] interventions and education provided with clinical rationale (include individualized treatment techniques and standardized tests):  Skilled Occupational services were provided utilizing therapeutic exercises, therapeutic activities, functional mobility, functional activities, and EC/WS. Using a comparative statement, summarize significant progress toward goals as a result of skilled intervention provided:  Patient has made Good progress towards their Occupational Therapy goals in the following areas: increased independence and performance with grooming, bathing, upper body dressing, lower body dressing, toileting and toilet transfer. Patient has met 4/6 STGS and making good progression towards LTGS. Identify remaining functional areas, impairments limiting progress and/or barriers to improvement:  Patient would benefit from continued skilled Occupational Therapy Services to address the following functional deficits in decreased dynamic standing balance and tolerance needed for ADLS. OBJECTIVE DATA SUMMARY:          INITIAL ASSESSMENT WEEKLY PROGRESS   COGNITIVE STATUS: COGNITIVE STATUS:   Neurologic State: Alert  Orientation Level: Oriented X4  Cognition: Follows commands  Perception: Appears intact (Simultaneous filing. User may not have seen previous data.)  Perseveration: No perseveration noted (Simultaneous filing. User may not have seen previous data.)  Safety/Judgement: Fall prevention (Simultaneous filing.  User may not have seen previous data.) Neurologic State: Alert, Appropriate for age  Orientation Level: Oriented X4  Cognition: Appropriate for age attention/concentration  Perception: Cues to maintain midline in sitting  Perseveration: No perseveration noted  Safety/Judgement: Awareness of environment, Fall prevention   PAIN: PAIN:   Pain Scale 1: Numeric (0 - 10)  Pain Intensity 1: 0  Patient Stated Pain Goal: 0  Pain Reassessment 1: Yes       Pain Scale 1: Numeric (0 - 10)  Pain Intensity 1: 0  Patient Stated Pain Goal: 0  Pain Reassessment 1: Yes   BED MOBILITY BED MOBILITY   Rolling: Moderate assistance, Additional time, Assist x1  Supine to Sit: Maximum assistance, Additional time, Assist x1  Sit to Supine: Maximum assistance, Assist x2  Scooting: Moderate assistance, Assist x1 Rolling: Stand-by asssistance  Supine to Sit: Supervision, Additional time  Sit to Supine: Moderate assistance  Scooting: Stand-by asssistance   ADL SELF CARE ADL SELF CARE   Upper Body Dressing: Maximum assistance  Lower Body Dressing: Total assistance  Toileting: Total assistance       Dressing Assistance: Minimum assistance  Pullover Shirt: Minimum assistance  Front Opened Shirt: Maximum assistance           Toileting Assistance: Maximum assistance  Bowel Hygiene: Maximum assistance  Clothing Management: Maximum assistance           Shoes with Velcro: Maximum assistance         TRANSFERS TRANSFERS   Sit to Stand:  Moderate assistance, Assist x2  Stand to Sit: Moderate assistance, Assist x2  Bed to Chair: Moderate assistance, Assist x2  Toilet Transfer : Minimum assistance, Assist x2 Sit to Stand: Minimum assistance, Contact guard assistance  Stand to Sit: Minimum assistance, Contact guard assistance  Bed to Chair: Contact guard assistance  Toilet Transfer : Minimum assistance, Assist x2   Toilet Transfer : Minimum assistance, Assist x2 Toilet Transfer : Minimum assistance, Assist x2   BALANCE BALANCE   Sitting: Impaired, With support  Sitting - Static: Fair (occasional) (-)  Sitting - Dynamic: Poor (constant support) (+)  Standing: Impaired, With support, Pull to stand  Standing - Static: Poor  Standing - Dynamic : Poor Sitting: With support  Sitting - Static: Fair (occasional)  Sitting - Dynamic: Fair (occasional)  Standing: With support  Standing - Static: Fair  Standing - Dynamic : Fair         GROSS ASSESSMENT  GROSS ASSESSMENT   AROM: Generally decreased, functional  PROM: Generally decreased, functional  Strength: Generally decreased, functional (BLEs grossly 3+/5)  Coordination: Generally decreased, functional  Tone: Normal (in BLEs)  Sensation: Intact (to light touch on BLEs) AROM: Generally decreased, functional  PROM: Generally decreased, functional  Strength: Generally decreased, functional (BLEs grossly 3+/5)  Coordination: Generally decreased, functional  Tone: Normal (in BLEs)  Sensation: Intact (to light touch on BLEs)   COORDINATION COORDINATION   Fine Motor Skills-Upper: Left Impaired, Right Intact  Gross Motor Skills-Upper: Left Impaired, Right Intact Fine Motor Skills-Upper: Left Impaired, Right Intact  Gross Motor Skills-Upper: Left Impaired, Right Intact   VISUAL/PERCEPTUAL VISUAL/PERCEPTUAL   Tracking: Able to track stimulus in all quadrants w/o difficulty   Tracking: Able to track stimulus in all quadrants w/o difficulty     AUDITORY: AUDITORY:   Auditory Impairment: None Auditory Impairment: None         INSTRUMENTAL  ADL'S:    INSTRUMENTAL ADL'S:            THE BARTHEL INDEX  ACTIVITY    SCORE   FEEDING  0=unable  5=needs help cutting,spreading butter,etc., or modified diet  10= independent    10   BATHING  0=dependent  5=independent (or in shower    0   GROOMING  0=needs help  5=independent face/hair/teeth/shaving (implements provided)    5   DRESSING  0=dependent  5=needs help but can do about half unaided  10=independent(including buttons, zips,laces etc.)    5   BOWELS  0=incontinent  5=occasional accident  10=continent    10 BLADDER  0=incontinent, or catheterized and unable to manage alone  5=occasional accident  10=continent    10   TOILET USE  0=dependent  5=needs some help, but can do something alone  10=independent (on and off, dressing, wiping)    5   TRANSFER (BED TO CHAIR AND BACK)  0=unable, no sitting balance  5=major help(one or two people,physical), can sit  10=minor help(verbal or physical)  15=independent    10   MOBILITY (ON LEVEL SURFACES)  0=immobile or <50 yards  5=wheelchair independent,including corners,>50 yards  10=walkes with help of one person (verbal or physical) >50 yards  15=independent(but may use any aid; for example, stick) >50 yards    0   STAIRS  0=unable  5=needs help (verbal, physical, carrying aid)  10=independent    0             TOTAL:                  55/100      Treatment: Attempted functional reach activity with L UE however patient demonstrated increased stiffness hiking during task. Table slides, 2 sets x 20 reps in to decrease stiffness in L shoulder during mobility. Functional mobility utilizing RW in order to increase functional activity tolerance needed for ADLS. Patient was able to tolerate 20 ft of mobility prior to requesting to rest due to increase B LE fatigue. Static standing activity with one hand release in order to increase standing balance and tolerance needed for ADLS. Patient was able to tolerate standing for 3 mins prior to requesting to sit. Patient's response to Treatment rendered:  Patient is pleasant and receptive to therapist cueing. Patient expected Discharge Location:  [X ]Private Residence  [ ] [facility]  [ [facility]  [ ]Other:     Plan: Continue OT services as established on the Plan of Care for 5 times a week.      Treatment Minutes:  40  OT and Assistant have had a weekly case conference regarding the above treatment:  [ ] Yes     [ ] No    Therapist:   TYLER Barreto,         Date:7/12/2017      Forward to OT for co-signature when completed

## 2017-07-12 NOTE — PROGRESS NOTES
CATHIE spoke with pt's daughter Fly Tucker re: home health referral for pt. She signed the [place] of Choice for [facility] and was given a copy. CATHIE spoke with [address] [place] [address] liaison re: home health referral and pt's d/c on 7/14/17.

## 2017-07-12 NOTE — PROGRESS NOTES
Problem: Mobility Impaired (Adult and Pediatric)  Goal: *Acute Goals and Plan of Care (Insert Text)  PHYSICAL THERAPY STG GOALS :  Initiated 6/19/2017 and to be accomplished within 2 Weeks (updated 6/30/17)    1. Patient will move from supine to sit and sit to supine and roll side to side in bed with minimal assistance/contact guard assist.   (Achieved)   2. Patient will transfer from bed to chair and chair to bed with minimal assistance/contact guard assist using RW. (Achieved)  3. Patient will perform sit to stand with minimal assistance/contact guard assist with Fair balance and safety awareness. (Progressing; varying Max A-Min A)  4. Patient will ambulate with minimal assistance/contact guard assist for 75 feet with RW on level surfaces with 2 turns. (Progressing; CGA/min A x 12 ft with 1 turn)  5. Patient will ascend/descend 1 stairs with bilateral handrail(s) with moderate assistance to allow for safe home access/exit. (Not addressed)  6. Patient will improve standardized test score for [place] Standing Balance score of 2. (Achieved)    PHYSICAL THERAPY STG GOALS :  Initiated 6/19/2017 and to be accomplished within 2 Weeks (updated 6/23/17)    1. Patient will move from supine to sit and sit to supine and roll side to side in bed with minimal assistance/contact guard assist.   (Progressing; mod A)   2. Patient will transfer from bed to chair and chair to bed with minimal assistance/contact guard assist using RW. (Progressing; mod A using Stand pivot transfer)  3. Patient will perform sit to stand with minimal assistance/contact guard assist with Fair balance and safety awareness. (Progressing; varying between mod A to max A x 2 with poor/poor + balance)  4. Patient will ambulate with minimal assistance/contact guard assist for 75 feet with RW on level surfaces with 2 turns. (Progressing; min/mod A x 5 ft using // bars)  5.  Patient will ascend/descend 1 stairs with bilateral handrail(s) with moderate assistance to allow for safe home access/exit. (Not addressed)  6. Patient will improve standardized test score for [place] Standing Balance score of 2. (Progressing; 1)      PHYSICAL THERAPY LTG GOALS :  Initiated 6/19/2017 and to be accomplished within 4 Weeks    1. Patient will move from supine to sit and sit to supine and roll side to side in bed with supervision/set-up. 2. Patient will transfer from bed to chair and chair to bed with supervision/set-up using RW. 3. Patient will perform sit to stand with supervision/set-up with Fair+ balance and safety awareness. 4. Patient will ambulate with supervision/set-up for 150 feet with RW on level surfaces and be able to maneuver through narrow spaces and obstacles without loss of balance. 5. Patient will ascend/descend 1 stairs with NO handrail(s) with minimal assistance/contact guard assist to allow for safe home access/exit. 6. Patient will improve standardized test score for [place] Standing Balance score of 3 for reduced fall risk.      Physical Therapist: Rene Avina PT on 6/19/2017    [facility]   PHYSICAL THERAPY DAILY TREATMENT NOTE        Patient: Nirav Weeks (18 y.o. female)               Date: 7/12/2017    Physician: Vicente Lucia MD  Primary Diagnosis: Hemorrhagic stroke           Treatment Diagnosis  Treatment Diagnosis: left hemiparesis  Treatment Diagnosis 2: difficulty in walking  Precautions: Fall  Vital Signs  Vital Signs  Temp: 97.6 °F (36.4 °C)  Temp Source: Oral  Pulse (Heart Rate): (!) 48  Heart Rate Source: Monitor  Resp Rate: 16  O2 Sat (%): 100 %  Level of Consciousness: Alert  BP: 121/49  MAP (Calculated): 73  MEWS Score: 2  Cognitive Status:  Pain  Pain Screen  Pain Scale 1: Numeric (0 - 10)  Pain Intensity 1: 0  Patient Stated Pain Goal: 0  Bed Mobility Training  Balance  Sitting: With support  Sitting - Static: Fair (occasional)  Sitting - Dynamic: Fair (occasional)  Standing: With support  Standing - Static: Fair  Standing - Dynamic : Fair  Transfer Training  Transfer Training  Sit to Stand: Minimum assistance  Stand to Sit: Contact guard assistance  Interventions: Safety awareness training;Verbal cues  Sit to Stand: Minimum assistance  Gait Training  Gait  Base of Support: Center of gravity altered  Speed/Aura: Slow  Step Length: Right shortened;Left shortened  Gait Abnormalities: Decreased step clearance  Ambulation - Level of Assistance: Contact guard assistance;Minimal assistance  Distance (ft): 20 Feet (ft)  Assistive Device: Gait belt;Walker, rolling  Interventions: Safety awareness training;Verbal cues  Gait Abnormalities: Decreased step clearance  With 2 turns. Therapeutic Exercise: Gait training with abovementioned details and w/c follow for safety. Pt advised to sit before B LE's become too fatigued to reduce risk of L knee buckling. Static standing balance with 1 UE support and SBA with no noted LOB. Pt able to stand for 3'10\" x2. Sit>stand throughout session with Min A and verbal cues for hand placement. Seated B LE TE rendered to promote strength and endurance for improved functional mobility: LAQ, hip flexion, HR/TR, ball squeezes x20. Patient/Caregiver Education:   Pt /Caregiver Education on safety and fall prevention with sit<>stand and gait was provided to reduce risk of falls. ASSESSMENT:  Patient continues to benefit from Skilled PT services for 1-2 additional sessions with DC planned for 7/14/17  Progression toward goals:  [X]      Improving appropriately and progressing toward goals  [ ]      Improving slowly and progressing toward goals  [ ]      Not making progress toward goals and plan of care will be adjusted      Treatment session: 40 minutes.   Therapist:   Akhil Brgigs PTA,          7/12/2017

## 2017-07-12 NOTE — PROGRESS NOTES
NUTRITION follow-up/Plan of care    RECOMMENDATIONS:   1. Dental Soft  2. Monitor weight and PO intake  3. RD to follow    GOALS:   1. Ongoing: PO intake meets >75% of protein/calorie needs by  7/19  2. Met/Ongoing: Maintain weight (+/- 1-2 lb) by 7/19    ASSESSMENT:   Weight status is classified as obese per BMI of 42.1. Weight stable. PO intake is adequate. Labs noted. BG range from  over past 24 hours. Nutrition recommendations listed. RD to follow. Nutrition Risk:  []   High []  Moderate [x] Low    SUBJECTIVE/OBJECTIVE:     Visited pt, daughters were in room. PO intake remains good for this pt at %. No complaints voiced. Good family support. Information Obtained From:   [x] Chart Review  [x] Patient  [x] Family/Caregiver  [] Nurse/Physician   [x] Patient Rounds/Interdisciplinary Meeting    DietDental Soft  No data found. Medications: [x] Reviewed   No diagnosis found.   Past Medical History:   Diagnosis Date    Atrial fibrillation ([facility]     Chronic kidney disease     unknown what stage    Diabetes ([facility]      Labs:    Lab Results   Component Value Date/Time    Sodium 139 07/06/2017 02:09 PM    Potassium 3.7 07/06/2017 02:09 PM    Chloride 101 07/06/2017 02:09 PM    CO2 28 07/06/2017 02:09 PM    Anion gap 10 07/06/2017 02:09 PM    Glucose 193 07/06/2017 02:09 PM    BUN 24 07/06/2017 02:09 PM    Creatinine 1.63 07/06/2017 02:09 PM    Calcium 8.7 07/06/2017 02:09 PM    Magnesium 2.3 06/16/2017 02:16 PM    Phosphorus 1.8 06/16/2017 01:00 AM    Albumin 2.8 06/14/2017 01:00 AM     Anthropometrics: BMI (calculated): 42.4   Last 3 Recorded Weights in this Encounter    06/28/17 1013 07/04/17 1521 07/12/17 1048   Weight: 94.7 kg (208 lb 12.8 oz) 95.3 kg (210 lb) 95.3 kg (210 lb)      Ht Readings from Last 1 Encounters:   06/21/17 4' 11\" (1.499 m)     []  Weight Loss  []  Weight Gain  [x]  Weight Stable   []  New wt n/a on record     Estimated Nutrition Needs:       Calories: 1582 Kcal  Protein:   73 P     Nutrition Problems Identified:   [] Suboptimal PO intake   [] Food Allergies  [x] Difficulty chewing/swallowing/poor dentition  [] Constipation/Diarrhea   [] Nausea/Vomiting   [] None  [] Other:     Plan:   [] Therapeutic Diet  []  Obtained/adjusted food preferences/tolerances and/or snacks options   [x]  Supplements continued  [] Occupational therapy following for feeding techniques  []  HS snack added   [x]  Modify diet texture   []  Modify diet for food allergies   []  Educate patient   []  Assist with menu selection   [x]  Monitor PO intake on meal rounds   [x]  Continue inpatient monitoring and intervention   []  Participated in discharge planning/Interdisciplinary rounds/Team meetings   []  Other:     Education Needs:   [] Not appropriate for teaching at this time due to:   [x] Identified and addressed    Nutrition Monitoring and Evaluation:   [x] Continue inpatient monitoring and interventions    [] Other:     Jori Ellison  Pager: [phone]

## 2017-07-12 NOTE — PROGRESS NOTES
[facility]     Patient: Sandi Johnson MRN: [id number]  CSN: [id number]    YOB: 1929  Age: 80 y.o. Sex: female    DOA: 6/16/2017 LOS:  LOS: 26 days                    Subjective:     Pt doing well and eduardo. Neuro eval and will start pts asa for a fib. Blood sugars tight controll.     Objective:      Visit Vitals    /49    Pulse (!) 48    Temp 97.6 °F (36.4 °C)    Resp 16    Ht 4' 11\" (1.499 m)    Wt 95.3 kg (210 lb)    SpO2 100%    Breastfeeding No    BMI 42.41 kg/m2       Physical Exam:  Chest clear  Cor rr  abd soft  No edema  L heiparesis persists    Intake and Output:  Current Shift:     Last three shifts:       Recent Results (from the past 24 hour(s))   GLUCOSE, POC    Collection Time: 07/11/17  4:31 PM   Result Value Ref Range    Glucose (POC) 162 (H) 70 - 110 mg/dL   GLUCOSE, POC    Collection Time: 07/12/17  5:32 AM   Result Value Ref Range    Glucose (POC) 97 70 - 110 mg/dL       Current Facility-Administered Medications   Medication Dose Route Frequency    aspirin chewable tablet 81 mg  81 mg Oral DAILY    betamethasone valerate (VALISONE) 0.1 % cream   Topical BID PRN    loperamide (IMODIUM) capsule 2 mg  2 mg Oral Q4H PRN    furosemide (LASIX) tablet 20 mg  20 mg Oral DAILY    sodium phosphate (FLEET'S) enema 118 mL  1 Enema Rectal DAILY PRN    pantoprazole (PROTONIX) tablet 40 mg  40 mg Oral ACB    [facility] ANESTHESIA   Other PRN    [facility] EMERGENCY/STAT BOX   Other PRN    alum-mag hydroxide-simeth (MYLANTA) oral suspension 30 mL  30 mL Oral QID PRN    ferrous fumarate-vitamin C 200 mg (65 mg iron)-25 mg TbER 1 Tab (patient's own med)  1 Tab Oral DAILY    linagliptin (TRADJENTA) tablet 5 mg (Patient's own med)  5 mg Oral ACB    albuterol-ipratropium (DUO-NEB) 2.5 MG-0.5 MG/3 ML  3 mL Nebulization Q4H PRN    bisacodyl (DULCOLAX) suppository 10 mg  10 mg Rectal DAILY PRN    insulin lispro (HUMALOG) injection   SubCUTAneous ACB&D    magnesium hydroxide (MILK OF MAGNESIA) 400 mg/5 mL oral suspension 30 mL  30 mL Oral DAILY PRN    dorzolamide-timolol (COSOPT) 22.3-6.8 mg/mL ophthalmic solution 1 Drop  1 Drop Both Eyes BID    ascorbic acid (vitamin C) (VITAMIN C) tablet 500 mg  500 mg Oral DAILY    gabapentin (NEURONTIN) capsule 300 mg  300 mg Oral QHS    metoprolol tartrate (LOPRESSOR) tablet 50 mg  50 mg Oral BID    levothyroxine (SYNTHROID) tablet 100 mcg  100 mcg Oral ACB    spironolactone (ALDACTONE) tablet 50 mg  50 mg Oral BID    folic acid (FOLVITE) tablet 1 mg  1 mg Oral DAILY    Thiamine Mononitrate (B-1) tablet 100 mg  100 mg Oral DAILY    meclizine (ANTIVERT) tablet 25 mg  25 mg Oral QID PRN    zolpidem (AMBIEN) tablet 5 mg  5 mg Oral QHS PRN    simvastatin (ZOCOR) tablet 40 mg  40 mg Oral QHS    Please ask patient to bring in linagliptin. We do not carry in pharmacy. 1 Each Other Rx Dosing/Monitoring       Lab Results   Component Value Date/Time    Glucose 193 07/06/2017 02:09 PM    Glucose 121 07/03/2017 05:09 AM    Glucose 83 06/24/2017 04:40 AM    Glucose 66 06/22/2017 03:50 AM    Glucose 87 06/16/2017 01:00 AM        Assessment/Plan     Active Problems:    * No active hospital problems.  Yosvany Medina MD  7/12/2017, 10:23 AM

## 2017-07-12 NOTE — ROUTINE PROCESS
Bedside and Verbal shift change report given to [facility] LPN (oncoming nurse) by Jael Zhang RN (offgoing nurse). Report included the following information SBAR, Kardex and MAR. Hourly rounds made.

## 2017-07-13 LAB
GLUCOSE BLD STRIP.AUTO-MCNC: 131 MG/DL (ref 70–110)
GLUCOSE BLD STRIP.AUTO-MCNC: 157 MG/DL (ref 70–110)

## 2017-07-13 PROCEDURE — 74011000250 HC RX REV CODE- 250: Performed by: INTERNAL MEDICINE

## 2017-07-13 PROCEDURE — 74011250637 HC RX REV CODE- 250/637: Performed by: INTERNAL MEDICINE

## 2017-07-13 PROCEDURE — 82962 GLUCOSE BLOOD TEST: CPT

## 2017-07-13 RX ORDER — FUROSEMIDE 20 MG/1
TABLET ORAL
Qty: 60 TAB | Refills: 0 | Status: SHIPPED | OUTPATIENT
Start: 2017-07-13 | End: 2020-01-01

## 2017-07-13 RX ORDER — ZOLPIDEM TARTRATE 5 MG/1
5 TABLET ORAL
Qty: 60 TAB | Refills: 0 | Status: SHIPPED | OUTPATIENT
Start: 2017-07-13 | End: 2020-01-01

## 2017-07-13 RX ORDER — LEVOTHYROXINE SODIUM 100 UG/1
100 TABLET ORAL
Qty: 60 TAB | Refills: 0 | Status: SHIPPED | OUTPATIENT
Start: 2017-07-13

## 2017-07-13 RX ORDER — ASCORBIC ACID 500 MG
500 TABLET ORAL DAILY
Qty: 60 TAB | Refills: 0 | Status: SHIPPED | OUTPATIENT
Start: 2017-07-13 | End: 2019-01-01

## 2017-07-13 RX ORDER — ASPIRIN 325 MG/1
100 TABLET, FILM COATED ORAL DAILY
Qty: 60 TAB | Refills: 0 | Status: SHIPPED | OUTPATIENT
Start: 2017-07-13 | End: 2019-01-01

## 2017-07-13 RX ORDER — SPIRONOLACTONE 50 MG/1
50 TABLET, FILM COATED ORAL 2 TIMES DAILY
Qty: 60 TAB | Refills: 0 | Status: SHIPPED | OUTPATIENT
Start: 2017-07-13

## 2017-07-13 RX ORDER — PANTOPRAZOLE SODIUM 40 MG/1
40 TABLET, DELAYED RELEASE ORAL
Qty: 60 TAB | Refills: 0 | Status: SHIPPED | OUTPATIENT
Start: 2017-07-13

## 2017-07-13 RX ORDER — GABAPENTIN 300 MG/1
300 CAPSULE ORAL
Qty: 60 CAP | Refills: 0 | Status: SHIPPED | OUTPATIENT
Start: 2017-07-13

## 2017-07-13 RX ORDER — GUAIFENESIN 100 MG/5ML
81 LIQUID (ML) ORAL DAILY
Qty: 100 TAB | Refills: 0 | Status: SHIPPED | OUTPATIENT
Start: 2017-07-13

## 2017-07-13 RX ORDER — DORZOLAMIDE HYDROCHLORIDE AND TIMOLOL MALEATE 20; 5 MG/ML; MG/ML
1 SOLUTION/ DROPS OPHTHALMIC 2 TIMES DAILY
Qty: 5 ML | Refills: 0 | Status: SHIPPED | OUTPATIENT
Start: 2017-07-13 | End: 2019-01-01

## 2017-07-13 RX ORDER — FOLIC ACID 1 MG/1
1 TABLET ORAL DAILY
Qty: 60 TAB | Refills: 0 | Status: SHIPPED | OUTPATIENT
Start: 2017-07-13 | End: 2019-01-01

## 2017-07-13 RX ORDER — METOPROLOL TARTRATE 50 MG/1
50 TABLET ORAL 2 TIMES DAILY
Qty: 60 TAB | Refills: 0 | Status: SHIPPED | OUTPATIENT
Start: 2017-07-13

## 2017-07-13 RX ORDER — SIMVASTATIN 40 MG/1
40 TABLET, FILM COATED ORAL
Qty: 60 TAB | Refills: 0 | Status: SHIPPED | OUTPATIENT
Start: 2017-07-13

## 2017-07-13 RX ADMIN — LEVOTHYROXINE SODIUM 100 MCG: 50 TABLET ORAL at 08:54

## 2017-07-13 RX ADMIN — DORZOLAMIDE HYDROCHLORIDE AND TIMOLOL MALEATE 1 DROP: 20; 5 SOLUTION/ DROPS OPHTHALMIC at 08:53

## 2017-07-13 RX ADMIN — Medication 500 MG: at 08:54

## 2017-07-13 RX ADMIN — SIMVASTATIN 40 MG: 40 TABLET, FILM COATED ORAL at 21:01

## 2017-07-13 RX ADMIN — FOLIC ACID 1 MG: 1 TABLET ORAL at 08:54

## 2017-07-13 RX ADMIN — BETAMETHASONE VALERATE: 1.2 CREAM TOPICAL at 08:53

## 2017-07-13 RX ADMIN — METOPROLOL TARTRATE 50 MG: 50 TABLET ORAL at 18:00

## 2017-07-13 RX ADMIN — PANTOPRAZOLE SODIUM 40 MG: 40 TABLET, DELAYED RELEASE ORAL at 08:53

## 2017-07-13 RX ADMIN — DORZOLAMIDE HYDROCHLORIDE AND TIMOLOL MALEATE 1 DROP: 20; 5 SOLUTION/ DROPS OPHTHALMIC at 17:58

## 2017-07-13 RX ADMIN — SPIRONOLACTONE 50 MG: 25 TABLET, FILM COATED ORAL at 08:53

## 2017-07-13 RX ADMIN — THIAMINE HCL TAB 100 MG 100 MG: 100 TAB at 08:54

## 2017-07-13 RX ADMIN — IPRATROPIUM BROMIDE AND ALBUTEROL SULFATE 3 ML: .5; 3 SOLUTION RESPIRATORY (INHALATION) at 17:58

## 2017-07-13 RX ADMIN — METOPROLOL TARTRATE 50 MG: 50 TABLET ORAL at 08:54

## 2017-07-13 RX ADMIN — ZOLPIDEM TARTRATE 5 MG: 5 TABLET, COATED ORAL at 21:35

## 2017-07-13 RX ADMIN — FUROSEMIDE 20 MG: 20 TABLET ORAL at 08:54

## 2017-07-13 RX ADMIN — ASPIRIN 81 MG 81 MG: 81 TABLET ORAL at 08:54

## 2017-07-13 RX ADMIN — GABAPENTIN 300 MG: 300 CAPSULE ORAL at 21:01

## 2017-07-13 RX ADMIN — SPIRONOLACTONE 50 MG: 25 TABLET, FILM COATED ORAL at 17:58

## 2017-07-13 NOTE — PROGRESS NOTES
Problem: Mobility Impaired (Adult and Pediatric)  Goal: *Acute Goals and Plan of Care (Insert Text)  PHYSICAL THERAPY STG GOALS :  Initiated 6/19/2017 and to be accomplished within 2 Weeks (updated 6/30/17)    1. Patient will move from supine to sit and sit to supine and roll side to side in bed with minimal assistance/contact guard assist.   (Achieved)   2. Patient will transfer from bed to chair and chair to bed with minimal assistance/contact guard assist using RW. (Achieved)  3. Patient will perform sit to stand with minimal assistance/contact guard assist with Fair balance and safety awareness. (Progressing; varying Max A-Min A)  4. Patient will ambulate with minimal assistance/contact guard assist for 75 feet with RW on level surfaces with 2 turns. (Progressing; CGA/min A x 12 ft with 1 turn)  5. Patient will ascend/descend 1 stairs with bilateral handrail(s) with moderate assistance to allow for safe home access/exit. (Not addressed)  6. Patient will improve standardized test score for [place] Standing Balance score of 2. (Achieved)    PHYSICAL THERAPY STG GOALS :  Initiated 6/19/2017 and to be accomplished within 2 Weeks (updated 6/23/17)    1. Patient will move from supine to sit and sit to supine and roll side to side in bed with minimal assistance/contact guard assist.   (Progressing; mod A)   2. Patient will transfer from bed to chair and chair to bed with minimal assistance/contact guard assist using RW. (Progressing; mod A using Stand pivot transfer)  3. Patient will perform sit to stand with minimal assistance/contact guard assist with Fair balance and safety awareness. (Progressing; varying between mod A to max A x 2 with poor/poor + balance)  4. Patient will ambulate with minimal assistance/contact guard assist for 75 feet with RW on level surfaces with 2 turns. (Progressing; min/mod A x 5 ft using // bars)  5.  Patient will ascend/descend 1 stairs with bilateral handrail(s) with moderate assistance to allow for safe home access/exit. (Not addressed)  6. Patient will improve standardized test score for [place] Standing Balance score of 2. (Progressing; 1)      PHYSICAL THERAPY LTG GOALS :  Initiated 2017 and to be accomplished within 4 Weeks    1. Patient will move from supine to sit and sit to supine and roll side to side in bed with supervision/set-up. 2. Patient will transfer from bed to chair and chair to bed with supervision/set-up using RW. 3. Patient will perform sit to stand with supervision/set-up with Fair+ balance and safety awareness. 4. Patient will ambulate with supervision/set-up for 150 feet with RW on level surfaces and be able to maneuver through narrow spaces and obstacles without loss of balance. 5. Patient will ascend/descend 1 stairs with NO handrail(s) with minimal assistance/contact guard assist to allow for safe home access/exit. 6. Patient will improve standardized test score for [place] Standing Balance score of 3 for reduced fall risk. Physical Therapist: Minnie Post PT on 2017    [facility] PHYSICAL THERAPY DISCHARGE SUMMARY        Patient: Lizzette Shannon (57 y.o. female)                  Date: 2017    Attending Physician: Sakina Cooper MD  Primary Diagnosis: Hemorrhagic stroke               Treatment Diagnosis  Treatment Diagnosis: left hemiparesis  Treatment Diagnosis 2: difficulty in walking         Precautions: Fall   MEDICAL HISTORY:   Past Medical History:   Diagnosis Date    [facility] [address] [address]      Chronic kidney disease       unknown what stage    Diabetes ([facility] [address]     No past surgical history on file. Reason for Discharge: [ ] Goals Met   [X]Met highest potential  [ ]    [ ] Progress ceased  [ ]Hospitalized  [ Dheeraj Ballard: Pt/family request for early D/C from facility. Discharge Location:  [X] Private Residence  [ ] longterm  [ ] LTC  [ ]  [address] [ ]Other: 24 hr.supervision for safety. Summarize skilled services provided and significant progress attained since last daily/weekly note (include individualized treatment techniques and standardized tests): This Patient has received skilled PT services from 6/19/17 through 7/13/17 and has made Good progress towards their PT goals. Improvements noted in bed mobility, transfers and gait   Summary of education/recommendation provided to Patient/Caregivers:    Pt. Education provided to use [employer] and HEP       Objective Data:       INITIAL  ASSESSMENT DISCHARGE ASSESSMENT   COGNITIVE STATUS COGNITIVE STATUS   Neurologic State: Alert  Orientation Level: Oriented X4  Cognition: Follows commands  Perception: Appears intact (Simultaneous filing. User may not have seen previous data.)  Perseveration: No perseveration noted (Simultaneous filing. User may not have seen previous data.)  Safety/Judgement: Fall prevention (Simultaneous filing.  User may not have seen previous data.) Neurologic State: Alert, Appropriate for age  Orientation Level: Oriented X4  Cognition: Appropriate for age attention/concentration  Perception: Cues to maintain midline in sitting  Perseveration: No perseveration noted  Safety/Judgement: Awareness of environment, Fall prevention   PAIN PAIN   Pain Scale 1: Numeric (0 - 10)  Pain Intensity 1: 0  Patient Stated Pain Goal: 0  Pain Reassessment 1: Yes Pain Scale 1: Numeric (0 - 10)  Pain Intensity 1: 0  Patient Stated Pain Goal: 0  Pain Reassessment 1: Yes   GROSS ASSESSMENT GROSS ASSESSMENT   AROM: Generally decreased, functional  PROM: Generally decreased, functional  Strength: Generally decreased, functional (BLEs grossly 3+/5)  Coordination: Generally decreased, functional  Tone: Normal (in BLEs)  Sensation: Intact (to light touch on BLEs) AROM: Generally decreased, functional  PROM: Generally decreased, functional  Strength: Generally decreased, functional (BLEs grossly 3+/5)  Coordination: Generally decreased, functional  Tone: Normal (in BLEs)  Sensation: Intact (to light touch on BLEs)   BED MOBILITY BED MOBILITY   Rolling: Moderate assistance, Additional time, Assist x1  Supine to Sit: Maximum assistance, Additional time, Assist x1  Sit to Supine: Maximum assistance, Assist x2  Scooting: Moderate assistance, Assist x1 Rolling: Stand-by asssistance  Supine to Sit: Supervision, Additional time  Sit to Supine: Moderate assistance  Scooting: Stand-by asssistance   GAIT GAIT   Base of Support: Center of gravity altered, Narrowed, Shift to right  Speed/Aura: Pace decreased (<100 feet/min)  Step Length: Right shortened, Left shortened  Gait Abnormalities: Decreased step clearance, Step to gait  Ambulation - Level of Assistance: Minimal assistance  Distance (ft): 6 Feet (ft) (x2)  Assistive Device: Other (comment) (at //)  Interventions: Safety awareness training, Verbal cues, Tactile cues, Manual cues Base of Support: Center of gravity altered  Speed/Aura: Slow  Step Length: Right shortened, Left shortened  Gait Abnormalities: Decreased step clearance  Ambulation - Level of Assistance: Contact guard assistance  Distance (ft): 20 Feet (ft) (+18)  Assistive Device: Gait belt, Walker, rolling  Interventions: Safety awareness training, Verbal cues    (unable at this time)  (unable at this time)                 With 1 turns. TRANSFERS TRANSFERS   Sit to Stand:  Moderate assistance, Assist x2  Stand to Sit: Moderate assistance, Assist x2  Bed to Chair: Moderate assistance, Assist x2 Sit to Stand: Minimum assistance  Stand to Sit: Contact guard assistance  Bed to Chair: Contact guard assistance   BALANCE BALANCE   Sitting: Impaired, With support  Sitting - Static: Fair (occasional) (-)  Sitting - Dynamic: Poor (constant support) (+)  Standing: Impaired, With support, Pull to stand  Standing - Static: Poor  Standing - Dynamic : Poor Sitting: With support  Sitting - Static: Fair (occasional)  Sitting - Dynamic: Fair (occasional)  Standing: With support  Standing - Static: Fair  Standing - Dynamic : Fair   WHEELCHAIR MOBILITY/MGMT WHEELCHAIR MOBILITY/MGMT           With 2 turns   Visual/Perceptual       Tracking: Able to track stimulus in all quadrants w/o difficulty    Auditory:   Auditory  Auditory Impairment: None             Visual/Perceptual       Tracking: Able to track stimulus in all quadrants w/o difficulty    Auditory:   Auditory  Auditory Impairment: None             Activity Tolerance:  Fair                              Treatment:   Pt had improved with transfers and is now able to complete stand-step transfers with Min/CGA with use of RW. Gait training x20ft, x18ft with RW and CGA with close w/c follow for safety. Pt with no noted buckling of L knee with ambulation today. Therapist provided pt with HEP and reviewed all TE's with pt. Discharge Recommendations:  [X]  Home Exercise Program                                   [X]  Home Health PT             [X]  Remove throw rugs/clear environmental barriers                  [X]  Assistance with: transfers and gait                              [X]  Ambulation Device: Rolling Walker   [X]  Steps (Unable to negotiate steps)   [X]  Wheelchair (Pt had w/c at home)   [ ]  Life Line/alert   [X]  WC  Ramp        Treatment minutes: 40 minutes.      Therapist :  Eugene Meier, PTA            Date:7/13/2017

## 2017-07-13 NOTE — ROUTINE PROCESS
Bedside shift change report given to [id number] (oncoming nurse) by Edelmira Morin RN (offgoing nurse). Report included the following information SBAR, Kardex, MAR and Med Rec Status. VISUALLY CHECKED PT Q 1 HR BY NURSING STAFF. 24 hour order chart check.

## 2017-07-13 NOTE — PROGRESS NOTES
Problem: Self Care Deficits Care Plan (Adult)  Goal: *Therapy Goal (Edit Goal, Insert Text)  OCCUPATIONAL THERAPY SHORT TERM GOALS     Updated 7/12/17    1. Patient will perform Upper body ADLs with/without adaptive equipment with Mod I . (progressing)  2. Patient will increase left UE AROM/strength to use Supervision with min cues. (goal met)  3. Patient will perform toileting task with Mod assistance with Fair safety to reduce falls risk. (goal met)  4. Patient will perform functional transfers with and CGA. (goal met)  5. Patient will perform dynamic sitting balance activities for improved ADL/IADL function with Supervision and Fair balance and safety awarenes. (goal met)  6. Patient will improve Barthel index scores to at least 50/100 to improve functional mobility. (goal met)    Updated 7/05/17    1. Patient will perform Upper body ADLs with/without adaptive equipment with Min assistance . (goal met-UPG Mod I)  2. Patient will increase left UE AROM/strength to use CGA with min cues. (goal met-UPG Supervision)  3. Patient will perform toileting task with Mod assistance with Fair safety to reduce falls risk. (progressing)  4. Patient will perform functional transfers with and moderate assistance. (goal met-UPG CGA)  5. Patient will perform dynamic sitting balance activities for improved ADL/IADL function with SBA and Fair balance and safety awarenes. (goal met-UPG Supervision)  6. Patient will improve Barthel index scores to at least 50/100 to improve functional mobility. (progressing)    Updated 6/28/17    1. Patient will perform Upper body ADLs with/without adaptive equipment with moderate assistance . (goal met-UPG Min A)  2. Patient will increase left UE AROM/strength to use minimal assist with min cues. (goal met-UPG CGA)  3. Patient will perform toileting task with maximal assistance with Fair safety to reduce falls risk. (goal met-UPG Mod A)  4.  Patient will perform functional transfers with and moderate assistance. (progressing)  5. Patient will perform dynamic sitting balance activities for improved ADL/IADL function with minimal assistance/contact guard assist and Fair balance and safety awarenes. (goal met-UPG SBA)  6. Patient will improve Barthel index scores to at least 30/100 to improve functional mobility. (goal met-UPG 50/100)     Initiated 6/22/2017 and to be accomplished within 2 Week(s)    1. Patient will perform Upper body ADLs with/without adaptive equipment with moderate assistance .(progressing-currently Max A)  2. Patient will increase left UE AROM/strength to use functional stabilizer assist with min cues. (goal met-UPG)  3. Patient will perform toileting task with maximal assistance with Fair safety to reduce falls risk. (progressing)  4. Patient will perform functional transfers with and moderate assistance . (progressing)  5. Patient will perform dynamic sitting balance activities for improved ADL/IADL function with minimal assistance/contact guard assist and Fair balance and safety awarenes. (progressing)   6. Patient will improve Barthel index scores to at least 30/100 to improve functional mobility. (progressing)      OCCUPATIONAL THERAPY LONG TERM GOALS   Initiated 6/22/2017 and to be accomplished within 4 Week(s)    1. Patient will perform Upper body ADLs with/without adaptive equipment with supervision/set-up. 2. Patient will perform Lower body ADLs with/without adaptive equipment with moderate assistance . 3. Patient will perform toileting task with minimal assistance/contact guard assist with Good safety to reduce falls risk. 4. Patient will perform functional transfers with supervision/set-up and Good balance and safety awareness. 5. Patient will perform standing static/dynamic activity for improved ADL/IADL function with contact guard an and good safety awareness.   6. Patient will improve Barthel index score to 70/100 to improve independence with mobility. Therapist: Faustino Rivas OT 2017   [facility]  OCCUPATIONAL THERAPY DISCHARGE SUMMARY        Patient: Stiven Berrios (78 y.o. female)                     Date: 2017                   Attending Physician: Dariel King MD  Primary Diagnosis: Hemorrhagic stroke        Treatment Diagnosis  Treatment Diagnosis: left hemiparesis  Treatment Diagnosis 2: difficulty in walking         Precautions: Fall     Medical History:   Past Medical History:   Diagnosis Date    [facility] [address]      Chronic kidney disease       unknown what stage    Diabetes [address] [address]     No past surgical history on file. Reason for Discharge: [ Juan David Police   [ ]Met highest potential  [ ]Hospitalized   [ ]  Progress ceased  [ ]   [ Licha Dye: Patient/family requesting D/C from facility. Discharge Location:  [ [facility]  [ ] [facility]  [ ] [address] [ ] 24 hr. Supervision for safety      Summarize skilled services provided and significant progress attained since last daily/weekly note (include individualized treatment techniques and standardized tests):   Patient has received skilled OT services from 17 to 17 and has made Good progress towards their OT goals. Improvements noted in: increased independence and performance with grooming, bathing, upper body dressing, lower body dressing, toileting and toilet transfer     Summary of education/recommendation provided to Patient/Caregivers in the following areas: Remove barriers/rugs, , mobility w/Rolling Walker for grooming, bathing, upper body dressing, lower body dressing, toileting and toilet transfer          Objective Data:        INITIAL ASSESSMENT DISCHARGE ASSESSMENT   COGNITIVE STATUS: COGNITIVE STATUS:   Neurologic State: Alert  Orientation Level: Oriented X4  Cognition: Follows commands  Perception: Appears intact (Simultaneous filing.  User may not have seen previous data.)  Perseveration: No perseveration noted (Simultaneous filing. User may not have seen previous data.)  Safety/Judgement: Fall prevention (Simultaneous filing. User may not have seen previous data.) Mental Status  Neurologic State: Alert, Appropriate for age  Orientation Level: Oriented X4  Cognition: Appropriate for age attention/concentration  Perception: Cues to maintain midline in sitting  Perseveration: No perseveration noted  Safety/Judgement: Awareness of environment, Fall prevention   PAIN: PAIN:   Pain Scale 1: Numeric (0 - 10)  Pain Intensity 1: 0  Patient Stated Pain Goal: 0  Pain Reassessment 1: Yes Pain Screen  Pain Scale 1: Numeric (0 - 10)  Pain Intensity 1: 0  Patient Stated Pain Goal: 0  Pain Reassessment 1: Yes   BED MOBILITY BED MOBILITY   Rolling: Moderate assistance, Additional time, Assist x1  Supine to Sit: Maximum assistance, Additional time, Assist x1  Sit to Supine: Maximum assistance, Assist x2  Scooting: Moderate assistance, Assist x1 Bed Mobility  Rolling: Stand-by asssistance  Supine to Sit: Supervision, Additional time  Sit to Supine: Moderate assistance  Scooting: Stand-by asssistance   ADL SELF CARE ADL SELF CARE   Upper Body Dressing: Maximum assistance  Lower Body Dressing: Total assistance  Toileting: Total assistance Basic ADL  Upper Body Dressing: Maximum assistance  Lower Body Dressing: Total assistance  Toileting: Total assistance   ADL INTERVENTION ADL INTERVENTION           Upper Body Dressing Assistance  Dressing Assistance: Minimum assistance  Pullover Shirt: Minimum assistance  Front Opened Shirt: Maximum assistance           Toileting  Toileting Assistance: Maximum assistance  Bowel Hygiene: Maximum assistance  Clothing Management: Maximum assistance               TRANSFERS TRANSFERS   Sit to Stand:  Moderate assistance, Assist x2  Stand to Sit: Moderate assistance, Assist x2  Bed to Chair: Moderate assistance, Assist x2  Toilet Transfer : Minimum assistance, Assist x2 Functional Transfers  Sit to Stand: Minimum assistance  Stand to Sit: Contact guard assistance  Bed to Chair: Contact guard assistance  Toilet Transfer : Minimum assistance, Assist x2   BALANCE BALANCE   Sitting: Impaired, With support  Sitting - Static: Fair (occasional) (-)  Sitting - Dynamic: Poor (constant support) (+)  Standing: Impaired, With support, Pull to stand  Standing - Static: Poor  Standing - Dynamic : Poor Balance  Sitting: With support  Sitting - Static: Fair (occasional)  Sitting - Dynamic: Fair (occasional)  Standing: With support  Standing - Static: Fair  Standing - Dynamic : Fair   GROSS ASSESSMENT  GROSS ASSESSMENT   AROM: Generally decreased, functional  PROM: Generally decreased, functional  Strength: Generally decreased, functional (BLEs grossly 3+/5)  Coordination: Generally decreased, functional  Tone: Normal (in BLEs)  Sensation: Intact (to light touch on BLEs) Gross Assessment  AROM: Generally decreased, functional  PROM: Generally decreased, functional  Strength: Generally decreased, functional (BLEs grossly 3+/5)  Coordination: Generally decreased, functional  Tone: Normal (in BLEs)  Sensation: Intact (to light touch on BLEs)   COORDINATION COORDINATION   Fine Motor Skills-Upper: Left Impaired, Right Intact  Gross Motor Skills-Upper: Left Impaired, Right Intact Coordination  Fine Motor Skills-Upper: Left Impaired, Right Intact  Gross Motor Skills-Upper: Left Impaired, Right Intact   VISUAL/PERCEPTUAL VISUAL/PERCEPTUAL   Tracking: Able to track stimulus in all quadrants w/o difficulty   Vision  Tracking: Able to track stimulus in all quadrants w/o difficulty     AUDITORY: AUDITORY:   Auditory Impairment: None Auditory  Auditory Impairment: None   I ADL'S:  I ADL'S:            THE BARTHEL INDEX      ACTIVITY    SCORE   FEEDING  0=unable  5=needs help cutting,spreading butter,etc., or modified diet  10= independent    10   BATHING  0=dependent  5=independent (or in shower    0   GROOMING  0=needs help  5=independent face/hair/teeth/shaving (implements provided)    5   DRESSING  0=dependent  5=needs help but can do about half unaided  10=independent(including buttons, zips,laces etc.)    5   BOWELS  0=incontinent  5=occasional accident  10=continent    10   BLADDER  0=incontinent, or catheterized and unable to manage alone  5=occasional accident  10=continent    10   TOILET USE  0=dependent  5=needs some help, but can do something alone  10=independent (on and off, dressing, wiping)    5   TRANSFER (BED TO CHAIR AND BACK)  0=unable, no sitting balance  5=major help(one or two people,physical), can sit  10=minor help(verbal or physical)  15=independent    10   MOBILITY (ON LEVEL SURFACES)  0=immobile or <50 yards  5=wheelchair independent,including corners,>50 yards  10=walkes with help of one person (verbal or physical) >50 yards  15=independent(but may use any aid; for example, stick) >50 yards    0   STAIRS  0=unable  5=needs help (verbal, physical, carrying aid)  10=independent    0               TOTAL:               55/100      Treatment :  Pink T-Putty exercises, in order to increase hand dexterity and  strengthening needed for UB ADLS. UB skate, 2 sets x 20 reps, in order to increase L shoulder ROM needed for ADLS. Discharge Recommendations:  [X] Home Exercise Program                        [ ] Elevated toilet seat/3 in 1 commode      [ ] Heri Mosley      [ Silvia Nieto                     [ ] Life Line/alert          [ ] Splint/orthotic application/schedule  [ ] Grab Bars: Location   [X] Home Health OT       [X] Rolling Walker  [X] WC 18 inch      Treatment session:  40 minutes.      Therapist: Hina Elias, [address]      Date:7/13/2017

## 2017-07-14 ENCOUNTER — HOME HEALTH ADMISSION (OUTPATIENT)
Dept: HOME HEALTH SERVICES | Facility: HOME HEALTH | Age: 82
End: 2017-07-14
Payer: MEDICARE

## 2017-07-14 VITALS
DIASTOLIC BLOOD PRESSURE: 51 MMHG | SYSTOLIC BLOOD PRESSURE: 123 MMHG | WEIGHT: 210 LBS | RESPIRATION RATE: 18 BRPM | TEMPERATURE: 97.8 F | BODY MASS INDEX: 42.33 KG/M2 | OXYGEN SATURATION: 97 % | HEIGHT: 59 IN | HEART RATE: 55 BPM

## 2017-07-14 LAB — GLUCOSE BLD STRIP.AUTO-MCNC: 120 MG/DL (ref 70–110)

## 2017-07-14 PROCEDURE — 74011250637 HC RX REV CODE- 250/637: Performed by: INTERNAL MEDICINE

## 2017-07-14 PROCEDURE — 82962 GLUCOSE BLOOD TEST: CPT

## 2017-07-14 RX ADMIN — FOLIC ACID 1 MG: 1 TABLET ORAL at 09:00

## 2017-07-14 RX ADMIN — LEVOTHYROXINE SODIUM 100 MCG: 50 TABLET ORAL at 10:20

## 2017-07-14 RX ADMIN — PANTOPRAZOLE SODIUM 40 MG: 40 TABLET, DELAYED RELEASE ORAL at 10:21

## 2017-07-14 RX ADMIN — Medication 500 MG: at 10:20

## 2017-07-14 RX ADMIN — DORZOLAMIDE HYDROCHLORIDE AND TIMOLOL MALEATE 1 DROP: 20; 5 SOLUTION/ DROPS OPHTHALMIC at 10:23

## 2017-07-14 RX ADMIN — LOPERAMIDE HYDROCHLORIDE 2 MG: 2 CAPSULE ORAL at 10:22

## 2017-07-14 RX ADMIN — SPIRONOLACTONE 50 MG: 25 TABLET, FILM COATED ORAL at 10:20

## 2017-07-14 RX ADMIN — ASPIRIN 81 MG 81 MG: 81 TABLET ORAL at 10:21

## 2017-07-14 RX ADMIN — METOPROLOL TARTRATE 50 MG: 50 TABLET ORAL at 10:21

## 2017-07-14 RX ADMIN — FUROSEMIDE 20 MG: 20 TABLET ORAL at 10:20

## 2017-07-14 RX ADMIN — THIAMINE HCL TAB 100 MG 100 MG: 100 TAB at 10:21

## 2017-07-14 NOTE — ROUTINE PROCESS
Bedside and Verbal shift change report given to Ev Nguyen (oncoming nurse) by Madelin Mann RN (offgoing nurse). Report included the following information SBAR, Kardex and MAR. QH ROUNDS DONE.

## 2017-07-14 NOTE — PROGRESS NOTES
CATHIE spoke with Windy Ayala from [facility] and pt's last covered day was 7/13/17. CATHIE informed her that pt was being discharged as planned today with home health.

## 2017-07-14 NOTE — PROGRESS NOTES
Home care referral sent to [facility] via [address] and called to the [facility] for f/u.

## 2017-07-14 NOTE — DISCHARGE INSTRUCTIONS
DISCHARGE SUMMARY from Nurse    The following personal items are in your possession at time of discharge:    Dental Appliances: Lowers, Uppers  Visual Aid: Glasses        Jewelry: None  Clothing: Slippers, Undergarments (3 nightgowns)  Other Valuables: Eyeglasses             PATIENT INSTRUCTIONS:    After general anesthesia or intravenous sedation, for 24 hours or while taking prescription Narcotics:  · Limit your activities  · Do not drive and operate hazardous machinery  · Do not make important personal or business decisions  · Do  not drink alcoholic beverages  · If you have not urinated within 8 hours after discharge, please contact your surgeon on call. Report the following to your surgeon:  · Excessive pain, swelling, redness or odor of or around the surgical area  · Temperature over 100.5  · Nausea and vomiting lasting longer than 4 hours or if unable to take medications  · Any signs of decreased circulation or nerve impairment to extremity: change in color, persistent  numbness, tingling, coldness or increase pain  · Any questions        What to do at Home:  Recommended activity: PT/OT to follow at home    If you experience any of the following symptoms Shortness of breath, elevated Temperature, chest pain or fall please follow up with ER/Primary Care Giver      *  Please give a list of your current medications to your Primary Care Provider. *  Please update this list whenever your medications are discontinued, doses are      changed, or new medications (including over-the-counter products) are added. *  Please carry medication information at all times in case of emergency situations. These are general instructions for a healthy lifestyle:    No smoking/ No tobacco products/ Avoid exposure to second hand smoke    Surgeon General's Warning:  Quitting smoking now greatly reduces serious risk to your health.     Obesity, smoking, and sedentary lifestyle greatly increases your risk for illness    A healthy diet, regular physical exercise & weight monitoring are important for maintaining a healthy lifestyle    You may be retaining fluid if you have a history of heart failure or if you experience any of the following symptoms:  Weight gain of 3 pounds or more overnight or 5 pounds in a week, increased swelling in our hands or feet or shortness of breath while lying flat in bed. Please call your doctor as soon as you notice any of these symptoms; do not wait until your next office visit. Recognize signs and symptoms of STROKE:    F-face looks uneven    A-arms unable to move or move unevenly    S-speech slurred or non-existent    T-time-call 911 as soon as signs and symptoms begin-DO NOT go       Back to bed or wait to see if you get better-TIME IS BRAIN. Warning Signs of HEART ATTACK     Call 911 if you have these symptoms:   Chest discomfort. Most heart attacks involve discomfort in the center of the chest that lasts more than a few minutes, or that goes away and comes back. It can feel like uncomfortable pressure, squeezing, fullness, or pain.  Discomfort in other areas of the upper body. Symptoms can include pain or discomfort in one or both arms, the back, neck, jaw, or stomach.  Shortness of breath with or without chest discomfort.  Other signs may include breaking out in a cold sweat, nausea, or lightheadedness. Don't wait more than five minutes to call 911 - MINUTES MATTER! Fast action can save your life. Calling 911 is almost always the fastest way to get lifesaving treatment. Emergency Medical Services staff can begin treatment when they arrive -- up to an hour sooner than if someone gets to the hospital by car. The discharge information has been reviewed with the Patient and family. The Patient and family verbalized understanding.     Discharge medications reviewed with the Patient and family and appropriate educational materials and side effects teaching were provided.

## 2017-07-14 NOTE — ROUTINE PROCESS
Bedside shift change report given to [address] [place] [address] (oncoming nurse) by Leslie Andrade RN (offgoing nurse). Report included the following information SBAR, Kardex, MAR and Recent Results. VISUALLY CHECKED PT Q 1 HR BY NURSING STAFF. 24 hour order chart check done.

## 2017-07-14 NOTE — PROGRESS NOTES
Care Management Interventions  Transition of Care Consult (CM Consult): [address]: Yes  Plan discussed with Pt/Family/Caregiver: Yes  Freedom of Choice Offered: Yes  Discharge Location  Discharge Placement: Home with home health

## 2017-07-14 NOTE — ROUTINE PROCESS
Discharge instructions reviewed with patient and family. All questions answered. Unit bracelets removed from patient's arm. All belongings taken by family. Patient taken downstairs via wheelchair per staff. Daughter and son in law present. Patient's own medication given to patient.

## 2017-07-14 NOTE — DISCHARGE SUMMARY
[facility] SUMMARY    Name:  Justin Fuentes  MR#:  [id number]  :  [id number]  Account #:  [de-identified]  Date of Adm:  2017  Date of Discharge:  2017      DISCHARGE MEDICATIONS: Include:  1. Aspirin 81 mg a day. 2. Cosopt 1 drop to both eyes twice a day. 3. Ferrous fumarate with vitamin C 1 tablet once a day. 4. Protonix 40 mg a day. 5. Lasix 20 mg once a day. 6. Neurontin 300 mg nightly. 7. Tradjenta 5 mg once a day. 8. Ambien 5 mg at bedtime as needed. 9. Vitamin C 500 mg a day. 10. Folic acid 1 mg a day. 11. Synthroid 100 mcg a day. 12. Metoprolol 50 mg twice a day. 13. Zocor 40 mg a day. 14. Aldactone 50 mg twice a day. 15. Thiamine 100 mg a day. At this time, she had been on Amaryl 2 mg. We decreased this to 0.5  mg and still found her to be occasionally hypoglycemic and at this time,  the Amaryl has been discontinued totally. It may need to be restarted  as an outpatient. COURSE IN THE FACILITY AND FINAL DIAGNOSES AND  PROBLEMS INCLUDE:  1. Intracerebral hemorrhage with resultant left hemiparesis. 2. History of atrial fibrillation on chronic anticoagulation, which was  held for 2 weeks. After discussion with the patient and the patient's  family, it was elected to restart the patient on aspirin only. The decision  regarding placement on Coumadin can be left to the patient's primary  care physician. 3. Chronic kidney disease with GFR of 30, suggesting stage 3-4  disease. 4. History of diabetes mellitus for many years. She was admitted here for rehab following the CVA. It was not felt that  she would require surgical intervention and underwent a relatively  uneventful rehab stay. She did improve with function in her left arm, still  having problems with coordination. She was restarted on aspirin 81 mg  a day, after being cleared by Neurology. She had labs that showed a  creatinine of 1.63. BUN was 24. Hemoglobin was 10.4.  White count was  3.8. She is to follow up with her primary care physician, Dr. Theodora William, in 2  weeks.         David Edge MD    [address] [place] [address] / Alejandro Calero  D:  07/13/2017   09:51  T:  07/14/2017   16:09  Job #:  [id number]

## 2017-07-17 ENCOUNTER — HOME CARE VISIT (OUTPATIENT)
Dept: SCHEDULING | Facility: HOME HEALTH | Age: 82
End: 2017-07-17
Payer: MEDICARE

## 2017-07-17 ENCOUNTER — HOME CARE VISIT (OUTPATIENT)
Dept: HOME HEALTH SERVICES | Facility: HOME HEALTH | Age: 82
End: 2017-07-17

## 2017-07-17 VITALS
TEMPERATURE: 98.3 F | HEART RATE: 72 BPM | SYSTOLIC BLOOD PRESSURE: 120 MMHG | DIASTOLIC BLOOD PRESSURE: 80 MMHG | RESPIRATION RATE: 17 BRPM

## 2017-07-17 PROCEDURE — 3331090001 HH PPS REVENUE CREDIT

## 2017-07-17 PROCEDURE — 400013 HH SOC

## 2017-07-17 PROCEDURE — 3331090002 HH PPS REVENUE DEBIT

## 2017-07-17 PROCEDURE — G0151 HHCP-SERV OF PT,EA 15 MIN: HCPCS

## 2017-07-18 ENCOUNTER — HOME CARE VISIT (OUTPATIENT)
Dept: HOME HEALTH SERVICES | Facility: HOME HEALTH | Age: 82
End: 2017-07-18
Payer: MEDICARE

## 2017-07-18 PROCEDURE — 3331090001 HH PPS REVENUE CREDIT

## 2017-07-18 PROCEDURE — 3331090002 HH PPS REVENUE DEBIT

## 2017-07-19 ENCOUNTER — HOME CARE VISIT (OUTPATIENT)
Dept: SCHEDULING | Facility: HOME HEALTH | Age: 82
End: 2017-07-19
Payer: MEDICARE

## 2017-07-19 ENCOUNTER — HOME CARE VISIT (OUTPATIENT)
Dept: HOME HEALTH SERVICES | Facility: HOME HEALTH | Age: 82
End: 2017-07-19
Payer: MEDICARE

## 2017-07-19 VITALS — DIASTOLIC BLOOD PRESSURE: 89 MMHG | HEART RATE: 96 BPM | OXYGEN SATURATION: 55 % | SYSTOLIC BLOOD PRESSURE: 125 MMHG

## 2017-07-19 PROCEDURE — 3331090002 HH PPS REVENUE DEBIT

## 2017-07-19 PROCEDURE — G0152 HHCP-SERV OF OT,EA 15 MIN: HCPCS

## 2017-07-19 PROCEDURE — 3331090001 HH PPS REVENUE CREDIT

## 2017-07-20 ENCOUNTER — HOME CARE VISIT (OUTPATIENT)
Dept: SCHEDULING | Facility: HOME HEALTH | Age: 82
End: 2017-07-20
Payer: MEDICARE

## 2017-07-20 PROCEDURE — G0156 HHCP-SVS OF AIDE,EA 15 MIN: HCPCS

## 2017-07-20 PROCEDURE — 3331090002 HH PPS REVENUE DEBIT

## 2017-07-20 PROCEDURE — 3331090001 HH PPS REVENUE CREDIT

## 2017-07-21 ENCOUNTER — HOME CARE VISIT (OUTPATIENT)
Dept: SCHEDULING | Facility: HOME HEALTH | Age: 82
End: 2017-07-21
Payer: MEDICARE

## 2017-07-21 PROCEDURE — G0157 HHC PT ASSISTANT EA 15: HCPCS

## 2017-07-21 PROCEDURE — 3331090001 HH PPS REVENUE CREDIT

## 2017-07-21 PROCEDURE — 3331090002 HH PPS REVENUE DEBIT

## 2017-07-22 ENCOUNTER — HOME CARE VISIT (OUTPATIENT)
Dept: HOME HEALTH SERVICES | Facility: HOME HEALTH | Age: 82
End: 2017-07-22
Payer: MEDICARE

## 2017-07-22 VITALS — DIASTOLIC BLOOD PRESSURE: 60 MMHG | SYSTOLIC BLOOD PRESSURE: 117 MMHG | HEART RATE: 61 BPM | OXYGEN SATURATION: 94 %

## 2017-07-22 PROCEDURE — G0156 HHCP-SVS OF AIDE,EA 15 MIN: HCPCS

## 2017-07-22 PROCEDURE — 3331090001 HH PPS REVENUE CREDIT

## 2017-07-22 PROCEDURE — 3331090002 HH PPS REVENUE DEBIT

## 2017-07-23 PROCEDURE — 3331090002 HH PPS REVENUE DEBIT

## 2017-07-23 PROCEDURE — 3331090001 HH PPS REVENUE CREDIT

## 2017-07-24 ENCOUNTER — HOME CARE VISIT (OUTPATIENT)
Dept: SCHEDULING | Facility: HOME HEALTH | Age: 82
End: 2017-07-24
Payer: MEDICARE

## 2017-07-24 PROCEDURE — 3331090002 HH PPS REVENUE DEBIT

## 2017-07-24 PROCEDURE — G0157 HHC PT ASSISTANT EA 15: HCPCS

## 2017-07-24 PROCEDURE — G0152 HHCP-SERV OF OT,EA 15 MIN: HCPCS

## 2017-07-24 PROCEDURE — 3331090001 HH PPS REVENUE CREDIT

## 2017-07-25 ENCOUNTER — HOME CARE VISIT (OUTPATIENT)
Dept: SCHEDULING | Facility: HOME HEALTH | Age: 82
End: 2017-07-25
Payer: MEDICARE

## 2017-07-25 VITALS — SYSTOLIC BLOOD PRESSURE: 145 MMHG | OXYGEN SATURATION: 95 % | HEART RATE: 60 BPM | DIASTOLIC BLOOD PRESSURE: 79 MMHG

## 2017-07-25 VITALS — SYSTOLIC BLOOD PRESSURE: 138 MMHG | DIASTOLIC BLOOD PRESSURE: 60 MMHG | OXYGEN SATURATION: 96 % | HEART RATE: 65 BPM

## 2017-07-25 PROCEDURE — 3331090001 HH PPS REVENUE CREDIT

## 2017-07-25 PROCEDURE — 3331090002 HH PPS REVENUE DEBIT

## 2017-07-25 PROCEDURE — G0156 HHCP-SVS OF AIDE,EA 15 MIN: HCPCS

## 2017-07-26 ENCOUNTER — HOME CARE VISIT (OUTPATIENT)
Dept: SCHEDULING | Facility: HOME HEALTH | Age: 82
End: 2017-07-26
Payer: MEDICARE

## 2017-07-26 VITALS — HEART RATE: 61 BPM | DIASTOLIC BLOOD PRESSURE: 72 MMHG | SYSTOLIC BLOOD PRESSURE: 118 MMHG | OXYGEN SATURATION: 97 %

## 2017-07-26 PROCEDURE — 3331090002 HH PPS REVENUE DEBIT

## 2017-07-26 PROCEDURE — G0157 HHC PT ASSISTANT EA 15: HCPCS

## 2017-07-26 PROCEDURE — G0152 HHCP-SERV OF OT,EA 15 MIN: HCPCS

## 2017-07-26 PROCEDURE — 3331090001 HH PPS REVENUE CREDIT

## 2017-07-27 ENCOUNTER — HOME CARE VISIT (OUTPATIENT)
Dept: SCHEDULING | Facility: HOME HEALTH | Age: 82
End: 2017-07-27
Payer: MEDICARE

## 2017-07-27 VITALS — HEART RATE: 58 BPM | DIASTOLIC BLOOD PRESSURE: 60 MMHG | OXYGEN SATURATION: 97 % | SYSTOLIC BLOOD PRESSURE: 112 MMHG

## 2017-07-27 PROCEDURE — 3331090001 HH PPS REVENUE CREDIT

## 2017-07-27 PROCEDURE — G0152 HHCP-SERV OF OT,EA 15 MIN: HCPCS

## 2017-07-27 PROCEDURE — 3331090002 HH PPS REVENUE DEBIT

## 2017-07-28 ENCOUNTER — HOME CARE VISIT (OUTPATIENT)
Dept: SCHEDULING | Facility: HOME HEALTH | Age: 82
End: 2017-07-28
Payer: MEDICARE

## 2017-07-28 VITALS — HEART RATE: 54 BPM | SYSTOLIC BLOOD PRESSURE: 116 MMHG | OXYGEN SATURATION: 93 % | DIASTOLIC BLOOD PRESSURE: 70 MMHG

## 2017-07-28 VITALS — OXYGEN SATURATION: 97 % | HEART RATE: 65 BPM | DIASTOLIC BLOOD PRESSURE: 60 MMHG | SYSTOLIC BLOOD PRESSURE: 140 MMHG

## 2017-07-28 PROCEDURE — 3331090001 HH PPS REVENUE CREDIT

## 2017-07-28 PROCEDURE — 3331090002 HH PPS REVENUE DEBIT

## 2017-07-28 PROCEDURE — G0157 HHC PT ASSISTANT EA 15: HCPCS

## 2017-07-28 PROCEDURE — G0156 HHCP-SVS OF AIDE,EA 15 MIN: HCPCS

## 2017-07-29 PROCEDURE — 3331090001 HH PPS REVENUE CREDIT

## 2017-07-29 PROCEDURE — 3331090002 HH PPS REVENUE DEBIT

## 2017-07-30 PROCEDURE — 3331090001 HH PPS REVENUE CREDIT

## 2017-07-30 PROCEDURE — 3331090002 HH PPS REVENUE DEBIT

## 2017-07-31 ENCOUNTER — HOME CARE VISIT (OUTPATIENT)
Dept: SCHEDULING | Facility: HOME HEALTH | Age: 82
End: 2017-07-31
Payer: MEDICARE

## 2017-07-31 PROCEDURE — 3331090001 HH PPS REVENUE CREDIT

## 2017-07-31 PROCEDURE — 3331090002 HH PPS REVENUE DEBIT

## 2017-07-31 PROCEDURE — G0152 HHCP-SERV OF OT,EA 15 MIN: HCPCS

## 2017-07-31 PROCEDURE — G0151 HHCP-SERV OF PT,EA 15 MIN: HCPCS

## 2017-08-01 ENCOUNTER — HOME CARE VISIT (OUTPATIENT)
Dept: SCHEDULING | Facility: HOME HEALTH | Age: 82
End: 2017-08-01
Payer: MEDICARE

## 2017-08-01 VITALS — OXYGEN SATURATION: 95 % | HEART RATE: 54 BPM | SYSTOLIC BLOOD PRESSURE: 126 MMHG | DIASTOLIC BLOOD PRESSURE: 68 MMHG

## 2017-08-01 VITALS — OXYGEN SATURATION: 97 % | SYSTOLIC BLOOD PRESSURE: 129 MMHG | HEART RATE: 57 BPM | DIASTOLIC BLOOD PRESSURE: 75 MMHG

## 2017-08-01 PROCEDURE — 3331090002 HH PPS REVENUE DEBIT

## 2017-08-01 PROCEDURE — G0156 HHCP-SVS OF AIDE,EA 15 MIN: HCPCS

## 2017-08-01 PROCEDURE — 3331090001 HH PPS REVENUE CREDIT

## 2017-08-02 ENCOUNTER — HOME CARE VISIT (OUTPATIENT)
Dept: SCHEDULING | Facility: HOME HEALTH | Age: 82
End: 2017-08-02
Payer: MEDICARE

## 2017-08-02 PROCEDURE — 3331090002 HH PPS REVENUE DEBIT

## 2017-08-02 PROCEDURE — 3331090001 HH PPS REVENUE CREDIT

## 2017-08-02 PROCEDURE — G0157 HHC PT ASSISTANT EA 15: HCPCS

## 2017-08-03 ENCOUNTER — HOME CARE VISIT (OUTPATIENT)
Dept: SCHEDULING | Facility: HOME HEALTH | Age: 82
End: 2017-08-03
Payer: MEDICARE

## 2017-08-03 PROCEDURE — 3331090001 HH PPS REVENUE CREDIT

## 2017-08-03 PROCEDURE — G0152 HHCP-SERV OF OT,EA 15 MIN: HCPCS

## 2017-08-03 PROCEDURE — 3331090002 HH PPS REVENUE DEBIT

## 2017-08-04 ENCOUNTER — HOME CARE VISIT (OUTPATIENT)
Dept: SCHEDULING | Facility: HOME HEALTH | Age: 82
End: 2017-08-04
Payer: MEDICARE

## 2017-08-04 VITALS — DIASTOLIC BLOOD PRESSURE: 65 MMHG | OXYGEN SATURATION: 94 % | SYSTOLIC BLOOD PRESSURE: 117 MMHG | HEART RATE: 62 BPM

## 2017-08-04 VITALS — SYSTOLIC BLOOD PRESSURE: 127 MMHG | DIASTOLIC BLOOD PRESSURE: 72 MMHG | HEART RATE: 55 BPM | OXYGEN SATURATION: 95 %

## 2017-08-04 PROCEDURE — 3331090002 HH PPS REVENUE DEBIT

## 2017-08-04 PROCEDURE — 3331090001 HH PPS REVENUE CREDIT

## 2017-08-04 PROCEDURE — G0156 HHCP-SVS OF AIDE,EA 15 MIN: HCPCS

## 2017-08-05 PROCEDURE — 3331090002 HH PPS REVENUE DEBIT

## 2017-08-05 PROCEDURE — 3331090001 HH PPS REVENUE CREDIT

## 2017-08-06 PROCEDURE — 3331090002 HH PPS REVENUE DEBIT

## 2017-08-06 PROCEDURE — 3331090001 HH PPS REVENUE CREDIT

## 2017-08-07 ENCOUNTER — HOME CARE VISIT (OUTPATIENT)
Dept: SCHEDULING | Facility: HOME HEALTH | Age: 82
End: 2017-08-07
Payer: MEDICARE

## 2017-08-07 PROCEDURE — 3331090001 HH PPS REVENUE CREDIT

## 2017-08-07 PROCEDURE — 3331090002 HH PPS REVENUE DEBIT

## 2017-08-07 PROCEDURE — G0152 HHCP-SERV OF OT,EA 15 MIN: HCPCS

## 2017-08-07 PROCEDURE — G0157 HHC PT ASSISTANT EA 15: HCPCS

## 2017-08-08 ENCOUNTER — HOME CARE VISIT (OUTPATIENT)
Dept: SCHEDULING | Facility: HOME HEALTH | Age: 82
End: 2017-08-08
Payer: MEDICARE

## 2017-08-08 VITALS — OXYGEN SATURATION: 96 % | DIASTOLIC BLOOD PRESSURE: 72 MMHG | HEART RATE: 63 BPM | SYSTOLIC BLOOD PRESSURE: 118 MMHG

## 2017-08-08 VITALS — OXYGEN SATURATION: 96 % | HEART RATE: 63 BPM | DIASTOLIC BLOOD PRESSURE: 72 MMHG | SYSTOLIC BLOOD PRESSURE: 118 MMHG

## 2017-08-08 PROCEDURE — 3331090001 HH PPS REVENUE CREDIT

## 2017-08-08 PROCEDURE — 3331090002 HH PPS REVENUE DEBIT

## 2017-08-08 PROCEDURE — G0156 HHCP-SVS OF AIDE,EA 15 MIN: HCPCS

## 2017-08-09 ENCOUNTER — HOME CARE VISIT (OUTPATIENT)
Dept: SCHEDULING | Facility: HOME HEALTH | Age: 82
End: 2017-08-09
Payer: MEDICARE

## 2017-08-09 ENCOUNTER — HOME CARE VISIT (OUTPATIENT)
Dept: HOME HEALTH SERVICES | Facility: HOME HEALTH | Age: 82
End: 2017-08-09
Payer: MEDICARE

## 2017-08-09 VITALS — SYSTOLIC BLOOD PRESSURE: 123 MMHG | DIASTOLIC BLOOD PRESSURE: 65 MMHG | OXYGEN SATURATION: 95 % | HEART RATE: 52 BPM

## 2017-08-09 PROCEDURE — 3331090002 HH PPS REVENUE DEBIT

## 2017-08-09 PROCEDURE — G0157 HHC PT ASSISTANT EA 15: HCPCS

## 2017-08-09 PROCEDURE — 3331090001 HH PPS REVENUE CREDIT

## 2017-08-09 PROCEDURE — G0152 HHCP-SERV OF OT,EA 15 MIN: HCPCS

## 2017-08-09 PROCEDURE — 3331090003 HH PPS REVENUE ADJ

## 2017-08-10 VITALS — DIASTOLIC BLOOD PRESSURE: 62 MMHG | HEART RATE: 56 BPM | SYSTOLIC BLOOD PRESSURE: 138 MMHG | OXYGEN SATURATION: 95 %

## 2017-08-10 PROCEDURE — 3331090002 HH PPS REVENUE DEBIT

## 2017-08-10 PROCEDURE — 3331090001 HH PPS REVENUE CREDIT

## 2017-08-11 PROCEDURE — 3331090002 HH PPS REVENUE DEBIT

## 2017-08-11 PROCEDURE — 3331090001 HH PPS REVENUE CREDIT

## 2017-08-12 PROCEDURE — 3331090002 HH PPS REVENUE DEBIT

## 2017-08-12 PROCEDURE — 3331090001 HH PPS REVENUE CREDIT

## 2017-08-13 PROCEDURE — 3331090001 HH PPS REVENUE CREDIT

## 2017-08-13 PROCEDURE — 3331090002 HH PPS REVENUE DEBIT

## 2018-10-21 ENCOUNTER — APPOINTMENT (OUTPATIENT)
Dept: GENERAL RADIOLOGY | Age: 83
End: 2018-10-21
Attending: PHYSICIAN ASSISTANT
Payer: MEDICARE

## 2018-10-21 ENCOUNTER — APPOINTMENT (OUTPATIENT)
Dept: CT IMAGING | Age: 83
End: 2018-10-21
Attending: PHYSICIAN ASSISTANT
Payer: MEDICARE

## 2018-10-21 ENCOUNTER — APPOINTMENT (OUTPATIENT)
Dept: VASCULAR SURGERY | Age: 83
End: 2018-10-21
Attending: PHYSICIAN ASSISTANT
Payer: MEDICARE

## 2018-10-21 ENCOUNTER — HOSPITAL ENCOUNTER (EMERGENCY)
Age: 83
Discharge: HOME | End: 2018-10-21
Attending: EMERGENCY MEDICINE
Payer: MEDICARE

## 2018-10-21 VITALS
RESPIRATION RATE: 18 BRPM | SYSTOLIC BLOOD PRESSURE: 141 MMHG | DIASTOLIC BLOOD PRESSURE: 77 MMHG | TEMPERATURE: 98.1 F | WEIGHT: 215 LBS | HEIGHT: 59 IN | OXYGEN SATURATION: 96 % | BODY MASS INDEX: 43.34 KG/M2 | HEART RATE: 65 BPM

## 2018-10-21 DIAGNOSIS — S81.811A NONINFECTED SKIN TEAR OF RIGHT LOWER EXTREMITY, INITIAL ENCOUNTER: ICD-10-CM

## 2018-10-21 DIAGNOSIS — M79.89 LEG SWELLING: ICD-10-CM

## 2018-10-21 DIAGNOSIS — M25.552 LEFT HIP PAIN: Primary | ICD-10-CM

## 2018-10-21 LAB
ANION GAP SERPL CALC-SCNC: 7 MMOL/L (ref 3–18)
BASOPHILS # BLD: 0 K/UL (ref 0–0.1)
BASOPHILS NFR BLD: 0 % (ref 0–2)
BUN SERPL-MCNC: 28 MG/DL (ref 7–18)
BUN/CREAT SERPL: 18 (ref 12–20)
CALCIUM SERPL-MCNC: 9.3 MG/DL (ref 8.5–10.1)
CHLORIDE SERPL-SCNC: 101 MMOL/L (ref 100–108)
CO2 SERPL-SCNC: 32 MMOL/L (ref 21–32)
CREAT SERPL-MCNC: 1.56 MG/DL (ref 0.6–1.3)
D DIMER PPP FEU-MCNC: 1.27 UG/ML(FEU)
DIFFERENTIAL METHOD BLD: ABNORMAL
EOSINOPHIL # BLD: 0 K/UL (ref 0–0.4)
EOSINOPHIL NFR BLD: 0 % (ref 0–5)
ERYTHROCYTE [DISTWIDTH] IN BLOOD BY AUTOMATED COUNT: 15.9 % (ref 11.6–14.5)
GLUCOSE SERPL-MCNC: 217 MG/DL (ref 74–99)
HCT VFR BLD AUTO: 45.9 % (ref 35–45)
HGB BLD-MCNC: 14.9 G/DL (ref 12–16)
LYMPHOCYTES # BLD: 0.6 K/UL (ref 0.9–3.6)
LYMPHOCYTES NFR BLD: 11 % (ref 21–52)
MCH RBC QN AUTO: 29.1 PG (ref 24–34)
MCHC RBC AUTO-ENTMCNC: 32.5 G/DL (ref 31–37)
MCV RBC AUTO: 89.6 FL (ref 74–97)
MONOCYTES # BLD: 0.4 K/UL (ref 0.05–1.2)
MONOCYTES NFR BLD: 6 % (ref 3–10)
NEUTS SEG # BLD: 4.8 K/UL (ref 1.8–8)
NEUTS SEG NFR BLD: 83 % (ref 40–73)
PLATELET # BLD AUTO: 150 K/UL (ref 135–420)
PMV BLD AUTO: 9.2 FL (ref 9.2–11.8)
POTASSIUM SERPL-SCNC: 4.6 MMOL/L (ref 3.5–5.5)
RBC # BLD AUTO: 5.12 M/UL (ref 4.2–5.3)
SODIUM SERPL-SCNC: 140 MMOL/L (ref 136–145)
WBC # BLD AUTO: 5.8 K/UL (ref 4.6–13.2)

## 2018-10-21 PROCEDURE — 85379 FIBRIN DEGRADATION QUANT: CPT | Performed by: PHYSICIAN ASSISTANT

## 2018-10-21 PROCEDURE — 74011250637 HC RX REV CODE- 250/637: Performed by: PHYSICIAN ASSISTANT

## 2018-10-21 PROCEDURE — 99284 EMERGENCY DEPT VISIT MOD MDM: CPT

## 2018-10-21 PROCEDURE — 93971 EXTREMITY STUDY: CPT

## 2018-10-21 PROCEDURE — 74011000250 HC RX REV CODE- 250: Performed by: PHYSICIAN ASSISTANT

## 2018-10-21 PROCEDURE — 73700 CT LOWER EXTREMITY W/O DYE: CPT

## 2018-10-21 PROCEDURE — 73502 X-RAY EXAM HIP UNI 2-3 VIEWS: CPT

## 2018-10-21 PROCEDURE — 85025 COMPLETE CBC W/AUTO DIFF WBC: CPT | Performed by: PHYSICIAN ASSISTANT

## 2018-10-21 PROCEDURE — 80048 BASIC METABOLIC PNL TOTAL CA: CPT | Performed by: PHYSICIAN ASSISTANT

## 2018-10-21 RX ORDER — BACITRACIN 500 [USP'U]/G
OINTMENT TOPICAL 3 TIMES DAILY
Qty: 1 TUBE | Refills: 0 | Status: SHIPPED | OUTPATIENT
Start: 2018-10-21 | End: 2018-10-31

## 2018-10-21 RX ORDER — BACITRACIN 500 UNIT/G
2 PACKET (EA) TOPICAL
Status: COMPLETED | OUTPATIENT
Start: 2018-10-21 | End: 2018-10-21

## 2018-10-21 RX ORDER — HYDROCODONE BITARTRATE AND ACETAMINOPHEN 5; 325 MG/1; MG/1
1 TABLET ORAL
Status: COMPLETED | OUTPATIENT
Start: 2018-10-21 | End: 2018-10-21

## 2018-10-21 RX ORDER — HYDROCODONE BITARTRATE AND ACETAMINOPHEN 5; 325 MG/1; MG/1
1 TABLET ORAL
Qty: 12 TAB | Refills: 0 | Status: SHIPPED | OUTPATIENT
Start: 2018-10-21 | End: 2019-01-01

## 2018-10-21 RX ADMIN — HYDROCODONE BITARTRATE AND ACETAMINOPHEN 1 TABLET: 5; 325 TABLET ORAL at 20:48

## 2018-10-21 RX ADMIN — HYDROCODONE BITARTRATE AND ACETAMINOPHEN 1 TABLET: 5; 325 TABLET ORAL at 15:48

## 2018-10-21 RX ADMIN — BACITRACIN 2 PACKET: 500 OINTMENT TOPICAL at 21:09

## 2018-10-21 NOTE — ED NOTES
Bedside and Verbal shift change report given to Trav Montes RN (oncoming nurse) by Kimi Fowler RN (offgoing nurse). Report included the following information ED Summary and MAR.

## 2018-10-21 NOTE — PROGRESS NOTES
PVL- Left lower extremity venous duplex exam completed. No evidence of DVT or SVT of the left lower extremity. Final report to follow.

## 2018-10-21 NOTE — ED PROVIDER NOTES
EMERGENCY DEPARTMENT HISTORY AND PHYSICAL EXAM 
 
Date: 10/21/2018 Patient Name: Alexandra Vega History of Presenting Illness Chief Complaint Patient presents with  
 Hip Pain History Provided By: Patient and Patient's Daughter Chief Complaint: left hip pain Duration: worsening over the past 3 days Timing:  Acute Location: left hip Quality: Aching and [place] Severity: 10 out of 10 Modifying Factors: no falls. Had a recent hip injection last week at PCP's office for trochanteric bursitis. States it helped for 4 days, and then pain came back-  worse Associated Symptoms: no fevers, no chills. + increased swelling to the left leg, no chest pain, no SOB Additional History (Context): Alexandra Vega is a 80 y.o. 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 Daughter who is her primary caregiver states that the patient has had excruciating pain. Daughter reports that the patient has a steroid injection at her PCP's office about one week ago for hip bursitis. She had some relief for 4 days and then the pain started to come back. The pain is worse at night. Patient has not been sleeping because of the pain. Patient typically uses a walker, but hasn't really wanted to walk due to pain. She takes gabapentin at home and has been using ice and tylenol for pain control. It has not helped. She has not seen an orthopedist and has not had an MRI on the hip. No Fevers, no chills, no skin changes. Patient's daughter does report that the patient has seemingly worsening left leg pain. She takes a daily baby ASA. She used to be on coumadin, but last year she suffered from an acute hemorrhagic stroke that gave her some residual left sided weakness while on the coumadin and was taken off. PCP: April Carson MD 
 
Current Facility-Administered Medications Medication Dose Route Frequency Provider Last Rate Last Dose  
 HYDROcodone-acetaminophen (NORCO) 5-325 mg per tablet 1 Tab  1 Tab Oral NOW [place], [place]      
 bacitracin 500 unit/gram packet 2 Packet  2 Packet Topical NOW [place] Current Outpatient Medications Medication Sig Dispense Refill  
 HYDROcodone-acetaminophen (NORCO) 5-325 mg per tablet Take 1 Tab by mouth every six (6) hours as needed for Pain. Max Daily Amount: 4 Tabs. 12 Tab 0  
 bacitracin (BACITRACIN) 500 unit/gram oint Apply  to affected area three (3) times daily for 10 days. Apply to affected area 1 Tube 0  
 febuxostat (ULORIC) 40 mg tab tablet Take 40 mg by mouth daily.  glipiZIDE (GLUCOTROL) 5 mg tablet Take 5 mg by mouth daily.  ascorbic acid, vitamin C, (VITAMIN C) 500 mg tablet Take 1 Tab by mouth daily. 60 Tab 0  
 aspirin 81 mg chewable tablet Take 1 Tab by mouth daily. 100 Tab 0  
 dorzolamide-timolol (COSOPT) 22.3-6.8 mg/mL ophthalmic solution Administer 1 Drop to both eyes two (2) times a day. 5 mL 0  
 ferrous fumarate-vitamin C (LAURA-SEQUELS, IRON-VIT C,) 200 mg (65 mg iron)-25 mg TbER Take 1 Tab by mouth daily. (Patient taking differently: Take 1 Tab by mouth daily. 65 mg) 60 Tab 0  
 folic acid (FOLVITE) 1 mg tablet Take 1 Tab by mouth daily. 60 Tab 0  
 furosemide (LASIX) 20 mg tablet One pill a day 60 Tab 0  
 gabapentin (NEURONTIN) 300 mg capsule Take 1 Cap by mouth nightly. 60 Cap 0  
 levothyroxine (SYNTHROID) 100 mcg tablet Take 1 Tab by mouth Daily (before breakfast). 60 Tab 0  
 linagliptin (TRADJENTA) 5 mg tablet Take 1 Tab by mouth Daily (before breakfast). 60 Tab 0  
 metoprolol tartrate (LOPRESSOR) 50 mg tablet Take 1 Tab by mouth two (2) times a day. 60 Tab 0  
 pantoprazole (PROTONIX) 40 mg tablet Take 1 Tab by mouth Daily (before breakfast).  60 Tab 0  
  simvastatin (ZOCOR) 40 mg tablet Take 1 Tab by mouth nightly. 60 Tab 0  
 spironolactone (ALDACTONE) 50 mg tablet Take 1 Tab by mouth two (2) times a day. 60 Tab 0  Thiamine Mononitrate (B-1) 100 mg tablet Take 1 Tab by mouth daily. 60 Tab 0  
 zolpidem (AMBIEN) 5 mg tablet Take 1 Tab by mouth nightly as needed for Sleep. Max Daily Amount: 5 mg. 60 Tab 0  
 bisacodyl (DULCOLAX) 10 mg suppository Insert 10 mg into rectum daily as needed. 30 Suppository 0  
 IRON/DOCUSATE SODIUM (LAURA-SEQUELS PO) Take 50 mg by mouth daily.  meclizine (ANTIVERT) 25 mg tablet Take 25 mg by mouth four (4) times daily as needed.  betamethasone valerate (VALISONE) 0.1 % topical cream Apply  to affected area two (2) times a day.  losartan (COZAAR) 50 mg tablet Take 50 mg by mouth daily.  glimepiride (AMARYL) 2 mg tablet Take 2 mg by mouth daily. Past History Past Medical History: 
Past Medical History:  
Diagnosis Date  Atrial fibrillation ([facility]  Chronic kidney disease   
 unknown what stage  [facility] Past Surgical History: 
History reviewed. No pertinent surgical history. Family History: 
Family History Family history unknown: Yes  
 
 
Social History: 
Social History Tobacco Use  Smoking status: Never Smoker  Smokeless tobacco: Never Used Substance Use Topics  Alcohol use: No  
 Drug use: No  
 
 
Allergies: Allergies Allergen Reactions  Codeine Itching Review of Systems Review of Systems Constitutional: Negative for chills and fever. Respiratory: Negative for chest tightness, shortness of breath and wheezing. Cardiovascular: Positive for leg swelling. Negative for chest pain and palpitations. Worsening left leg swelling Gastrointestinal: Negative for abdominal pain, diarrhea, nausea and vomiting. Genitourinary: Negative for dysuria, flank pain and frequency. Musculoskeletal: Positive for arthralgias. Left hip pain Neurological: Negative. All other systems reviewed and are negative. Physical Exam  
 
Vitals:  
 10/21/18 1450 10/21/18 1515 10/21/18 1856 BP: 166/69 161/61 141/77 Pulse: 73  65 Resp: 16  18 Temp: 98.1 °F (36.7 °C) SpO2: 97% 97% 96% Weight: 97.5 kg (215 lb) Height: 4' 11\" (1.499 m) Physical Exam  
Constitutional: She is oriented to person, place, and time. She appears well-developed and well-nourished. No distress. HENT:  
Head: Normocephalic and atraumatic. Eyes: EOM are normal. Pupils are equal, round, and reactive to light. Neck: Neck supple. Cardiovascular: Normal rate and regular rhythm. Exam reveals no gallop and no friction rub. No murmur heard. Pulmonary/Chest: Effort normal and breath sounds normal. No respiratory distress. She has no wheezes. She has no rales. Abdominal: Soft. Bowel sounds are normal. She exhibits no distension and no mass. There is no tenderness. There is no rebound and no guarding. Musculoskeletal:  
+ TTP over the left hip, predominantly in the posterior aspect of the hip. She has increased pain with flexion of the left hip and rotation of the hip. There is TTP over the left lower leg and over the calf with noted pitting edema, 1-2+. Of note there is edema to the right lower leg as well, approximately 1+. Non tender to palpation of the left ankle, foot, and knee. DP pulses intact and equal.   
Lymphadenopathy:  
  She has no cervical adenopathy. Neurological: She is alert and oriented to person, place, and time. No cranial nerve deficit. Skin: Skin is warm and dry. No rash noted. She is not diaphoretic. Psychiatric: She has a normal mood and affect. Her behavior is normal.  
Nursing note and vitals reviewed. Diagnostic Study Results Labs - Recent Results (from the past 12 hour(s)) CBC WITH AUTOMATED DIFF Collection Time: 10/21/18  4:00 PM  
Result Value Ref Range WBC 5.8 4.6 - 13.2 K/uL  
 RBC 5.12 4.20 - 5.30 M/uL  
 HGB 14.9 12.0 - 16.0 g/dL HCT 45.9 (H) 35.0 - 45.0 % MCV 89.6 74.0 - 97.0 FL  
 MCH 29.1 24.0 - 34.0 PG  
 MCHC 32.5 31.0 - 37.0 g/dL  
 RDW 15.9 (H) 11.6 - 14.5 % PLATELET 588 521 - 514 K/uL MPV 9.2 9.2 - 11.8 FL  
 NEUTROPHILS 83 (H) 40 - 73 % LYMPHOCYTES 11 (L) 21 - 52 % MONOCYTES 6 3 - 10 % EOSINOPHILS 0 0 - 5 % BASOPHILS 0 0 - 2 %  
 ABS. NEUTROPHILS 4.8 1.8 - 8.0 K/UL  
 ABS. LYMPHOCYTES 0.6 (L) 0.9 - 3.6 K/UL  
 ABS. MONOCYTES 0.4 0.05 - 1.2 K/UL  
 ABS. EOSINOPHILS 0.0 0.0 - 0.4 K/UL  
 ABS. BASOPHILS 0.0 0.0 - 0.1 K/UL  
 DF AUTOMATED METABOLIC PANEL, BASIC Collection Time: 10/21/18  4:00 PM  
Result Value Ref Range Sodium 140 136 - 145 mmol/L Potassium 4.6 3.5 - 5.5 mmol/L Chloride 101 100 - 108 mmol/L  
 CO2 32 21 - 32 mmol/L Anion gap 7 3.0 - 18 mmol/L Glucose 217 (H) 74 - 99 mg/dL BUN 28 (H) 7.0 - 18 MG/DL Creatinine 1.56 (H) 0.6 - 1.3 MG/DL  
 BUN/Creatinine ratio 18 12 - 20 GFR est AA 38 (L) >60 ml/min/1.73m2 GFR est non-AA 31 (L) >60 ml/min/1.73m2 Calcium 9.3 8.5 - 10.1 MG/DL  
D DIMER Collection Time: 10/21/18  4:00 PM  
Result Value Ref Range D DIMER 1.27 (H) <0.46 ug/ml(FEU) Radiologic Studies -  
CT HIP LT WO CONT Final Result XR HIP LT W OR WO PELV 2-3 VWS Final Result CT Results  (Last 48 hours) 10/21/18 1731  CT HIP LT WO CONT Final result Impression:  IMPRESSION:  
   
No acute fractures seen and there is no subluxation the left hip Narrative:  EXAM: CT of the left hip without contrast  
   
INDICATION: Chronic left hip pain COMPARISON: None. TECHNIQUE: Axial CT imaging of the left hip was performed without intravenous  
contrast. Multiplanar reformats were generated.  One or more dose reduction  
techniques were used on this CT: automated exposure control, adjustment of the  
 mAs and/or kVp according to patient size, and iterative reconstruction  
techniques. The specific techniques used on this CT exam have been documented  
in the patient's electronic medical record.  
   
_______________ FINDINGS:  
   
There is osteopenia. There are degenerative changes left SI joint with vacuum  
phenomenon. This chondrocalcinosis pubic symphysis suggesting CPPD. There are no  
acute fractures or subluxation. Soft tissue windows demonstrate colonic diverticulosis sigmoid colon. Pelvic  
organs are grossly unremarkable.  
   
_______________ CXR Results  (Last 48 hours) None Medical Decision Making I am the first provider for this patient. I reviewed the vital signs, available nursing notes, past medical history, past surgical history, family history and social history. Vital Signs-Reviewed the patient's vital signs. Records Reviewed: Nursing Notes, Old Medical Records and Previous Laboratory Studies Procedures: 
Procedures Provider Notes (Medical Decision Making):  
Discussed prelim reading with Dr. Teresita Parks from [address] from resident. Agrees with plan to call attending radiologist for attending read prior to moving forward with MRI. Updated family and patient about the plan and they agree. [place], [place] Patient informed RN and me that there is a skin tear to her right groin after the PVL was done. States it happened when tech was wiping the US gel off. Patient's sister states they have been having trouble with the groin and sometimes it breaks open. She has been putting betamethasone cream on it. I inquired about further thinning of the skin with the betamethasone, daughter was unsure if the patient's skin has thinned since using the cream.  On inspection of the skin there is mild tearing of the skin with no active bleeding to the right groin.  Patient's pannus folds over this area so it is warm and moist.   There appears to be no superimposed infection. Plan to clean the area, apply bacitracin. Will have PCP see patient in close wound check. Sofia Wallace, [address] Called several times about attending reading. Finally CT tech called again for me. Attending stated he would read the CT. Updated family and patient. Patient is upset about the wait time and I explained what the delay was and what the next possible steps would be. Sofia Wallace, [address] Noted Attending CT reading. Plan to send patient home. She has had control of her pain with Norco.  It started to creep back up again after CT and PVL. Gave second dose of Norco.   Will have her follow with PCP and ortho. Patient and daughter who is bedside both agree with the plan. IMpression:   Left hip pain. There is arthritis there, but it was an acute on chronic hip pain. It seemingly got worse after hip injection. She is afebrile, has no leukocytosis. She has known CKD so CR today is not surprising. Obtained CT of the hip and also got a PVL of the leg due to swelling. PVL was negative for acute DVT. CT of hip after having questionable fx on CT and XR was read by an attending, which read it as negative hip. Plan to send to ortho and PCP for close follow up. Patient and daughter agree with the plan. DEBBIE Black [place] 
 
MED RECONCILIATION: 
Current Facility-Administered Medications Medication Dose Route Frequency  HYDROcodone-acetaminophen (NORCO) 5-325 mg per tablet 1 Tab  1 Tab Oral NOW  
 bacitracin 500 unit/gram packet 2 Packet  2 Packet Topical NOW Current Outpatient Medications Medication Sig  
 HYDROcodone-acetaminophen (NORCO) 5-325 mg per tablet Take 1 Tab by mouth every six (6) hours as needed for Pain. Max Daily Amount: 4 Tabs.  bacitracin (BACITRACIN) 500 unit/gram oint Apply  to affected area three (3) times daily for 10 days. Apply to affected area  febuxostat (ULORIC) 40 mg tab tablet Take 40 mg by mouth daily.  glipiZIDE (GLUCOTROL) 5 mg tablet Take 5 mg by mouth daily.  ascorbic acid, vitamin C, (VITAMIN C) 500 mg tablet Take 1 Tab by mouth daily.  aspirin 81 mg chewable tablet Take 1 Tab by mouth daily.  dorzolamide-timolol (COSOPT) 22.3-6.8 mg/mL ophthalmic solution Administer 1 Drop to both eyes two (2) times a day.  ferrous fumarate-vitamin C (LAURA-SEQUELS, IRON-VIT C,) 200 mg (65 mg iron)-25 mg TbER Take 1 Tab by mouth daily. (Patient taking differently: Take 1 Tab by mouth daily. 65 mg)  folic acid (FOLVITE) 1 mg tablet Take 1 Tab by mouth daily.  furosemide (LASIX) 20 mg tablet One pill a day  gabapentin (NEURONTIN) 300 mg capsule Take 1 Cap by mouth nightly.  levothyroxine (SYNTHROID) 100 mcg tablet Take 1 Tab by mouth Daily (before breakfast).  linagliptin (TRADJENTA) 5 mg tablet Take 1 Tab by mouth Daily (before breakfast).  metoprolol tartrate (LOPRESSOR) 50 mg tablet Take 1 Tab by mouth two (2) times a day.  pantoprazole (PROTONIX) 40 mg tablet Take 1 Tab by mouth Daily (before breakfast).  simvastatin (ZOCOR) 40 mg tablet Take 1 Tab by mouth nightly.  spironolactone (ALDACTONE) 50 mg tablet Take 1 Tab by mouth two (2) times a day.  Thiamine Mononitrate (B-1) 100 mg tablet Take 1 Tab by mouth daily.  zolpidem (AMBIEN) 5 mg tablet Take 1 Tab by mouth nightly as needed for Sleep. Max Daily Amount: 5 mg.  bisacodyl (DULCOLAX) 10 mg suppository Insert 10 mg into rectum daily as needed.  IRON/DOCUSATE SODIUM (LAURA-SEQUELS PO) Take 50 mg by mouth daily.  meclizine (ANTIVERT) 25 mg tablet Take 25 mg by mouth four (4) times daily as needed.  betamethasone valerate (VALISONE) 0.1 % topical cream Apply  to affected area two (2) times a day.  losartan (COZAAR) 50 mg tablet Take 50 mg by mouth daily.  glimepiride (AMARYL) 2 mg tablet Take 2 mg by mouth daily. Disposition: Discharged DISCHARGE NOTE:  
 
Pt has been reexamined. Patient has no new complaints, changes, or physical findings. Care plan outlined and precautions discussed. Results of labs, XR, PVL, CT were reviewed with the patient. All medications were reviewed with the patient; will d/c home with [place] and bacitracin. All of pt's questions and concerns were addressed. Patient was instructed and agrees to follow up with PCP and ortho, as well as to return to the ED upon further deterioration. Patient is ready to go home. Follow-up Information Follow up With Specialties Details Why Contact [facility] EMERGENCY DEPT Emergency Medicine  If symptoms worsen [address] 
[phone] Chayo Doe MD Nephrology In 3 days  [address] [facility] [address] [id number] [phone] [address] [facility]  In 3 days  [address] [facility] [address] ([facility]) [address] [phone] [email or web address], [employer]. In 3 days  [address] [facility], [address] [facility] 
[phone] Current Discharge Medication List  
  
START taking these medications Details HYDROcodone-acetaminophen (NORCO) 5-325 mg per tablet Take 1 Tab by mouth every six (6) hours as needed for Pain. Max Daily Amount: 4 Tabs. Qty: 12 Tab, Refills: 0 Associated Diagnoses: Left hip pain  
  
bacitracin (BACITRACIN) 500 unit/gram oint Apply  to affected area three (3) times daily for 10 days. Apply to affected area Qty: 1 Tube, Refills: 0 CONTINUE these medications which have NOT CHANGED Details  
febuxostat (ULORIC) 40 mg tab tablet Take 40 mg by mouth daily. glipiZIDE (GLUCOTROL) 5 mg tablet Take 5 mg by mouth daily. ascorbic acid, vitamin C, (VITAMIN C) 500 mg tablet Take 1 Tab by mouth daily. Qty: 60 Tab, Refills: 0  
  
aspirin 81 mg chewable tablet Take 1 Tab by mouth daily. Qty: 100 Tab, Refills: 0 dorzolamide-timolol (COSOPT) 22.3-6.8 mg/mL ophthalmic solution Administer 1 Drop to both eyes two (2) times a day. Qty: 5 mL, Refills: 0  
  
ferrous fumarate-vitamin C (LAURA-SEQUELS, IRON-VIT C,) 200 mg (65 mg iron)-25 mg TbER Take 1 Tab by mouth daily. Qty: 60 Tab, Refills: 0  
  
folic acid (FOLVITE) 1 mg tablet Take 1 Tab by mouth daily. Qty: 60 Tab, Refills: 0  
  
furosemide (LASIX) 20 mg tablet One pill a day 
Qty: 60 Tab, Refills: 0  
  
gabapentin (NEURONTIN) 300 mg capsule Take 1 Cap by mouth nightly. Qty: 60 Cap, Refills: 0  
  
levothyroxine (SYNTHROID) 100 mcg tablet Take 1 Tab by mouth Daily (before breakfast). Qty: 60 Tab, Refills: 0  
  
linagliptin (TRADJENTA) 5 mg tablet Take 1 Tab by mouth Daily (before breakfast). Qty: 60 Tab, Refills: 0  
  
metoprolol tartrate (LOPRESSOR) 50 mg tablet Take 1 Tab by mouth two (2) times a day. Qty: 60 Tab, Refills: 0  
  
pantoprazole (PROTONIX) 40 mg tablet Take 1 Tab by mouth Daily (before breakfast). Qty: 60 Tab, Refills: 0  
  
simvastatin (ZOCOR) 40 mg tablet Take 1 Tab by mouth nightly. Qty: 60 Tab, Refills: 0  
  
spironolactone (ALDACTONE) 50 mg tablet Take 1 Tab by mouth two (2) times a day. Qty: 60 Tab, Refills: 0 Thiamine Mononitrate (B-1) 100 mg tablet Take 1 Tab by mouth daily. Qty: 60 Tab, Refills: 0  
  
zolpidem (AMBIEN) 5 mg tablet Take 1 Tab by mouth nightly as needed for Sleep. Max Daily Amount: 5 mg. Qty: 60 Tab, Refills: 0  
  
bisacodyl (DULCOLAX) 10 mg suppository Insert 10 mg into rectum daily as needed. Qty: 30 Suppository, Refills: 0 IRON/DOCUSATE SODIUM (LAURA-SEQUELS PO) Take 50 mg by mouth daily. meclizine (ANTIVERT) 25 mg tablet Take 25 mg by mouth four (4) times daily as needed. betamethasone valerate (VALISONE) 0.1 % topical cream Apply  to affected area two (2) times a day. losartan (COZAAR) 50 mg tablet Take 50 mg by mouth daily. glimepiride (AMARYL) 2 mg tablet Take 2 mg by mouth daily. Diagnosis Clinical Impression: 1. Left hip pain 2. Leg swelling 3. Noninfected skin tear of right lower extremity, initial encounter

## 2018-10-22 NOTE — ED NOTES
I have reviewed discharge instruction and prescriptions with patient. Patient verbalized understanding and has no further questions at this time. Education taught and patient verbalized understanding of education. Teach back method used. Right ac IV removed, catheter tip intact on removal.  Armband removed and shredded per patients request.   
Patients pain 2/10. Belongings given to patient. Patient discharged with daughter via w/c to home.

## 2018-10-22 NOTE — DISCHARGE INSTRUCTIONS
Hip Pain: Care Instructions  Your Care Instructions    Hip pain may be caused by many things, including overuse, a fall, or a twisting movement. Another cause of hip pain is arthritis. Your pain may increase when you stand up, walk, or squat. The pain may come and go or may be constant. Home treatment can help relieve hip pain, swelling, and stiffness. If your pain is ongoing, you may need more tests and treatment. Follow-up care is a key part of your treatment and safety. Be sure to make and go to all appointments, and call your doctor if you are having problems. It's also a good idea to know your test results and keep a list of the medicines you take. How can you care for yourself at home? · Take pain medicines exactly as directed. ? If the doctor gave you a prescription medicine for pain, take it as prescribed. ? If you are not taking a prescription pain medicine, ask your doctor if you can take an over-the-counter medicine. · Rest and protect your hip. Take a break from any activity, including standing or walking, that may cause pain. · Put ice or a cold pack against your hip for 10 to 20 minutes at a time. Try to do this every 1 to 2 hours for the next 3 days (when you are awake) or until the swelling goes down. Put a thin cloth between the ice and your skin. · Sleep on your healthy side with a pillow between your knees, or sleep on your back with pillows under your knees. · If there is no swelling, you can put moist heat, a heating pad, or a warm cloth on your hip. Do gentle stretching exercises to help keep your hip flexible. · Learn how to prevent falls. Have your vision and hearing checked regularly. Wear slippers or shoes with a nonskid sole. · Stay at a healthy weight. · Wear comfortable shoes. When should you call for help? Call 911 anytime you think you may need emergency care.  For example, call if:    · You have sudden chest pain and shortness of breath, or you cough up blood.     · You are not able to stand or walk or bear weight.     · Your buttocks, legs, or feet feel numb or tingly.     · Your leg or foot is cool or pale or changes color.     · You have severe pain.    Call your doctor now or seek immediate medical care if:    · You have signs of infection, such as:  ? Increased pain, swelling, warmth, or redness in the hip area. ? Red streaks leading from the hip area. ? Pus draining from the hip area. ? A fever.     · You have signs of a blood clot, such as:  ? Pain in your calf, back of the knee, thigh, or groin. ? Redness and swelling in your leg or groin.     · You are not able to bend, straighten, or move your leg normally.     · You have trouble urinating or having bowel movements.    Watch closely for changes in your health, and be sure to contact your doctor if:    · You do not get better as expected. Where can you learn more? Go to http://yamilet-joanne.info/. Enter N130 in the search box to learn more about \"Hip Pain: Care Instructions. \"  Current as of: November 20, 2017  Content Version: 11.8  © 8110-4058 [employer]. Care instructions adapted under license by [employer] (which disclaims liability or warranty for this information). If you have questions about a medical condition or this instruction, always ask your healthcare professional. [place] [address] any warranty or liability for your use of this information. Skin Tears: Care Instructions  Your Care Instructions  As we get older, our skin gets drier and more fragile. Sometimes this can cause the outer layers of skin to split and tear open. Skin tears are treated in different ways. In some cases, doctors use pieces of tape called Steri-Strips to pull the skin together and help it heal. Other times, it's best to leave the tear open and cover it with a special wound-care bandage. Skin tears are usually not serious. They usually heal in a few weeks. But how long you take to heal depends on your body and the type of tear you have. Sometimes the torn piece of skin is used to protect the wound while it heals. But that piece of skin does not heal. It may fall off on its own. Or the doctor may remove it. As your tear heals, it's important to keep it clean to help prevent infection. The doctor has checked you carefully, but problems can develop later. If you notice any problems or new symptoms, get medical treatment right away. Follow-up care is a key part of your treatment and safety. Be sure to make and go to all appointments, and call your doctor if you are having problems. It's also a good idea to know your test results and keep a list of the medicines you take. How can you care for yourself at home? · If you have pain, ask your doctor if you can take an over-the-counter pain medicine, such as acetaminophen (Tylenol), ibuprofen (Advil, Motrin), or naproxen (Aleve). Be safe with medicines. Read and follow all instructions on the label. · If you have a bandage, follow your doctor's instructions for changing it. · If you have Steri-Strips, leave them on until they fall off. · Follow your doctor's instructions about bathing. · Gently wash the skin tear with plain water 2 times a day. Do not rub the area. · Let the area air dry. Or you can pat it carefully with a soft towel. When should you call for help? Call your doctor now or seek immediate medical care if:  · You have signs of infection, such as:  ¨ Increased pain, swelling, warmth, or redness around the tear. ¨ Red streaks leading from the tear. ¨ Pus draining from the tear. ¨ A fever. · The tear starts to bleed a lot. Small amounts of blood are normal.  Watch closely for changes in your health, and be sure to contact your doctor if:  · You do not get better as expected. Where can you learn more?    Go to [email or web address]be  Enter L784 in the search box to learn more about \"Skin Tears: Care Instructions. \"   © 4742-3511 [employer]. Care instructions adapted under license by [employer] (which disclaims liability or warranty for this information). This care instruction is for use with your licensed healthcare professional. If you have questions about a medical condition or this instruction, always ask your healthcare professional. [address] any warranty or liability for your use of this information.   Content Version: 00.9.713700; Current as of: May 22, 2015

## 2018-10-22 NOTE — ED NOTES
Care of patient assumed. Patient resting comfortably at present. Denies any pain. Skin tear noted Right groin post PVL study, E. Synder aware and family aware. Area cleansed. PVL aware.

## 2018-12-01 NOTE — PROGRESS NOTES
mishel chen'd , will go to [facility] once  auth obtained. shortness of breath

## 2018-12-08 ENCOUNTER — APPOINTMENT (OUTPATIENT)
Dept: CT IMAGING | Age: 83
DRG: 064 | End: 2018-12-08
Attending: NURSE PRACTITIONER
Payer: MEDICARE

## 2018-12-08 ENCOUNTER — HOSPITAL ENCOUNTER (INPATIENT)
Age: 83
LOS: 4 days | Discharge: HOME HEALTH | DRG: 064 | End: 2018-12-13
Attending: EMERGENCY MEDICINE | Admitting: HOSPITALIST
Payer: MEDICARE

## 2018-12-08 ENCOUNTER — APPOINTMENT (OUTPATIENT)
Dept: GENERAL RADIOLOGY | Age: 83
DRG: 064 | End: 2018-12-08
Attending: EMERGENCY MEDICINE
Payer: MEDICARE

## 2018-12-08 DIAGNOSIS — G45.9 TIA (TRANSIENT ISCHEMIC ATTACK): Primary | ICD-10-CM

## 2018-12-08 LAB
ALBUMIN SERPL-MCNC: 3.5 G/DL (ref 3.4–5)
ALBUMIN/GLOB SERPL: 1.1 {RATIO} (ref 0.8–1.7)
ALP SERPL-CCNC: 89 U/L (ref 45–117)
ALT SERPL-CCNC: 13 U/L (ref 13–56)
ANION GAP BLD CALC-SCNC: 15 MMOL/L (ref 10–20)
ANION GAP SERPL CALC-SCNC: 8 MMOL/L (ref 3–18)
AST SERPL-CCNC: 11 U/L (ref 15–37)
BILIRUB SERPL-MCNC: 0.4 MG/DL (ref 0.2–1)
BUN BLD-MCNC: 28 MG/DL (ref 7–18)
BUN SERPL-MCNC: 26 MG/DL (ref 7–18)
BUN/CREAT SERPL: 14 (ref 12–20)
CA-I BLD-MCNC: 1.08 MMOL/L (ref 1.12–1.32)
CALCIUM SERPL-MCNC: 8.7 MG/DL (ref 8.5–10.1)
CHLORIDE BLD-SCNC: 96 MMOL/L (ref 100–108)
CHLORIDE SERPL-SCNC: 100 MMOL/L (ref 100–108)
CO2 BLD-SCNC: 34 MMOL/L (ref 19–24)
CO2 SERPL-SCNC: 32 MMOL/L (ref 21–32)
CREAT SERPL-MCNC: 1.84 MG/DL (ref 0.6–1.3)
CREAT UR-MCNC: 1.8 MG/DL (ref 0.6–1.3)
ERYTHROCYTE [DISTWIDTH] IN BLOOD BY AUTOMATED COUNT: 15 % (ref 11.6–14.5)
FIBRINOGEN PPP-MCNC: 584 MG/DL (ref 210–451)
GLOBULIN SER CALC-MCNC: 3.3 G/DL (ref 2–4)
GLUCOSE BLD STRIP.AUTO-MCNC: 260 MG/DL (ref 70–110)
GLUCOSE BLD STRIP.AUTO-MCNC: 274 MG/DL (ref 74–106)
GLUCOSE SERPL-MCNC: 268 MG/DL (ref 74–99)
HCT VFR BLD AUTO: 45.1 % (ref 35–45)
HCT VFR BLD CALC: 44 % (ref 36–49)
HGB BLD-MCNC: 13.6 G/DL (ref 12–16)
HGB BLD-MCNC: 15 G/DL (ref 12–16)
INR PPP: 0.8 (ref 0.8–1.2)
MCH RBC QN AUTO: 28.3 PG (ref 24–34)
MCHC RBC AUTO-ENTMCNC: 30.2 G/DL (ref 31–37)
MCV RBC AUTO: 94 FL (ref 74–97)
PLATELET # BLD AUTO: 149 K/UL (ref 135–420)
PMV BLD AUTO: 10 FL (ref 9.2–11.8)
POTASSIUM BLD-SCNC: 3.6 MMOL/L (ref 3.5–5.5)
POTASSIUM SERPL-SCNC: 3.6 MMOL/L (ref 3.5–5.5)
PROT SERPL-MCNC: 6.8 G/DL (ref 6.4–8.2)
PROTHROMBIN TIME: 11.1 SEC (ref 11.5–15.2)
RBC # BLD AUTO: 4.8 M/UL (ref 4.2–5.3)
SODIUM BLD-SCNC: 141 MMOL/L (ref 136–145)
SODIUM SERPL-SCNC: 140 MMOL/L (ref 136–145)
THROMBIN TIME: 16.1 SECS (ref 13.8–18.2)
TROPONIN I SERPL-MCNC: <0.02 NG/ML (ref 0–0.04)
WBC # BLD AUTO: 6.2 K/UL (ref 4.6–13.2)

## 2018-12-08 PROCEDURE — 84481 FREE ASSAY (FT-3): CPT

## 2018-12-08 PROCEDURE — 82962 GLUCOSE BLOOD TEST: CPT

## 2018-12-08 PROCEDURE — 84443 ASSAY THYROID STIM HORMONE: CPT

## 2018-12-08 PROCEDURE — 80053 COMPREHEN METABOLIC PANEL: CPT

## 2018-12-08 PROCEDURE — 85027 COMPLETE CBC AUTOMATED: CPT

## 2018-12-08 PROCEDURE — 85384 FIBRINOGEN ACTIVITY: CPT

## 2018-12-08 PROCEDURE — 86141 C-REACTIVE PROTEIN HS: CPT

## 2018-12-08 PROCEDURE — 85670 THROMBIN TIME PLASMA: CPT

## 2018-12-08 PROCEDURE — 84439 ASSAY OF FREE THYROXINE: CPT

## 2018-12-08 PROCEDURE — 85610 PROTHROMBIN TIME: CPT

## 2018-12-08 PROCEDURE — 83036 HEMOGLOBIN GLYCOSYLATED A1C: CPT

## 2018-12-08 PROCEDURE — 71045 X-RAY EXAM CHEST 1 VIEW: CPT

## 2018-12-08 PROCEDURE — 80047 BASIC METABLC PNL IONIZED CA: CPT

## 2018-12-08 PROCEDURE — 99285 EMERGENCY DEPT VISIT HI MDM: CPT

## 2018-12-08 PROCEDURE — 85576 BLOOD PLATELET AGGREGATION: CPT

## 2018-12-08 PROCEDURE — 85652 RBC SED RATE AUTOMATED: CPT

## 2018-12-08 PROCEDURE — 70450 CT HEAD/BRAIN W/O DYE: CPT

## 2018-12-08 PROCEDURE — 84484 ASSAY OF TROPONIN QUANT: CPT

## 2018-12-08 PROCEDURE — 80061 LIPID PANEL: CPT

## 2018-12-09 ENCOUNTER — APPOINTMENT (OUTPATIENT)
Dept: MRI IMAGING | Age: 83
DRG: 064 | End: 2018-12-09
Attending: FAMILY MEDICINE
Payer: MEDICARE

## 2018-12-09 ENCOUNTER — APPOINTMENT (OUTPATIENT)
Dept: NON INVASIVE DIAGNOSTICS | Age: 83
DRG: 064 | End: 2018-12-09
Attending: FAMILY MEDICINE
Payer: MEDICARE

## 2018-12-09 PROBLEM — G45.9 TIA (TRANSIENT ISCHEMIC ATTACK): Status: ACTIVE | Noted: 2018-12-09

## 2018-12-09 PROBLEM — I63.9 CVA (CEREBRAL VASCULAR ACCIDENT) (HCC): Status: ACTIVE | Noted: 2018-12-09

## 2018-12-09 LAB
ABO + RH BLD: NORMAL
ASPIRIN TEST, ASPIRN: 466 ARU (ref 620–672)
ATRIAL RATE: 441 BPM
BLOOD GROUP ANTIBODIES SERPL: NORMAL
CALCULATED R AXIS, ECG10: 34 DEGREES
CALCULATED T AXIS, ECG11: 55 DEGREES
CHOLEST SERPL-MCNC: 163 MG/DL
DIAGNOSIS, 93000: NORMAL
ERYTHROCYTE [SEDIMENTATION RATE] IN BLOOD: 3 MM/HR (ref 0–30)
EST. AVERAGE GLUCOSE BLD GHB EST-MCNC: 163 MG/DL
GLUCOSE BLD STRIP.AUTO-MCNC: 122 MG/DL (ref 70–110)
GLUCOSE BLD STRIP.AUTO-MCNC: 128 MG/DL (ref 70–110)
GLUCOSE BLD STRIP.AUTO-MCNC: 177 MG/DL (ref 70–110)
GLUCOSE BLD STRIP.AUTO-MCNC: 193 MG/DL (ref 70–110)
HBA1C MFR BLD: 7.3 % (ref 4.2–5.6)
HDLC SERPL-MCNC: 80 MG/DL (ref 40–60)
HDLC SERPL: 2 {RATIO} (ref 0–5)
LDLC SERPL CALC-MCNC: 54.8 MG/DL (ref 0–100)
LIPID PROFILE,FLP: ABNORMAL
Q-T INTERVAL, ECG07: 404 MS
QRS DURATION, ECG06: 80 MS
QTC CALCULATION (BEZET), ECG08: 442 MS
SPECIMEN EXP DATE BLD: NORMAL
T3FREE SERPL-MCNC: 1.6 PG/ML (ref 2.18–3.98)
T4 FREE SERPL-MCNC: 1.3 NG/DL (ref 0.7–1.5)
TRIGL SERPL-MCNC: 141 MG/DL (ref ?–150)
TSH SERPL DL<=0.05 MIU/L-ACNC: 1.7 UIU/ML (ref 0.36–3.74)
VENTRICULAR RATE, ECG03: 72 BPM
VLDLC SERPL CALC-MCNC: 28.2 MG/DL

## 2018-12-09 PROCEDURE — 77030032491 HC SLV COMPR SCD KNE XL COVD -B

## 2018-12-09 PROCEDURE — 86901 BLOOD TYPING SEROLOGIC RH(D): CPT

## 2018-12-09 PROCEDURE — 74011636637 HC RX REV CODE- 636/637: Performed by: FAMILY MEDICINE

## 2018-12-09 PROCEDURE — A9575 INJ GADOTERATE MEGLUMI 0.1ML: HCPCS | Performed by: HOSPITALIST

## 2018-12-09 PROCEDURE — 74011250636 HC RX REV CODE- 250/636: Performed by: HOSPITALIST

## 2018-12-09 PROCEDURE — 74011000258 HC RX REV CODE- 258: Performed by: FAMILY MEDICINE

## 2018-12-09 PROCEDURE — 77030037878 HC DRSG MEPILEX >48IN BORD MOLN -B

## 2018-12-09 PROCEDURE — 77010033678 HC OXYGEN DAILY

## 2018-12-09 PROCEDURE — 99218 HC RM OBSERVATION: CPT

## 2018-12-09 PROCEDURE — 77030037870 HC GLD SHT PREVALON SAGE -B

## 2018-12-09 PROCEDURE — 85576 BLOOD PLATELET AGGREGATION: CPT

## 2018-12-09 PROCEDURE — 74011250637 HC RX REV CODE- 250/637: Performed by: EMERGENCY MEDICINE

## 2018-12-09 PROCEDURE — 74011000250 HC RX REV CODE- 250: Performed by: FAMILY MEDICINE

## 2018-12-09 PROCEDURE — 70553 MRI BRAIN STEM W/O & W/DYE: CPT

## 2018-12-09 PROCEDURE — 82962 GLUCOSE BLOOD TEST: CPT

## 2018-12-09 PROCEDURE — 93005 ELECTROCARDIOGRAM TRACING: CPT

## 2018-12-09 PROCEDURE — 97530 THERAPEUTIC ACTIVITIES: CPT

## 2018-12-09 PROCEDURE — 74011250637 HC RX REV CODE- 250/637: Performed by: FAMILY MEDICINE

## 2018-12-09 PROCEDURE — 97162 PT EVAL MOD COMPLEX 30 MIN: CPT

## 2018-12-09 PROCEDURE — 65660000001 HC RM ICU INTERMED STEPDOWN

## 2018-12-09 RX ORDER — ACETAMINOPHEN 650 MG/1
650 SUPPOSITORY RECTAL
Status: DISCONTINUED | OUTPATIENT
Start: 2018-12-09 | End: 2018-12-13 | Stop reason: HOSPADM

## 2018-12-09 RX ORDER — FACIAL-BODY WIPES
10 EACH TOPICAL DAILY PRN
Status: DISCONTINUED | OUTPATIENT
Start: 2018-12-09 | End: 2018-12-13 | Stop reason: HOSPADM

## 2018-12-09 RX ORDER — LEVOTHYROXINE SODIUM 100 UG/1
100 TABLET ORAL
Status: DISCONTINUED | OUTPATIENT
Start: 2018-12-09 | End: 2018-12-13 | Stop reason: HOSPADM

## 2018-12-09 RX ORDER — LORAZEPAM 2 MG/ML
1-2 INJECTION INTRAMUSCULAR ONCE
Status: COMPLETED | OUTPATIENT
Start: 2018-12-09 | End: 2018-12-09

## 2018-12-09 RX ORDER — INSULIN LISPRO 100 [IU]/ML
INJECTION, SOLUTION INTRAVENOUS; SUBCUTANEOUS EVERY 6 HOURS
Status: DISCONTINUED | OUTPATIENT
Start: 2018-12-09 | End: 2018-12-09

## 2018-12-09 RX ORDER — FEBUXOSTAT 40 MG/1
40 TABLET, FILM COATED ORAL DAILY
Status: DISCONTINUED | OUTPATIENT
Start: 2018-12-09 | End: 2018-12-13 | Stop reason: HOSPADM

## 2018-12-09 RX ORDER — AMOXICILLIN 250 MG
2 CAPSULE ORAL
Status: DISCONTINUED | OUTPATIENT
Start: 2018-12-09 | End: 2018-12-13 | Stop reason: HOSPADM

## 2018-12-09 RX ORDER — ALBUTEROL SULFATE 0.83 MG/ML
2.5 SOLUTION RESPIRATORY (INHALATION)
Status: DISCONTINUED | OUTPATIENT
Start: 2018-12-09 | End: 2018-12-13 | Stop reason: HOSPADM

## 2018-12-09 RX ORDER — BETAMETHASONE VALERATE 1.2 MG/G
CREAM TOPICAL 2 TIMES DAILY
Status: DISCONTINUED | OUTPATIENT
Start: 2018-12-09 | End: 2018-12-13 | Stop reason: HOSPADM

## 2018-12-09 RX ORDER — DEXTROSE 50 % IN WATER (D50W) INTRAVENOUS SYRINGE
25-50 AS NEEDED
Status: DISCONTINUED | OUTPATIENT
Start: 2018-12-09 | End: 2018-12-13 | Stop reason: HOSPADM

## 2018-12-09 RX ORDER — ASPIRIN 325 MG
325 TABLET ORAL
Status: COMPLETED | OUTPATIENT
Start: 2018-12-09 | End: 2018-12-09

## 2018-12-09 RX ORDER — GABAPENTIN 300 MG/1
300 CAPSULE ORAL
Status: DISCONTINUED | OUTPATIENT
Start: 2018-12-09 | End: 2018-12-13 | Stop reason: HOSPADM

## 2018-12-09 RX ORDER — SPIRONOLACTONE 25 MG/1
50 TABLET ORAL 2 TIMES DAILY
Status: DISCONTINUED | OUTPATIENT
Start: 2018-12-09 | End: 2018-12-11

## 2018-12-09 RX ORDER — PANTOPRAZOLE SODIUM 40 MG/1
40 TABLET, DELAYED RELEASE ORAL
Status: DISCONTINUED | OUTPATIENT
Start: 2018-12-09 | End: 2018-12-13 | Stop reason: HOSPADM

## 2018-12-09 RX ORDER — HYDROCODONE BITARTRATE AND ACETAMINOPHEN 5; 325 MG/1; MG/1
1 TABLET ORAL
Status: DISCONTINUED | OUTPATIENT
Start: 2018-12-09 | End: 2018-12-13 | Stop reason: HOSPADM

## 2018-12-09 RX ORDER — ASPIRIN 325 MG
325 TABLET ORAL DAILY
Status: DISCONTINUED | OUTPATIENT
Start: 2018-12-09 | End: 2018-12-13 | Stop reason: HOSPADM

## 2018-12-09 RX ORDER — ASCORBIC ACID 250 MG
500 TABLET ORAL DAILY
Status: DISCONTINUED | OUTPATIENT
Start: 2018-12-09 | End: 2018-12-13 | Stop reason: HOSPADM

## 2018-12-09 RX ORDER — GLIMEPIRIDE 2 MG/1
2 TABLET ORAL DAILY
Status: DISCONTINUED | OUTPATIENT
Start: 2018-12-09 | End: 2018-12-09

## 2018-12-09 RX ORDER — ZOLPIDEM TARTRATE 5 MG/1
5 TABLET ORAL
Status: DISCONTINUED | OUTPATIENT
Start: 2018-12-09 | End: 2018-12-13 | Stop reason: HOSPADM

## 2018-12-09 RX ORDER — GUAIFENESIN 100 MG/5ML
81 LIQUID (ML) ORAL DAILY
Status: DISCONTINUED | OUTPATIENT
Start: 2018-12-09 | End: 2018-12-09

## 2018-12-09 RX ORDER — ATORVASTATIN CALCIUM 40 MG/1
40 TABLET, FILM COATED ORAL
Status: DISCONTINUED | OUTPATIENT
Start: 2018-12-09 | End: 2018-12-09

## 2018-12-09 RX ORDER — AMOXICILLIN 250 MG
2 CAPSULE ORAL
Status: DISCONTINUED | OUTPATIENT
Start: 2018-12-09 | End: 2018-12-09

## 2018-12-09 RX ORDER — MECLIZINE HCL 12.5 MG 12.5 MG/1
25 TABLET ORAL
Status: DISCONTINUED | OUTPATIENT
Start: 2018-12-09 | End: 2018-12-13 | Stop reason: HOSPADM

## 2018-12-09 RX ORDER — ASPIRIN 325 MG/1
100 TABLET, FILM COATED ORAL DAILY
Status: DISCONTINUED | OUTPATIENT
Start: 2018-12-09 | End: 2018-12-13 | Stop reason: HOSPADM

## 2018-12-09 RX ORDER — FOLIC ACID 1 MG/1
1 TABLET ORAL DAILY
Status: DISCONTINUED | OUTPATIENT
Start: 2018-12-09 | End: 2018-12-13 | Stop reason: HOSPADM

## 2018-12-09 RX ORDER — METOPROLOL TARTRATE 50 MG/1
50 TABLET ORAL 2 TIMES DAILY
Status: DISCONTINUED | OUTPATIENT
Start: 2018-12-09 | End: 2018-12-13 | Stop reason: HOSPADM

## 2018-12-09 RX ORDER — ATORVASTATIN CALCIUM 40 MG/1
80 TABLET, FILM COATED ORAL
Status: DISCONTINUED | OUTPATIENT
Start: 2018-12-09 | End: 2018-12-09

## 2018-12-09 RX ORDER — DORZOLAMIDE HYDROCHLORIDE AND TIMOLOL MALEATE 20; 5 MG/ML; MG/ML
1 SOLUTION/ DROPS OPHTHALMIC 2 TIMES DAILY
Status: DISCONTINUED | OUTPATIENT
Start: 2018-12-09 | End: 2018-12-11

## 2018-12-09 RX ORDER — PANTOPRAZOLE SODIUM 40 MG/1
40 TABLET, DELAYED RELEASE ORAL EVERY 12 HOURS
Status: DISCONTINUED | OUTPATIENT
Start: 2018-12-09 | End: 2018-12-09

## 2018-12-09 RX ORDER — ACETAMINOPHEN 325 MG/1
650 TABLET ORAL
Status: DISCONTINUED | OUTPATIENT
Start: 2018-12-09 | End: 2018-12-13 | Stop reason: HOSPADM

## 2018-12-09 RX ORDER — GADOTERATE MEGLUMINE 376.9 MG/ML
20 INJECTION INTRAVENOUS
Status: COMPLETED | OUTPATIENT
Start: 2018-12-09 | End: 2018-12-09

## 2018-12-09 RX ORDER — GLIPIZIDE 5 MG/1
5 TABLET ORAL DAILY
Status: DISCONTINUED | OUTPATIENT
Start: 2018-12-09 | End: 2018-12-09

## 2018-12-09 RX ORDER — DEXTROSE MONOHYDRATE AND SODIUM CHLORIDE 5; .9 G/100ML; G/100ML
0.75 INJECTION, SOLUTION INTRAVENOUS CONTINUOUS
Status: DISCONTINUED | OUTPATIENT
Start: 2018-12-09 | End: 2018-12-09

## 2018-12-09 RX ORDER — ONDANSETRON 2 MG/ML
2 INJECTION INTRAMUSCULAR; INTRAVENOUS
Status: DISCONTINUED | OUTPATIENT
Start: 2018-12-09 | End: 2018-12-13 | Stop reason: HOSPADM

## 2018-12-09 RX ORDER — LANOLIN ALCOHOL/MO/W.PET/CERES
1 CREAM (GRAM) TOPICAL
Status: DISCONTINUED | OUTPATIENT
Start: 2018-12-09 | End: 2018-12-13 | Stop reason: HOSPADM

## 2018-12-09 RX ORDER — INSULIN LISPRO 100 [IU]/ML
INJECTION, SOLUTION INTRAVENOUS; SUBCUTANEOUS
Status: DISCONTINUED | OUTPATIENT
Start: 2018-12-09 | End: 2018-12-13 | Stop reason: HOSPADM

## 2018-12-09 RX ORDER — ATORVASTATIN CALCIUM 40 MG/1
80 TABLET, FILM COATED ORAL
Status: DISCONTINUED | OUTPATIENT
Start: 2018-12-09 | End: 2018-12-13 | Stop reason: HOSPADM

## 2018-12-09 RX ORDER — FUROSEMIDE 20 MG/1
20 TABLET ORAL DAILY
Status: DISCONTINUED | OUTPATIENT
Start: 2018-12-09 | End: 2018-12-11

## 2018-12-09 RX ORDER — LOSARTAN POTASSIUM 50 MG/1
50 TABLET ORAL DAILY
Status: DISCONTINUED | OUTPATIENT
Start: 2018-12-09 | End: 2018-12-11

## 2018-12-09 RX ORDER — SIMVASTATIN 40 MG/1
40 TABLET, FILM COATED ORAL
Status: DISCONTINUED | OUTPATIENT
Start: 2018-12-09 | End: 2018-12-09

## 2018-12-09 RX ADMIN — ATORVASTATIN CALCIUM 40 MG: 40 TABLET, FILM COATED ORAL at 01:02

## 2018-12-09 RX ADMIN — ASPIRIN 325 MG ORAL TABLET 325 MG: 325 PILL ORAL at 08:22

## 2018-12-09 RX ADMIN — BETAMETHASONE VALERATE: 1 CREAM TOPICAL at 12:34

## 2018-12-09 RX ADMIN — FUROSEMIDE 20 MG: 20 TABLET ORAL at 08:24

## 2018-12-09 RX ADMIN — Medication 100 MG: at 08:24

## 2018-12-09 RX ADMIN — GLIPIZIDE 5 MG: 5 TABLET ORAL at 12:34

## 2018-12-09 RX ADMIN — ZOLPIDEM TARTRATE 5 MG: 5 TABLET ORAL at 22:25

## 2018-12-09 RX ADMIN — SPIRONOLACTONE 50 MG: 25 TABLET, FILM COATED ORAL at 18:09

## 2018-12-09 RX ADMIN — LORAZEPAM 1 MG: 2 INJECTION INTRAMUSCULAR at 10:47

## 2018-12-09 RX ADMIN — STANDARDIZED SENNA CONCENTRATE AND DOCUSATE SODIUM 2 TABLET: 8.6; 5 TABLET, FILM COATED ORAL at 22:23

## 2018-12-09 RX ADMIN — BETAMETHASONE VALERATE: 1 CREAM TOPICAL at 18:09

## 2018-12-09 RX ADMIN — Medication 500 MG: at 08:23

## 2018-12-09 RX ADMIN — LORAZEPAM 1 MG: 2 INJECTION INTRAMUSCULAR at 11:30

## 2018-12-09 RX ADMIN — ATORVASTATIN CALCIUM 80 MG: 40 TABLET, FILM COATED ORAL at 22:23

## 2018-12-09 RX ADMIN — DORZOLAMIDE HYDROCHLORIDE AND TIMOLOL MALEATE 1 DROP: 20; 5 SOLUTION/ DROPS OPHTHALMIC at 09:00

## 2018-12-09 RX ADMIN — INSULIN LISPRO 2 UNITS: 100 INJECTION, SOLUTION INTRAVENOUS; SUBCUTANEOUS at 13:21

## 2018-12-09 RX ADMIN — GABAPENTIN 300 MG: 300 CAPSULE ORAL at 22:23

## 2018-12-09 RX ADMIN — FERROUS SULFATE TAB 325 MG (65 MG ELEMENTAL FE) 325 MG: 325 (65 FE) TAB at 08:23

## 2018-12-09 RX ADMIN — INSULIN LISPRO 2 UNITS: 100 INJECTION, SOLUTION INTRAVENOUS; SUBCUTANEOUS at 06:41

## 2018-12-09 RX ADMIN — METOPROLOL TARTRATE 50 MG: 50 TABLET ORAL at 08:24

## 2018-12-09 RX ADMIN — LOSARTAN POTASSIUM 50 MG: 50 TABLET ORAL at 08:23

## 2018-12-09 RX ADMIN — SPIRONOLACTONE 50 MG: 25 TABLET, FILM COATED ORAL at 08:23

## 2018-12-09 RX ADMIN — FEBUXOSTAT 40 MG: 40 TABLET ORAL at 12:33

## 2018-12-09 RX ADMIN — PANTOPRAZOLE SODIUM 40 MG: 40 TABLET, DELAYED RELEASE ORAL at 08:23

## 2018-12-09 RX ADMIN — ASPIRIN 325 MG ORAL TABLET 325 MG: 325 PILL ORAL at 01:03

## 2018-12-09 RX ADMIN — LEVOTHYROXINE SODIUM 100 MCG: 100 TABLET ORAL at 06:41

## 2018-12-09 RX ADMIN — GADOTERATE MEGLUMINE 20 ML: 376.9 INJECTION INTRAVENOUS at 11:48

## 2018-12-09 RX ADMIN — FOLIC ACID 1 MG: 1 TABLET ORAL at 08:23

## 2018-12-09 RX ADMIN — DORZOLAMIDE HYDROCHLORIDE AND TIMOLOL MALEATE 1 DROP: 20; 5 SOLUTION/ DROPS OPHTHALMIC at 18:00

## 2018-12-09 RX ADMIN — DEXTROSE MONOHYDRATE AND SODIUM CHLORIDE 0.75 ML/KG/HR: 5; .9 INJECTION, SOLUTION INTRAVENOUS at 06:20

## 2018-12-09 RX ADMIN — SITAGLIPTIN 25 MG: 25 TABLET, FILM COATED ORAL at 12:33

## 2018-12-09 NOTE — H&P
Internal Medicine History and Physical 
   
 
 
Subjective HPI: Luciana Cleveland is a 80 y.o. female who presented to the ED with apparent transient slurring of her speech, according to her daughter. No other complaints. Patient denies slurred speech. Has hx of CVA and paroxysmal Afib. She is on ASA but not anticoagulated. ED evaluation was unremarkable for any new findings. Teleneurology consulted and recommended observation in hospital with MRI and carotid u/s in am.  
 
PMHx: 
Past Medical History:  
Diagnosis Date  Atrial fibrillation ([facility]  Chronic kidney disease   
 unknown what stage  Diabetes ([facility] [address] PSurgHx: 
History reviewed. No pertinent surgical history. SocialHx: 
Social History Socioeconomic History  Marital status:  Spouse name: Not on file  Number of children: Not on file  Years of education: Not on file  Highest education level: Not on file Social Needs  Financial resource strain: Not on file  Food insecurity - worry: Not on file  Food insecurity - inability: Not on file  Transportation needs - medical: Not on file  Transportation needs - non-medical: Not on file Occupational History  Not on file Tobacco Use  Smoking status: Never Smoker  Smokeless tobacco: Never Used Substance and Sexual Activity  Alcohol use: No  
 Drug use: No  
 Sexual activity: Not on file Other Topics Concern  Not on file Social History Narrative  Not on file Allergies: Allergies Allergen Reactions  Codeine Itching FamilyHx: 
Family History Family history unknown: Yes Prior to Admission Medications Prescriptions Last Dose Informant Patient Reported? Taking? HYDROcodone-acetaminophen (NORCO) 5-325 mg per tablet   No No  
Sig: Take 1 Tab by mouth every six (6) hours as needed for Pain. Max Daily Amount: 4 Tabs. IRON/DOCUSATE SODIUM (LAURA-SEQUELS PO)   Yes No  
Sig: Take 50 mg by mouth daily. Thiamine Mononitrate (B-1) 100 mg tablet   No No  
Sig: Take 1 Tab by mouth daily. ascorbic acid, vitamin C, (VITAMIN C) 500 mg tablet   No No  
Sig: Take 1 Tab by mouth daily. aspirin 81 mg chewable tablet   No No  
Sig: Take 1 Tab by mouth daily. betamethasone valerate (VALISONE) 0.1 % topical cream   Yes No  
Sig: Apply  to affected area two (2) times a day. bisacodyl (DULCOLAX) 10 mg suppository   No No  
Sig: Insert 10 mg into rectum daily as needed. dorzolamide-timolol (COSOPT) 22.3-6.8 mg/mL ophthalmic solution   No No  
Sig: Administer 1 Drop to both eyes two (2) times a day. febuxostat (ULORIC) 40 mg tab tablet   Yes No  
Sig: Take 40 mg by mouth daily. ferrous fumarate-vitamin C (LAURA-SEQUELS, IRON-VIT C,) 200 mg (65 mg iron)-25 mg TbER   No No  
Sig: Take 1 Tab by mouth daily. Patient taking differently: Take 1 Tab by mouth daily. 65 mg  
folic acid (FOLVITE) 1 mg tablet   No No  
Sig: Take 1 Tab by mouth daily. furosemide (LASIX) 20 mg tablet   No No  
Sig: One pill a day  
gabapentin (NEURONTIN) 300 mg capsule   No No  
Sig: Take 1 Cap by mouth nightly. glimepiride (AMARYL) 2 mg tablet   Yes No  
Sig: Take 2 mg by mouth daily. glipiZIDE (GLUCOTROL) 5 mg tablet   Yes No  
Sig: Take 5 mg by mouth daily. levothyroxine (SYNTHROID) 100 mcg tablet   No No  
Sig: Take 1 Tab by mouth Daily (before breakfast). linagliptin (TRADJENTA) 5 mg tablet   No No  
Sig: Take 1 Tab by mouth Daily (before breakfast). losartan (COZAAR) 50 mg tablet   Yes No  
Sig: Take 50 mg by mouth daily. meclizine (ANTIVERT) 25 mg tablet   Yes No  
Sig: Take 25 mg by mouth four (4) times daily as needed. metoprolol tartrate (LOPRESSOR) 50 mg tablet   No No  
Sig: Take 1 Tab by mouth two (2) times a day. pantoprazole (PROTONIX) 40 mg tablet   No No  
Sig: Take 1 Tab by mouth Daily (before breakfast). simvastatin (ZOCOR) 40 mg tablet   No No  
Sig: Take 1 Tab by mouth nightly. spironolactone (ALDACTONE) 50 mg tablet   No No  
Sig: Take 1 Tab by mouth two (2) times a day. zolpidem (AMBIEN) 5 mg tablet   No No  
Sig: Take 1 Tab by mouth nightly as needed for Sleep. Max Daily Amount: 5 mg. Facility-Administered Medications: None Review of Systems: 
CONST: fever(-),   chills(-),   fatigue(-),   malaise(-) SKIN: itching(-),   rash(-) EYES: vision - no change from baseline ENT: ear pain(-),   nasal discharge(-),   sore throat(-),   voice change(-) RESP: SOB(-),   cough(-),   hemoptysis(-) CV: chest pain(-),   PND(-),   orthopnea(-),   exertional dyspnea(-),   palpitations(-), syncope(-) 
GI: abd pain(-),   nausea(-),   vomiting(-),   diarrhea(-),   melena(-),   hematemesis(-), hematochesia(-) 
: dysuria(-),   frequency(-),   urgency(-),   hematuria(-) 
MS: arthralgias(-),   myalgias(-) NEURO: speech w/o change according to patient but slurred at home prior to  ED visit according to daughter,   tremors(-),   seizures(-),   numbness(-),   dizziness(-) Objective Visit Vitals /87 Pulse 65 Resp 16 Ht 5' 3\" (1.6 m) Wt 97.5 kg (214 lb 15.2 oz) SpO2 98% BMI 38.08 kg/m² Physical Exam: 
CONST: NAD,   VSS reviewed SKIN: rashes(-),   ulcers(-) EYES: PERRL,   EOMI,   sclerae w/o jaundice HENT: HEENT,   normal appearing nose and ears,   normal nasal mucosa and turbinates NECK: supple,   no LA,   trachea is midline,   thyroid w/o goiter or palpable nodules RESP: normal respiratory effort,   wheezes(-),   rales(-),   rhochi(-),   no consolidation on percussion CHEST: normal axillae,   retractions(-),   use of accessory muscles(-) CV: JVD(-),   carotid bruits(-),   RRR,   gallops(-),   murmurs(-),   edema LE(-),   abd bruits(-),   peripheral pulses normal 
ABD: soft, tender(-),   distended(-),   HSM(-),   BS(+) in all quadrants,   peritoneal signs(-) 
MS: ROM(-) in all extremities,  clubbing(-),   peripheral cyanosis(-) NEURO: speech normal, tremors(-),   CN II-XII(-),  
PSYCH: AOC x 3,   appropriate affect,   non-suicidal 
 
 
Laboratory Studies: All lab results for the last 24 hours reviewed. Imaging Reviewed: 
Ct Head Wo Cont Result Date: 12/8/2018 EXAM: CT head INDICATION: Slurred speech. Acute neurologic deficit. COMPARISON: Annmarie 15, 2017. TECHNIQUE: Axial CT imaging of the head was performed without intravenous contrast. Dose reduction techniques used: automated exposure control, adjustment of the mAs and/or kVp according to patient size, and iterative reconstruction techniques. _______________ FINDINGS: BRAIN AND POSTERIOR FOSSA: There is mild cerebral volume loss with prominence of the lateral and third ventricles. The cortical sulci are widened appropriately. The fourth ventricle and basal cisterns are normally outlined. There is mild bilateral periventricular and central white matter diminished attenuation. There is no acute territorial defect, hemorrhage or midline shift. EXTRA-AXIAL SPACES AND MENINGES: There are no abnormal extra-axial fluid collections. CALVARIUM: Intact. SINUSES: Clear. OTHER: None. _______________ IMPRESSION: Mild cerebral volume loss and mild bilateral periventricular and central white matter diminished attenuation which is nonspecific but likely to represent microvascular disease. No acute intracranial abnormality. Findings were called to emergency room physician  prior to signing report. EKG:  
 
 
 
Assessment/Plan Active Hospital Problems Diagnosis Date Noted  TIA (transient ischemic attack) 12/09/2018  
 
 
- Cont acceptable home medications for chronic conditions  
- DVT protocol and GI prophylaxis I have personally reviewed all pertinent labs, films and EKGs that have officially resulted. I reviewed available electronic documentation outlining the initial presentation as well as the emergency room physician's encounter. Total time spent with patient and chart review, orders and documentation - 45min out of which more than 50% spent face-to-face with patient. Tom Abrams MD  
12/9/2018 
1:36 AM 
 
 
[facility]

## 2018-12-09 NOTE — PROGRESS NOTES
Problem: Mobility Impaired (Adult and Pediatric) Goal: *Acute Goals and Plan of Care (Insert Text) Physical Therapy Goals Initiated 12/9/2018 and to be accomplished within 7 day(s) 1. Patient will move from supine to sit and sit to supine , scoot up and down and roll side to side in bed with minimal assistance/contact guard assist.    
2.  Patient will transfer from bed to chair and chair to bed with minimal assistance/contact guard assist using the least restrictive device. 3.  Patient will perform sit to stand with minimal assistance/contact guard assist. 
4.  Patient will ambulate with minimal assistance/contact guard assist for >/= 75 feet with the least restrictive device. 5.  Patient will demonstrate independence with performance of home exercise program.  
 
physical Therapy EVALUATION Patient: Calixto Vega (83 y.o. female) Date: 12/9/2018 Primary Diagnosis: TIA (transient ischemic attack) CVA (cerebral vascular accident) ([facility] [address] Precautions:     
 PLOF: Ambulatory with rolling walker around house. Utilize wheelchair outdoor. Require some assistance with ADLs. Daughter lives with patient and assist as needed. ASSESSMENT : 
 Patient requires between minimal assistance/contact guard assist and moderate assistance  for bed mobility, transfers and ambulation. Will benefit from skilled PT intervention to increase overall functional mobility independence and safety. Patient presents with deficits in:  
Bed Mobility, Transfers, Gait, Strength, Balance and Home Exercise Program 
 
Patient will benefit from skilled intervention to address the above impairments. Patients rehabilitation potential is considered to be Good Factors which may influence rehabilitation potential include:  
[]         None noted 
[]         Mental ability/status [x]         Medical condition 
[]         Home/family situation and support systems 
[]         Safety awareness []         Pain tolerance/management 
[]         Other:  
 
Recommendations for nursing:  
Written on communication board: OOB with assist of 2 for safety. Verbally communicated to: nurse Abel Ordonez PLAN : 
Recommendations and Planned Interventions: 
[x]           Bed Mobility Training             [x]    Neuromuscular Re-Education 
[x]           Transfer Training                   []    Orthotic/Prosthetic Training 
[x]           Gait Training                          []    Modalities []           Therapeutic Exercises          []    Edema Management/Control 
[]           Therapeutic Activities            []    Patient and Family Training/Education* [x]           Other (comment): Plan of care, bed mobility, transfers bedBSC>Reclining chair Frequency/Duration: Patient will be followed by physical therapy 1 time per day/3-5 days per week to address goals. Discharge Recommendations: Home Health Further Equipment Recommendations for Discharge: N/A  
 
SUBJECTIVE:  
Patient stated I need to pee.  OBJECTIVE DATA SUMMARY:  
 
Past Medical History:  
Diagnosis Date  [facility]  Chronic kidney disease   
 unknown what stage  Diabetes ([facility] History reviewed. No pertinent surgical history. Barriers to Learning/Limitations: yes;  none Compensate with: visual, verbal, tactile, kinesthetic cues/model G CODE:Mobility [id number] Current  CM= 80-99%   Goal  CL= 60-79%. The severity rating is based on the Other [facility] Balance Scale Eval Complexity: History: MEDIUM  Complexity : 1-2 comorbidities / personal factors will impact the outcome/ POC Exam:MEDIUM Complexity : 3 Standardized tests and measures addressing body structure, function, activity limitation and / or participation in recreation  Presentation: MEDIUM Complexity : Evolving with changing characteristics  Clinical Decision Making:Medium Complexity [facility] Balance Scale Overall Complexity:MEDIUM [employer] Standing Balance Scale 
0: Pt performs 25% or less of standing activity (Max assist) CN, 100% impaired. 1: Pt supports self with upper extremities but requires therapist assistance. Pt performs 25-50% of effort (Mod assist) CM, 80% to <100% impaired. 1+: Pt supports self with upper extremities but requires therapist assistance. Pt performs >50% effort. (Min assist). CL, 60% to <80% impaired. 2: Pt supports self independently with both upper extremities (walker, crutches, parallel bars). CL, 60% to <80% impaired. 2+: Pt support self independently with 1 upper extremity (cane, crutch, 1 parallel bar). CK, 40% to <60% impaired. 3: Pt stands without upper extremity support for up to 30 seconds. CK, 40% to <60% impaired. 3+: Pt stands without upper extremity support for 30 seconds or greater. CJ, 20% to <40% impaired. 4: Pt independently moves and returns center of gravity 1-2 inches in one plane. CJ, 20% to <40% impaired. 4+: Pt independently moves and returns center of gravity 1-2 inches in multiple planes. CI, 1% to <20% impaired. 5: Pt independently moves and returns center of gravity in all planes greater than 2 inches. CH, 0% impaired. Prior Level of Function/Home Situation:  Ambulatory with rolling walker around house. Utilize wheelchair outdoor. Require some assistance with ADLs. Daughter lives with patient and assist as needed. Home Situation Home Environment: Private residence # Steps to Enter: 1 One/Two Story Residence: Two story # of Interior Steps: 15 Interior Rails: Left Lift Chair Available: Yes Living Alone: No 
Support Systems: Child(janeth) Patient Expects to be Discharged to[de-identified] Private residence Current DME Used/Available at Home: Rick Raza Behavior: 
Neurologic State: Alert Orientation Level: Oriented X4 Cognition: Follows commands Safety/Judgement: Awareness of environment; Fall prevention Psychosocial 
 Patient Behaviors: Calm; Cooperative Family  Behaviors: Calm;Supportive Needs Expressed: Emotional 
Purposeful Interaction: Yes Pt Identified Daily Priority: Clinical issues (comment) Caritas Process: Nurture loving kindness Caring Interventions: Reassure; Therapeutic modalities Reassure: Caring roundsFamily  Behaviors: Calm;Supportive Strength:   
Strength: Generally decreased, functional(both LE) Tone & Sensation:  
Tone: Normal 
    
Range Of Motion: 
AROM: Generally decreased, functional(both LE) Functional Mobility: 
Bed Mobility: 
Supine to Sit: Moderate assistance;Maximum assistance; Additional time Transfers: 
Sit to Stand: Moderate assistance; Additional time Stand Pivot Transfers: Moderate assistance(/Min Assist of 2 taking 3-4 short steps Bed>BSC>Chair) Balance:  
Sitting - Static: Fair (occasional) Sitting - Dynamic: Fair (occasional)(Minus) Standing: Impaired; With support Standing - Static: Poor(Plus) Standing - Dynamic : Poor(/Poor +)Ambulation/Gait Training: Not tested at this time Pain: 
Pre treatment pain level:  0Post treatment pain level:  0 Pain Scale 1: Numeric (0 - 10) Pain Intensity 1: 0 Activity Tolerance:  
Fair Minus Please refer to the flowsheet for vital signs taken during this treatment. After treatment: 
[x]         Patient left in no apparent distress sitting up in chair 
[]         Patient left in no apparent distress in bed 
[x]         Call bell left within reach [x]         Nursing notified 
[x]         Daughter present 
[]         Bed alarm activated COMMUNICATION/EDUCATION:  
[x]         Fall prevention education was provided and the patient/caregiver indicated understanding. [x]         Patient/family have participated as able in goal setting and plan of care. [x]         Patient/family agree to work toward stated goals and plan of care.  
[]         Patient understands intent and goals of therapy, but is neutral about his/her participation. []         Patient is unable to participate in goal setting and plan of care. Thank you for this referral. 
Angela Batista, PT Time Calculation: 26 mins

## 2018-12-09 NOTE — ED NOTES
TRANSFER - ED to INPATIENT REPORT: 
 
SBAR report made available to receiving floor on this patient being transferred to  [phone] /[phone])  for routine progression of care Admitting diagnosis TIA (transient ischemic attack) Information from the following report(s) SBAR, ED Summary, Procedure Summary, MAR, Recent Results and Cardiac Rhythm Strip was made available to receiving floor. Lines:  
Peripheral IV 12/08/18 Right Antecubital (Active) Site Assessment Clean, dry, & intact 12/8/2018 11:27 PM  
Phlebitis Assessment 0 12/8/2018 11:27 PM  
Infiltration Assessment 0 12/8/2018 11:27 PM  
Dressing Status Clean, dry, & intact 12/8/2018 11:27 PM  
Dressing Type Transparent 12/8/2018 11:27 PM  
Hub Color/Line Status Flushed 12/8/2018 11:27 PM  
Action Taken Blood drawn 12/8/2018 11:27 PM  
  
 
Medication list confirmed with patient Opportunity for questions and clarification was provided. Patient is oriented to time, place, person and situation. Patient is  continent and non-ambulatory Valuables transported with patient Patient transported with: 
 Monitor Registered Nurse

## 2018-12-09 NOTE — PROGRESS NOTES
Speech Therapy: Attempted dysphagia evaluation; however, pt currently off unit for MRI. Per chart review: she passed dysphagia screener at [id number] this date. Please initiate diet if appropriate. D/w RN on unit. ST to f/u next date or as medical condition permits. Thank you for this referral. 
 
Gail Suarez M.S. CCC-SLP/L Speech-Language Pathologist

## 2018-12-09 NOTE — PROGRESS NOTES
Problem: Falls - Risk of 
Goal: *Absence of Falls Document Parish Shadow Fall Risk and appropriate interventions in the flowsheet. Outcome: Progressing Towards Goal 
Fall Risk Interventions: 
Mobility Interventions: Bed/chair exit alarm Medication Interventions: Bed/chair exit alarm Elimination Interventions: Bed/chair exit alarm, Call light in reach, Elevated toilet seat, Patient to call for help with toileting needs

## 2018-12-09 NOTE — PROGRESS NOTES
Bedside shift change report given to earline (oncoming nurse) by Kathy Webb RN 
 (offgoing nurse). Report included the following information SBAR, MAR and Recent Results.

## 2018-12-09 NOTE — PROGRESS NOTES
0730 - Received pt at bedside from [facility], dual skin assessment performed and double RN verified, no apparent signs of distress, will continue to monitor 1050 - Bedside RN and transport accompany pt to [facility], tolerated with some difficulty 1221 - Returned pt to unit from [facility], family present at bedside, VSS on RA, no apparent signs of distress, will continue to monitor 1530 - Resting quietly in bed, VSS on RA, family present at bedside 1800 - Watching TV in bed, VSS on RA, eating dinner, tolerating well 
 
1930 - Bedside and Verbal shift change report given to Juana RIOS (oncoming nurse) by Robinson Lewis RN (offgoing nurse). Report included the following information SBAR, Kardex, Intake/Output, MAR, Recent Results and Cardiac Rhythm Afib.

## 2018-12-09 NOTE — ED NOTES
Patients family noticed about 25 minutes ago that the patient had slurred speech. Hx of CVA with left sided weakness. Left sided slight facial droop noted.

## 2018-12-09 NOTE — PROGRESS NOTES
Problem: Falls - Risk of 
Goal: *Absence of Falls Document Shyann Deal Fall Risk and appropriate interventions in the flowsheet. Outcome: Progressing Towards Goal 
Fall Risk Interventions: 
Mobility Interventions: Bed/chair exit alarm Medication Interventions: Bed/chair exit alarm Elimination Interventions: Bed/chair exit alarm, Call light in reach, Elevated toilet seat, Patient to call for help with toileting needs

## 2018-12-09 NOTE — ED PROVIDER NOTES
EMERGENCY DEPARTMENT HISTORY AND PHYSICAL EXAM 
 
11:23 PM 
 
 
Date: 12/8/2018 Patient Name: Alexandra Vega History of Presenting Illness Chief Complaint Patient presents with  Dysarthria History Provided By: EMS Additional History (Context): Alexandra Vega is a 80 y.o. female with diabetes and stroke, afib who presents with left sided weakness and slurred speech that the family noticed PTA. Her last known normal was 20 minutes ago. Family noticed that she had a significant change from prior. PCP: Douglas Dickson MD 
 
Current Facility-Administered Medications Medication Dose Route Frequency Provider Last Rate Last Dose  aspirin tablet 325 mg  325 mg Oral NOW Ben Wahl MD      
 atorvastatin (LIPITOR) tablet 40 mg  40 mg Oral QHS Ben Wahl MD      
 
Current Outpatient Medications Medication Sig Dispense Refill  
 HYDROcodone-acetaminophen (NORCO) 5-325 mg per tablet Take 1 Tab by mouth every six (6) hours as needed for Pain. Max Daily Amount: 4 Tabs. 12 Tab 0  
 febuxostat (ULORIC) 40 mg tab tablet Take 40 mg by mouth daily.  glipiZIDE (GLUCOTROL) 5 mg tablet Take 5 mg by mouth daily.  ascorbic acid, vitamin C, (VITAMIN C) 500 mg tablet Take 1 Tab by mouth daily. 60 Tab 0  
 aspirin 81 mg chewable tablet Take 1 Tab by mouth daily. 100 Tab 0  
 dorzolamide-timolol (COSOPT) 22.3-6.8 mg/mL ophthalmic solution Administer 1 Drop to both eyes two (2) times a day. 5 mL 0  
 ferrous fumarate-vitamin C (LAURA-SEQUELS, IRON-VIT C,) 200 mg (65 mg iron)-25 mg TbER Take 1 Tab by mouth daily. (Patient taking differently: Take 1 Tab by mouth daily. 65 mg) 60 Tab 0  
 folic acid (FOLVITE) 1 mg tablet Take 1 Tab by mouth daily. 60 Tab 0  
 furosemide (LASIX) 20 mg tablet One pill a day 60 Tab 0  
 gabapentin (NEURONTIN) 300 mg capsule Take 1 Cap by mouth nightly.  60 Cap 0  
 levothyroxine (SYNTHROID) 100 mcg tablet Take 1 Tab by mouth Daily (before breakfast). 60 Tab 0  
 linagliptin (TRADJENTA) 5 mg tablet Take 1 Tab by mouth Daily (before breakfast). 60 Tab 0  
 metoprolol tartrate (LOPRESSOR) 50 mg tablet Take 1 Tab by mouth two (2) times a day. 60 Tab 0  
 pantoprazole (PROTONIX) 40 mg tablet Take 1 Tab by mouth Daily (before breakfast). 60 Tab 0  
 simvastatin (ZOCOR) 40 mg tablet Take 1 Tab by mouth nightly. 60 Tab 0  
 spironolactone (ALDACTONE) 50 mg tablet Take 1 Tab by mouth two (2) times a day. 60 Tab 0  Thiamine Mononitrate (B-1) 100 mg tablet Take 1 Tab by mouth daily. 60 Tab 0  
 zolpidem (AMBIEN) 5 mg tablet Take 1 Tab by mouth nightly as needed for Sleep. Max Daily Amount: 5 mg. 60 Tab 0  
 bisacodyl (DULCOLAX) 10 mg suppository Insert 10 mg into rectum daily as needed. 30 Suppository 0  
 IRON/DOCUSATE SODIUM (LAURA-SEQUELS PO) Take 50 mg by mouth daily.  meclizine (ANTIVERT) 25 mg tablet Take 25 mg by mouth four (4) times daily as needed.  betamethasone valerate (VALISONE) 0.1 % topical cream Apply  to affected area two (2) times a day.  losartan (COZAAR) 50 mg tablet Take 50 mg by mouth daily.  glimepiride (AMARYL) 2 mg tablet Take 2 mg by mouth daily. Past History Past Medical History: 
Past Medical History:  
Diagnosis Date  Atrial fibrillation ([facility]  Chronic kidney disease   
 unknown what stage  Diabetes ([facility] [facility] Past Surgical History: 
History reviewed. No pertinent surgical history. Family History: 
Family History Family history unknown: Yes  
 
 
Social History: 
Social History Tobacco Use  Smoking status: Never Smoker  Smokeless tobacco: Never Used Substance Use Topics  Alcohol use: No  
 Drug use: No  
 
 
Allergies: Allergies Allergen Reactions  Codeine Itching Review of Systems Review of Systems Unable to perform ROS: Mental status change Physical Exam  
 
Visit Vitals Ht 5' 3\" (1.6 m) Wt 97.5 kg (214 lb 15.2 oz) BMI 38.08 kg/m² Physical Exam  
Constitutional: She appears well-developed and well-nourished. HENT:  
Head: Normocephalic and atraumatic. Eyes: EOM are normal. Pupils are equal, round, and reactive to light. Right eye exhibits no discharge. Left eye exhibits no discharge. Neck: Normal range of motion. Neck supple. No JVD present. No tracheal deviation present. Cardiovascular: Normal rate, regular rhythm and normal heart sounds. No murmur heard. Pulmonary/Chest: Effort normal and breath sounds normal. No respiratory distress. She has no wheezes. She has no rales. bilateral lung sounds Abdominal: Soft. Bowel sounds are normal. She exhibits no distension. There is no tenderness. There is no rebound. Musculoskeletal: Normal range of motion. She exhibits no tenderness or deformity. Neurological: No cranial nerve deficit. 5/5 strength UE/LE, 5/5 sensation UE/LE 
no facial droop 
no pronater drift on UE 
NIHSS 0 Skin: Skin is warm and dry. No rash noted. No erythema. Psychiatric: She has a normal mood and affect. Her behavior is normal.  
 
 
 
Diagnostic Study Results Labs - Recent Results (from the past 12 hour(s)) CBC W/O DIFF Collection Time: 12/08/18 11:24 PM  
Result Value Ref Range WBC 6.2 4.6 - 13.2 K/uL  
 RBC 4.80 4.20 - 5.30 M/uL  
 HGB 13.6 12.0 - 16.0 g/dL HCT 45.1 (H) 35.0 - 45.0 % MCV 94.0 74.0 - 97.0 FL  
 MCH 28.3 24.0 - 34.0 PG  
 MCHC 30.2 (L) 31.0 - 37.0 g/dL  
 RDW 15.0 (H) 11.6 - 14.5 % PLATELET 326 519 - 503 K/uL MPV 10.0 9.2 - 89.0 FL  
METABOLIC PANEL, COMPREHENSIVE Collection Time: 12/08/18 11:24 PM  
Result Value Ref Range Sodium 140 136 - 145 mmol/L Potassium 3.6 3.5 - 5.5 mmol/L Chloride 100 100 - 108 mmol/L  
 CO2 32 21 - 32 mmol/L Anion gap 8 3.0 - 18 mmol/L Glucose 268 (H) 74 - 99 mg/dL BUN 26 (H) 7.0 - 18 MG/DL  Creatinine 1.84 (H) 0.6 - 1.3 MG/DL  
 BUN/Creatinine ratio 14 12 - 20 GFR est AA 31 (L) >60 ml/min/1.73m2 GFR est non-AA 26 (L) >60 ml/min/1.73m2 Calcium 8.7 8.5 - 10.1 MG/DL Bilirubin, total 0.4 0.2 - 1.0 MG/DL  
 ALT (SGPT) 13 13 - 56 U/L  
 AST (SGOT) 11 (L) 15 - 37 U/L Alk. phosphatase 89 45 - 117 U/L Protein, total 6.8 6.4 - 8.2 g/dL Albumin 3.5 3.4 - 5.0 g/dL Globulin 3.3 2.0 - 4.0 g/dL A-G Ratio 1.1 0.8 - 1.7 PROTHROMBIN TIME + INR Collection Time: 12/08/18 11:24 PM  
Result Value Ref Range Prothrombin time 11.1 (L) 11.5 - 15.2 sec INR 0.8 0.8 - 1.2 THROMBIN TIME Collection Time: 12/08/18 11:24 PM  
Result Value Ref Range Thrombin time 16.1 13.8 - 18.2 SECS FIBRINOGEN Collection Time: 12/08/18 11:24 PM  
Result Value Ref Range Fibrinogen 584 (H) 210 - 451 mg/dL GLUCOSE, POC Collection Time: 12/08/18 11:24 PM  
Result Value Ref Range Glucose (POC) 260 (H) 70 - 110 mg/dL TROPONIN I Collection Time: 12/08/18 11:24 PM  
Result Value Ref Range Troponin-I, Qt. <0.02 0.0 - 0.045 NG/ML  
POC CHEM8 Collection Time: 12/08/18 11:27 PM  
Result Value Ref Range CO2, POC 34 (H) 19 - 24 MMOL/L Glucose,  (H) 74 - 106 MG/DL  
 BUN, POC 28 (H) 7 - 18 MG/DL Creatinine, POC 1.8 (H) 0.6 - 1.3 MG/DL  
 GFRAA, POC 32 (L) >60 ml/min/1.73m2 GFRNA, POC 26 (L) >60 ml/min/1.73m2 Sodium,  136 - 145 MMOL/L Potassium, POC 3.6 3.5 - 5.5 MMOL/L Calcium, ionized (POC) 1.08 (L) 1.12 - 1.32 mmol/L Chloride, POC 96 (L) 100 - 108 MMOL/L Anion gap, POC 15 10 - 20 Hematocrit, POC 44 36 - 49 % Hemoglobin, POC 15.0 12 - 16 G/DL  
EKG, 12 LEAD, INITIAL Collection Time: 12/09/18 12:00 AM  
Result Value Ref Range Ventricular Rate 72 BPM  
 Atrial Rate 441 BPM  
 QRS Duration 80 ms  
 Q-T Interval 404 ms QTC Calculation (Bezet) 442 ms Calculated R Axis 34 degrees Calculated T Axis 55 degrees Diagnosis Atrial fibrillation with premature ventricular or aberrantly conducted  
complexes Nonspecific ST and T wave abnormality , probably digitalis effect Abnormal ECG When compared with ECG of 13-JUN-2017 10:50, 
Atrial fibrillation has replaced Sinus rhythm Nonspecific T wave abnormality now evident in Lateral leads Radiologic Studies -  
CT HEAD WO CONT Final Result IMPRESSION:  
  
Mild cerebral volume loss and mild bilateral periventricular and central white  
matter diminished attenuation which is nonspecific but likely to represent  
microvascular disease. No acute intracranial abnormality. Findings were called to emergency room physician  prior to signing report. XR CHEST PORT    (Results Pending) Nothing Medical Decision Making I am the first provider for this patient. I reviewed the vital signs, available nursing notes, past medical history, past surgical history, family history and social history. Vital Signs-Reviewed the patient's vital signs. EKG: Interpreted by the EP. Time Interpreted: 1924 AM 
 Rate: 72 Rhythm: Irregularly irregular Afib Interpretation:few PVC's. No obvious signs of ischemia. Nl axis. Nl intervals Records Reviewed: Nursing Notes ED Course: Progress Notes, Reevaluation, and Consults: 
11:58 PM Consult: I discussed care with (Teleneuro). It was a standard discussion including patient history, chief complaint, available diagnostic results, and predicted treatment course. Thinks its a TIA due to her Paroxysmal Afib Provider Notes (Medical Decision Making): MDM Number of Diagnoses or Management Options TIA (transient ischemic attack):  
Diagnosis management comments: Diff dx: cva, tia, ICH, dehydration Pt presenting with stroke like sx, hx of same, more slurred per family, weaker onL.  On our exam, pt is equal strength, sensation, no signs of stroke, NIHSS neg. Code S called to r/o given hx of similar, hx of afib putting at higher risk. After discussing with neuro, may represent TIA, will give asa 325, lipitor, admit for MRI/MRA, US carotid Juan F Dennis MD 
 
 
 
Diagnosis Clinical Impression: 1. TIA (transient ischemic attack) Disposition: admit Follow-up Information None Medication List  
  
CHANGE how you take these medications   
ferrous fumarate-vitamin C 200 mg (65 mg iron)-25 mg Tber Commonly known as:  LAURA-SEQUELS (IRON-VIT C) Take 1 Tab by mouth daily. What changed:  additional instructions CONTINUE taking these medications AMARYL 2 mg tablet Generic drug:  glimepiride 
  
ascorbic acid (vitamin C) 500 mg tablet Commonly known as:  VITAMIN C Take 1 Tab by mouth daily. aspirin 81 mg chewable tablet Take 1 Tab by mouth daily. betamethasone valerate 0.1 % topical cream 
Commonly known as:  VALISONE 
  
bisacodyl 10 mg suppository Commonly known as:  DULCOLAX Insert 10 mg into rectum daily as needed. dorzolamide-timolol 22.3-6.8 mg/mL ophthalmic solution Commonly known as:  COSOPT Administer 1 Drop to both eyes two (2) times a day. LAURA-SEQUELS PO 
  
folic acid 1 mg tablet Commonly known as:  Google Take 1 Tab by mouth daily. furosemide 20 mg tablet Commonly known as:  LASIX One pill a day 
  
gabapentin 300 mg capsule Commonly known as:  NEURONTIN Take 1 Cap by mouth nightly. glipiZIDE 5 mg tablet Commonly known as:  Larayne Flank HYDROcodone-acetaminophen 5-325 mg per tablet Commonly known as:  Dahiana Anabel Take 1 Tab by mouth every six (6) hours as needed for Pain. Max Daily Amount: 4 Tabs. levothyroxine 100 mcg tablet Commonly known as:  SYNTHROID Take 1 Tab by mouth Daily (before breakfast). linagliptin 5 mg tablet Commonly known as:  Carla Floras Take 1 Tab by mouth Daily (before breakfast). losartan 50 mg tablet Commonly known as:  COZAAR 
  
 meclizine 25 mg tablet Commonly known as:  ANTIVERT 
  
metoprolol tartrate 50 mg tablet Commonly known as:  LOPRESSOR Take 1 Tab by mouth two (2) times a day. pantoprazole 40 mg tablet Commonly known as:  PROTONIX Take 1 Tab by mouth Daily (before breakfast). simvastatin 40 mg tablet Commonly known as:  ZOCOR Take 1 Tab by mouth nightly. spironolactone 50 mg tablet Commonly known as:  ALDACTONE Take 1 Tab by mouth two (2) times a day. thiamine mononitrate 100 mg tablet Commonly known as:  B-1 Take 1 Tab by mouth daily. ULORIC 40 mg Tab tablet Generic drug:  febuxostat 
  
zolpidem 5 mg tablet Commonly known as:  AMBIEN Take 1 Tab by mouth nightly as needed for Sleep. Max Daily Amount: 5 mg. 
  
  
 
_______________________________ Attestations: 
Scribe Attestation Radha Lynch acting as a scribe for and in the presence of Vania Ya MD     
December 09, 2018 at 12:26 AM 
    
Provider Attestation:     
I personally performed the services described in the documentation, reviewed the documentation, as recorded by the scribe in my presence, and it accurately and completely records my words and actions. December 09, 2018 at 12:26 AM - Vania Ya MD   
___________________________ 
____

## 2018-12-09 NOTE — PROGRESS NOTES
Chart reviewed and pt verifed demographics. She lives with her daughter Garima Stephens [phone]. Other contacts are son Dilia Diaz [phone], daughter Tierra Nguyen [phone] and grand son Riana Childress [phone], grandson to probably drive and participate in dc process. She has [employer]. Dr Janette Nichols is PCP- last seen abt 2 weeks ago. She has a private pay caregiver from 07-09 five days a week. This person is not from any [address] [place] [address] but arranged privately. She was observation status but is now changed to inpatient status. Reason for Admission:   TIA 
               
RRAT Score:     19 Do you (patient/family) have any concerns for transition/discharge? no  
           
Plan for utilizing home health:     She initially refused snf and home health, but then said she would discuss home health with her children. She had a stroke last year and went to [facility]. It was ok, but she says she is too old for that again. Likelihood of readmission? Yellow/ mod- she is elderly with co morbidiites Transition of Care Plan:    Home- hope full with home health, refusing snf Patient and/or [facility]  [facility] and all questions answered. Copy of this notice given to patient and copy placed on chart. Patient and/or next of kin has been given the Outpatient Observation Information and Notification letter and all questions answered. She did not sign this document due to having, after numerous tries, a new PIV in right ac. I did not want her to bend new IV site.

## 2018-12-09 NOTE — PROGRESS NOTES
Speech Therapy:  
 
Per chart review, pt passed dysphagia screener. Please initiate diet if appropriate. SLP to f/u later this date. Thank you for this referral. 
 
Evert Bloch, M.S. CCC-SLP/L Speech-Language Pathologist

## 2018-12-10 ENCOUNTER — APPOINTMENT (OUTPATIENT)
Dept: VASCULAR SURGERY | Age: 83
DRG: 064 | End: 2018-12-10
Attending: FAMILY MEDICINE
Payer: MEDICARE

## 2018-12-10 ENCOUNTER — APPOINTMENT (OUTPATIENT)
Dept: NON INVASIVE DIAGNOSTICS | Age: 83
DRG: 064 | End: 2018-12-10
Attending: FAMILY MEDICINE
Payer: MEDICARE

## 2018-12-10 ENCOUNTER — APPOINTMENT (OUTPATIENT)
Dept: MRI IMAGING | Age: 83
DRG: 064 | End: 2018-12-10
Attending: HOSPITALIST
Payer: MEDICARE

## 2018-12-10 LAB
GLUCOSE BLD STRIP.AUTO-MCNC: 107 MG/DL (ref 70–110)
GLUCOSE BLD STRIP.AUTO-MCNC: 127 MG/DL (ref 70–110)
GLUCOSE BLD STRIP.AUTO-MCNC: 131 MG/DL (ref 70–110)
GLUCOSE BLD STRIP.AUTO-MCNC: 136 MG/DL (ref 70–110)
LEFT CCA DIST DIAS: 10.56 CM/S
LEFT CCA DIST SYS: 55.63 CM/S
LEFT CCA MID DIAS: 8.69 CM/S
LEFT CCA MID SYS: 51.88 CM/S
LEFT CCA PROX DIAS: 10.33 CM/S
LEFT CCA PROX SYS: 74.65 CM/S
LEFT ECA DIAS: 0 CM/S
LEFT ECA SYS: 50.94 CM/S
LEFT ICA DIST DIAS: 23.71 CM/S
LEFT ICA DIST SYS: 77.23 CM/S
LEFT ICA MID DIAS: 18.78 CM/S
LEFT ICA MID SYS: 71.36 CM/S
LEFT ICA PROX DIAS: 14.79 CM/S
LEFT ICA PROX SYS: 50.47 CM/S
LEFT ICA/CCA SYS: 1.03
LEFT SUBCLAVIAN DIAS: 0 CM/S
LEFT SUBCLAVIAN SYS: 53.99 CM/S
LEFT VERTEBRAL DIAS: 9.62 CM/S
LEFT VERTEBRAL SYS: 34.98 CM/S
RIGHT CCA DIST DIAS: 9.15 CM/S
RIGHT CCA DIST SYS: 44.83 CM/S
RIGHT CCA MID DIAS: 13.39 CM/S
RIGHT CCA MID SYS: 65.09 CM/S
RIGHT CCA PROX DIAS: 13.94 CM/S
RIGHT CCA PROX SYS: 80.03 CM/S
RIGHT ECA DIAS: 0 CM/S
RIGHT ECA SYS: 53.75 CM/S
RIGHT ICA DIST DIAS: 16.64 CM/S
RIGHT ICA DIST SYS: 69.95 CM/S
RIGHT ICA MID DIAS: 23 CM/S
RIGHT ICA MID SYS: 69.95 CM/S
RIGHT ICA PROX DIAS: 19.01 CM/S
RIGHT ICA PROX SYS: 61.27 CM/S
RIGHT ICA/CCA SYS: 0.87
RIGHT SUBCLAVIAN DIAS: 0 CM/S
RIGHT SUBCLAVIAN SYS: 57.98 CM/S
RIGHT VERTEBRAL DIAS: 13.85 CM/S
RIGHT VERTEBRAL SYS: 45.3 CM/S

## 2018-12-10 PROCEDURE — 93880 EXTRACRANIAL BILAT STUDY: CPT

## 2018-12-10 PROCEDURE — 97116 GAIT TRAINING THERAPY: CPT

## 2018-12-10 PROCEDURE — 97535 SELF CARE MNGMENT TRAINING: CPT

## 2018-12-10 PROCEDURE — 97166 OT EVAL MOD COMPLEX 45 MIN: CPT

## 2018-12-10 PROCEDURE — 65270000029 HC RM PRIVATE

## 2018-12-10 PROCEDURE — 92610 EVALUATE SWALLOWING FUNCTION: CPT

## 2018-12-10 PROCEDURE — 93306 TTE W/DOPPLER COMPLETE: CPT

## 2018-12-10 PROCEDURE — 77030021352 HC CBL LD SYS DISP COVD -B

## 2018-12-10 PROCEDURE — 82962 GLUCOSE BLOOD TEST: CPT

## 2018-12-10 PROCEDURE — 74011250637 HC RX REV CODE- 250/637: Performed by: FAMILY MEDICINE

## 2018-12-10 RX ADMIN — PANTOPRAZOLE SODIUM 40 MG: 40 TABLET, DELAYED RELEASE ORAL at 08:11

## 2018-12-10 RX ADMIN — BETAMETHASONE VALERATE: 1 CREAM TOPICAL at 08:12

## 2018-12-10 RX ADMIN — FEBUXOSTAT 40 MG: 40 TABLET ORAL at 08:16

## 2018-12-10 RX ADMIN — DORZOLAMIDE HYDROCHLORIDE AND TIMOLOL MALEATE 1 DROP: 20; 5 SOLUTION/ DROPS OPHTHALMIC at 18:31

## 2018-12-10 RX ADMIN — FUROSEMIDE 20 MG: 20 TABLET ORAL at 08:16

## 2018-12-10 RX ADMIN — METOPROLOL TARTRATE 50 MG: 50 TABLET ORAL at 18:28

## 2018-12-10 RX ADMIN — SPIRONOLACTONE 50 MG: 25 TABLET, FILM COATED ORAL at 18:28

## 2018-12-10 RX ADMIN — SPIRONOLACTONE 50 MG: 25 TABLET, FILM COATED ORAL at 08:11

## 2018-12-10 RX ADMIN — ASPIRIN 325 MG ORAL TABLET 325 MG: 325 PILL ORAL at 08:11

## 2018-12-10 RX ADMIN — BETAMETHASONE VALERATE: 1 CREAM TOPICAL at 18:30

## 2018-12-10 RX ADMIN — LOSARTAN POTASSIUM 50 MG: 50 TABLET ORAL at 08:20

## 2018-12-10 RX ADMIN — Medication 100 MG: at 08:11

## 2018-12-10 RX ADMIN — Medication 500 MG: at 08:11

## 2018-12-10 RX ADMIN — FOLIC ACID 1 MG: 1 TABLET ORAL at 08:16

## 2018-12-10 RX ADMIN — METOPROLOL TARTRATE 50 MG: 50 TABLET ORAL at 08:20

## 2018-12-10 RX ADMIN — DORZOLAMIDE HYDROCHLORIDE AND TIMOLOL MALEATE 1 DROP: 20; 5 SOLUTION/ DROPS OPHTHALMIC at 09:00

## 2018-12-10 RX ADMIN — LEVOTHYROXINE SODIUM 100 MCG: 100 TABLET ORAL at 05:29

## 2018-12-10 RX ADMIN — FERROUS SULFATE TAB 325 MG (65 MG ELEMENTAL FE) 325 MG: 325 (65 FE) TAB at 08:12

## 2018-12-10 NOTE — PROGRESS NOTES
Problem: Falls - Risk of 
Goal: *Absence of Falls Document Marinamary lou Tam Fall Risk and appropriate interventions in the flowsheet. Outcome: Progressing Towards Goal 
Fall Risk Interventions: 
Mobility Interventions: Bed/chair exit alarm Medication Interventions: Bed/chair exit alarm Elimination Interventions: Patient to call for help with toileting needs Problem: Pressure Injury - Risk of 
Goal: *Prevention of pressure injury Document Kamlesh Scale and appropriate interventions in the flowsheet. Outcome: Progressing Towards Goal 
Pressure Injury Interventions: 
  
 
Moisture Interventions: Apply protective barrier, creams and emollients Activity Interventions: Pressure redistribution bed/mattress(bed type) Mobility Interventions: HOB 30 degrees or less, Pressure redistribution bed/mattress (bed type) Nutrition Interventions: Document food/fluid/supplement intake

## 2018-12-10 NOTE — DIABETES MGMT
Diabetes Patient/Family Education RecordFactors That  May Influence Patients Ability  to Learn or  Comply with Recommendations []   Language barrier    []   Cultural needs   [x]   Motivation- meals  
 []   Cognitive limitation    [x]   Physical   []   Education  
 []   Physiological factors   []   Hearing/vision/speaking impairment   []   Hindu beliefs []   Financial factors   []  Other:   []  No factors identified at this time. Person Instructed: [x]   Patient   []   Family   []  Other Preference for Learning: 
 [x]   Verbal   []   Written   []  Demonstration Level of Comprehension & Competence:   
[]  Good                                      [x] Fair                                     []  Poor                             [x]  Needs Reinforcement  
[x]  Teachback completed Education Component:  
[x]  Medication management, and potential medication interactions [x]  Nutritional management -reduced appetite prior to admission but eating well at the breakfast meal today  
[]  Exercise  
[]  Signs, symptoms, and treatment of hyperglycemia and hypoglycemia [x] Prevention, recognition and treatment of hyperglycemia and hypoglycemia  
[]  Importance of blood glucose monitoring and how to obtain a blood glucose meter - N/A  
[]  Instruction on use of the blood glucose meter [x]  Discuss the importance of HbA1C monitoring  7.3% equivalent  to ave Blood Glucose of 159mg/dl for 2-3 months prior to admission BG is now in target range, not requiring insulin today. []  Sick day guidelines  
[]  Proper use and disposal of lancets, needles, syringes or insulin pens (if appropriate)-N/A [x]  Potential long-term complications (retinopathy, kidney disease, neuropathy, foot care)  
[] Information about whom to contact in case of emergency or for more information   
[x]  Goal:  Patient/family will demonstrate understanding of Diabetes Self Management Skills by: (date) ___12/20/18____ Plan for post-discharge education or self-management support: 
  [x] Outpatient class schedule provided            [] Patient Declined 
  [] Scheduled for outpatient classes (date) _______ Verify: 
Does patient understand how diabetes medications work? _general___________________________ Does patient know what their most recent A1c is? ___yes________________________________ Does patient monitor glucose at home? __no_________________________________________ Does patient have a glucometer, testing supplies or difficulty obtaining diabetes medications? __no_______________ Julio Flannery RD Pt screened for nutritional status. She is well nourished. Oral DM medications dc'd per order set while in ICU. Pt is receiving corrective lispro. pgr [phone]

## 2018-12-10 NOTE — PROGRESS NOTES
Received patient from [phone] via [address] by Kaylyn Hernández [place]. Placed on bed comfortably Patient is awake, alert. Oriented X4. Patient denies pain. VS checked. Dual NIH done with Kaylyn Hernández RN. Bed is locked and in lowest position and call bell is within reach. Not in acute distress.

## 2018-12-10 NOTE — PROGRESS NOTES
Problem: Mobility Impaired (Adult and Pediatric) Goal: *Acute Goals and Plan of Care (Insert Text) Physical Therapy Goals Initiated 12/9/2018 and to be accomplished within 7 day(s) 1. Patient will move from supine to sit and sit to supine , scoot up and down and roll side to side in bed with minimal assistance/contact guard assist.    
2.  Patient will transfer from bed to chair and chair to bed with minimal assistance/contact guard assist using the least restrictive device. 3.  Patient will perform sit to stand with minimal assistance/contact guard assist. 
4.  Patient will ambulate with minimal assistance/contact guard assist for >/= 75 feet with the least restrictive device. 5.  Patient will demonstrate independence with performance of home exercise program.  
Outcome: Progressing Towards Goal 
 
PHYSICAL THERAPY: Daily TREATMENT Note INPATIENT: Medicare: Hospital Day: 3 Patient: Lin Granados (20 y.o. female)    Date: 12/10/2018 Primary Diagnosis: TIA (transient ischemic attack) CVA (cerebral vascular accident) ([facility] [address]  
,  ,  
Precautions:   
Chart, physical therapy assessment, plan of care and goals were reviewed. PLOF:with rolling walker at home ASSESSMENT: 
Pt pleasant and cooperative, motivated to participate in therapy. Making good progress today; found up in chair, mod a X2 for sit<>stand transfers with verbal cues for weight shift and push to stand. Pt states she uses lift chair at home. Pt able to progress with gait training, 12 ft X2 with seated rest break in between. Pt with flexed posture in stance, corrected with verbal and tactile cues for posture correction to improve balance and reduce fall risk. Progression toward goals: 
      Improving appropriately and progressing toward goals PLAN: 
Patient continues to benefit from skilled intervention to address the above impairments. Continue treatment per established plan of care.  
 
EDUCATION:  
 Education:  Patient was educated on the following topics: goals of therapy Barriers to Learning/Limitations: None Compensate with: visual, verbal, tactile, kinesthetic cues/model Discharge Recommendations:  Campbell Medina Further Equipment Recommendations for Discharge:  N/A Factors which may impact discharge planning: none SUBJECTIVE:  
Patient stated I can try and get up.  OBJECTIVE DATA SUMMARY:  
Critical Behavior: 
Neurologic State: Alert Orientation Level: Oriented X4 Cognition: Follows commands Safety/Judgement: Awareness of environment, Fall prevention G CODE:Mobility [id number] Current  CL= 60-79%   Goal  CI= 1-19%. The severity rating is based on the Other KUSBS [address] Standing Balance Scale 
0: Pt performs 25% or less of standing activity (Max assist) CN, 100% impaired. 1: Pt supports self with upper extremities but requires therapist assistance. Pt performs 25-50% of effort (Mod assist) CM, 80% to <100% impaired. 1+: Pt supports self with upper extremities but requires therapist assistance. Pt performs >50% effort. (Min assist). CL, 60% to <80% impaired. 2: Pt supports self independently with both upper extremities (walker, crutches, parallel bars). CL, 60% to <80% impaired. 2+: Pt support self independently with 1 upper extremity (cane, crutch, 1 parallel bar). CK, 40% to <60% impaired. 3: Pt stands without upper extremity support for up to 30 seconds. CK, 40% to <60% impaired. 3+: Pt stands without upper extremity support for 30 seconds or greater. CJ, 20% to <40% impaired. 4: Pt independently moves and returns center of gravity 1-2 inches in one plane. CJ, 20% to <40% impaired. 4+: Pt independently moves and returns center of gravity 1-2 inches in multiple planes. CI, 1% to <20% impaired. 5: Pt independently moves and returns center of gravity in all planes greater than 2 inches. CH, 0% impaired. Functional Mobility: 
 
 
Functional Status Indep (I) Mod I Super-vision/CGA Min A Mod A Max A Total A Assist x2 Verbal cues Additional time Not tested Comments Rolling []  []  [] []    []    []  []  [] [] [] [x] Supine to sit []  []  [] []  []  []  []  [] [] [] [x] Sit to supine []  []  [] []  []  []  []  [] [] [] [x] Sit to stand []  []  [] []  [x]  []  []  [x] [x] [x] [] Stand to sit []  []  [] []  [x]  []  []  [x] [x] [x] [] Bed to chair transfers []  []  [x] []  []  []  []  [x] [x] [x] [] Balance Good [address] Poor Unable Not tested Comments Sitting static [x]  []  []  []  [] Sitting dynamic [x]  []  []  []  []   
Standing static [x]  []  []  []  []   
Standing dynamic [x]  []  []  []  [] With support Mobility/Gait:  
Level of Assistance: Contact guard assistance Assistive Device: rolling walker Distance Ambulated: 12 feet X2 Base of Support: center of gravity altered Speed/Aura: pace decreased (<100 feet/min) Step Length: left shortened and right shortened Swing Pattern: [facility] Stance: time Gait Abnormalities: decreased step clearance Stairs:  
Level of Assistance: Unable at this time Vital Signs Temp: 97.6 °F (36.4 °C) Pulse (Heart Rate): 60    
BP: 117/53 Resp Rate: 12    
O2 Sat (%): 99 %Pain:Pre treatment pain level:0 Post treatment pain level:0Pain Scale 1: Numeric (0 - 10) Pain Intensity 1: 0 Activity Tolerance:  
Fair After treatment:  
Patient left in no apparent distress sitting up in chair Call bell left within reach Nursing notified Ayla Thompson PTA Time Calculation: 18 mins

## 2018-12-10 NOTE — PROGRESS NOTES
[facility] Hospitalist Division Inpatient Daily Progress Note Patient: Sudha Galicia MRN: [id number]  CSN: [id number] YOB: 1929  Age: 80 y.o. Sex: female DOA: 12/8/2018 LOS:  LOS: 1 day Chief Complaint:  CVA Interval History:   
Per Dr. Lucila Richards HPI: \"Otilia Bobby is a 80 y.o. female who presented to the ED with apparent transient slurring of her speech, according to her daughter. No other complaints. Patient denies slurred speech. Has hx of CVA and paroxysmal Afib. She is on ASA but not anticoagulated. ED evaluation was unremarkable for any new findings. Teleneurology consulted and recommended observation in hospital with MRI and carotid u/s in am. \" CT head negative for any acute event. CXR ok. MRI showing small area of acute infarct involving the posterior right insular cortex. No evidence of associated mass effect or hemorrhage, distal right MCA branching thrombus along the sylvian fissure, adjacent to the acute infarct. 12/10/18: ECHO read pending. MRA brain/neck pending-neuro consulted. BP marginal-monitor closely. Left sided weakness subsided per pt-tingling in LLE still persists. Renal numbers elevated; monitor closely (pt with h/o CKD). Will consult nephrology if continues elevated. PT/OT; recommending SNF. Transfer to floor. Subjective:  
  
NAD. Resting comfortably. Mild tingling noted in LLE per pt Objective:  
  
Visit Vitals BP (!) 89/46 Pulse 68 Temp 97.7 °F (36.5 °C) Resp 23 Ht 5' 3\" (1.6 m) Wt 97.1 kg (214 lb) SpO2 92% Breastfeeding? No  
BMI 37.91 kg/m² Physical Exam: 
General appearance: alert, cooperative Lungs: clear to auscultation bilaterally Heart: irregularly irregular, rate ok , S1, S2 normal  
Abdomen: soft, non tender, non distended. Normoactive bowel sounds. Extremities: extremities normal, atraumatic, no cyanosis or edema Skin: Skin color, texture, turgor normal.  
Neurologic: Grossly normal; strength 5/5 bl; face symmetry ok, PERRL Intake and Output: 
Current Shift:  No intake/output data recorded. Last three shifts:  12/08 1901 - 12/10 0700 In: 392.3 [I.V.:392.3] Out: - Recent Results (from the past 24 hour(s)) GLUCOSE, POC Collection Time: 12/09/18  1:08 PM  
Result Value Ref Range Glucose (POC) 193 (H) 70 - 110 mg/dL GLUCOSE, POC Collection Time: 12/09/18  6:06 PM  
Result Value Ref Range Glucose (POC) 122 (H) 70 - 110 mg/dL GLUCOSE, POC Collection Time: 12/09/18 10:22 PM  
Result Value Ref Range Glucose (POC) 128 (H) 70 - 110 mg/dL Lab Results Component Value Date/Time Glucose 268 (H) 12/08/2018 11:24 PM  
 Glucose 217 (H) 10/21/2018 04:00 PM  
 Glucose 193 (H) 07/06/2017 02:09 PM  
 Glucose 121 (H) 07/03/2017 05:09 AM  
 Glucose 83 06/24/2017 04:40 AM  
 EXAM: CT head 
  INDICATION: Slurred speech. Acute neurologic deficit. 
  
COMPARISON: Annmarie 15, 2017. 
  
TECHNIQUE: Axial CT imaging of the head was performed without intravenous 
contrast. Dose reduction techniques used: automated exposure control, adjustment 
of the mAs and/or kVp according to patient size, and iterative reconstruction 
techniques. 
  
_______________ 
  
FINDINGS: 
  
BRAIN AND POSTERIOR FOSSA: There is mild cerebral volume loss with prominence of 
the lateral and third ventricles. The cortical sulci are widened appropriately. The fourth ventricle and basal cisterns are normally outlined. There is mild 
bilateral periventricular and central white matter diminished attenuation. There 
is no acute territorial defect, hemorrhage or midline shift. 
  
EXTRA-AXIAL SPACES AND MENINGES: There are no abnormal extra-axial fluid 
collections. 
  
CALVARIUM: Intact. 
  
SINUSES: Clear. 
  
OTHER: None. 
  
_______________ 
  
IMPRESSION IMPRESSION: 
  
 Mild cerebral volume loss and mild bilateral periventricular and central white 
matter diminished attenuation which is nonspecific but likely to represent 
microvascular disease. 
  
No acute intracranial abnormality. 
  
Findings were called to emergency room physician  prior to signing report. 
  
 A portable AP radiograph of the chest was obtained at 2334 hours: 
INDICATION:  Slurred speech, weakness, stroke. COMPARISON:  Multiple prior studies most recent being 6/13/2017. 
  
FINDINGS:  
  
Heart and mediastinum: Borderline heart size. Lungs and pleura: Previously noted vascular congestion and interstitial edema 
with pleural effusions have resolved. No residual consolidation noted. Aorta: Partially calcified. Bones: Within normal limits for age. Other: None. 
  
IMPRESSION Impression: 
  
Resolution of previously noted fluid overload/ CHF. No definite acute 
cardiopulmonary process is seen.  
 EXAM: MRI BRAIN  
  
INDICATION: Slurred speech, history of CVA 
  
COMPARISON: No prior study 
  
TECHNIQUE: Multiplanar multi sequence MR imaging of the brain was performed 
utilizing sagittal FLAIR T1, axial T2 propeller, FLAIR T2 fat suppression, 
diffusion, and axial T1 SE pre with axial and coronal post contrast imaging with 
administration of gadolinium. Additional dedicated susceptibility weighted 
imaging was performed including MIP and filtered phase reconstruction images. Imaging performed on wide bore Discovery FH026d [place] suite 3T magnet at [address] 
_______________________ 
  
FINDINGS: 
  
Motion artifact is present which limits evaluation. In particular, the FLAIR 
sequence is severely degraded by motion with several images relatively 
nondiagnostic. 
  
VENTRICLES/EXTRA-AXIAL SPACES: The ventricles and sulci are mildly enlarged 
consistent with diffuse volume loss. 
  
BRAIN PARENCHYMA: Small area of of localized abnormal restricted diffusion is present involving the posterior right insular cortex, as seen on axial diffusion 
images 20-22. Corresponding T2/FLAIR hyperintensity is present. No mass effect 
is seen.  
  
 There is a localized area of T2 and T1 precontrast hypointensity and 
susceptibility artifact involving the posterior limb right internal capsule 
extending to the adjacent thalamocapsular margin, well seen on axial images 19-21 suggesting hemosiderin staining from prior hemorrhage. No evidence of intracranial mass effect, midline shift, or herniation. No abnormal contrast enhancement is present.   
  
VASCULATURE: Extra-axial right sylvian fissure branching susceptibility artifact 
is present adjacent to the posterior insular cortex infarct, compatible with MCA 
branch thrombus. Associated lack of vascular enhancement is seen on postcontrast 
imaging. There may be FLAIR hyperintense vessels slightly more superiorly in the 
right frontoparietal region, not well seen due to motion. The other major intracranial vascular flow voids are grossly normal. 
  
ORBITS: The bilateral lenses are absent, likely due to prior cataract surgery. 
  
PARANASAL SINUSES/MASTOIDS: Visualized paranasal sinuses are clear. Visualized 
mastoid air cells are clear. 
  
OSSEOUS STRUCTURES: Unremarkable 
  
OTHER: None.   
  
________________________ 
  
IMPRESSION IMPRESSION: 
  
Motion degraded study. 
  
1. Small area of acute infarct involving the posterior right insular cortex. No 
evidence associated mass effect or hemorrhage. 
  
2. Distal right MCA branching thrombus along the sylvian fissure, adjacent to 
the acute infarct.  
  
3. 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 
internal capsule and  thalamocapsular junction. 
  
4. Mild diffuse volume loss. ECHO read pending Assessment/Plan:  
 
Patient Active Problem List  
Diagnosis Code  Hemorrhagic stroke ([address] I61.9  Chronic a-fib (HCC) I48.2  Supratherapeutic INR R79.1  Left-sided weakness R53.1  Hypokalemia E87.6  CKD (chronic kidney disease) stage 3, GFR 30-59 ml/min (HCC) N18.3  Obesity E66.9  
 SOB (shortness of breath) R06.02  
 Constipation K59.00  TIA (transient ischemic attack) G45.9  CVA (cerebral vascular accident) ([address] I63.9 A/P: 
 
CVA 
-MRI showing small area of acute infarct involving the posterior right insular cortex. No evidence of associated mass effect or hemorrhage, distal right MCA branching thrombus along the sylvian fissure, adjacent to the acute infarct. -CT negative 
-MRA pending -ECHO formal read pending 
-neuro consult after MRA completed 
-monitor BP closely  
-folic acid/thiamine/statin/ASA -lipid panel ok 
-PT/OT/Speech Chronic afib 
-monitor; rate ok currently Hypothyroid 
-synthroid 
-monitor DM II 
-monitor accuchecks 
-SSI  
-a1c 7.3 CKD 
-monitor renal numbers 
-slightly elevated; will consult nephrology if continue elevated DVT px 
-SCDs GI-protonix ABEBA Ferrell, NP-C [facility] [phone] Office:  [phone]

## 2018-12-10 NOTE — PROGRESS NOTES
Bedside shift change report given to earline (oncoming nurse) by Yolande Mackey RN 
 (offgoing nurse). Report included the following information SBAR, Intake/Output and Recent Results.

## 2018-12-10 NOTE — PROGRESS NOTES
Problem: Self Care Deficits Care Plan (Adult) Goal: *Acute Goals and Plan of Care (Insert Text) Occupational Therapy Goals Initiated 12/10/2018 within 7 day(s). 1.  Patient will perform grooming tasks at EOB with supervision/set-up. 2.  Patient will perform upper body dressing with minimal assistance and fair dynamic sitting balance. 3.  Patient will perform functional task at EOB for 8 minutes with supervision for safety and minimal verbal cues for activity pacing. 4.  Patient will perform toilet transfers with minimal assistance. 5.  Patient will perform all aspects of toileting with minimal assistance. 6.  Patient will participate in upper extremity therapeutic exercise/activities with supervision/set-up for 8 minutes to increase BUE AROM/strength for functional transfers and ADLs. 7.  Patient will utilize energy conservation techniques during functional activities with minimal verbal cues. Outcome: [address] [facility] Occupational THERAPY: Initial Assessment INPATIENT: Medicare: Hospital Day: 3 Patient: Osmin Rowan (79 y.o. female)    Date: 12/10/2018 Primary Diagnosis: TIA (transient ischemic attack) CVA (cerebral vascular accident) ([facility] [address]  
,  ,  
Precautions: Falls PLOF: Pt utilized RW for functional mobility and required assist for all ADLs. ASSESSMENT:  
Based on the objective data described below, the patient presents with impairments with regard to bed mobility, activity tolerance, BUE function/strength and independence in ADLs. Pt supine on arrival, agreeable to therapy, with Peg Lesch, RN at bedside. /66. Min/mod A & additional time for supine-->sit. Fair sitting balance with self support during MMT. Max/total A to don bilateral socks due to decreased sitting balance & increased leg edema. Pt reports requiring assist from dtr & caregiver with all ADLs PTA.  Min/mod A x1 to stand; mod A x2 to maneuver to chair; mod A x2 to reposition in chair. Supervision/min A for ADLs in chair. Needs within reach. /58. Recommend SNF upon d/c. Recommendations for the next treatment session: ADLs at EOB to increase sitting balance challenge Ms. Benavides will benefit from skilled intervention to address the above impairments. Her rehabilitation potential is considered to be Good. EDUCATION Education:  Patient was educated on the following topics: importance of mobility; role of OT and POC Barriers to Learning/Limitations: None Compensate with: visual, verbal, tactile, kinesthetic cues/model PLAN OF CARE:  
Problems:  Decreased ROM, Decreased strength affecting function, Decreased ADL/functioning of activities and Decreased transfer abilities Recommendations and Planned Interventions: 
[x]                  Self Care Training                   [x]         Therapeutic Activities [x]                  Functional Mobility Training    []          Cognitive Retraining 
[x]                  Therapeutic Exercises            [x]          Endurance Activities [x]                  Balance Training                     []          Neuromuscular Re-ed []                  Visual/Perceptual Training      [x]       Home Safety Training 
[x]                  Patient Education                    [x]          Family Training/Education []                  Other (comment): Frequency/Duration: Patient will be followed by occupational therapy 3-5 times a week to address goals. Discharge Recommendations: Campbell Medina Further Equipment Recommendations for Discharge: bedside commode SUBJECTIVE:  
Patient stated: \"I'm okay. \" OBJECTIVE/TREATMENT:  
 
Past Medical History:  
Diagnosis Date  Atrial fibrillation ([facility] [address]  Chronic kidney disease   
 unknown what stage  Diabetes ([facility] History reviewed. No pertinent surgical history. Eval Complexity: History: MEDIUM Complexity : Expanded review of history including physical, cognitive and psychosocial  history ; Examination: MEDIUM Complexity : 3-5 performance deficits relating to physical, cognitive , or psychosocial skils that result in activity limitations and / or participation restrictions; Decision Making:MEDIUM Complexity : Patient may present with comorbidities that affect occupational performnce. Miniml to moderate modification of tasks or assistance (eg, physical or verbal ) with assesment(s) is necessary to enable patient to complete evaluation G CODES: Self Care  Current  CK= 40-59%  Goal  CJ= 20-39%. The severity rating is based on the Other Functional assessment, MMT, ROM Prior Level of Function/Home Situation: Fully active; able to carry on all performance without restriction Lives with Daughter Lives with Caregivers Maxx Neeraj Cognitive/Behavioral Status:  
Neurologic State: alert Orientation: oriented to time, place, person and situation Cognition:   appropriate decision making, appropriate for age attention/concentration and following commands  follows multi-step simple commands/direction Safety/Judgement: Awareness of environment and Fall prevention ROM: Minimally limited (RUE>LUE due to L shoulder tightness) MMT: 3+/5 (BUES) Coordination: WFL (BUEs) Hand dominance:  Right Skin: Intact (BUEs) Edema: None noted (BUEs) Sensation: Intact (BUEs) Vision/Perceptual: normal 
 
 
Functional Status Indep Mod I  
Sup. / 
Set- Up SBA CGA Min Assist  
Mod Assist  
Max assist  
Total Assist  
Assist x2 Additional Time NT Comments Rolling []  []  []  []  []    [x]    []    []  []  []  []  [] Supine to sit []  []  []  []  []  [x]  [x]  []  []  []  [x]  [] Sit to supine []  []  []  []  []  []  []  []  []  []  []  [x] Sit to stand []  []  []  []  []  [x]  [x]  []  []  []  []  [] Toilet Transfer []  []  []  []  []  [x]  [x]  []  []  [x]  []  [] Feeding []  []  [x]  []  []  []  []  []  []  []  []  []    
Grooming []  []  []  []  []  [x]  []  []  []  []  []  [] Bathing  []  []  []  []  []  []  []  [x]  []  []  []  []    
UB Dressing  []  []  []  []  []  []  [x]  []  []  []  []  []    
LB Dressing  []  []  []  []  []  []  []  [x]  []  []  []  [] Toileting []  []  []  []  []  []  []  [x]  []  []  []  [] Balance Good [place] Brittle Poor Unable Comments Sitting static []  [x]  []  [] Sitting dynamic []  [x]  []  []    
Standing static []  [x]  []  []    
Standing dynamic []  [x]  []  []  Fair-  
ADL Intervention:  
While seated in chair for energy conservation and safety, pt performed oral care task with min A for set-up. Supervision/set-up for facial hygiene. Pain:  
Pre treatment: 0/10 Post treatment: 0/10 Scale: numeric Activity tolerance:  fair COMMUNICATION/EDUCATION: Pt educated on role of OT and POC; she verbalized understanding. [x]         Fall prevention education was provided and the patient/caregiver indicated understanding. [x]         Patient/family have participated as able in goal setting and plan of care. [x]         Patient/family agree to work toward stated goals and plan of care. []         Patient understands intent and goals of therapy, but is neutral about his/her participation The patients plan of care was also discussed with: Physical Therapist, Certified Occupational Therapy Assistant and Registered Nurse. · After treatment position/precautions:  
o Up in chair 
o Call light within reach 
o RN notified Recommendations for nursing: up with assist x1-2 & RW 
Verbally communicated to:  Sergei Mccarty Thank you for this referral. 
Chalino Franz MS OTR/L Time Calculation: 26 mins

## 2018-12-10 NOTE — PROGRESS NOTES
Problem: Falls - Risk of 
Goal: *Absence of Falls Document [facility] Fall Risk and appropriate interventions in the flowsheet. Outcome: Progressing Towards Goal 
Fall Risk Interventions: 
Mobility Interventions: Bed/chair exit alarm Medication Interventions: Bed/chair exit alarm Elimination Interventions: Patient to call for help with toileting needs

## 2018-12-10 NOTE — PROGRESS NOTES
0730 - Received pt at bedside from [facility], dual skin assessment completed and double RN verified, VSS on RA, pt resting comfortably in bed, no apparent S/S of distress at this time, will continue to monitor 0930 - Sitting up, eating breakfast in chair, tolerating well 
 
1100 - Family present at bedside 1245 - TRANSFER - OUT REPORT: 
 
Verbal report given to Georgie RIOS(name) on Doreatha Adam  being transferred to 2100(unit) for routine progression of care Report consisted of patients Situation, Background, Assessment and  
Recommendations(SBAR). Information from the following report(s) SBAR, Kardex, Intake/Output, MAR, Recent Results and Cardiac Rhythm A fib was reviewed with the receiving nurse. Lines:  
Peripheral IV 12/09/18 Right Forearm (Active) Site Assessment Clean, dry, & intact 12/10/2018 12:00 PM  
Phlebitis Assessment 0 12/10/2018 12:00 PM  
Infiltration Assessment 0 12/10/2018 12:00 PM  
Dressing Status Clean, dry, & intact 12/10/2018 12:00 PM  
Dressing Type Transparent;Tape 12/10/2018 12:00 PM  
Hub Color/Line Status Capped; Patent 12/10/2018 12:00 PM  
Action Taken Open ports on tubing capped 12/10/2018 12:00 PM  
Alcohol Cap Used Yes 12/10/2018 12:00 PM  
  
 
Opportunity for questions and clarification was provided. Patient transported with: 
 Registered Nurse

## 2018-12-10 NOTE — PROGRESS NOTES
Problem: Falls - Risk of 
Goal: *Absence of Falls Document [facility] Fall Risk and appropriate interventions in the flowsheet. Outcome: Progressing Towards Goal 
Fall Risk Interventions: 
Mobility Interventions: Bed/chair exit alarm Medication Interventions: Bed/chair exit alarm Elimination Interventions: Patient to call for help with toileting needs Problem: Pressure Injury - Risk of 
Goal: *Prevention of pressure injury Document Kamlesh Scale and appropriate interventions in the flowsheet. Outcome: Progressing Towards Goal 
Pressure Injury Interventions: 
  
 
Moisture Interventions: Apply protective barrier, creams and emollients Activity Interventions: Pressure redistribution bed/mattress(bed type) Mobility Interventions: HOB 30 degrees or less Nutrition Interventions: Document food/fluid/supplement intake

## 2018-12-10 NOTE — PROGRESS NOTES
Progress Note Patient: El Ralph               Sex: female          DOA: 12/8/2018 YOB: 1929      Age:  80 y.o.        LOS:  LOS: 0 days CHIEF COMPLAINT:  CVA, likely embolic Subjective:  
 
Patient awake and alert No distress Objective:  
  
Visit Vitals /61 Pulse 69 Temp 98 °F (36.7 °C) Resp 21 Ht 5' 3\" (1.6 m) Wt 97.1 kg (214 lb) SpO2 100% Breastfeeding? No  
BMI 37.91 kg/m² Physical Exam: 
Gen:  Patient is in no distress. No complaint HEENT and Neck:  PERRL, No cervical tenderness or masses Lungs:  Clear bilaterally. No rales, no wheeze. Normal effort. Heart:  Regular Rate and Rhythm. No murmur or gallop. No JVD. No edema. Abdomen:  Soft, non tender, no masses, no Hepatosplenomegally Extremities:  No digital clubbing, no cyanosis Neuro:  Alert and oriented, Normal affect, Cranial nerves intact, No sensory deficits Lab/Data Reviewed: All lab results for the last 24 hours reviewed. 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 
  CVA (cerebral vascular accident) ([address] (12/9/2018) Active Problems: 
  TIA (transient ischemic attack) (12/9/2018) Plan: 
Proceed with MRA head and neck BP control Follow neuro exam 
Needs Neuro consult after MRA Echo DVT prophylaxis

## 2018-12-10 NOTE — PROGRESS NOTES
Problem: Dysphagia (Adult) Goal: *Acute Goals and Plan of Care (Insert Text) Patient will: 1. Participate in training and education related to continued aspiration risk, diet recs and compensatory strategies (goal met). Outcome: Resolved/Met Date Met: 12/10/18 Speech LAnguage Pathology bedside swallow evaluation AND DISCHARGE Patient: Alexandra Vega (43 y.o. female) Date: 12/10/2018 Primary Diagnosis: TIA (transient ischemic attack) CVA (cerebral vascular accident) ([facility] [address] Precautions: N/A    
PLOF: ModA lives with caregiver ASSESSMENT : 
Clinical beside swallow eval completed per MD orders. Pt A&Ox4. Functional communication. Intelligibility >90%. Cognitive-linguistic function appears intact. OM examination revealed dentures in place with all other oral motor structures functional for mastication and deglutition. Presented with thin liquid and mixed consistency (soup) trials. Exhibited mildly delayed but functional bolus cohesion, manipulation and A-P transit. Further exhibited adequate swallow timing/reflex and hyolaryngeal excursion. Pt able to manipulate and clear with 0 clinical s/s aspiration and/or oropharyngeal dysphagia. Pt safe for soft solid with chopped meats, thin liquid diet. 0 formal ST needs for dysphagia indicated at this time. SLP educated pt on role of speech therapist in current setting with re: speech/swallow; verbalized comprehension. SLP available for re-evaluation if indicated by MD. Results d/w RN, Madisyn Enamorado. Thank you for this referral.  
Elke President. LAURA Pimentel. CF-SLP PLAN : 
Recommendations and Planned Interventions: 
No formal ST needs ID'd for dysphagia. Eval only. Discharge Recommendations: None for ST  
 
SUBJECTIVE:  
Patient stated I like soup. OBJECTIVE:  
 
Past Medical History:  
Diagnosis Date  Atrial fibrillation ([facility] [address]  Chronic kidney disease   
 unknown what stage  Diabetes ([facility] [address] History reviewed. No pertinent surgical history. Prior Level of Function/Home Situation: [address] lives with caregiver Home Situation Home Environment: Private residence # Steps to Enter: 1 One/Two Story Residence: Two story # of Interior Steps: 15 Interior Rails: Left Lift Chair Available: Yes Living Alone: No 
Support Systems: Child(janeth) Patient Expects to be Discharged to[de-identified] Private residence Current DME Used/Available at Home: Walker, rolling Diet prior to admission: soft solids / thin Current Diet:  Soft solids / thin Cognitive and Communication Status: 
Neurologic State: Alert Orientation Level: Oriented X4 Cognition: Follows commands Perception: Appears intact Perseveration: No perseveration noted Safety/Judgement: Awareness of environment Oral Assessment: 
Oral Assessment Labial: No impairment Dentition: Upper & lower dentures Oral Hygiene: good Lingual: Decreased rate Velum: No impairment Mandible: No impairment P.O. Trials: 
Patient Position: HOB 80 Vocal quality prior to P.O.: No impairment Consistency Presented: Thin liquid;Mixed consistency How Presented: Self-fed/presented;Spoon;Straw;Successive swallows Bolus Acceptance: No impairment Bolus Formation/Control: Impaired Type of Impairment: Delayed;Mastication Propulsion: No impairment Oral Residue: None Initiation of Swallow: No impairment Laryngeal Elevation: Functional 
Aspiration Signs/Symptoms: None Pharyngeal Phase Characteristics: No impairment, issues, or problems Effective Modifications: None Cues for Modifications: None Oral Phase Severity: Minimal 
Pharyngeal Phase Severity : No impairment GCODESwallowing:  Swallow Current Status CI= 1-19%  Swallow D/C Status CI= 1-19% The severity rating is based on the following outcomes: BROOKE Noms Swallow Level 6 Clinical Judgement PAIN: 
Start of Eval: 0 End of Eval: 0 After evaluation:  
[]            Patient left in no apparent distress sitting up in chair [x]            Patient left in no apparent distress in bed 
[x]            Call bell left within reach [x]            Nursing notified 
[x]            Family present 
[]            Caregiver present 
[]            Bed alarm activated COMMUNICATION/EDUCATION:  
[x]            Aspiration precautions; swallow safety; compensatory techniques. []            Patient/family have participated as able in goal setting and plan of care. []            Patient/family agree to work toward stated goals and plan of care. []            Patient understands intent and goals of therapy; neutral about participation. []            Patient unable to participate in goal setting/plan of care; educ ongoing with interdisciplinary staff 
[]         Posted safety precautions in patient's room. Thank you for this referral. 
Lila Cowart SLP Time Calculation: 15 mins

## 2018-12-10 NOTE — CONSULTS
Neurology Consult Note Admit Date: 12/8/2018 Length of Stay: 1 Primary Care: Letty Tesfaye MD  
 
 
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 Plan: Continue aspirin, MRA brain, echo pending. She is not a good candidate for anticoagulation. PT/OT to improve function HPI: She has a history of atrial fibrillation. She had an intracerebral hemorrhage due to coumadin. It was stopped and asa was started. She had an episode of altered mentation and slurred speech for less than an hour yesterday. She was with her daughter and came to the ED. She is diabetic. Her scan shows a small acute stroke in the right insular cortex, with suspected thrombus noted. Principle Problem: CVA (cerebral vascular accident) ([facility] [address] Problem List: Principal Problem: 
  CVA (cerebral vascular accident) ([facility] [address] (12/9/2018) Active Problems: 
  TIA (transient ischemic attack) (12/9/2018) Vital Signs:  
Visit Vitals /70 (BP 1 Location: Left arm, BP Patient Position: At rest) Pulse 64 Temp 97.7 °F (36.5 °C) Resp 14 Ht 5' 3\" (1.6 m) Wt 97.1 kg (214 lb) SpO2 98% Breastfeeding? No  
BMI 37.91 kg/m² Neurological examination:  
· Appearance: In NAD · Mental status examination:  Awake and alert, cooperative · Cardiovascular: heart seems regular at this time · Cranial Nerves: face symmetric, speech clear, tongue and uvual midline, hearing intact, visual fields normal 
· Motor exam: moves right better than left, has mild left sided weakness in the arm with 3/5 weakness in the left leg in UMN pattern · Sensory: tactile intact · Station and Gait: not tested · Reflexes: reduced generally, no Babinski · Coordination:   KYUNG a little slower on the left Allergies: Allergies Allergen Reactions  Codeine Itching Review of Systems: ROS 
 
PMH:  
Past Medical History:  
Diagnosis Date  [facility]  Chronic kidney disease   
 unknown what stage  Diabetes ([facility] [address] FH:  
Family History Family history unknown: Yes SH: Social History Socioeconomic History  Marital status:  Spouse name: Not on file  Number of children: Not on file  Years of education: Not on file  Highest education level: Not on file Tobacco Use  Smoking status: Never Smoker  Smokeless tobacco: Never Used Substance and Sexual Activity  Alcohol use: No  
 Drug use: No  
  
 
 
Medications:   
[x] REVIEWED Current Facility-Administered Medications Medication  meclizine (ANTIVERT) tablet 25 mg  
 betamethasone valerate (VALISONE) 0.1 % cream  
 losartan (COZAAR) tablet 50 mg  
 bisacodyl (DULCOLAX) suppository 10 mg  
 ascorbic acid (vitamin C) (VITAMIN C) tablet 500 mg  dorzolamide-timolol (COSOPT) 22.3-6.8 mg/mL ophthalmic solution 1 Drop  folic acid (FOLVITE) tablet 1 mg  furosemide (LASIX) tablet 20 mg  
 gabapentin (NEURONTIN) capsule 300 mg  levothyroxine (SYNTHROID) tablet 100 mcg  metoprolol tartrate (LOPRESSOR) tablet 50 mg  
 pantoprazole (PROTONIX) tablet 40 mg  
 spironolactone (ALDACTONE) tablet 50 mg  
 thiamine mononitrate (B-1) tablet 100 mg  
 zolpidem (AMBIEN) tablet 5 mg  
 febuxostat (ULORIC) tablet 40 mg  
 HYDROcodone-acetaminophen (NORCO) 5-325 mg per tablet 1 Tab  ondansetron (ZOFRAN) injection 2 mg  aspirin tablet 325 mg  
 acetaminophen (TYLENOL) tablet 650 mg  
 acetaminophen (TYLENOL) suppository 650 mg  
 albuterol (PROVENTIL VENTOLIN) nebulizer solution 2.5 mg  
 senna-docusate (PERICOLACE) 8.6-50 mg per tablet 2 Tab  atorvastatin (LIPITOR) tablet 80 mg  
 glucagon (GLUCAGEN) injection 1 mg  dextrose (D50W) injection syrg 12.5-25 g  ferrous sulfate tablet 325 mg  
 insulin lispro (HUMALOG) injection Data:   
[x] REVIEWED 
CT Results (most recent): 
Results from Hospital Encounter encounter on 12/08/18 CT HEAD WO CONT Narrative EXAM: CT head INDICATION: Slurred speech. Acute neurologic deficit. COMPARISON: Annmarie 15, 2017. TECHNIQUE: Axial CT imaging of the head was performed without intravenous 
contrast. Dose reduction techniques used: automated exposure control, adjustment 
of the mAs and/or kVp according to patient size, and iterative reconstruction 
techniques. _______________ FINDINGS: 
 
BRAIN AND POSTERIOR FOSSA: There is mild cerebral volume loss with prominence of 
the lateral and third ventricles. The cortical sulci are widened appropriately. The fourth ventricle and basal cisterns are normally outlined. There is mild 
bilateral periventricular and central white matter diminished attenuation. There 
is no acute territorial defect, hemorrhage or midline shift. EXTRA-AXIAL SPACES AND MENINGES: There are no abnormal extra-axial fluid 
collections. CALVARIUM: Intact. SINUSES: Clear. OTHER: None. 
 
_______________ Impression IMPRESSION: 
 
Mild cerebral volume loss and mild bilateral periventricular and central white 
matter diminished attenuation which is nonspecific but likely to represent 
microvascular disease. No acute intracranial abnormality. Findings were called to emergency room physician  prior to signing report. MRI Results (most recent): 
Results from Hospital Encounter encounter on 12/08/18 MRI BRAIN W WO CONT Narrative EXAM: MRI BRAIN  
 
INDICATION: Slurred speech, history of CVA COMPARISON: No prior study TECHNIQUE: Multiplanar multi sequence MR imaging of the brain was performed 
utilizing sagittal FLAIR T1, axial T2 propeller, FLAIR T2 fat suppression, 
diffusion, and axial T1 SE pre with axial and coronal post contrast imaging with 
administration of gadolinium. Additional dedicated susceptibility weighted 
imaging was performed including MIP and filtered phase reconstruction images. Imaging performed on wide bore Discovery [id number] GEM suite 3T magnet at [address]  
 
_______________________ FINDINGS: 
 
Motion artifact is present which limits evaluation. In particular, the FLAIR 
sequence is severely degraded by motion with several images relatively 
nondiagnostic. VENTRICLES/EXTRA-AXIAL SPACES: The ventricles and sulci are mildly enlarged 
consistent with diffuse volume loss. BRAIN PARENCHYMA: Small area of of localized abnormal restricted diffusion is 
present involving the posterior right insular cortex, as seen on axial diffusion 
images 20-22. Corresponding T2/FLAIR hyperintensity is present. No mass effect 
is seen. There is a localized area of T2 and T1 precontrast hypointensity and 
susceptibility artifact involving the posterior limb right internal capsule 
extending to the adjacent thalamocapsular margin, well seen on axial images 19-21 suggesting hemosiderin staining from prior hemorrhage. No evidence of intracranial mass effect, midline shift, or herniation. No abnormal contrast enhancement is present. VASCULATURE: Extra-axial right sylvian fissure branching susceptibility artifact 
is present adjacent to the posterior insular cortex infarct, compatible with MCA 
branch thrombus. Associated lack of vascular enhancement is seen on postcontrast 
imaging. There may be FLAIR hyperintense vessels slightly more superiorly in the 
right frontoparietal region, not well seen due to motion. The other major intracranial vascular flow voids are grossly normal. 
 
ORBITS: The bilateral lenses are absent, likely due to prior cataract surgery. PARANASAL SINUSES/MASTOIDS: Visualized paranasal sinuses are clear. Visualized 
mastoid air cells are clear. OSSEOUS STRUCTURES: Unremarkable OTHER: None.   
 
________________________ Impression IMPRESSION: 
 
Motion degraded study. 1.  Small area of acute infarct involving the posterior right insular cortex. No 
evidence associated mass effect or hemorrhage. 2.  Distal right MCA branching thrombus along the sylvian fissure, adjacent to 
the acute infarct. 3. 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 
internal capsule and  thalamocapsular junction. 4. Mild diffuse volume loss. Latest Cardiology Procedure: 
Results for orders placed or performed during the hospital encounter of 12/08/18 EKG, 12 LEAD, INITIAL Result Value Ref Range Ventricular Rate 72 BPM  
 Atrial Rate 441 BPM  
 QRS Duration 80 ms  
 Q-T Interval 404 ms QTC Calculation (Bezet) 442 ms Calculated R Axis 34 degrees Calculated T Axis 55 degrees Diagnosis Atrial fibrillation with premature ventricular or aberrantly conducted  
complexes Nonspecific ST and T wave abnormality Abnormal ECG When compared with ECG of 13-JUN-2017 10:50, 
Atrial fibrillation has replaced Sinus rhythm Nonspecific T wave abnormality now evident in Lateral leads Confirmed by Mickie Martini ([id number]) on 12/9/2018 6:09:09 AM 
  
 
 
Important Labs: No components found for: B12 Lab Results Component Value Date/Time Calcium 8.7 12/08/2018 11:24 PM  
 Phosphorus 1.8 (L) 06/16/2017 01:00 AM  
 Magnesium 2.3 06/16/2017 02:16 PM  
Lab Results Component Value Date/Time TSH 1.70 12/08/2018 11:24 PM  
 T4, Free 1.3 12/08/2018 11:24 PM  
 
Lab Results Component Value Date/Time  Hemoglobin A1c 7.3 (H) 12/08/2018 11:24 PM

## 2018-12-10 NOTE — PROGRESS NOTES
Reason for Admission:   TIA , CVA RRAT Score:     19 Do you (patient/family) have any concerns for transition/discharge? Plan for utilizing home health:   As indicated Likelihood of readmission?   mod Transition of Care Plan:  hh vs snf Interviewed patient, she agrees to share her discharge information with her daughterMorsabas Jungr . She lives with her daughter and uses a walker or a cane to assist with ambulation and see Dr Ashu Alfredo for her primary care needs. She describes herself as independent with the use of a walker. She lives with her daughter. but now states she has left sided weakness. She states she would like to return home at discharge. Care Management Interventions PCP Verified by CM: Yes(abt 2 weeks ago) Palliative Care Criteria Met (RRAT>21 & CHF Dx)?: No 
Mode of Transport at Discharge: Other (see comment)(daughter) Transition of Care Consult (CM Consult): Discharge Planning MyChart Signup: No 
Discharge Durable Medical Equipment: No 
Physical Therapy Consult: Yes Occupational Therapy Consult: Yes Speech Therapy Consult: Yes Current Support Network: Relative's Home(daughter lives with her) Confirm Follow Up Transport: Family Plan discussed with Pt/Family/Caregiver: Yes [employer] Information Provided?: No 
Discharge Location Discharge Placement: Other:(hh vs home)

## 2018-12-11 ENCOUNTER — APPOINTMENT (OUTPATIENT)
Dept: MRI IMAGING | Age: 83
DRG: 064 | End: 2018-12-11
Attending: HOSPITALIST
Payer: MEDICARE

## 2018-12-11 LAB
ANION GAP SERPL CALC-SCNC: 8 MMOL/L (ref 3–18)
BUN SERPL-MCNC: 37 MG/DL (ref 7–18)
BUN/CREAT SERPL: 16 (ref 12–20)
CALCIUM SERPL-MCNC: 8.3 MG/DL (ref 8.5–10.1)
CHLORIDE SERPL-SCNC: 102 MMOL/L (ref 100–108)
CO2 SERPL-SCNC: 30 MMOL/L (ref 21–32)
CREAT SERPL-MCNC: 2.29 MG/DL (ref 0.6–1.3)
CRP SERPL HS-MCNC: 2.29 MG/L (ref 0–3)
ECHO PULMONARY ARTERY SYSTOLIC PRESSURE (PASP): 51 MMHG
ERYTHROCYTE [DISTWIDTH] IN BLOOD BY AUTOMATED COUNT: 15 % (ref 11.6–14.5)
GLUCOSE BLD STRIP.AUTO-MCNC: 133 MG/DL (ref 70–110)
GLUCOSE BLD STRIP.AUTO-MCNC: 151 MG/DL (ref 70–110)
GLUCOSE BLD STRIP.AUTO-MCNC: 180 MG/DL (ref 70–110)
GLUCOSE BLD STRIP.AUTO-MCNC: 79 MG/DL (ref 70–110)
GLUCOSE SERPL-MCNC: 83 MG/DL (ref 74–99)
HCT VFR BLD AUTO: 38.2 % (ref 35–45)
HGB BLD-MCNC: 11.6 G/DL (ref 12–16)
MCH RBC QN AUTO: 28 PG (ref 24–34)
MCHC RBC AUTO-ENTMCNC: 30.4 G/DL (ref 31–37)
MCV RBC AUTO: 92.3 FL (ref 74–97)
PLATELET # BLD AUTO: 141 K/UL (ref 135–420)
PMV BLD AUTO: 10.2 FL (ref 9.2–11.8)
POTASSIUM SERPL-SCNC: 3.9 MMOL/L (ref 3.5–5.5)
RBC # BLD AUTO: 4.14 M/UL (ref 4.2–5.3)
SODIUM SERPL-SCNC: 140 MMOL/L (ref 136–145)
WBC # BLD AUTO: 6.2 K/UL (ref 4.6–13.2)

## 2018-12-11 PROCEDURE — 97535 SELF CARE MNGMENT TRAINING: CPT

## 2018-12-11 PROCEDURE — 82962 GLUCOSE BLOOD TEST: CPT

## 2018-12-11 PROCEDURE — 74011636637 HC RX REV CODE- 636/637: Performed by: HOSPITALIST

## 2018-12-11 PROCEDURE — 97110 THERAPEUTIC EXERCISES: CPT

## 2018-12-11 PROCEDURE — 85027 COMPLETE CBC AUTOMATED: CPT

## 2018-12-11 PROCEDURE — 70544 MR ANGIOGRAPHY HEAD W/O DYE: CPT

## 2018-12-11 PROCEDURE — 74011250637 HC RX REV CODE- 250/637: Performed by: FAMILY MEDICINE

## 2018-12-11 PROCEDURE — 70547 MR ANGIOGRAPHY NECK W/O DYE: CPT

## 2018-12-11 PROCEDURE — 80048 BASIC METABOLIC PNL TOTAL CA: CPT

## 2018-12-11 PROCEDURE — 65270000029 HC RM PRIVATE

## 2018-12-11 PROCEDURE — 36415 COLL VENOUS BLD VENIPUNCTURE: CPT

## 2018-12-11 PROCEDURE — 97116 GAIT TRAINING THERAPY: CPT

## 2018-12-11 RX ADMIN — Medication 500 MG: at 10:01

## 2018-12-11 RX ADMIN — Medication 100 MG: at 10:01

## 2018-12-11 RX ADMIN — ATORVASTATIN CALCIUM 80 MG: 40 TABLET, FILM COATED ORAL at 22:45

## 2018-12-11 RX ADMIN — LEVOTHYROXINE SODIUM 100 MCG: 100 TABLET ORAL at 08:41

## 2018-12-11 RX ADMIN — FEBUXOSTAT 40 MG: 40 TABLET ORAL at 10:01

## 2018-12-11 RX ADMIN — SPIRONOLACTONE 50 MG: 25 TABLET, FILM COATED ORAL at 10:01

## 2018-12-11 RX ADMIN — PANTOPRAZOLE SODIUM 40 MG: 40 TABLET, DELAYED RELEASE ORAL at 10:01

## 2018-12-11 RX ADMIN — ATORVASTATIN CALCIUM 80 MG: 40 TABLET, FILM COATED ORAL at 00:24

## 2018-12-11 RX ADMIN — GABAPENTIN 300 MG: 300 CAPSULE ORAL at 22:45

## 2018-12-11 RX ADMIN — INSULIN LISPRO 2 UNITS: 100 INJECTION, SOLUTION INTRAVENOUS; SUBCUTANEOUS at 12:47

## 2018-12-11 RX ADMIN — ASPIRIN 325 MG ORAL TABLET 325 MG: 325 PILL ORAL at 10:01

## 2018-12-11 RX ADMIN — FOLIC ACID 1 MG: 1 TABLET ORAL at 10:01

## 2018-12-11 RX ADMIN — BETAMETHASONE VALERATE: 1 CREAM TOPICAL at 10:02

## 2018-12-11 RX ADMIN — GABAPENTIN 300 MG: 300 CAPSULE ORAL at 00:24

## 2018-12-11 RX ADMIN — METOPROLOL TARTRATE 50 MG: 50 TABLET ORAL at 17:32

## 2018-12-11 RX ADMIN — METOPROLOL TARTRATE 50 MG: 50 TABLET ORAL at 10:01

## 2018-12-11 RX ADMIN — FERROUS SULFATE TAB 325 MG (65 MG ELEMENTAL FE) 325 MG: 325 (65 FE) TAB at 10:01

## 2018-12-11 RX ADMIN — DORZOLAMIDE HYDROCHLORIDE AND TIMOLOL MALEATE 1 DROP: 20; 5 SOLUTION/ DROPS OPHTHALMIC at 10:01

## 2018-12-11 RX ADMIN — INSULIN LISPRO 2 UNITS: 100 INJECTION, SOLUTION INTRAVENOUS; SUBCUTANEOUS at 17:32

## 2018-12-11 NOTE — CONSULTS
Consult Note    Assessment:   · JOSIE on CKD 3. Etio is likely ischemic atn in a setting of acute cva with transient hypotension. ARB contributed to josie. · CKD 3 due to dm/htn. · HTN. Controlled/slightly low. · PAF. Rate controlled. Not on ac due to prior h/o of ich. · Acute rt mca distribution embolic cva. Recommendations:   · Continue to hold arb and lasix. Hold aldactone. · Obtain ua, urine Na/protein. · Avoid NSAID's, IV dye. · Avoid fleets enemas due to concern for acute phosphate nephropathy. · Please dose all medications for approximate creatinine clearance   30-15. Thank you. Consult requested by: Caleb Blevins DO    ADMIT DATE: 12/8/2018  CONSULT DATE: December 11, 2018                 Admission diagnosis: CVA (cerebral vascular accident) [facility])   Reason for Nephrology Consultation: JOSIE on CKD 3. HPI: Jolly Riedel is a 80 y.o. female [address] with h/o of dm, htn, le lymphedema, prior cva, paf and ckd 3 for which patient is followed by Dr. Arun Haile. Patient presented with acute onset of slurred speech, lt facial droop and lt ue/le weakness. Symptoms essentially resolved by the time of admission. Head MRI confirmed rt mca distribution acute cva, w/u is in progress. Patient has been stable except transient hypotension. Baseline scr is in the mid one's. Upon admission scr was 1.84m today it is up to 2.29. Past Medical History:   Diagnosis Date    Atrial fibrillation (HCC)     CKD (chronic kidney disease) stage 3, GFR 30-59 ml/min (HCC)     unknown what stage    Diabetes ([facility] [address]       History reviewed. No pertinent surgical history.     Social History     Socioeconomic History    Marital status:      Spouse name: Not on file    Number of children: Not on file    Years of education: Not on file    Highest education level: Not on file   Social Needs    Financial resource strain: Not on file    Food insecurity - worry: Not on file   [facility] Food insecurity - inability: Not on file    Transportation needs - medical: Not on file   [employer] needs - non-medical: Not on file   Occupational History    Not on file   Tobacco Use    Smoking status: Never Smoker    Smokeless tobacco: Never Used   Substance and Sexual Activity    Alcohol use: No    Drug use: No    Sexual activity: Not on file   Other Topics Concern    Not on file   Social History Narrative    Not on file       Family History   Family history unknown: Yes     Allergies   Allergen Reactions    Codeine Itching        Home Medications:     Medications Prior to Admission   Medication Sig    febuxostat (ULORIC) 40 mg tab tablet Take 40 mg by mouth daily.  glipiZIDE (GLUCOTROL) 5 mg tablet Take 5 mg by mouth daily.  ascorbic acid, vitamin C, (VITAMIN C) 500 mg tablet Take 1 Tab by mouth daily.  aspirin 81 mg chewable tablet Take 1 Tab by mouth daily.  dorzolamide-timolol (COSOPT) 22.3-6.8 mg/mL ophthalmic solution Administer 1 Drop to both eyes two (2) times a day.  ferrous fumarate-vitamin C (LAURA-SEQUELS, IRON-VIT C,) 200 mg (65 mg iron)-25 mg TbER Take 1 Tab by mouth daily. (Patient taking differently: Take 1 Tab by mouth daily. 65 mg)    folic acid (FOLVITE) 1 mg tablet Take 1 Tab by mouth daily.  furosemide (LASIX) 20 mg tablet One pill a day    gabapentin (NEURONTIN) 300 mg capsule Take 1 Cap by mouth nightly. (Patient taking differently: Take 300 mg by mouth two (2) times a day.)    levothyroxine (SYNTHROID) 100 mcg tablet Take 1 Tab by mouth Daily (before breakfast).  linagliptin (TRADJENTA) 5 mg tablet Take 1 Tab by mouth Daily (before breakfast).  metoprolol tartrate (LOPRESSOR) 50 mg tablet Take 1 Tab by mouth two (2) times a day.  pantoprazole (PROTONIX) 40 mg tablet Take 1 Tab by mouth Daily (before breakfast).  simvastatin (ZOCOR) 40 mg tablet Take 1 Tab by mouth nightly.     spironolactone (ALDACTONE) 50 mg tablet Take 1 Tab by mouth two (2) times a day.  Thiamine Mononitrate (B-1) 100 mg tablet Take 1 Tab by mouth daily.  zolpidem (AMBIEN) 5 mg tablet Take 1 Tab by mouth nightly as needed for Sleep. Max Daily Amount: 5 mg.  bisacodyl (DULCOLAX) 10 mg suppository Insert 10 mg into rectum daily as needed.  IRON/DOCUSATE SODIUM (LAURA-SEQUELS PO) Take 50 mg by mouth daily.  betamethasone valerate (VALISONE) 0.1 % topical cream Apply  to affected area two (2) times a day.  losartan (COZAAR) 50 mg tablet Take 50 mg by mouth daily.  HYDROcodone-acetaminophen (NORCO) 5-325 mg per tablet Take 1 Tab by mouth every six (6) hours as needed for Pain. Max Daily Amount: 4 Tabs.  meclizine (ANTIVERT) 25 mg tablet Take 25 mg by mouth four (4) times daily as needed.  glimepiride (AMARYL) 2 mg tablet Take 2 mg by mouth daily.        Current Inpatient Medications:     Current Facility-Administered Medications   Medication Dose Route Frequency    meclizine (ANTIVERT) tablet 25 mg  25 mg Oral Q6H PRN    betamethasone valerate (VALISONE) 0.1 % cream   Topical BID    bisacodyl (DULCOLAX) suppository 10 mg  10 mg Rectal DAILY PRN    ascorbic acid (vitamin C) (VITAMIN C) tablet 500 mg  500 mg Oral DAILY    folic acid (FOLVITE) tablet 1 mg  1 mg Oral DAILY    gabapentin (NEURONTIN) capsule 300 mg  300 mg Oral QHS    levothyroxine (SYNTHROID) tablet 100 mcg  100 mcg Oral 6am    metoprolol tartrate (LOPRESSOR) tablet 50 mg  50 mg Oral BID    pantoprazole (PROTONIX) tablet 40 mg  40 mg Oral ACB    spironolactone (ALDACTONE) tablet 50 mg  50 mg Oral BID    thiamine mononitrate (B-1) tablet 100 mg  100 mg Oral DAILY    zolpidem (AMBIEN) tablet 5 mg  5 mg Oral QHS PRN    febuxostat (ULORIC) tablet 40 mg  40 mg Oral DAILY    HYDROcodone-acetaminophen (NORCO) 5-325 mg per tablet 1 Tab  1 Tab Oral Q6H PRN    ondansetron (ZOFRAN) injection 2 mg  2 mg IntraVENous Q6H PRN    aspirin tablet 325 mg  325 mg Oral DAILY    acetaminophen (TYLENOL) tablet 650 mg  650 mg Oral Q4H PRN    acetaminophen (TYLENOL) suppository 650 mg  650 mg Rectal Q4H PRN    albuterol (PROVENTIL VENTOLIN) nebulizer solution 2.5 mg  2.5 mg Nebulization Q4H PRN    senna-docusate (PERICOLACE) 8.6-50 mg per tablet 2 Tab  2 Tab Oral QHS    atorvastatin (LIPITOR) tablet 80 mg  80 mg Oral QHS    glucagon (GLUCAGEN) injection 1 mg  1 mg IntraMUSCular PRN    dextrose (D50W) injection syrg 12.5-25 g  25-50 mL IntraVENous PRN    ferrous sulfate tablet 325 mg  1 Tab Oral DAILY WITH BREAKFAST    insulin lispro (HUMALOG) injection   SubCUTAneous AC&HS       Review of Systems:   No fever or chills. No sore throat. No cough or hemoptysis. No shortness of breath or chest pain. No orthopnea or paroxysmal nocturnal dyspnea. Fair appetite. No nausea, vomiting, abdominal pain, melena or hematochezia. No constipation or diarrhea. No dysuria, no gross hematuria of voiding difficulties. C/o chronic ankle swelling, no joint paints. No muscle aches. No skin changes. No dizziness or lightheadedness. No headaches. Physical Assessment:     Vitals:    12/11/18 0222 12/11/18 0603 12/11/18 1003 12/11/18 1336   BP: 103/58 95/59 112/63 108/58   Pulse: 68 84 80 70   Resp: 16 16 15 15   Temp: 98.2 °F (36.8 °C) 98.1 °F (36.7 °C) 98 °F (36.7 °C) 97.8 °F (36.6 °C)   SpO2: 97% 95% 96% 97%   Weight:       Height:         Last 3 Recorded Weights in this Encounter    12/08/18 2346 12/09/18 1255   Weight: 97.5 kg (214 lb 15.2 oz) 97.1 kg (214 lb)     Admission weight: Weight: 97.5 kg (214 lb 15.2 oz) (12/08/18 2346)      Intake/Output Summary (Last 24 hours) at 12/11/2018 1557  Last data filed at 12/11/2018 1300  Gross per 24 hour   Intake 720 ml   Output 320 ml   Net 400 ml       Patient is in no apparent distress. HEENT: Head is normocephalic and atraumatic. Pupils are round, equal, reactive to light. Sclerae are anicteric. Oropharynx clear. Neck: no cervical lymphadenopathy or thyromegaly. Lungs: good air entry, clear to auscultation bilaterally. Trachea at the midline. Cardiovascular system: S1, S2, regular rate and rhythm. No murmurs, gallops or rubs. No jvd. Carotid upstroke 2 + bilaterally. Abdomen: soft, non tender, non distended. Positive bowel sounds. No hepatosplenomegaly. No abdominal bruits. Extremities: no clubbing, cyanosis. 2+ bl minimally pitting bl pretibial edema. Strong dorsalis pedis pulses. Brisk capillary refill on the toes bilaterally. Integumentary: skin is grossly intact. Neurologic: Alert, oriented time three. Cooperative and appropriate. No gross motor or sensory deficits. Data Review:    Labs: Results:       Chemistry Recent Labs     12/11/18  0445 12/08/18  2324   GLU 83 268*    140   K 3.9 3.6    100   CO2 30 32   BUN 37* 26*   CREA 2.29* 1.84*   CA 8.3* 8.7   AGAP 8 8   BUCR 16 14   AP  --  89   TP  --  6.8   ALB  --  3.5   GLOB  --  3.3   AGRAT  --  1.1         CBC w/Diff Recent Labs     12/11/18  0445 12/08/18  2324   WBC 6.2 6.2   RBC 4.14* 4.80   HGB 11.6* 13.6   HCT 38.2 45.1*    149         Iron/Ferritin No results for input(s): IRON in the last 72 hours. No lab exists for component: TIBCCALC   PTH/VIT D No results for input(s): PTH in the last 72 hours.     No lab exists for component: NADINED           Joselyn Chilel M.D  [facility]  Office [phone]  Pager [phone] December 11, 2018

## 2018-12-11 NOTE — CDMP QUERY
The medical record reflects the following: 
Risk: 81 yo with Dx CVA Clinical Indicators: 
--H&P \"presented to the ED with apparent transient slurring of her speech, according to her daughter. No other complaints\" --12/9 PT \"Strength: Generally decreased, functional(both LE)\" \"Ambulatory with rolling walker around house. Utilize wheelchair outdoor\" --12/10 PN \"Left sided weakness subsided per pt-tingling in LLE still persists\" Treatment: PT/OT Because there is conflicting information, please clarify if: 
=>L sided weakness due to CVA 
=>LE weakness only =>Other Explanation of clinical findings =>Unable to Determine (no explanation of clinical findings) Please clarify and document your clinical opinion in the progress notes and discharge summary including the definitive and/or presumptive diagnosis, (suspected or probable), related to the above clinical findings. Please include clinical findings supporting your diagnosis. If you DECLINE this query or would like to communicate with [facility], please utilize the \"[facility] message box\" at the TOP of the Progress Note on the right. Thank you, 
Miguel Ingram RN BSN CCDS [phone]

## 2018-12-11 NOTE — PROGRESS NOTES
Problem: Falls - Risk of 
Goal: *Absence of Falls Document Genevive Camera Fall Risk and appropriate interventions in the flowsheet. Outcome: Progressing Towards Goal 
Fall Risk Interventions: 
Mobility Interventions: Communicate number of staff needed for ambulation/transfer, PT Consult for mobility concerns Medication Interventions: Evaluate medications/consider consulting pharmacy Elimination Interventions: Call light in reach, Toileting schedule/hourly rounds

## 2018-12-11 NOTE — PROGRESS NOTES
Problem: Falls - Risk of 
Goal: *Absence of Falls Document Darlyn Valdovinos Fall Risk and appropriate interventions in the flowsheet. Outcome: Progressing Towards Goal 
Fall Risk Interventions: 
Mobility Interventions: Communicate number of staff needed for ambulation/transfer Medication Interventions: Evaluate medications/consider consulting pharmacy Elimination Interventions: Call light in reach

## 2018-12-11 NOTE — PROGRESS NOTES
2800 spoke with daily in MRI, pt was called for yesterday but refused to come down for tests b/c she was eating. Olayinka Pang will call for her today around 1000, barring any emergent MRIs ordered. 1330 pt helped back to bed, family in room at bedside. Call bell in reach, will continue to monitor. 1530 pt no longer on cosopt eye drops, discontinued in [facility]  
 
1918 Bedside and Verbal shift change report given to Radha (oncoming nurse) by Alexander Shah RN 
 (offgoing nurse). Report included the following information Kardex, MAR and Recent Results.

## 2018-12-11 NOTE — PROGRESS NOTES
Problem: Self Care Deficits Care Plan (Adult)  Goal: *Acute Goals and Plan of Care (Insert Text)  Occupational Therapy Goals  Initiated 12/10/2018 within 7 day(s). 1.  Patient will perform grooming tasks at EOB with supervision/set-up. 2.  Patient will perform upper body dressing with minimal assistance and fair dynamic sitting balance. 3.  Patient will perform functional task at EOB for 8 minutes with supervision for safety and minimal verbal cues for activity pacing. 4.  Patient will perform toilet transfers with minimal assistance. 5.  Patient will perform all aspects of toileting with minimal assistance. 6.  Patient will participate in upper extremity therapeutic exercise/activities with supervision/set-up for 8 minutes to increase BUE AROM/strength for functional transfers and ADLs. 7.  Patient will utilize energy conservation techniques during functional activities with minimal verbal cues. Outcome: Progressing Towards Goal  Occupational Therapy TREATMENT    Patient: Judit Vaca (16 y.o. female)  Date: 12/11/2018  Diagnosis: TIA (transient ischemic attack)  CVA [facility]cerebral vascular accident) [facility]) CVA (cerebral vascular accident) ([facility] [place] [address]      Precautions:    Chart, occupational therapy assessment, plan of care, and goals were reviewed. PLOF: Assist w/BADLs    ASSESSMENT:  Pt OOB seated in chair visiting w/supportive family upon entry. Pt c/o decrease ROM LUE. Reviewed SROM and encouraged family to have pt perform 10x/3x/day. BUE  strength WFL. Pt reports no difficulty holding utensils or with self-feeding. Generalized weakness requires Min Assist w/functional transfer to standing and increase time w/functional mobility to bathroom w/RW. Pt tolerates standing sinkside performing hand hygiene and requires 1 standing rest break returning to chair. Verbal cues for hand placement w/fucntional transfer to chair.   EDUCATION Pt and family educated on importance of UE TherEx and encouraged to perform throughout the day  Progression toward goals:  [x]          Improving appropriately and progressing toward goals  []          Improving slowly and progressing toward goals  []          Not making progress toward goals and plan of care will be adjusted     PLAN:  Patient continues to benefit from skilled intervention to address the above impairments. Continue treatment per established plan of care. Discharge Recommendations:  Home Health  Further Equipment Recommendations for Discharge:  N/A, pt has DME     SUBJECTIVE:   Patient stated I want to go home.     OBJECTIVE DATA SUMMARY:       Cognitive/Behavioral Status:  Neurologic State: Alert  Orientation Level: Oriented X4  Cognition: Follows commands  Safety/Judgement: Awareness of environment  Functional Mobility and Transfers for ADLs:   Transfers:  Sit to Stand: Minimum assistance(w/RW)  Bed to Chair: Minimum assistance(w/RW)   Bathroom Mobility: Minimum assistance(w/RW)   Balance:  Sitting: Intact  Sitting - Static: Good (unsupported)  Sitting - Dynamic: Fair (occasional)  Standing: Impaired; With support  Standing - Static: Fair  Standing - Dynamic : Fair(fair minus)  ADL Intervention:  Grooming  Washing Hands: Contact guard assistance(standing sinkside)    Therapeutic Exercises:   AAROM LUE shoulder flexion  AROM LUE elbow flexion/extension  AROM RUE shoulder flexion, elbow flexion/extension    Pain:  Pre Treatment:0  Post Treatment:0  Pain Scale 1: Numeric (0 - 10)  Pain Intensity 1: 0    Activity Tolerance:    Fair    Please refer to the flowsheet for vital signs taken during this treatment.   After treatment:   [x]  Patient left in no apparent distress sitting up in chair  []  Patient left in no apparent distress in bed  [x]  Call bell left within reach  []  Nursing notified  [x]  Caregivers present  []  Bed alarm activated    TYLER Linda  Time Calculation: 25 mins

## 2018-12-11 NOTE — PROGRESS NOTES
Problem: Pressure Injury - Risk of 
Goal: *Prevention of pressure injury Document Kamlesh Scale and appropriate interventions in the flowsheet. Outcome: Progressing Towards Goal 
Pressure Injury Interventions: 
  
 
Moisture Interventions: Absorbent underpads Activity Interventions: Pressure redistribution bed/mattress(bed type) Mobility Interventions: HOB 30 degrees or less Nutrition Interventions: Document food/fluid/supplement intake

## 2018-12-11 NOTE — PROGRESS NOTES
Problem: Mobility Impaired (Adult and Pediatric)  Goal: *Acute Goals and Plan of Care (Insert Text)  Physical Therapy Goals  Initiated 12/9/2018 and to be accomplished within 7 day(s)  1. Patient will move from supine to sit and sit to supine , scoot up and down and roll side to side in bed with minimal assistance/contact guard assist.     2.  Patient will transfer from bed to chair and chair to bed with minimal assistance/contact guard assist using the least restrictive device. 3.  Patient will perform sit to stand with minimal assistance/contact guard assist.  4.  Patient will ambulate with minimal assistance/contact guard assist for >/= 75 feet with the least restrictive device. 5.  Patient will demonstrate independence with performance of home exercise program.   Outcome: Progressing Towards Goal    PHYSICAL THERAPY: Daily TREATMENT Note   INPATIENT: Medicare: Hospital Day: 4     Patient: Annabelle Flores (37 y.o. female)    Date: 12/11/2018  Primary Diagnosis: TIA (transient ischemic attack)  CVA (cerebral vascular accident) ([facility] [address]   ,  ,   Precautions:      Chart, physical therapy assessment, plan of care and goals were reviewed. PLOF: Independent     ASSESSMENT:  Patient sitting up in chair, agreeable to participation with PT. SBA x 2 for sit <> stand with RW for support. Ambulation x 30 ft with RW with CGA. Patient requires verbal cuing for upright posture. Patient states \"I've been hunched over and I can't stay up straight long\". Patient returned to chair and left sitting with all needs within reach. Patient reports back pain with mobility. Progression toward goals:        Improving appropriately and progressing toward goals         PLAN:  Patient continues to benefit from skilled intervention to address the above impairments. Continue treatment per established plan of care. EDUCATION:   Education:  Patient was educated on the following topics: safety with RW, upright posture.  Verbalizes understanding. Barriers to Learning/Limitations: None  Compensate with: visual, verbal, tactile, kinesthetic cues/model    Discharge Recommendations:  Home Health  Further Equipment Recommendations for Discharge:  rolling walker  Factors which may impact discharge planning: N/A     SUBJECTIVE:   Patient stated My back hurts from walking too far.     OBJECTIVE DATA SUMMARY:   Critical Behavior:  Neurologic State: Alert  Orientation Level: Oriented X4  Cognition: Follows commands  Safety/Judgement: Awareness of environment    G CODE:Mobility [id number] Current  CJ= 20-39%   Goal  CI= 1-19%.   The severity rating is based on the Other CGA x 2 for mobility, poor posture, decreased activity tolerance      Functional Mobility:      Functional Status      Indep   (I)   Mod I   Super-vision   Min A   Mod A   Max A   Total A   Assist x2 Verbal cues Additional time Not tested   Comments   Rolling []  []  [] []    []    []  []  [] [] [] []    Supine to sit []  []  [] []  []  []  []  [] [] [] []    Sit to supine []  []  [] []  []  []  []  [] [] [] []    Sit to stand []  []  [] []  []  []  []  [] [] [] []    Stand to sit []  []  [] []  []  []  []  [] [] [] []    Bed to chair transfers []  []  [] []  []  []  []  [] [] [] []        Balance    Good   Fair   Poor   Unable   Not tested   Comments   Sitting static []  []  []  []  []    Sitting dynamic []  []  []  []  []    Standing static []  []  []  []  []    Standing dynamic []  []  []  []  []       Mobility/Gait:   Level of Assistance: Contact guard assistance  Assistive Device: rolling walker  Distance Ambulated: 30 feet     Left Lower Extremity: FWB  Right Lower Extremity: FWB  Base of Support: center of gravity altered  Speed/Aura: pace decreased (<100 feet/min)  Step Length: left shortened and right shortened  Swing Pattern: WFL  Stance: WFL  Gait Abnormalities: altered arm swing and forward trunk lean    Vital Signs  Temp: 98 °F (36.7 °C)     Pulse (Heart Rate): 80 BP: 112/63     Resp Rate: 15     O2 Sat (%): 96 %     Pain:  Back pain with mobility, improved with rest.      Activity Tolerance:   Fair     After treatment:   Patient left in no apparent distress sitting up in chair  Call bell left within reach      Juliane QUEVEDO Leigha   Time Calculation: 14 mins

## 2018-12-11 NOTE — PROGRESS NOTES
Neurology Progress Note    Admit Date: 12/8/2018  Length of Stay: 2  Primary Care: Theodore Hammond MD     Interim History: No further events, patient in good spirits. Baby asa was used prior to the stroke, and aspirin test was adequate. Dose being increased to 325mg a day because of stroke while on 81mg. Discussed with:  Dr. Herrera Cancer: completed stroke, likely embolus that broke up       Plan: a little PT/OT to tune her up. Continue aspirin at 325mg. Addition of Plavix addition may increase bleed risk, but might confer a little more protection. Would hold off on adding it for now. Principle Problem: CVA (cerebral vascular accident) [facility])     Problem List: Principal Problem:    CVA (cerebral vascular accident) ([address] (12/9/2018)    Active Problems:    TIA (transient ischemic attack) (12/9/2018)    HPI: She has a history of atrial fibrillation. She had an intracerebral hemorrhage due to coumadin. It was stopped and asa was started. She had an episode of altered mentation and slurred speech for less than an hour yesterday. She was with her daughter and came to the ED. She is diabetic. Her scan shows a small acute stroke in the right insular cortex, with suspected thrombus noted. Vital Signs:   Visit Vitals  /58 (BP 1 Location: Left arm, BP Patient Position: At rest)   Pulse 70   Temp 97.8 °F (36.6 °C)   Resp 15   Ht 5' 3\" (1.6 m)   Wt 97.1 kg (214 lb)   SpO2 97%   Breastfeeding? No   BMI 37.91 kg/m²        Neurological examination:    Appearance: Awake and alert, speech clear   Cardiovascular: irregular   Mental status examination: oriented   Cranial Nerves: face symmetric  · Motor exam:  moves right better than left, has mild left sided weakness in the arm with 3/5 weakness in the left leg in [facility] pattern    CT Results (most recent):  Results from Hospital Encounter encounter on 12/08/18   CT HEAD WO CONT    Narrative EXAM: CT head    INDICATION: Slurred speech.  Acute neurologic deficit. COMPARISON: [facility] 15, 2017. TECHNIQUE: Axial CT imaging of the head was performed without intravenous  contrast. Dose reduction techniques used: automated exposure control, adjustment  of the mAs and/or kVp according to patient size, and iterative reconstruction  techniques. _______________    FINDINGS:    BRAIN AND POSTERIOR FOSSA: There is mild cerebral volume loss with prominence of  the lateral and third ventricles. The cortical sulci are widened appropriately. The fourth ventricle and basal cisterns are normally outlined. There is mild  bilateral periventricular and central white matter diminished attenuation. There  is no acute territorial defect, hemorrhage or midline shift. EXTRA-AXIAL SPACES AND MENINGES: There are no abnormal extra-axial fluid  collections. CALVARIUM: Intact. SINUSES: Clear. OTHER: None.    _______________      Impression IMPRESSION:    Mild cerebral volume loss and mild bilateral periventricular and central white  matter diminished attenuation which is nonspecific but likely to represent  microvascular disease. No acute intracranial abnormality. Findings were called to emergency room physician  prior to signing report. MRI Results (most recent):  Results from [place] encounter on 12/08/18   MRA NECK WO CONT    Narrative EXAM: MRA NECK W/O CONTRAST    INDICATION: Slurred speech, acute infarct    COMPARISON: No prior MRA or CTA study, MR brain 12/9/2018    TECHNIQUE:  MR angiography of the neck was performed utilizing axial 2-D and 3-D  noncontrast time-of-flight acquisitions with multiplanar reconstructions. No  gadolinium was administered as per specific clinician request.    _______________________    FINDINGS:      The left carotid bifurcation is slightly irregular. Estimated minimal luminal  diameter of proximal ICA is 4.5 mm. Estimated diameter of normal distal  cervical segment ICA is 4.9 mm.  Estimated stenosis of left ICA based upon NASCET  criteria is less than 10%. The right carotid bifurcation is slightly irregular. Estimated minimal luminal  diameter of proximal ICA is 4.3 mm. Estimated diameter of normal distal  cervical segment ICA is 4.8 mm. Estimated stenosis of right ICA based upon  NASCET criteria is 11%. The origins of the great vessels are within normal limits given artifact. The  vertebral arteries are relatively equal in size. No focal vertebral artery  stenosis is seen. Antegrade flow is seen in bilateral vertebral arteries. _______________________      Impression IMPRESSION:    1. Mild proximal ICA irregularity without hemodynamically significant carotid  stenosis based on NASCET criteria. 2.  No focal vertebral artery stenosis is seen. Bilateral antegrade flow.        Medications:    [x] REVIEWED  Current Facility-Administered Medications   Medication    meclizine (ANTIVERT) tablet 25 mg    betamethasone valerate (VALISONE) 0.1 % cream    bisacodyl (DULCOLAX) suppository 10 mg    ascorbic acid (vitamin C) (VITAMIN C) tablet 724 mg    folic acid (FOLVITE) tablet 1 mg    gabapentin (NEURONTIN) capsule 300 mg    levothyroxine (SYNTHROID) tablet 100 mcg    metoprolol tartrate (LOPRESSOR) tablet 50 mg    pantoprazole (PROTONIX) tablet 40 mg    thiamine mononitrate (B-1) tablet 100 mg    zolpidem (AMBIEN) tablet 5 mg    febuxostat (ULORIC) tablet 40 mg    HYDROcodone-acetaminophen (NORCO) 5-325 mg per tablet 1 Tab    ondansetron (ZOFRAN) injection 2 mg    aspirin tablet 325 mg    acetaminophen (TYLENOL) tablet 650 mg    acetaminophen (TYLENOL) suppository 650 mg    albuterol (PROVENTIL VENTOLIN) nebulizer solution 2.5 mg    senna-docusate (PERICOLACE) 8.6-50 mg per tablet 2 Tab    atorvastatin (LIPITOR) tablet 80 mg    glucagon (GLUCAGEN) injection 1 mg    dextrose (D50W) injection syrg 12.5-25 g    ferrous sulfate tablet 325 mg    insulin lispro (HUMALOG) injection     Data: [x] REVIEWED  Recent Results (from the past 24 hour(s))   GLUCOSE, POC    Collection Time: 12/10/18  9:45 PM   Result Value Ref Range    Glucose (POC) 131 (H) 70 - 953 mg/dL   METABOLIC PANEL, BASIC    Collection Time: 12/11/18  4:45 AM   Result Value Ref Range    Sodium 140 136 - 145 mmol/L    Potassium 3.9 3.5 - 5.5 mmol/L    Chloride 102 100 - 108 mmol/L    CO2 30 21 - 32 mmol/L    Anion gap 8 3.0 - 18 mmol/L    Glucose 83 74 - 99 mg/dL    BUN 37 (H) 7.0 - 18 MG/DL    Creatinine 2.29 (H) 0.6 - 1.3 MG/DL    BUN/Creatinine ratio 16 12 - 20      GFR est AA 24 (L) >60 ml/min/1.73m2    GFR est non-AA 20 (L) >60 ml/min/1.73m2    Calcium 8.3 (L) 8.5 - 10.1 MG/DL   CBC W/O DIFF    Collection Time: 12/11/18  4:45 AM   Result Value Ref Range    WBC 6.2 4.6 - 13.2 K/uL    RBC 4.14 (L) 4.20 - 5.30 M/uL    HGB 11.6 (L) 12.0 - 16.0 g/dL    HCT 38.2 35.0 - 45.0 %    MCV 92.3 74.0 - 97.0 FL    MCH 28.0 24.0 - 34.0 PG    MCHC 30.4 (L) 31.0 - 37.0 g/dL    RDW 15.0 (H) 11.6 - 14.5 %    PLATELET 726 567 - 805 K/uL    MPV 10.2 9.2 - 11.8 FL   GLUCOSE, POC    Collection Time: 12/11/18  8:00 AM   Result Value Ref Range    Glucose (POC) 79 70 - 110 mg/dL   GLUCOSE, POC    Collection Time: 12/11/18 11:26 AM   Result Value Ref Range    Glucose (POC) 180 (H) 70 - 110 mg/dL   GLUCOSE, POC    Collection Time: 12/11/18  4:51 PM   Result Value Ref Range    Glucose (POC) 151 (H) 70 - 110 mg/dL

## 2018-12-11 NOTE — PROGRESS NOTES
2050 - Assumed pt care from Wong Garrett, [facility]. Pt in bed, alert and oriented x 4. Not in any form of distress. Denies pain. Frequent use items and call bell within reach. Verbalized understanding to call for assistance. Bed locked in lowest position. Dual NIHSS completed. No neurological changes throughout the night. [id number] - Bedside and Verbal shift change report given to Dorinda Arriola RN (oncoming nurse) by Kori Fox RN (offgoing nurse). Report included the following information SBAR, Kardex, Intake/Output, MAR and Recent Results. Dual NIHSS completed.

## 2018-12-11 NOTE — ROUTINE PROCESS
Bedside and Verbal shift change report given to Rahul Ellison RN (oshncoming nurse) by Ashley Burns RN (offgoing nurse). Report included the folowing information SBAR, Kardex, Intake/Output, MAR and Recent Results.

## 2018-12-11 NOTE — PROGRESS NOTES
[address] [place] [address] Hospitalist Division Inpatient Daily Progress Note Patient: El Ralph MRN: [id number]  CSN: [id number] YOB: 1929  Age: 80 y.o. Sex: female DOA: 12/8/2018 LOS:  LOS: 2 days Chief Complaint:  CVA Interval History:   
Per Dr. Leah Beasley HPI: \"Otilia Kerr is a 80 y.o. female who presented to the ED with apparent transient slurring of her speech, according to her daughter. No other complaints. Patient denies slurred speech. Has hx of CVA and paroxysmal Afib. She is on ASA. ED evaluation was unremarkable for any new findings. Teleneurology consulted and recommended observation in hospital with MRI and carotid u/s in am. \" Patient was taking couadmin per neurology's notes for afib anticoagulation. CT head negative for any acute event. CXR ok. MRI showing small area of acute infarct involving the posterior right insular cortex. No evidence of associated mass effect or hemorrhage, distal right MCA branching thrombus along the sylvian fissure, adjacent to the acute infarct. 12/10/18: ECHO read pending. MRA brain/neck pending-neuro consulted. BP marginal-monitor closely. Left sided weakness subsided per pt-tingling in LLE still persists. Renal numbers elevated; monitor closely (pt with h/o CKD). Will consult nephrology if continues elevated. PT/OT; recommending SNF. Transfer to floor. Neurology 12/11/18: MRA head/neck negative-further recommendations per neurology. ECHO LVEF 56-60%. PT/OT; recommending home with home health. Nephrology consulted for elevated renal numbers, h/o CKD. BP remains marginal on low side-monitor closely. Subjective:  
  
NAD. Resting comfortably. Objective:  
  
Visit Vitals BP 95/59 (BP 1 Location: Left arm, BP Patient Position: At rest) Pulse 84 Temp 98.1 °F (36.7 °C) Resp 16 Ht 5' 3\" (1.6 m) Wt 97.1 kg (214 lb) SpO2 95% Breastfeeding? No  
BMI 37.91 kg/m² Physical Exam: 
General appearance: alert, cooperative Lungs: clear to auscultation bilaterally Heart: irregularly irregular, rate ok , S1, S2 normal  
Abdomen: soft, non tender, non distended. Normoactive bowel sounds. Extremities: extremities normal, atraumatic, no cyanosis or edema Skin: Skin color, texture, turgor normal.  
Neurologic: Strength R side 5/5; left sided weakness noted-pronator drift. PERRLA. Intake and Output: 
Current Shift:  No intake/output data recorded. Last three shifts:  12/09 1901 - 12/11 0700 In: 26 [P.O.:460] Out: - Recent Results (from the past 24 hour(s)) GLUCOSE, POC Collection Time: 12/10/18 12:30 PM  
Result Value Ref Range Glucose (POC) 127 (H) 70 - 110 mg/dL DUPLEX CAROTID BILATERAL Collection Time: 12/10/18  1:07 PM  
Result Value Ref Range Left CCA dist sys 55.63 cm/s Left CCA dist parker 10.56 cm/s LEFT COMMON CAROTID ARTERY MID S 51.88 cm/s LEFT COMMON CAROTID ARTERY MID D 8.69 cm/s Left CCA prox sys 74.65 cm/s Left CCA prox parker 10.33 cm/s Left ECA sys 50.94 cm/s LEFT EXTERNAL CAROTID ARTERY D 0.00 cm/s Left ICA dist sys 77.23 cm/s Left ICA dist parker 23.71 cm/s Left ICA mid sys 71.36 cm/s Left ICA mid parker 18.78 cm/s Left ICA prox sys 50.47 cm/s Left ICA prox parker 14.79 cm/s Left subclavian sys 53.99 cm/s LEFT SUBCLAVIAN ARTERY D 0.00 cm/s Left vertebral sys 34.98 cm/s LEFT VERTEBRAL ARTERY D 9.62 cm/s Left ICA/CCA sys 1.03 Right cca dist sys 44.83 cm/s Right CCA dist parker 9.15 cm/s RIGHT COMMON CAROTID ARTERY MID S 65.09 cm/s RIGHT COMMON CAROTID ARTERY MID D 13.39 cm/s Right CCA prox sys 80.03 cm/s Right CCA prox praker 13.94 cm/s Right eca sys 53.75 cm/s RIGHT EXTERNAL CAROTID ARTERY D 0.00 cm/s Right ICA dist sys 69.95 cm/s Right ICA dist parker 16.64 cm/s Right ICA mid sys 69.95 cm/s Right ICA mid parker 23.00 cm/s Right ICA prox sys 61.27 cm/s Right ICA prox parker 19.01 cm/s Right subclavian sys 57.98 cm/s RIGHT SUBCLAVIAN ARTERY D 0.00 cm/s Right vertebral sys 45.30 cm/s RIGHT VERTEBRAL ARTERY D 13.85 cm/s Right ICA/CCA sys 0.87 ECHO ADULT COMPLETE Collection Time: 12/10/18  2:51 PM  
Result Value Ref Range PASP 51 mmHg GLUCOSE, POC Collection Time: 12/10/18  5:13 PM  
Result Value Ref Range Glucose (POC) 136 (H) 70 - 110 mg/dL GLUCOSE, POC Collection Time: 12/10/18  9:45 PM  
Result Value Ref Range Glucose (POC) 131 (H) 70 - 110 mg/dL METABOLIC PANEL, BASIC Collection Time: 12/11/18  4:45 AM  
Result Value Ref Range Sodium 140 136 - 145 mmol/L Potassium 3.9 3.5 - 5.5 mmol/L Chloride 102 100 - 108 mmol/L  
 CO2 30 21 - 32 mmol/L Anion gap 8 3.0 - 18 mmol/L Glucose 83 74 - 99 mg/dL BUN 37 (H) 7.0 - 18 MG/DL Creatinine 2.29 (H) 0.6 - 1.3 MG/DL  
 BUN/Creatinine ratio 16 12 - 20 GFR est AA 24 (L) >60 ml/min/1.73m2 GFR est non-AA 20 (L) >60 ml/min/1.73m2 Calcium 8.3 (L) 8.5 - 10.1 MG/DL  
CBC W/O DIFF Collection Time: 12/11/18  4:45 AM  
Result Value Ref Range WBC 6.2 4.6 - 13.2 K/uL  
 RBC 4.14 (L) 4.20 - 5.30 M/uL  
 HGB 11.6 (L) 12.0 - 16.0 g/dL HCT 38.2 35.0 - 45.0 % MCV 92.3 74.0 - 97.0 FL  
 MCH 28.0 24.0 - 34.0 PG  
 MCHC 30.4 (L) 31.0 - 37.0 g/dL  
 RDW 15.0 (H) 11.6 - 14.5 % PLATELET 636 828 - 375 K/uL MPV 10.2 9.2 - 11.8 FL  
GLUCOSE, POC Collection Time: 12/11/18  8:00 AM  
Result Value Ref Range Glucose (POC) 79 70 - 110 mg/dL Lab Results Component Value Date/Time  Glucose 83 12/11/2018 04:45 AM  
 Glucose 268 (H) 12/08/2018 11:24 PM  
 Glucose 217 (H) 10/21/2018 04:00 PM  
 Glucose 193 (H) 07/06/2017 02:09 PM  
 Glucose 121 (H) 07/03/2017 05:09 AM  
 
EXAM: MRA HEAD 
  
INDICATION: Slurred speech, acute infarct 
  
COMPARISON: No prior MRA or CTA study, MR brain 12/9/2018 
  
 TECHNIQUE:  MR angiography of the head was performed utilizing 3-D time of 
flight axial acquisitions with multiplanar reconstructions. 
  
_______________________ 
  
FINDINGS:   
  
There is no evidence of aneurysm. There is no focal high-grade stenosis within 
the intracranial arteries. No focal carotid siphon stenosis is seen, only 
slight irregularity. The carotid terminus is unremarkable.  
  
No focal anterior cerebral artery stenosis is seen. An anterior communicating 
artery is present. The middle cerebral artery bifurcations are unremarkable. The previously seen branching susceptibility artifact along the posterior aspect 
of the right sylvian fissure is no longer identified. Normal flow is seen 
involving the right MCA inferior division sylvian branches in this location. 
  
The vertebral arteries are relatively equal in size and slightly irregular. PICA origins are unremarkable. The basilar artery is unremarkable. Posterior 
cerebral arteries are unremarkable. 
  
_______________________ 
  
IMPRESSION IMPRESSION: 
  
1. No high-grade intracranial stenosis is seen. Only mild irregularity involving 
the carotid siphons and intracranial vertebral arteries likely reflecting 
atherosclerotic disease. 
  
2. No longer identified suspected right MCA branch thrombus along the sylvian 
fissure, now with unremarkable appearing patent MCA branches in this location. EXAM: MRA NECK W/O CONTRAST 
  
INDICATION: Slurred speech, acute infarct 
  
COMPARISON: No prior MRA or CTA study, MR brain 12/9/2018 
  
TECHNIQUE:  MR angiography of the neck was performed utilizing axial 2-D and 3-D 
noncontrast time-of-flight acquisitions with multiplanar reconstructions. No 
gadolinium was administered as per specific clinician request. 
  
_______________________ 
  
FINDINGS:   
  
The left carotid bifurcation is slightly irregular.   Estimated minimal luminal 
 diameter of proximal ICA is 4.5 mm. Estimated diameter of normal distal 
cervical segment ICA is 4.9 mm. Estimated stenosis of left ICA based upon NASCET 
criteria is less than 10%.   
The right carotid bifurcation is slightly irregular. Estimated minimal luminal 
diameter of proximal ICA is 4.3 mm. Estimated diameter of normal distal 
cervical segment ICA is 4.8 mm. Estimated stenosis of right ICA based upon NASCET criteria is 11%. 
  
The origins of the great vessels are within normal limits given artifact. The 
vertebral arteries are relatively equal in size. No focal vertebral artery 
stenosis is seen. Antegrade flow is seen in bilateral vertebral arteries. 
  
_______________________ 
  
IMPRESSION IMPRESSION: 
  
1. Mild proximal ICA irregularity without hemodynamically significant carotid 
stenosis based on NASCET criteria. 
  
2. No focal vertebral artery stenosis is seen. Bilateral antegrade flow. · Estimated left ventricular ejection fraction is 56 - 60%. Visually measured ejection fraction. Left ventricular mild concentric hypertrophy. Inconclusive left ventricular diastolic function. · Left atrial cavity size is severely dilated. · Moderator band present. · Right atrial cavity size is mildly dilated. · Mild aortic valve sclerosis with no evidence of reduced excursion. · Mitral valve non-specific thickening. Moderate mitral annular calcification. Trace mitral valve regurgitation. · Myxomatous degeneration of the tricuspid valve. Moderate tricuspid valve regurgitation is present. Moderate pulmonary hypertension is present. · Mild pulmonic valve regurgitation is present. Myocardial Findings Left Ventricle Normal cavity size. Mild concentric hypertrophy observed. The estimated ejection fraction is 56 - 60%. Visually measured ejection fraction. There is inconclusive left ventricular diastolic function. Left Atrium The cavity size is severely dilated.  Left Atrium volume index is 46 mL/m2. Right Ventricle Normal cavity size and global systolic function. Moderator band present. Right Atrium The cavity size is mildly dilated. Aortic Valve Trileaflet aortic valve structure. No stenosis and no regurgitation. Mild aortic valve sclerosis with no evidence of reduced excursion. Mitral Valve No stenosis. Mitral valve non-specific thickening. Moderate mitral annular calcification. Trace regurgitation. Tricuspid Valve No stenosis. Myxomatous degeneration. Moderate tricuspid valve regurgitation. Pulmonary arterial systolic pressure is 51 mmHg. Moderate pulmonary hypertension. Pulmonic Valve Normal valve structure and no stenosis. Mild regurgitation. Aorta Normal aortic root, ascending aortic, and aortic arch. Pericardium Pericardial fat pad present. TTE procedure Findings TTE Procedure Information Image quality: suboptimal. The view(s) performed were parasternal, apical, subcostal and suprasternal. Technically difficult study due to patient's body habitus, color flow Doppler was performed and pulse wave and/or continuous wave Doppler was performed. EXAM: CT head 
  INDICATION: Slurred speech. Acute neurologic deficit. 
  
COMPARISON: Annmarie 15, 2017. 
  
TECHNIQUE: Axial CT imaging of the head was performed without intravenous 
contrast. Dose reduction techniques used: automated exposure control, adjustment 
of the mAs and/or kVp according to patient size, and iterative reconstruction 
techniques. 
  
_______________ 
  
FINDINGS: 
  
BRAIN AND POSTERIOR FOSSA: There is mild cerebral volume loss with prominence of 
the lateral and third ventricles. The cortical sulci are widened appropriately. The fourth ventricle and basal cisterns are normally outlined. There is mild 
bilateral periventricular and central white matter diminished attenuation.  There 
is no acute territorial defect, hemorrhage or midline shift. 
  
 EXTRA-AXIAL SPACES AND MENINGES: There are no abnormal extra-axial fluid 
collections. 
  
CALVARIUM: Intact. 
  
SINUSES: Clear. 
  
OTHER: None. 
  
_______________ 
  
IMPRESSION IMPRESSION: 
  
Mild cerebral volume loss and mild bilateral periventricular and central white 
matter diminished attenuation which is nonspecific but likely to represent 
microvascular disease. 
  
No acute intracranial abnormality. 
  
Findings were called to emergency room physician  prior to signing report. 
  
 A portable AP radiograph of the chest was obtained at 2334 hours: 
INDICATION:  Slurred speech, weakness, stroke. COMPARISON:  Multiple prior studies most recent being 6/13/2017. 
  
FINDINGS:  
  
Heart and mediastinum: Borderline heart size. Lungs and pleura: Previously noted vascular congestion and interstitial edema 
with pleural effusions have resolved. No residual consolidation noted. Aorta: Partially calcified. Bones: Within normal limits for age. Other: None. 
  
IMPRESSION Impression: 
  
Resolution of previously noted fluid overload/ CHF. No definite acute 
cardiopulmonary process is seen.  
 EXAM: MRI BRAIN  
  
INDICATION: Slurred speech, history of CVA 
  
COMPARISON: No prior study 
  
TECHNIQUE: Multiplanar multi sequence MR imaging of the brain was performed 
utilizing sagittal FLAIR T1, axial T2 propeller, FLAIR T2 fat suppression, 
diffusion, and axial T1 SE pre with axial and coronal post contrast imaging with 
administration of gadolinium. Additional dedicated susceptibility weighted 
imaging was performed including MIP and filtered phase reconstruction images. Imaging performed on wide bore Discovery [id number] [place] suite 3T magnet at [address] 
_______________________ 
  
FINDINGS: 
  
Motion artifact is present which limits evaluation.  In particular, the FLAIR 
sequence is severely degraded by motion with several images relatively 
nondiagnostic. 
  
 VENTRICLES/EXTRA-AXIAL SPACES: The ventricles and sulci are mildly enlarged 
consistent with diffuse volume loss. 
  
BRAIN PARENCHYMA: Small area of of localized abnormal restricted diffusion is 
present involving the posterior right insular cortex, as seen on axial diffusion 
images 20-22. Corresponding T2/FLAIR hyperintensity is present. No mass effect 
is seen.  
  
 There is a localized area of T2 and T1 precontrast hypointensity and 
susceptibility artifact involving the posterior limb right internal capsule 
extending to the adjacent thalamocapsular margin, well seen on axial images 19-21 suggesting hemosiderin staining from prior hemorrhage. No evidence of intracranial mass effect, midline shift, or herniation. No abnormal contrast enhancement is present.   
  
VASCULATURE: Extra-axial right sylvian fissure branching susceptibility artifact 
is present adjacent to the posterior insular cortex infarct, compatible with MCA 
branch thrombus. Associated lack of vascular enhancement is seen on postcontrast 
imaging. There may be FLAIR hyperintense vessels slightly more superiorly in the 
right frontoparietal region, not well seen due to motion. The other major intracranial vascular flow voids are grossly normal. 
  
ORBITS: The bilateral lenses are absent, likely due to prior cataract surgery. 
  
PARANASAL SINUSES/MASTOIDS: Visualized paranasal sinuses are clear. Visualized 
mastoid air cells are clear. 
  
OSSEOUS STRUCTURES: Unremarkable 
  
OTHER: None.   
  
________________________ 
  
IMPRESSION IMPRESSION: 
  
Motion degraded study. 
  
1. Small area of acute infarct involving the posterior right insular cortex. No 
evidence associated mass effect or hemorrhage. 
  
2. Distal right MCA branching thrombus along the sylvian fissure, adjacent to 
the acute infarct.  
  
3.  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 
internal capsule and  thalamocapsular junction. 
  
 4. Mild diffuse volume loss. Assessment/Plan:  
 
Patient Active Problem List  
Diagnosis Code  Hemorrhagic stroke ([facility] [address] I61.9  Chronic a-fib (HCC) I48.2  Supratherapeutic INR R79.1  Left-sided weakness R53.1  Hypokalemia E87.6  CKD (chronic kidney disease) stage 3, GFR 30-59 ml/min ([facility]) N18.3  Obesity E66.9  
 SOB (shortness of breath) R06.02  
 Constipation K59.00  TIA (transient ischemic attack) G45.9  CVA (cerebral vascular accident) ([facility] [address] I63.9 A/P: 
 
CVA 
-MRI showing small area of acute infarct involving the posterior right insular cortex. No evidence of associated mass effect or hemorrhage, distal right MCA branching thrombus along the sylvian fissure, adjacent to the acute infarct. -CT negative 
-MRA pending -ECHO EF 56-60%  
-neuro consult  
-monitor BP closely  
-folic acid/thiamine/statin/ASA -lipid panel ok 
-PT/OT/Speech Chronic afib 
-monitor; rate ok currently Hypothyroid 
-synthroid 
-monitor DM II 
-monitor accuchecks 
-SSI  
-a1c 7.3 CKD 
-monitor renal numbers 
-nephrology consult DVT px 
-SCDs GI-protonix Chick Alford CM to start working on SNF disposition. ABEBA Napier, NP-C [phone] [facility] Hospitalist Division [facility]:[phone] Office:  [phone]

## 2018-12-12 LAB
ALBUMIN SERPL-MCNC: 2.9 G/DL (ref 3.4–5)
ANION GAP SERPL CALC-SCNC: 9 MMOL/L (ref 3–18)
BUN SERPL-MCNC: 44 MG/DL (ref 7–18)
BUN/CREAT SERPL: 16 (ref 12–20)
CALCIUM SERPL-MCNC: 8.5 MG/DL (ref 8.5–10.1)
CHLORIDE SERPL-SCNC: 99 MMOL/L (ref 100–108)
CO2 SERPL-SCNC: 28 MMOL/L (ref 21–32)
CREAT SERPL-MCNC: 2.82 MG/DL (ref 0.6–1.3)
GLUCOSE BLD STRIP.AUTO-MCNC: 109 MG/DL (ref 70–110)
GLUCOSE BLD STRIP.AUTO-MCNC: 145 MG/DL (ref 70–110)
GLUCOSE BLD STRIP.AUTO-MCNC: 165 MG/DL (ref 70–110)
GLUCOSE BLD STRIP.AUTO-MCNC: 184 MG/DL (ref 70–110)
GLUCOSE SERPL-MCNC: 121 MG/DL (ref 74–99)
PHOSPHATE SERPL-MCNC: 3.7 MG/DL (ref 2.5–4.9)
POTASSIUM SERPL-SCNC: 3.5 MMOL/L (ref 3.5–5.5)
SODIUM SERPL-SCNC: 136 MMOL/L (ref 136–145)

## 2018-12-12 PROCEDURE — 82962 GLUCOSE BLOOD TEST: CPT

## 2018-12-12 PROCEDURE — 80069 RENAL FUNCTION PANEL: CPT

## 2018-12-12 PROCEDURE — 36415 COLL VENOUS BLD VENIPUNCTURE: CPT

## 2018-12-12 PROCEDURE — 74011250637 HC RX REV CODE- 250/637: Performed by: FAMILY MEDICINE

## 2018-12-12 PROCEDURE — 65270000029 HC RM PRIVATE

## 2018-12-12 PROCEDURE — 97110 THERAPEUTIC EXERCISES: CPT

## 2018-12-12 PROCEDURE — 97116 GAIT TRAINING THERAPY: CPT

## 2018-12-12 PROCEDURE — 74011636637 HC RX REV CODE- 636/637: Performed by: HOSPITALIST

## 2018-12-12 PROCEDURE — 97535 SELF CARE MNGMENT TRAINING: CPT

## 2018-12-12 RX ADMIN — Medication 500 MG: at 08:09

## 2018-12-12 RX ADMIN — ATORVASTATIN CALCIUM 80 MG: 40 TABLET, FILM COATED ORAL at 23:07

## 2018-12-12 RX ADMIN — BETAMETHASONE VALERATE: 1 CREAM TOPICAL at 08:07

## 2018-12-12 RX ADMIN — LEVOTHYROXINE SODIUM 100 MCG: 100 TABLET ORAL at 06:13

## 2018-12-12 RX ADMIN — INSULIN LISPRO 2 UNITS: 100 INJECTION, SOLUTION INTRAVENOUS; SUBCUTANEOUS at 11:57

## 2018-12-12 RX ADMIN — GABAPENTIN 300 MG: 300 CAPSULE ORAL at 23:07

## 2018-12-12 RX ADMIN — FOLIC ACID 1 MG: 1 TABLET ORAL at 08:09

## 2018-12-12 RX ADMIN — ASPIRIN 325 MG ORAL TABLET 325 MG: 325 PILL ORAL at 08:08

## 2018-12-12 RX ADMIN — PANTOPRAZOLE SODIUM 40 MG: 40 TABLET, DELAYED RELEASE ORAL at 08:09

## 2018-12-12 RX ADMIN — ZOLPIDEM TARTRATE 5 MG: 5 TABLET ORAL at 23:07

## 2018-12-12 RX ADMIN — FERROUS SULFATE TAB 325 MG (65 MG ELEMENTAL FE) 325 MG: 325 (65 FE) TAB at 08:09

## 2018-12-12 RX ADMIN — METOPROLOL TARTRATE 50 MG: 50 TABLET ORAL at 17:25

## 2018-12-12 RX ADMIN — FEBUXOSTAT 40 MG: 40 TABLET ORAL at 08:09

## 2018-12-12 RX ADMIN — Medication 100 MG: at 08:08

## 2018-12-12 RX ADMIN — METOPROLOL TARTRATE 50 MG: 50 TABLET ORAL at 08:09

## 2018-12-12 RX ADMIN — INSULIN LISPRO 165 UNITS: 100 INJECTION, SOLUTION INTRAVENOUS; SUBCUTANEOUS at 23:14

## 2018-12-12 NOTE — PROGRESS NOTES
1918: Assumed pt care. Received pt resting in bed, pt is alert and oriented x 4. Denies any pain at this time. No signs of distress. Dual NIH done with Luna Driver RN. Bed on lowest position, wheels locked, call bell within reach. 2015: patient resting in bed, no complains at this time. 2150: Bedside and Verbal shift change report given to [address] [place] [address] (oncoming nurse) by Jm Eddy   (offgoing nurse). Report included the following information SBAR, Kardex, Intake/Output, MAR and Recent Results.

## 2018-12-12 NOTE — PROGRESS NOTES
[facility]  Hospitalist Division           Inpatient Daily Progress Note        Patient: Mile Gaspar MRN: [id number]  [facility]: [id number]    YOB: 1929  Age: 80 y.o. Sex: female    DOA: 12/8/2018 LOS:  LOS: 3 days                    Chief Complaint:  CVA     Interval History:    Per Dr. Reina Wahl HPI: \"Otilia Hurst is a 80 y.o. female who presented to the ED with apparent transient slurring of her speech, according to her daughter. No other complaints. Patient denies slurred speech. Has hx of CVA and paroxysmal Afib. She is on ASA. ED evaluation was unremarkable for any new findings. Teleneurology consulted and recommended observation in hospital with MRI and carotid u/s in am. \" Patient was taking couadmin per neurology's notes for afib anticoagulation. CT head negative for any acute event. CXR ok. MRI showing small area of acute infarct involving the posterior right insular cortex. No evidence of associated mass effect or hemorrhage, distal right MCA branching thrombus along the sylvian fissure, adjacent to the acute infarct. 12/10/18: ECHO read pending. MRA brain/neck pending-neuro consulted. BP marginal-monitor closely. Left sided weakness subsided per pt-tingling in LLE still persists. Renal numbers elevated; monitor closely (pt with h/o CKD). PT/OT; recommending SNF vs. home health. Transfer to floor. 12/11/18: MRA head/neck negative-per neurology, likely embolus that broke up. ECHO LVEF 56-60%. PT/OT; recommending home with home health. Nephrology consulted for elevated renal numbers, h/o CKD. BP remains marginal on low side-monitor closely. Per nephrology's JOSIE on CKD 3 etiology 2/2 likely ischemic atn in setting of acute cva with transient hypotension. ARB, lasix, aldactone on hold. 12/12/18: Renal numbers remain elevated-defer further to nephrology. PT/OT. Home with home health when cleared by nephrology. Hold on plavix start per neurology. Subjective:      NAD. Resting comfortably. Objective:      Visit Vitals  /66 (BP 1 Location: Left arm, BP Patient Position: At rest)   Pulse 63   Temp 97.5 °F (36.4 °C)   Resp 18   Ht 5' 3\" (1.6 m)   Wt 97.1 kg (214 lb)   SpO2 97%   Breastfeeding? No   BMI 37.91 kg/m²           Physical Exam:  General appearance: alert, cooperative  Lungs: clear to auscultation bilaterally  Heart: irregularly irregular, rate ok , S1, S2 normal   Abdomen: soft, non tender, non distended. Normoactive bowel sounds. Extremities: extremities normal, atraumatic, no cyanosis or edema  Skin: Skin color, texture, turgor normal.   Neurologic: Strength R side 5/5; left sided weakness noted-pronator drift. PERRLA. Intake and Output:  Current Shift:  No intake/output data recorded.   Last three shifts:  12/10 1901 - 12/12 0700  In: 720 [P.O.:720]  Out: 320 [Urine:240]    Recent Results (from the past 24 hour(s))   GLUCOSE, POC    Collection Time: 12/11/18  8:00 AM   Result Value Ref Range    Glucose (POC) 79 70 - 110 mg/dL   GLUCOSE, POC    Collection Time: 12/11/18 11:26 AM   Result Value Ref Range    Glucose (POC) 180 (H) 70 - 110 mg/dL   GLUCOSE, POC    Collection Time: 12/11/18  4:51 PM   Result Value Ref Range    Glucose (POC) 151 (H) 70 - 110 mg/dL   GLUCOSE, POC    Collection Time: 12/11/18  9:06 PM   Result Value Ref Range    Glucose (POC) 133 (H) 70 - 110 mg/dL   RENAL FUNCTION PANEL    Collection Time: 12/12/18  4:40 AM   Result Value Ref Range    Sodium 136 136 - 145 mmol/L    Potassium 3.5 3.5 - 5.5 mmol/L    Chloride 99 (L) 100 - 108 mmol/L    CO2 28 21 - 32 mmol/L    Anion gap 9 3.0 - 18 mmol/L    Glucose 121 (H) 74 - 99 mg/dL    BUN 44 (H) 7.0 - 18 MG/DL    Creatinine 2.82 (H) 0.6 - 1.3 MG/DL    BUN/Creatinine ratio 16 12 - 20      GFR est AA 19 (L) >60 ml/min/1.73m2    GFR est non-AA 16 (L) >60 ml/min/1.73m2    Calcium 8.5 8.5 - 10.1 MG/DL    Phosphorus 3.7 2.5 - 4.9 MG/DL    Albumin 2.9 (L) 3.4 - 5.0 g/dL           Lab Results   Component Value Date/Time    Glucose 121 (H) 12/12/2018 04:40 AM    Glucose 83 12/11/2018 04:45 AM    Glucose 268 (H) 12/08/2018 11:24 PM    Glucose 217 (H) 10/21/2018 04:00 PM    Glucose 193 (H) 07/06/2017 02:09 PM     EXAM: MRA HEAD     INDICATION: Slurred speech, acute infarct     COMPARISON: No prior MRA or CTA study, MR brain 12/9/2018     TECHNIQUE:  MR angiography of the head was performed utilizing 3-D time of  flight axial acquisitions with multiplanar reconstructions.     _______________________     FINDINGS:       There is no evidence of aneurysm. There is no focal high-grade stenosis within  the intracranial arteries. No focal carotid siphon stenosis is seen, only  slight irregularity. The carotid terminus is unremarkable.      No focal anterior cerebral artery stenosis is seen. An anterior communicating  artery is present. The middle cerebral artery bifurcations are unremarkable. The previously seen branching susceptibility artifact along the posterior aspect  of the right sylvian fissure is no longer identified. Normal flow is seen  involving the right MCA inferior division sylvian branches in this location.     The vertebral arteries are relatively equal in size and slightly irregular. PICA origins are unremarkable. The basilar artery is unremarkable. Posterior  cerebral arteries are unremarkable.     _______________________     IMPRESSION  IMPRESSION:     1. No high-grade intracranial stenosis is seen. Only mild irregularity involving  the carotid siphons and intracranial vertebral arteries likely reflecting  atherosclerotic disease.     2. No longer identified suspected right MCA branch thrombus along the sylvian  fissure, now with unremarkable appearing patent MCA branches in this location.   EXAM: MRA NECK W/O CONTRAST     INDICATION: Slurred speech, acute infarct     COMPARISON: No prior MRA or CTA study, MR brain 12/9/2018     TECHNIQUE:  MR angiography of the neck was performed utilizing axial 2-D and 3-D  noncontrast time-of-flight acquisitions with multiplanar reconstructions. No  gadolinium was administered as per specific clinician request.     _______________________     FINDINGS:       The left carotid bifurcation is slightly irregular. Estimated minimal luminal  diameter of proximal ICA is 4.5 mm. Estimated diameter of normal distal  cervical segment ICA is 4.9 mm. Estimated stenosis of left ICA based upon NASCET  criteria is less than 10%.    The right carotid bifurcation is slightly irregular. Estimated minimal luminal  diameter of proximal ICA is 4.3 mm. Estimated diameter of normal distal  cervical segment ICA is 4.8 mm. Estimated stenosis of right ICA based upon  NASCET criteria is 11%.     The origins of the great vessels are within normal limits given artifact. The  vertebral arteries are relatively equal in size. No focal vertebral artery  stenosis is seen. Antegrade flow is seen in bilateral vertebral arteries.     _______________________     IMPRESSION  IMPRESSION:     1. Mild proximal ICA irregularity without hemodynamically significant carotid  stenosis based on NASCET criteria.     2. No focal vertebral artery stenosis is seen. Bilateral antegrade flow. · Estimated left ventricular ejection fraction is 56 - 60%. Visually measured ejection fraction. Left ventricular mild concentric hypertrophy. Inconclusive left ventricular diastolic function. · Left atrial cavity size is severely dilated. · Moderator band present. · Right atrial cavity size is mildly dilated. · Mild aortic valve sclerosis with no evidence of reduced excursion. · Mitral valve non-specific thickening. Moderate mitral annular calcification. Trace mitral valve regurgitation. · Myxomatous degeneration of the tricuspid valve. Moderate tricuspid valve regurgitation is present. Moderate pulmonary hypertension is present. · Mild pulmonic valve regurgitation is present. Myocardial Findings     Left Ventricle Normal cavity size. Mild concentric hypertrophy observed. The estimated ejection fraction is 56 - 60%. Visually measured ejection fraction. There is inconclusive left ventricular diastolic function. Left Atrium The cavity size is severely dilated. Left Atrium volume index is 46 mL/m2. Right Ventricle Normal cavity size and global systolic function. Moderator band present. Right Atrium The cavity size is mildly dilated. Aortic Valve Trileaflet aortic valve structure. No stenosis and no regurgitation. Mild aortic valve sclerosis with no evidence of reduced excursion. Mitral Valve No stenosis. Mitral valve non-specific thickening. Moderate mitral annular calcification. Trace regurgitation. Tricuspid Valve No stenosis. Myxomatous degeneration. Moderate tricuspid valve regurgitation. Pulmonary arterial systolic pressure is 51 mmHg. Moderate pulmonary hypertension. Pulmonic Valve Normal valve structure and no stenosis. Mild regurgitation. Aorta Normal aortic root, ascending aortic, and aortic arch. Pericardium Pericardial fat pad present. TTE procedure Findings     TTE Procedure Information Image quality: suboptimal. The view(s) performed were parasternal, apical, subcostal and suprasternal. Technically difficult study due to patient's body habitus, color flow Doppler was performed and pulse wave and/or continuous wave Doppler was performed. EXAM: CT head     INDICATION: Slurred speech. Acute neurologic deficit.     COMPARISON: Annmarie 15, 2017.     TECHNIQUE: Axial CT imaging of the head was performed without intravenous  contrast. Dose reduction techniques used: automated exposure control, adjustment  of the mAs and/or kVp according to patient size, and iterative reconstruction  techniques.     _______________     FINDINGS:     BRAIN AND POSTERIOR FOSSA: There is mild cerebral volume loss with prominence of  the lateral and third ventricles.  The cortical sulci are widened appropriately. The fourth ventricle and basal cisterns are normally outlined. There is mild  bilateral periventricular and central white matter diminished attenuation. There  is no acute territorial defect, hemorrhage or midline shift.     EXTRA-AXIAL SPACES AND MENINGES: There are no abnormal extra-axial fluid  collections.     CALVARIUM: Intact.     SINUSES: Clear.     OTHER: None.     _______________     IMPRESSION  IMPRESSION:     Mild cerebral volume loss and mild bilateral periventricular and central white  matter diminished attenuation which is nonspecific but likely to represent  microvascular disease.     No acute intracranial abnormality.     Findings were called to emergency room physician  prior to signing report.      A portable AP radiograph of the chest was obtained at 2334 hours:  INDICATION:  Slurred speech, weakness, stroke. COMPARISON:  Multiple prior studies most recent being 6/13/2017.     FINDINGS:      Heart and mediastinum: Borderline heart size. Lungs and pleura: Previously noted vascular congestion and interstitial edema  with pleural effusions have resolved. No residual consolidation noted. Aorta: Partially calcified. Bones: Within normal limits for age. Other: None.     IMPRESSION  Impression:     Resolution of previously noted fluid overload/ CHF. No definite acute  cardiopulmonary process is seen.    EXAM: MRI BRAIN      INDICATION: Slurred speech, history of CVA     COMPARISON: No prior study     TECHNIQUE: Multiplanar multi sequence MR imaging of the brain was performed  utilizing sagittal FLAIR T1, axial T2 propeller, FLAIR T2 fat suppression,  diffusion, and axial T1 SE pre with axial and coronal post contrast imaging with  administration of gadolinium. Additional dedicated susceptibility weighted  imaging was performed including MIP and filtered phase reconstruction images.   Imaging performed on wide bore Discovery [id number] Sproul suite 3T magnet at [address] [facility]  _______________________     FINDINGS:     Motion artifact is present which limits evaluation. In particular, the FLAIR  sequence is severely degraded by motion with several images relatively  nondiagnostic.     VENTRICLES/EXTRA-AXIAL SPACES: The ventricles and sulci are mildly enlarged  consistent with diffuse volume loss.     BRAIN PARENCHYMA: Small area of of localized abnormal restricted diffusion is  present involving the posterior right insular cortex, as seen on axial diffusion  images 20-22. Corresponding T2/FLAIR hyperintensity is present. No mass effect  is seen.       There is a localized area of T2 and T1 precontrast hypointensity and  susceptibility artifact involving the posterior limb right internal capsule  extending to the adjacent thalamocapsular margin, well seen on axial images  19-21 suggesting hemosiderin staining from prior hemorrhage. No evidence of intracranial mass effect, midline shift, or herniation. No abnormal contrast enhancement is present.       VASCULATURE: Extra-axial right sylvian fissure branching susceptibility artifact  is present adjacent to the posterior insular cortex infarct, compatible with MCA  branch thrombus. Associated lack of vascular enhancement is seen on postcontrast  imaging. There may be FLAIR hyperintense vessels slightly more superiorly in the  right frontoparietal region, not well seen due to motion. The other major intracranial vascular flow voids are grossly normal.     ORBITS: The bilateral lenses are absent, likely due to prior cataract surgery.     PARANASAL SINUSES/MASTOIDS: Visualized paranasal sinuses are clear. Visualized  mastoid air cells are clear.     OSSEOUS STRUCTURES: Unremarkable     OTHER: None.       ________________________     IMPRESSION  IMPRESSION:     Motion degraded study.     1. Small area of acute infarct involving the posterior right insular cortex.  No  evidence associated mass effect or hemorrhage.     2. Distal right MCA branching thrombus along the sylvian fissure, adjacent to  the acute infarct.      3. Hemosiderin staining from chronic hemorrhage along the posterior right  internal capsule and  thalamocapsular junction.     4. Mild diffuse volume loss. Assessment/Plan:     Patient Active Problem List   Diagnosis Code    Hemorrhagic stroke ([facility] [address] I61.9    Chronic a-fib ([facility] [address] I48.2    Supratherapeutic INR R79.1    Left-sided weakness R53.1    Hypokalemia E87.6    CKD (chronic kidney disease) stage 3, GFR 30-59 ml/min ([facility]) N18.3    Obesity E66.9    SOB (shortness of breath) R06.02    Constipation K59.00    TIA (transient ischemic attack) G45.9    CVA (cerebral vascular accident) ([facility] [address] I63.9       A/P:    CVA  -MRI showing small area of acute infarct involving the posterior right insular cortex. No evidence of associated mass effect or hemorrhage, distal right MCA branching thrombus along the sylvian fissure, adjacent to the acute infarct.    -CT negative  -MRA negative   -ECHO EF 56-60%   -neuro consult   -monitor BP closely   -folic acid/thiamine/statin/ASA   -lipid panel ok  -PT/OT/Speech   -likely from emboli that broke up per neurology's notation     Chronic afib  -monitor; rate ok currently     Hypothyroid  -synthroid  -monitor     DM II  -monitor accuchecks  -SSI   -a1c 7.3     JOSIE on CKD3  -monitor renal numbers (slightly more elevated today)   -nephrology consult -appreciate   -hold aldactone, lasix, ARB    -labs per nephrology     DVT px  -SCDs  GI-protonix  Bowel-pericolace         Marii Hannon, Romayne Berber, MARYCHUY [address]  [facility] Hospital[facility]:[phone]  Office:  [phone]

## 2018-12-12 NOTE — PROGRESS NOTES
Problem: Falls - Risk of  Goal: *Absence of Falls  Document Jun Fall Risk and appropriate interventions in the flowsheet.   Outcome: Progressing Towards Goal  Fall Risk Interventions:  Mobility Interventions: Communicate number of staff needed for ambulation/transfer         Medication Interventions: Evaluate medications/consider consulting pharmacy    Elimination Interventions: Call light in reach

## 2018-12-12 NOTE — ROUTINE PROCESS
2145 Bedside and Verbal shift change  Received from Cecilia Aguilera RN (outgoing nurse), to SHALINI Ealine (oncoming)  Pt. Is AOX 4. IV Sl, Pt. denies  pain at this time. Report included the following information SBAR, Kardex, Procedure Summary, Intake/Output, MAR, Recent Lab Results. Will resume care and monitor Pt. Condition. Pt. Educated on call bell when in need of help and assistance. Pt. verbalized understanding. Pt. Head to toe Assessment Done and documented. 2200  OOB to BSC, voided and BM. 2315  Pt. Resting back in bed, no sign of distress. 0000  Pt. Made no complaints.     0400  Pt not in distress.     0600 Pt. Able to rest well throughout the shift. Verbal and bedside Shift changed report given to Rochelle Culp RN (oncoming RN) on Pt. Condition. Report consisted of patients Situation, History, Activities, intake/output,  Background, Assessment and Recommendations(SBAR). Information from the following report(s) Kardex, order Summary, Lab results and MAR was reviewed with the receiving nurse. Opportunity for questions and clarification was provided.

## 2018-12-12 NOTE — PROGRESS NOTES
RENAL PROGRESS NOTE        [facility]         Assessment/Plan:   · JOSIE (ischemic atn in a setting of acute cva with transient hypotension). Scr is up fairly sharply, continue to monitor. · CKD 3 due to dm/htn. · HTN. Controlled/slightly low. Continue to hold arb and diuretics. · PAF. Rate controlled. Not on ac due to prior h/o of ich. · Acute rt mca distribution embolic cva. Improving. Subjective:  Patient complaints off: Feels better. No recurrence of slurred speech, le weakness. No SOB/CP/N/V. Appetite is fair.        Patient Active Problem List   Diagnosis Code    Hemorrhagic stroke ([facility] [address] I61.9    Chronic a-fib ([facility] [address] I48.2    Supratherapeutic INR R79.1    Left-sided weakness R53.1    Hypokalemia E87.6    CKD (chronic kidney disease) stage 3, GFR 30-59 ml/min ([facility]) N18.3    Obesity E66.9    SOB (shortness of breath) R06.02    Constipation K59.00    TIA (transient ischemic attack) G45.9    CVA (cerebral vascular accident) ([facility] [address] I63.9       Current Facility-Administered Medications   Medication Dose Route Frequency Provider Last Rate Last Dose    meclizine (ANTIVERT) tablet 25 mg  25 mg Oral Q6H PRN Rashid Le MD        betamethasone valerate (VALISONE) 0.1 % cream   Topical BID Rashid Le MD   Stopped at 12/12/18 1800    bisacodyl (DULCOLAX) suppository 10 mg  10 mg Rectal DAILY PRN Rashid Le MD        ascorbic acid (vitamin C) (VITAMIN C) tablet 500 mg  500 mg Oral DAILY Rashid Le MD   500 mg at 90/94/76 8164    folic acid (FOLVITE) tablet 1 mg  1 mg Oral DAILY Rashid Le MD   1 mg at 12/12/18 0809    gabapentin (NEURONTIN) capsule 300 mg  300 mg Oral QHS Rashid Le MD   300 mg at 12/11/18 2245    levothyroxine (SYNTHROID) tablet 100 mcg  100 mcg Oral 6am Rashid Le MD   100 mcg at 12/12/18 2276    metoprolol tartrate (LOPRESSOR) tablet 50 mg  50 mg Oral BID Rashid Le MD   50 mg at 12/12/18 1725    pantoprazole (PROTONIX) tablet 40 mg  40 mg Oral ACB Rashid Le MD   40 mg at 12/12/18 0809    thiamine mononitrate (B-1) tablet 100 mg  100 mg Oral DAILY Rashid Le MD   100 mg at 12/12/18 3304    zolpidem (AMBIEN) tablet 5 mg  5 mg Oral QHS PRN Rashid Le MD   5 mg at 12/09/18 2225    febuxostat (ULORIC) tablet 40 mg  40 mg Oral DAILY Rashid Le MD   40 mg at 12/12/18 0809    HYDROcodone-acetaminophen (NORCO) 5-325 mg per tablet 1 Tab  1 Tab Oral Q6H PRN Rashid Le MD        ondansetron (ZOFRAN) injection 2 mg  2 mg IntraVENous Q6H PRN Rashid Le MD        aspirin tablet 325 mg  325 mg Oral DAILY Rashid Le MD   325 mg at 12/12/18 0808    acetaminophen (TYLENOL) tablet 650 mg  650 mg Oral Q4H PRN Rashid Le MD        acetaminophen (TYLENOL) suppository 650 mg  650 mg Rectal Q4H PRN Rashid Le MD        albuterol (PROVENTIL VENTOLIN) nebulizer solution 2.5 mg  2.5 mg Nebulization Q4H PRN Rashid Le MD        senna-docusate (PERICOLACE) 8.6-50 mg per tablet 2 Tab  2 Tab Oral QHS Rashid Le MD   Stopped at 12/10/18 2200    atorvastatin (LIPITOR) tablet 80 mg  80 mg Oral QHS Rashid Le MD   80 mg at 12/11/18 2245    glucagon (GLUCAGEN) injection 1 mg  1 mg IntraMUSCular PRN Rashid Le MD        dextrose (D50W) injection syrg 12.5-25 g  25-50 mL IntraVENous PRN Rashid Le MD        ferrous sulfate tablet 325 mg  1 Tab Oral DAILY WITH BREAKFAST Rashid Le MD   325 mg at 12/12/18 0809    insulin lispro (HUMALOG) injection   SubCUTAneous AC&HS Vonnie Manjarrez MD   Stopped at 12/12/18 1630       Objective  Vitals:    12/11/18 2221 12/12/18 0613 12/12/18 0923 12/12/18 1424   BP: 92/65 104/66 109/60 105/62   Pulse: 65 63 66 65   Resp: 17 18 16 16   Temp: 97.8 °F (36.6 °C) 97.5 °F (36.4 °C) 97.6 °F (36.4 °C) 97.8 °F (36.6 °C)   SpO2: 93% 97% 97% 97%   Weight: Height:             Intake/Output Summary (Last 24 hours) at 12/12/2018 1736  Last data filed at 12/12/2018 1536  Gross per 24 hour   Intake 240 ml   Output 100 ml   Net 140 ml           Admission weight: Weight: 97.5 kg (214 lb 15.2 oz) (12/08/18 9476)  Last Weight Metrics:  Weight Loss Metrics 12/9/2018 10/21/2018 7/12/2017 6/12/2017 12/12/2016   Today's Wt 214 lb 215 lb 210 lb 215 lb 6.2 oz 214 lb   BMI 37.91 kg/m2 43.42 kg/m2 42.41 kg/m2 43.5 kg/m2 43.22 kg/m2             Physical Assessment:     General: NAD, alert and oriented. Neck: No jvd. LUNGS: Clear to Auscultation, No rales, rhonchi or wheezes. CVS EXM: S1, S2, irregular. Abdomen: soft, non tender. Lower Extremities:  2+ nonpitting  edema. Lab    CBC w/Diff Recent Labs     12/11/18  0445   WBC 6.2   RBC 4.14*   HGB 11.6*   HCT 38.2           Chemistry Recent Labs     12/12/18  0440 12/11/18  0445   * 83    140   K 3.5 3.9   CL 99* 102   CO2 28 30   BUN 44* 37*   CREA 2.82* 2.29*   CA 8.5 8.3*   AGAP 9 8   BUCR 16 16   ALB 2.9*  --    PHOS 3.7  --          No results found for: IRON, FE, TIBC, IBCT, PSAT, FERR   Lab Results   Component Value Date/Time    Calcium 8.5 12/12/2018 04:40 AM    Phosphorus 3.7 12/12/2018 04:40 AM        Mercer Boas, M.D.   [facility]  Phone

## 2018-12-12 NOTE — PROGRESS NOTES
5145: PT treatment session attempted, patient declining to participate at this time and reports that she is waiting to be bathed. Will f/u with patient.     Flores Lawrence PT, DPT  Office extension: [phone]  Pager #: [phone]

## 2018-12-12 NOTE — PROGRESS NOTES
Problem: Self Care Deficits Care Plan (Adult)  Goal: *Acute Goals and Plan of Care (Insert Text)  Occupational Therapy Goals  Initiated 12/10/2018 within 7 day(s). 1.  Patient will perform grooming tasks at EOB with supervision/set-up. 2.  Patient will perform upper body dressing with minimal assistance and fair dynamic sitting balance. 3.  Patient will perform functional task at EOB for 8 minutes with supervision for safety and minimal verbal cues for activity pacing. 4.  Patient will perform toilet transfers with minimal assistance. 5.  Patient will perform all aspects of toileting with minimal assistance. 6.  Patient will participate in upper extremity therapeutic exercise/activities with supervision/set-up for 8 minutes to increase BUE AROM/strength for functional transfers and ADLs. 7.  Patient will utilize energy conservation techniques during functional activities with minimal verbal cues. Outcome: Progressing Towards Goal  Occupational Therapy TREATMENT    Patient: Luciana Cleveland (40 y.o. female)  Date: 12/12/2018  Diagnosis: TIA (transient ischemic attack)  CVA (cerebral vascular accident) [facility]) CVA (cerebral vascular accident) ([facility] [address]      Precautions:    Chart, occupational therapy assessment, plan of care, and goals were reviewed. PLOF: Assist w/BADLs    ASSESSMENT:  Pt OOB seated in chair upon entry. Pt performs LUE TherEx at chair level. Pt continues w/decrease strength/ROM, requires assist to achieve full ROM. Pt performs cervical stretches for improved posture, continues to require max vc's for cervical extension. Pt requires use bathroom. Requires vc's for hand placement and decrease dynamic standing balance requires assist w/clothing mgt w/toileting ADL. Pt BLE edematous requires assist returning to supine. Pt left w/needs within reach and 2 daughters at bedside.    EDUCATION Pt educated on importance of hand placement w/functional transfers  Progression toward goals:  [x] Improving appropriately and progressing toward goals  []          Improving slowly and progressing toward goals  []          Not making progress toward goals and plan of care will be adjusted     PLAN:  Patient continues to benefit from skilled intervention to address the above impairments. Continue treatment per established plan of care. Discharge Recommendations:  Home Health  Further Equipment Recommendations for Discharge:  N/A, pt has DME     SUBJECTIVE:   Patient stated I need something to wash my hands.     OBJECTIVE DATA SUMMARY:       Cognitive/Behavioral Status:  Neurologic State: Alert  Orientation Level: Oriented X4  Cognition: Follows commands  Safety/Judgement: Awareness of environment  Functional Mobility and Transfers for ADLs:   Bed Mobility:  Sit to Supine: Moderate assistance(assist w/BLE)  Scooting: Maximum assistance;Assist x2   Transfers:  Sit to Stand: Minimum assistance  Bed to Chair: Minimum assistance(w/RW)   Toilet Transfer : Minimum assistance(chair --> BSC --> EOB xfer w/RW)  Balance:  Sitting: Intact  Sitting - Static: Good (unsupported)  Sitting - Dynamic: Fair (occasional)  Standing: Impaired; With support  Standing - Static: Fair  Standing - Dynamic : Fair  ADL Intervention:  Grooming  Washing Hands: Supervision/set-up(seated EOB)     Toileting ADL:Mod Assist  Bladder hygiene:CGA, standing  Clothing Mgt:Max Assist      Lower Body Dressing Assistance  Slip on Shoes with Back: Modified independent  Leg Crossed Method Used: No  Position Performed: Seated edge of bed  Cues: Doff    Therapeutic Exercises:   AAROM/SROM  LUE shoulder flexion  AROM LUE elbow flexion/extension  AAROM LUE shoulder horizontal ab/adduction   Cervical stretching, extension, lateral flexion L/R    Pain:  Pre Treatment:0  Post Treatment:0  Pain Scale 1: Numeric (0 - 10)  Pain Intensity 1: 0    Activity Tolerance:    Good    Please refer to the flowsheet for vital signs taken during this treatment.   After treatment:   []  Patient left in no apparent distress sitting up in chair  [x]  Patient left in no apparent distress in bed  [x]  Call bell left within reach  [x]  Nursing notified  []  Caregiver present  []  Bed alarm activated    TYLER Linda  Time Calculation: 28 mins

## 2018-12-12 NOTE — PROGRESS NOTES
Problem: Falls - Risk of  Goal: *Absence of Falls  Document Jun Fall Risk and appropriate interventions in the flowsheet. Outcome: Progressing Towards Goal  Fall Risk Interventions:  Mobility Interventions: Bed/chair exit alarm, PT Consult for mobility concerns         Medication Interventions: Bed/chair exit alarm    Elimination Interventions: Bed/chair exit alarm, Call light in reach, Patient to call for help with toileting needs             Problem: Pressure Injury - Risk of  Goal: *Prevention of pressure injury  Document Kamlesh Scale and appropriate interventions in the flowsheet.   Outcome: Progressing Towards Goal  Pressure Injury Interventions:       Moisture Interventions: Absorbent underpads    Activity Interventions: PT/OT evaluation, Increase time out of bed    Mobility Interventions: PT/OT evaluation, Pressure redistribution bed/mattress (bed type)    Nutrition Interventions: Document food/fluid/supplement intake

## 2018-12-12 NOTE — PROGRESS NOTES
0700 Bedside and Verbal shift change report given to [employer] (oncoming nurse) by Kory Gould RN (offgoing nurse). Report included the following information SBAR, Kardex, ED Summary, Intake/Output, MAR, Recent Results and Cardiac Rhythm AFib. Patient does have slight drift in LUE and LLE. Had previous stroke and states that side has always been weaker ever since    MRI/ CT negative. NIH discontinued per Kat Patel NP    8850 when trying to apply ointment to abdominal fold nurse noted green and yellow drainage on gauze that was packed under abdominal fold. Wilhemenia Cough NP made aware. Wound care consulted and new ABD pads placed for drainage and comfort    1900 Bedside and Verbal shift change report given to [address] (oncoming nurse) by René Alexander RN (offgoing nurse). Report included the following information SBAR, Kardex, ED Summary, Intake/Output, MAR, Recent Results and Cardiac Rhythm AFib.

## 2018-12-12 NOTE — PROGRESS NOTES
Neurology Progress Note    Admit Date: 12/8/2018  Length of Stay: 3  Primary Care: Nellei Galloway MD     Interim History: No further events, patient in good spirits. Discussed with:  Family at bedside    Assessment: completed stroke, likely embolus that broke up       Plan: home PT/OT to tune her up. Continue aspirin at 325mg. Addition of Plavix addition may increase bleed risk, but might confer a little more protection. Would hold off on adding it for now. Will be available if needed     Principle Problem: CVA (cerebral vascular accident) [facility])     Problem List: Principal Problem:    CVA (cerebral vascular accident) ([facility] [address] (12/9/2018)    Active Problems:    TIA (transient ischemic attack) (12/9/2018)    HPI: She has a history of atrial fibrillation. She had an intracerebral hemorrhage due to coumadin. It was stopped and asa was started. She had an episode of altered mentation and slurred speech for less than an hour yesterday. She was with her daughter and came to the ED. She is diabetic. Her scan shows a small acute stroke in the right insular cortex, with suspected thrombus noted. Vital Signs:   Visit Vitals  /62 (BP 1 Location: Left arm, BP Patient Position: At rest)   Pulse 65   Temp 97.8 °F (36.6 °C)   Resp 16   Ht 5' 3\" (1.6 m)   Wt 97.1 kg (214 lb)   SpO2 97%   Breastfeeding? No   BMI 37.91 kg/m²        Neurological examination:    Appearance: Awake and alert, speech clear   Cardiovascular: irregular   Mental status examination: oriented   Cranial Nerves: face symmetric  · Motor exam:  moves right better than left, has mild left sided weakness in the arm with 3/5 weakness in the left leg in UMN pattern    CT Results (most recent):  Results from Hospital Encounter encounter on 12/08/18   CT HEAD WO CONT    Narrative EXAM: CT head    INDICATION: Slurred speech. Acute neurologic deficit. COMPARISON: Ann[facility]ie 15, 2017.     TECHNIQUE: Axial CT imaging of the head was performed without intravenous  contrast. Dose reduction techniques used: automated exposure control, adjustment  of the mAs and/or kVp according to patient size, and iterative reconstruction  techniques. _______________    FINDINGS:    BRAIN AND POSTERIOR FOSSA: There is mild cerebral volume loss with prominence of  the lateral and third ventricles. The cortical sulci are widened appropriately. The fourth ventricle and basal cisterns are normally outlined. There is mild  bilateral periventricular and central white matter diminished attenuation. There  is no acute territorial defect, hemorrhage or midline shift. EXTRA-AXIAL SPACES AND MENINGES: There are no abnormal extra-axial fluid  collections. CALVARIUM: Intact. SINUSES: Clear. OTHER: None.    _______________      Impression IMPRESSION:    Mild cerebral volume loss and mild bilateral periventricular and central white  matter diminished attenuation which is nonspecific but likely to represent  microvascular disease. No acute intracranial abnormality. Findings were called to emergency room physician  prior to signing report. MRI Results (most recent):  Results from [place] encounter on 12/08/18   MRA NECK WO CONT    Narrative EXAM: MRA NECK W/O CONTRAST    INDICATION: Slurred speech, acute infarct    COMPARISON: No prior MRA or CTA study, MR brain 12/9/2018    TECHNIQUE:  MR angiography of the neck was performed utilizing axial 2-D and 3-D  noncontrast time-of-flight acquisitions with multiplanar reconstructions. No  gadolinium was administered as per specific clinician request.    _______________________    FINDINGS:      The left carotid bifurcation is slightly irregular. Estimated minimal luminal  diameter of proximal ICA is 4.5 mm. Estimated diameter of normal distal  cervical segment ICA is 4.9 mm. Estimated stenosis of left ICA based upon NASCET  criteria is less than 10%.     The right carotid bifurcation is slightly irregular. Estimated minimal luminal  diameter of proximal ICA is 4.3 mm. Estimated diameter of normal distal  cervical segment ICA is 4.8 mm. Estimated stenosis of right ICA based upon  NASCET criteria is 11%. The origins of the great vessels are within normal limits given artifact. The  vertebral arteries are relatively equal in size. No focal vertebral artery  stenosis is seen. Antegrade flow is seen in bilateral vertebral arteries. _______________________      Impression IMPRESSION:    1. Mild proximal ICA irregularity without hemodynamically significant carotid  stenosis based on NASCET criteria. 2.  No focal vertebral artery stenosis is seen. Bilateral antegrade flow.        Medications:    [x] REVIEWED  Current Facility-Administered Medications   Medication    meclizine (ANTIVERT) tablet 25 mg    betamethasone valerate (VALISONE) 0.1 % cream    bisacodyl (DULCOLAX) suppository 10 mg    ascorbic acid (vitamin C) (VITAMIN C) tablet 415 mg    folic acid (FOLVITE) tablet 1 mg    gabapentin (NEURONTIN) capsule 300 mg    levothyroxine (SYNTHROID) tablet 100 mcg    metoprolol tartrate (LOPRESSOR) tablet 50 mg    pantoprazole (PROTONIX) tablet 40 mg    thiamine mononitrate (B-1) tablet 100 mg    zolpidem (AMBIEN) tablet 5 mg    febuxostat (ULORIC) tablet 40 mg    HYDROcodone-acetaminophen (NORCO) 5-325 mg per tablet 1 Tab    ondansetron (ZOFRAN) injection 2 mg    aspirin tablet 325 mg    acetaminophen (TYLENOL) tablet 650 mg    acetaminophen (TYLENOL) suppository 650 mg    albuterol (PROVENTIL VENTOLIN) nebulizer solution 2.5 mg    senna-docusate (PERICOLACE) 8.6-50 mg per tablet 2 Tab    atorvastatin (LIPITOR) tablet 80 mg    glucagon (GLUCAGEN) injection 1 mg    dextrose (D50W) injection syrg 12.5-25 g    ferrous sulfate tablet 325 mg    insulin lispro (HUMALOG) injection     Data:    [x] REVIEWED  Recent Results (from the past 24 hour(s))   GLUCOSE, POC    Collection Time: 12/11/18  9:06 PM   Result Value Ref Range    Glucose (POC) 133 (H) 70 - 110 mg/dL   RENAL FUNCTION PANEL    Collection Time: 12/12/18  4:40 AM   Result Value Ref Range    Sodium 136 136 - 145 mmol/L    Potassium 3.5 3.5 - 5.5 mmol/L    Chloride 99 (L) 100 - 108 mmol/L    CO2 28 21 - 32 mmol/L    Anion gap 9 3.0 - 18 mmol/L    Glucose 121 (H) 74 - 99 mg/dL    BUN 44 (H) 7.0 - 18 MG/DL    Creatinine 2.82 (H) 0.6 - 1.3 MG/DL    BUN/Creatinine ratio 16 12 - 20      GFR est AA 19 (L) >60 ml/min/1.73m2    GFR est non-AA 16 (L) >60 ml/min/1.73m2    Calcium 8.5 8.5 - 10.1 MG/DL    Phosphorus 3.7 2.5 - 4.9 MG/DL    Albumin 2.9 (L) 3.4 - 5.0 g/dL   GLUCOSE, POC    Collection Time: 12/12/18  7:50 AM   Result Value Ref Range    Glucose (POC) 109 70 - 110 mg/dL   GLUCOSE, POC    Collection Time: 12/12/18 11:47 AM   Result Value Ref Range    Glucose (POC) 184 (H) 70 - 110 mg/dL

## 2018-12-12 NOTE — PROGRESS NOTES
Problem: Mobility Impaired (Adult and Pediatric)  Goal: *Acute Goals and Plan of Care (Insert Text)  Physical Therapy Goals  Initiated 12/9/2018 and to be accomplished within 7 day(s)  1. Patient will move from supine to sit and sit to supine , scoot up and down and roll side to side in bed with minimal assistance/contact guard assist.     2.  Patient will transfer from bed to chair and chair to bed with minimal assistance/contact guard assist using the least restrictive device. 3.  Patient will perform sit to stand with minimal assistance/contact guard assist.  4.  Patient will ambulate with minimal assistance/contact guard assist for >/= 75 feet with the least restrictive device. 5.  Patient will demonstrate independence with performance of home exercise program.   Outcome: Progressing Towards Goal    PHYSICAL THERAPY: Daily TREATMENT Note   INPATIENT: Medicare: Hospital Day: 5     Patient: Corean Favre (89 y.o. female)    Date: 12/12/2018  Primary Diagnosis: TIA (transient ischemic attack)  CVA (cerebral vascular accident) ([facility] [address]   ,  ,   Precautions:      Chart, physical therapy assessment, plan of care and goals were reviewed. PLOF: Ambulation with RW      ASSESSMENT:  Patient sitting up in chair, requesting to return to bed. MIN A x 1 for sit <> stand with RW for support. CGA for stand pivot transfer to bed. MOD A x 1 for sit > supine. MAX x 2 to scoot towards HOB. Patient left supine in bed with all needs within reach. No c/o pin. Progression toward goals:        Improving slowly and progressing toward goals        PLAN:  Patient continues to benefit from skilled intervention to address the above impairments. Continue treatment per established plan of care. EDUCATION:   Education:  Patient was educated on the following topics: transfers. Verbalizes understanding.    Barriers to Learning/Limitations: None  Compensate with: visual, verbal, tactile, kinesthetic cues/model    Discharge Recommendations:  Home Health  Further Equipment Recommendations for Discharge:  rolling walker  Factors which may impact discharge planning: N/A     SUBJECTIVE:   Patient stated I'm still waiting for my bed bath.     OBJECTIVE DATA SUMMARY:   Critical Behavior:  Neurologic State: Alert  Orientation Level: Oriented X4  Cognition: Follows commands  Safety/Judgement: Awareness of environment    G CODE:Mobility [id number] Current  CK= 40-59%   Goal  CK= 40-59%.   The severity rating is based on the Other MIN A - MAX for mobility     Functional Mobility:      Functional Status      Indep   (I)   Mod I   Super-vision   Min A   Mod A   Max A   Total A   Assist x2 Verbal cues Additional time Not tested   Comments   Rolling []  []  [] []    []    []  []  [] [] [] [x]    Supine to sit []  []  [] []  []  []  []  [] [] [] [x]    Sit to supine []  []  [] []  []  [x]  []  [x] [] [] []    Sit to stand []  []  [] [x]  []  []  []  [] [] [] []    Stand to sit []  []  [] [x]  []  []  []  [] [] [] []    Bed to chair transfers []  []  [x] []  []  []  []  [] [] [] [] CGA       Balance    Good   Fair   Poor   Unable   Not tested   Comments   Sitting static [x]  []  []  []  []    Sitting dynamic [x]  []  []  []  []    Standing static [x]  []  []  []  []    Standing dynamic []  []  []  []  [x]      Vital Signs  Temp: 97.6 °F (36.4 °C)     Pulse (Heart Rate): 66     BP: 109/60     Resp Rate: 16     O2 Sat (%): 97 %     Pain:  None    Activity Tolerance:   Fair     After treatment:   Patient left in no apparent distress in bed  Call bell left within reach    Juliane Ahuja   Time Calculation: 11 mins

## 2018-12-13 ENCOUNTER — HOME HEALTH ADMISSION (OUTPATIENT)
Dept: HOME HEALTH SERVICES | Facility: HOME HEALTH | Age: 83
End: 2018-12-13
Payer: MEDICARE

## 2018-12-13 VITALS
HEART RATE: 70 BPM | RESPIRATION RATE: 18 BRPM | HEIGHT: 63 IN | OXYGEN SATURATION: 91 % | BODY MASS INDEX: 37.92 KG/M2 | DIASTOLIC BLOOD PRESSURE: 70 MMHG | WEIGHT: 214 LBS | TEMPERATURE: 98.1 F | SYSTOLIC BLOOD PRESSURE: 135 MMHG

## 2018-12-13 LAB
ALBUMIN SERPL-MCNC: 2.9 G/DL (ref 3.4–5)
ANION GAP SERPL CALC-SCNC: 9 MMOL/L (ref 3–18)
BUN SERPL-MCNC: 42 MG/DL (ref 7–18)
BUN/CREAT SERPL: 18 (ref 12–20)
CALCIUM SERPL-MCNC: 8.3 MG/DL (ref 8.5–10.1)
CHLORIDE SERPL-SCNC: 100 MMOL/L (ref 100–108)
CO2 SERPL-SCNC: 28 MMOL/L (ref 21–32)
CREAT SERPL-MCNC: 2.28 MG/DL (ref 0.6–1.3)
GLUCOSE BLD STRIP.AUTO-MCNC: 104 MG/DL (ref 70–110)
GLUCOSE BLD STRIP.AUTO-MCNC: 144 MG/DL (ref 70–110)
GLUCOSE SERPL-MCNC: 104 MG/DL (ref 74–99)
PHOSPHATE SERPL-MCNC: 3.6 MG/DL (ref 2.5–4.9)
POTASSIUM SERPL-SCNC: 3.7 MMOL/L (ref 3.5–5.5)
SODIUM SERPL-SCNC: 137 MMOL/L (ref 136–145)

## 2018-12-13 PROCEDURE — 82962 GLUCOSE BLOOD TEST: CPT

## 2018-12-13 PROCEDURE — 74011250637 HC RX REV CODE- 250/637: Performed by: FAMILY MEDICINE

## 2018-12-13 PROCEDURE — 80069 RENAL FUNCTION PANEL: CPT

## 2018-12-13 PROCEDURE — 36415 COLL VENOUS BLD VENIPUNCTURE: CPT

## 2018-12-13 RX ORDER — LANOLIN ALCOHOL/MO/W.PET/CERES
325 CREAM (GRAM) TOPICAL
Qty: 30 TAB | Refills: 0 | Status: SHIPPED | OUTPATIENT
Start: 2018-12-13 | End: 2019-01-01

## 2018-12-13 RX ORDER — ATORVASTATIN CALCIUM 80 MG/1
80 TABLET, FILM COATED ORAL
Qty: 30 TAB | Refills: 0 | Status: SHIPPED | OUTPATIENT
Start: 2018-12-13

## 2018-12-13 RX ADMIN — FEBUXOSTAT 40 MG: 40 TABLET ORAL at 08:47

## 2018-12-13 RX ADMIN — Medication 100 MG: at 08:46

## 2018-12-13 RX ADMIN — METOPROLOL TARTRATE 50 MG: 50 TABLET ORAL at 08:44

## 2018-12-13 RX ADMIN — Medication 500 MG: at 08:46

## 2018-12-13 RX ADMIN — ASPIRIN 325 MG ORAL TABLET 325 MG: 325 PILL ORAL at 08:44

## 2018-12-13 RX ADMIN — BETAMETHASONE VALERATE: 1 CREAM TOPICAL at 08:49

## 2018-12-13 RX ADMIN — PANTOPRAZOLE SODIUM 40 MG: 40 TABLET, DELAYED RELEASE ORAL at 08:45

## 2018-12-13 RX ADMIN — FOLIC ACID 1 MG: 1 TABLET ORAL at 08:45

## 2018-12-13 RX ADMIN — FERROUS SULFATE TAB 325 MG (65 MG ELEMENTAL FE) 325 MG: 325 (65 FE) TAB at 08:46

## 2018-12-13 NOTE — PROGRESS NOTES
Assumed care of pt from [facility] pt awake in bed A&O x 4 , no distress noted and denies pain. Frequently used items and call bell within reach. Patient verbalized understanding to use call bell for any needs or assistance. Bed locked in lowest position. 1102 waiting for home health.

## 2018-12-13 NOTE — WOUND CARE
Wound/Ostomy Nurse Progress Note          Patient: Sudha Galicia                                                                                      :[id number]    MRN: [id number]          Situation: Wound consult for open area beneath pannus    Background: Patient was admitted to [facility] on 18 for TIA. Assessment: The patient was sitting up in her chair at bedside, awake and alert when wound care arrived. She was agreeable to a wound assessment. She has a small open wound underneath her pannus, along the crease in her abdomen. This may be a scar from a previous surgery (patient is unsure). The wound measures 0.4 x 3.3 x 0.1 cm. The base is red. There is no odor and the drainage is reddish/brown. She was given a betamethasone ointment to apply to area. Advised patient to discontinue the ointment for now, as it was not ideal for open wounds. She agreed to do this. A foam dressing was applied. Zinc was applied to the mohit wound and an Inter Dry sheet was placed between the skin fold. Home care will be ordered to follow up with wound care once the patient is discharged later today. Recommendation: Continue to monitor. Clean area daily with mild soap and water. Cover the open wound with Aquacel Ag or a silver foam. Use Inter Dry cloth for moisture wicking.

## 2018-12-13 NOTE — PROGRESS NOTES
Offered the pt FOC for home care, she chose [facility]. FOC form placed in the uut chart; copy given to the pt. Pt's dgt, Purvi Benavides, verified the contact information on the face sheet is correct. Referral sent to intake via [facility] and called to intake Jeanine livingston. Care Management Interventions  PCP Verified by CM: Yes(abt 2 weeks ago)  Palliative Care Criteria Met (RRAT>21 & CHF Dx)?: No  Mode of Transport at Discharge:  Other (see comment)(daughter)  Transition of Care Consult (CM Consult): [address]: Yes  MyChart Signup: No  Discharge Durable Medical Equipment: No  Physical Therapy Consult: Yes  Occupational Therapy Consult: Yes  Speech Therapy Consult: Yes  Current Support Network: Relative's Home(daughter lives with her)  Confirm Follow Up Transport: Family  Plan discussed with Pt/Family/Caregiver: Yes  Freedom of Choice Offered: Yes  [address] Provided?: No  Discharge Location  Discharge Placement: Home with home health

## 2018-12-13 NOTE — PROGRESS NOTES
conducted a Follow up consultation and Spiritual Assessment for Corean Favre, who is a 80 y.o.,female. The  provided the following Interventions:  Continued the relationship of care and support. Listened empathically. Offered prayer and assurance of continued prayer on patients behalf. Chart reviewed. The following outcomes were achieved:  Patient expressed gratitude for pastoral care visit. Assessment:  There are no further spiritual or Jew issues which require Spiritual Care Services interventions at this time. Plan:  Chaplains will continue to follow and will provide pastoral care on an as needed/requested basis.  recommends bedside caregivers page  on duty if patient shows signs of acute spiritual or emotional distress.      [facility]   Staff [address] [facility] [place]   [phone]

## 2018-12-13 NOTE — DISCHARGE INSTRUCTIONS
DISCHARGE SUMMARY from Nurse    PATIENT INSTRUCTIONS:    After general anesthesia or intravenous sedation, for 24 hours or while taking prescription Narcotics:  · Limit your activities  · Do not drive and operate hazardous machinery  · Do not make important personal or business decisions  · Do  not drink alcoholic beverages  · If you have not urinated within 8 hours after discharge, please contact your surgeon on call. Report the following to your surgeon:  · Excessive pain, swelling, redness or odor of or around the surgical area  · Temperature over 100.5  · Nausea and vomiting lasting longer than 4 hours or if unable to take medications  · Any signs of decreased circulation or nerve impairment to extremity: change in color, persistent  numbness, tingling, coldness or increase pain  · Any questions    What to do at Home:  Recommended activity: Activity as tolerated,     If you experience any of the following symptoms as listed under \" When should I call for help? \" in your discharge teaching  , please follow up with your Doctor and/ or call 911. *  Please give a list of your current medications to your Primary Care Provider. *  Please update this list whenever your medications are discontinued, doses are      changed, or new medications (including over-the-counter products) are added. *  Please carry medication information at all times in case of emergency situations. These are general instructions for a healthy lifestyle:    No smoking/ No tobacco products/ Avoid exposure to second hand smoke  Surgeon General's Warning:  Quitting smoking now greatly reduces serious risk to your health.     Obesity, smoking, and sedentary lifestyle greatly increases your risk for illness    A healthy diet, regular physical exercise & weight monitoring are important for maintaining a healthy lifestyle    You may be retaining fluid if you have a history of heart failure or if you experience any of the following symptoms:  Weight gain of 3 pounds or more overnight or 5 pounds in a week, increased swelling in our hands or feet or shortness of breath while lying flat in bed. Please call your doctor as soon as you notice any of these symptoms; do not wait until your next office visit. Recognize signs and symptoms of STROKE:    F-face looks uneven    A-arms unable to move or move unevenly    S-speech slurred or non-existent    T-time-call 911 as soon as signs and symptoms begin-DO NOT go       Back to bed or wait to see if you get better-TIME IS BRAIN. Warning Signs of HEART ATTACK     Call 911 if you have these symptoms:   Chest discomfort. Most heart attacks involve discomfort in the center of the chest that lasts more than a few minutes, or that goes away and comes back. It can feel like uncomfortable pressure, squeezing, fullness, or pain.  Discomfort in other areas of the upper body. Symptoms can include pain or discomfort in one or both arms, the back, neck, jaw, or stomach.  Shortness of breath with or without chest discomfort.  Other signs may include breaking out in a cold sweat, nausea, or lightheadedness. Don't wait more than five minutes to call 911 - MINUTES MATTER! Fast action can save your life. Calling 911 is almost always the fastest way to get lifesaving treatment. Emergency Medical Services staff can begin treatment when they arrive -- up to an hour sooner than if someone gets to the hospital by car. The discharge information has been reviewed with the patient. The patient verbalized understanding. Discharge medications reviewed with the patient and appropriate educational materials and side effects teaching were provided.     Patient armband removed and shredded  ___________________________________________________________________________________________________________________________________         Transient Ischemic Attack: Care Instructions  Your Care Instructions    A transient ischemic attack (TIA) is when blood flow to a part of your brain is blocked for a short time. A TIA is like a stroke but usually lasts only a few minutes. A TIA does not cause lasting brain damage. Any vision problems, slurred speech, or other symptoms usually go away in 10 to 20 minutes. But they may last for up to 24 hours. TIAs are often warning signs of a stroke. Some people who have a TIA may have a stroke in the future. A stroke can cause symptoms like those of a TIA. But a stroke causes lasting damage to your brain. You can take steps to help prevent a stroke. One thing you can do is get early treatment. If you have other new symptoms, or if your symptoms do not get better, go back to the emergency room or call your doctor right away. Getting treatment right away may prevent long-term brain damage caused by a stroke. The doctor has checked you carefully, but problems can develop later. If you notice any problems or new symptoms, get medical treatment right away. Follow-up care is a key part of your treatment and safety. Be sure to make and go to all appointments, and call your doctor if you are having problems. It's also a good idea to know your test results and keep a list of the medicines you take. How can you care for yourself at home? Medicines    · Be safe with medicines. Take your medicines exactly as prescribed. Call your doctor if you think you are having a problem with your medicine.     · If you take a blood thinner, such as aspirin, be sure you get instructions about how to take your medicine safely. Blood thinners can cause serious bleeding problems.     · Call your doctor if you are not able to take your medicines for any reason.     · Do not take any over-the-counter medicines or herbal products without talking to your doctor first.     · If you take birth control pills or hormone therapy, talk to your doctor.  Ask if these treatments are right for you.    Lifestyle changes    · Do not smoke. If you need help quitting, talk to your doctor about stop-smoking programs and medicines.     · Be active. If your doctor recommends it, get more exercise. Walking is a good choice. Bit by bit, increase the amount you walk every day. Try for at least 30 minutes on most days of the week. You also may want to swim, bike, or do other activities.     · Eat heart-healthy foods. These include fruits, vegetables, high-fiber foods, fish, and foods that are low in sodium, saturated fat, and trans fat.     · Stay at a healthy weight. Lose weight if you need to.     · Limit alcohol to 2 drinks a day for men and 1 drink a day for women.    Staying healthy    · Manage other health problems such as diabetes, high blood pressure, and high cholesterol.     · Get the flu vaccine every year. When should you call for help? Call 911 anytime you think you may need emergency care. For example, call if:    · You have new or worse symptoms of a stroke. These may include:  ? Sudden numbness, tingling, weakness, or loss of movement in your face, arm, or leg, especially on only one side of your body. ? Sudden vision changes. ? Sudden trouble speaking. ? Sudden confusion or trouble understanding simple statements. ? Sudden problems with walking or balance. ? A sudden, severe headache that is different from past headaches. Call 911 even if these symptoms go away in a few minutes.     · You feel like you are having another TIA.    Watch closely for changes in your health, and be sure to contact your doctor if you have any problems. Where can you learn more? Go to http://yamilet-joanne.info/. Enter ([phone] [phone] in the search box to learn more about \"Transient Ischemic Attack: Care Instructions. \"  Current as of: November 21, 2017  Content Version: 11.8  © 7921-2515 [email or web address]. Care instructions adapted under license by [employer] (which disclaims liability or warranty for this information).  If you have questions about a medical condition or this instruction, always ask your healthcare professional. [place] [address] any warranty or liability for your use of this information.

## 2018-12-13 NOTE — DISCHARGE SUMMARY
2 [facility]  Hospitalist Division    Discharge Summary    Patient: Doron Gerber MRN: [id number]  CSN: [id number]    YOB: 1929  Age: 80 y.o. Sex: female    DOA: 12/8/2018 LOS:  LOS: 4 days   Discharge Date:      Admission Diagnoses: TIA (transient ischemic attack)  CVA (cerebral vascular accident) [facility])    Discharge Diagnoses:    Problem List as of 12/13/2018 Never Reviewed          Codes Class Noted - Resolved    TIA (transient ischemic attack) ICD-10-CM: G45.9  ICD-9-CM: 435.9  12/9/2018 - Present        * (Principal) CVA (cerebral vascular accident) ([facility] ICD-10-CM: I63.9  ICD-9-CM: 434.91  12/9/2018 - Present        Constipation ICD-10-CM: K59.00  ICD-9-CM: 564.00  6/16/2017 - Present        CKD (chronic kidney disease) stage 3, GFR 30-59 ml/min ([facility]) ICD-10-CM: N18.3  ICD-9-CM: 585.3  6/14/2017 - Present        Obesity ICD-10-CM: E66.9  ICD-9-CM: 278.00  6/14/2017 - Present        SOB (shortness of breath) ICD-10-CM: R06.02  ICD-9-CM: 786.05  6/14/2017 - Present        Hemorrhagic stroke ([facility] ICD-10-CM: I61.9  ICD-9-CM: 823  6/11/2017 - Present        Chronic a-fib ([facility] ICD-10-CM: I48.2  ICD-9-CM: 427.31  6/11/2017 - Present        Supratherapeutic INR ICD-10-CM: R79.1  ICD-9-CM: 790.92  6/11/2017 - Present        Left-sided weakness ICD-10-CM: R53.1  ICD-9-CM: 728.87  6/11/2017 - Present        Hypokalemia ICD-10-CM: E87.6  ICD-9-CM: 276.8  6/11/2017 - Present              Discharge Condition: Good    Discharge To: Home with Home Health    Consults: Nephrology and Neurology    Hospital Course:     Per Dr. Lolita Frye HPI: \"Otilia Benitez a 80 y.o. female who presented to the ED with apparent transient slurring of her speech, according to her daughter. No other complaints. Patient denies slurred speech. Has hx of CVA and paroxysmal Afib. She is on ASA. ED evaluation was unremarkable for any new findings.  Teleneurology consulted and recommended observation in hospital with MRI and carotid u/s in am. \" Patient was taking couadmin per neurology's notes for afib anticoagulation. CT head negative for any acute event. CXR ok. MRI showing small area of acute infarct involving the posterior right insular cortex. No evidence of associated mass effect or hemorrhage, distal right MCA branching thrombus along the sylvian fissure, adjacent to the acute infarct. 12/10/18: ECHO read pending. MRA brain/neck pending-neuro consulted. BP marginal-monitor closely. Left sided weakness subsided per pt-tingling in LLE still persists. Renal numbers elevated; monitor closely (pt with h/o CKD). PT/OT; recommending SNF vs. home health. Transfer to floor. 12/11/18: MRA head/neck negative-per neurology, likely embolus that broke up. ECHO LVEF 56-60%. PT/OT; recommending home with home health. Nephrology consulted for elevated renal numbers, h/o CKD. BP remains marginal on low side-monitor closely. Per nephrology's JOSIE on CKD 3 etiology 2/2 likely ischemic atn in setting of acute cva with transient hypotension. ARB, lasix, aldactone on hold. PT/OT. Home with home health when cleared by nephrology. Hold on plavix start per neurology. Renal numbers with improvement on BMP. Continue to hold ARB, lasix and aldactone until further advised outpatient by nephrology. Discussed with patient and patient verbalized understanding. The patient will be discharged home today with home health. The patient should follow-up with PCP/neurology/nephrology in 1 week post discharge in regards to recent hospitalization. Patient to arrange. Inpatient treatment:      CVA  -MRI showing small area of acute infarct involving the posterior right insular cortex. No evidence of associated mass effect or hemorrhage, distal right MCA branching thrombus along the sylvian fissure, adjacent to the acute infarct.    -CT negative  -MRA negative   -ECHO EF 56-60%   -neuro consult   -monitor BP closely   -folic acid/thiamine/statin/ASA   -lipid panel ok  -PT/OT/Speech   -likely from emboli that broke up per neurology's notation      Chronic afib  -monitor; rate ok currently      Hypothyroid  -synthroid  -monitor      DM II  -monitor accuchecks  -SSI   -a1c 7.3      JOSIE on CKD3  -monitor renal numbers (slightly more elevated today)   -nephrology consult -appreciate   -hold aldactone, lasix, ARB    -labs per nephrology      DVT px  -SCDs  GI-protonix  Bowel-pericolace     Physical Exam:    General appearance: alert, cooperative  Lungs: clear to auscultation bilaterally  Heart: irregularly irregular, rate ok , S1, S2 normal   Abdomen: soft, non tender, non distended. Normoactive bowel sounds. Extremities: extremities normal, atraumatic, no cyanosis or edema  Skin: Skin color, texture, turgor normal.   Neurologic: Strength R side 5/5; left sided weakness noted-pronator drift. PERRLA. Significant Diagnostic Studies:     EXAM: MRA HEAD     INDICATION: Slurred speech, acute infarct     COMPARISON: No prior MRA or CTA study, MR brain 12/9/2018     TECHNIQUE:  MR angiography of the head was performed utilizing 3-D time of  flight axial acquisitions with multiplanar reconstructions.     _______________________     FINDINGS:       There is no evidence of aneurysm.  There is no focal high-grade stenosis within  the intracranial arteries.  No focal carotid siphon stenosis is seen, only  slight irregularity.  The carotid terminus is unremarkable.      No focal anterior cerebral artery stenosis is seen. An anterior communicating  artery is present.  The middle cerebral artery bifurcations are unremarkable. The previously seen branching susceptibility artifact along the posterior aspect  of the right sylvian fissure is no longer identified. Normal flow is seen  involving the right MCA inferior division sylvian branches in this location.     The vertebral arteries are relatively equal in size and slightly irregular.    PICA origins are unremarkable. The basilar artery is unremarkable.  Posterior  cerebral arteries are unremarkable.     _______________________     IMPRESSION  IMPRESSION:     1. No high-grade intracranial stenosis is seen. Only mild irregularity involving  the carotid siphons and intracranial vertebral arteries likely reflecting  atherosclerotic disease.     2. No longer identified suspected right MCA branch thrombus along the sylvian  fissure, now with unremarkable appearing patent MCA branches in this location. EXAM: MRA NECK W/O CONTRAST     INDICATION: Slurred speech, acute infarct     COMPARISON: No prior MRA or CTA study, MR brain 12/9/2018     TECHNIQUE:  MR angiography of the neck was performed utilizing axial 2-D and 3-D  noncontrast time-of-flight acquisitions with multiplanar reconstructions. No  gadolinium was administered as per specific clinician request.     _______________________     FINDINGS:       The left carotid bifurcation is slightly irregular.  Estimated minimal luminal  diameter of proximal ICA is 4.5 mm.  Estimated diameter of normal distal  cervical segment ICA is 4.9 mm. Estimated stenosis of left ICA based upon NASCET  criteria is less than 10%.    The right carotid bifurcation is slightly irregular.  Estimated minimal luminal  diameter of proximal ICA is 4.3 mm.  Estimated diameter of normal distal  cervical segment ICA is 4.8 mm. Estimated stenosis of right ICA based upon  NASCET criteria is 11%.     The origins of the great vessels are within normal limits given artifact. The  vertebral arteries are relatively equal in size. No focal vertebral artery  stenosis is seen. Antegrade flow is seen in bilateral vertebral arteries.     _______________________     IMPRESSION  IMPRESSION:     1.  Mild proximal ICA irregularity without hemodynamically significant carotid  stenosis based on NASCET criteria.     2.  No focal vertebral artery stenosis is seen. Bilateral antegrade flow.   · Estimated left ventricular ejection fraction is 56 - 60%. Visually measured ejection fraction. Left ventricular mild concentric hypertrophy. Inconclusive left ventricular diastolic function. · Left atrial cavity size is severely dilated. · Moderator band present. · Right atrial cavity size is mildly dilated. · Mild aortic valve sclerosis with no evidence of reduced excursion. · Mitral valve non-specific thickening. Moderate mitral annular calcification. Trace mitral valve regurgitation. · Myxomatous degeneration of the tricuspid valve. Moderate tricuspid valve regurgitation is present. Moderate pulmonary hypertension is present. · Mild pulmonic valve regurgitation is present.      Myocardial Findings      Left Ventricle Normal cavity size. Mild concentric hypertrophy observed. The estimated ejection fraction is 56 - 60%. Visually measured ejection fraction. There is inconclusive left ventricular diastolic function. Left Atrium The cavity size is severely dilated. Left Atrium volume index is 46 mL/m2. Right Ventricle Normal cavity size and global systolic function. Moderator band present. Right Atrium The cavity size is mildly dilated. Aortic Valve Trileaflet aortic valve structure. No stenosis and no regurgitation. Mild aortic valve sclerosis with no evidence of reduced excursion. Mitral Valve No stenosis. Mitral valve non-specific thickening. Moderate mitral annular calcification. Trace regurgitation. Tricuspid Valve No stenosis. Myxomatous degeneration. Moderate tricuspid valve regurgitation. Pulmonary arterial systolic pressure is 51 mmHg. Moderate pulmonary hypertension. Pulmonic Valve Normal valve structure and no stenosis. Mild regurgitation. Aorta Normal aortic root, ascending aortic, and aortic arch. Pericardium Pericardial fat pad present.    TTE procedure Findings      TTE Procedure Information Image quality: suboptimal. The view(s) performed were parasternal, apical, subcostal and suprasternal. Technically difficult study due to patient's body habitus, color flow Doppler was performed and pulse wave and/or continuous wave Doppler was performed.         EXAM: CT head     INDICATION: Slurred speech. Acute neurologic deficit.     COMPARISON: Annmarie 15, 2017.     TECHNIQUE: Axial CT imaging of the head was performed without intravenous  contrast. Dose reduction techniques used: automated exposure control, adjustment  of the mAs and/or kVp according to patient size, and iterative reconstruction  techniques.     _______________     FINDINGS:     BRAIN AND POSTERIOR FOSSA: There is mild cerebral volume loss with prominence of  the lateral and third ventricles. The cortical sulci are widened appropriately. The fourth ventricle and basal cisterns are normally outlined. There is mild  bilateral periventricular and central white matter diminished attenuation. There  is no acute territorial defect, hemorrhage or midline shift.     EXTRA-AXIAL SPACES AND MENINGES: There are no abnormal extra-axial fluid  collections.     CALVARIUM: Intact.     SINUSES: Clear.     OTHER: None.     _______________     IMPRESSION  IMPRESSION:     Mild cerebral volume loss and mild bilateral periventricular and central white  matter diminished attenuation which is nonspecific but likely to represent  microvascular disease.     No acute intracranial abnormality.     Findings were called to emergency room physician  prior to signing report.      A portable AP radiograph of the chest was obtained at 2334 hours:  INDICATION:  Slurred speech, weakness, stroke. COMPARISON:  Multiple prior studies most recent being 6/13/2017.     FINDINGS:      Heart and mediastinum: Borderline heart size. Lungs and pleura: Previously noted vascular congestion and interstitial edema  with pleural effusions have resolved. No residual consolidation noted. Aorta: Partially calcified. Bones: Within normal limits for age.    Other: None.     IMPRESSION  Impression:     Resolution of previously noted fluid overload/ CHF. No definite acute  cardiopulmonary process is seen.    EXAM: MRI BRAIN      INDICATION: Slurred speech, history of CVA     COMPARISON: No prior study     TECHNIQUE: Multiplanar multi sequence MR imaging of the brain was performed  utilizing sagittal FLAIR T1, axial T2 propeller, FLAIR T2 fat suppression,  diffusion, and axial T1 SE pre with axial and coronal post contrast imaging with  administration of gadolinium.  Additional dedicated susceptibility weighted  imaging was performed including MIP and filtered phase reconstruction images. Imaging performed on wide bore Discovery [id number] GEM suite 3T magnet at [facility].      _______________________     FINDINGS:     Motion artifact is present which limits evaluation. In particular, the FLAIR  sequence is severely degraded by motion with several images relatively  nondiagnostic.     VENTRICLES/EXTRA-AXIAL SPACES: The ventricles and sulci are mildly enlarged  consistent with diffuse volume loss.     BRAIN PARENCHYMA: Small area of of localized abnormal restricted diffusion is  present involving the posterior right insular cortex, as seen on axial diffusion  images 20-22. Corresponding T2/FLAIR hyperintensity is present. No mass effect  is seen.       There is a localized area of T2 and T1 precontrast hypointensity and  susceptibility artifact involving the posterior limb right internal capsule  extending to the adjacent thalamocapsular margin, well seen on axial images  19-21 suggesting hemosiderin staining from prior hemorrhage. No evidence of intracranial mass effect, midline shift, or herniation.     No abnormal contrast enhancement is present.       VASCULATURE: Extra-axial right sylvian fissure branching susceptibility artifact  is present adjacent to the posterior insular cortex infarct, compatible with MCA  branch thrombus.  Associated lack of vascular enhancement is seen on postcontrast  imaging. There may be FLAIR hyperintense vessels slightly more superiorly in the  right frontoparietal region, not well seen due to motion. The other major intracranial vascular flow voids are grossly normal.     ORBITS: The bilateral lenses are absent, likely due to prior cataract surgery.     PARANASAL SINUSES/MASTOIDS: Visualized paranasal sinuses are clear.  Visualized  mastoid air cells are clear.     OSSEOUS STRUCTURES: Unremarkable     OTHER: None.       ________________________     IMPRESSION  IMPRESSION:     Motion degraded study.     1.  Small area of acute infarct involving the posterior right insular cortex. No  evidence associated mass effect or hemorrhage.     2.  Distal right MCA branching thrombus along the sylvian fissure, adjacent to  the acute infarct.      3. Hemosiderin staining from chronic hemorrhage along the posterior right  internal capsule and  thalamocapsular junction.     4. Mild diffuse volume loss. Discharge Medications:          Current Discharge Medication List      START taking these medications    Details   atorvastatin (LIPITOR) 80 mg tablet Take 1 Tab by mouth nightly. Qty: 30 Tab, Refills: 0      ferrous sulfate 325 mg (65 mg iron) tablet Take 1 Tab by mouth daily (with breakfast). Qty: 30 Tab, Refills: 0         CONTINUE these medications which have NOT CHANGED    Details   febuxostat (ULORIC) 40 mg tab tablet Take 40 mg by mouth daily. glipiZIDE (GLUCOTROL) 5 mg tablet Take 5 mg by mouth daily. ascorbic acid, vitamin C, (VITAMIN C) 500 mg tablet Take 1 Tab by mouth daily. Qty: 60 Tab, Refills: 0      aspirin 81 mg chewable tablet Take 1 Tab by mouth daily. Qty: 100 Tab, Refills: 0      dorzolamide-timolol (COSOPT) 22.3-6.8 mg/mL ophthalmic solution Administer 1 Drop to both eyes two (2) times a day.   Qty: 5 mL, Refills: 0      ferrous fumarate-vitamin C (LAURA-SEQUELS, IRON-VIT C,) 200 mg (65 mg iron)-25 mg TbER Take 1 Tab by mouth daily. Qty: 60 Tab, Refills: 0      folic acid (FOLVITE) 1 mg tablet Take 1 Tab by mouth daily. Qty: 60 Tab, Refills: 0      furosemide (LASIX) 20 mg tablet One pill a day  Qty: 60 Tab, Refills: 0      gabapentin (NEURONTIN) 300 mg capsule Take 1 Cap by mouth nightly. Qty: 60 Cap, Refills: 0      levothyroxine (SYNTHROID) 100 mcg tablet Take 1 Tab by mouth Daily (before breakfast). Qty: 60 Tab, Refills: 0      linagliptin (TRADJENTA) 5 mg tablet Take 1 Tab by mouth Daily (before breakfast). Qty: 60 Tab, Refills: 0      metoprolol tartrate (LOPRESSOR) 50 mg tablet Take 1 Tab by mouth two (2) times a day. Qty: 60 Tab, Refills: 0      pantoprazole (PROTONIX) 40 mg tablet Take 1 Tab by mouth Daily (before breakfast). Qty: 60 Tab, Refills: 0      simvastatin (ZOCOR) 40 mg tablet Take 1 Tab by mouth nightly. Qty: 60 Tab, Refills: 0      spironolactone (ALDACTONE) 50 mg tablet Take 1 Tab by mouth two (2) times a day. Qty: 60 Tab, Refills: 0      Thiamine Mononitrate (B-1) 100 mg tablet Take 1 Tab by mouth daily. Qty: 60 Tab, Refills: 0      zolpidem (AMBIEN) 5 mg tablet Take 1 Tab by mouth nightly as needed for Sleep. Max Daily Amount: 5 mg. Qty: 60 Tab, Refills: 0      bisacodyl (DULCOLAX) 10 mg suppository Insert 10 mg into rectum daily as needed. Qty: 30 Suppository, Refills: 0      IRON/DOCUSATE SODIUM (LAURA-SEQUELS PO) Take 50 mg by mouth daily. betamethasone valerate (VALISONE) 0.1 % topical cream Apply  to affected area two (2) times a day. losartan (COZAAR) 50 mg tablet Take 50 mg by mouth daily. HYDROcodone-acetaminophen (NORCO) 5-325 mg per tablet Take 1 Tab by mouth every six (6) hours as needed for Pain. Max Daily Amount: 4 Tabs. Qty: 12 Tab, Refills: 0    Associated Diagnoses: Left hip pain      meclizine (ANTIVERT) 25 mg tablet Take 25 mg by mouth four (4) times daily as needed. glimepiride (AMARYL) 2 mg tablet Take 2 mg by mouth daily.              Activity: PT/OT Eval and Treat    Diet: Cardiac Diet and Diabetic Diet    Wound Care: None needed    Follow-up:     The patient should follow-up with PCP/neurology/nephrology in 1 week post discharge in regards to recent hospitalization. Defer to nephrology outpatient when to restart ARB, diuretics. The patient will need BMP drawn in 3-5 days to monitor renal numbers outpatient. Discussed with patient. Patient to arrange.      Discharge time > 35 mins   Rc Jackson NP  12/13/2018, 7:50 AM

## 2018-12-13 NOTE — PROGRESS NOTES
Spoke with pt, notified her of dc today. She is aware. Pt's family to transport home. She agrees to hh services. Order placed.

## 2018-12-13 NOTE — PROGRESS NOTES
1900 - Assumed pt care from [address]. Pt in bed, alert and oriented x 4. Not in any form of distress. Denies pain. Frequent use items and call bell within reach. Verbalized understanding to call for assistance. Bed locked in lowest position. No changes throughout the night.     0725 - Bedside and Verbal shift change report given to BLANCA Ayon (oncoming nurse) by Kandace Orr RN (offgoing nurse). Report included the following information SBAR, Kardex, Intake/Output, MAR and Recent Results.

## 2018-12-13 NOTE — PROGRESS NOTES
Pt discharged home with no distress noted, accompanied by son via  w/c to front entrance to vehicle. Pt has recieved discharge instructions, along with prescription  and belongings. Pt family and pt voiced understanding of discharge instructions. Patient given [facility] Stroke Education Binder, Stroke Handouts or Verbal Education.

## 2018-12-13 NOTE — PROGRESS NOTES
Problem: Falls - Risk of  Goal: *Absence of Falls  Document Jun Fall Risk and appropriate interventions in the flowsheet. Outcome: Progressing Towards Goal  Fall Risk Interventions:  Mobility Interventions: Bed/chair exit alarm         Medication Interventions: Bed/chair exit alarm    Elimination Interventions: Bed/chair exit alarm             Problem: Pressure Injury - Risk of  Goal: *Prevention of pressure injury  Document Kamlesh Scale and appropriate interventions in the flowsheet.   Outcome: Progressing Towards Goal  Pressure Injury Interventions:       Moisture Interventions: Apply protective barrier, creams and emollients    Activity Interventions: PT/OT evaluation, Pressure redistribution bed/mattress(bed type)    Mobility Interventions: Pressure redistribution bed/mattress (bed type), PT/OT evaluation    Nutrition Interventions: Document food/fluid/supplement intake

## 2018-12-16 ENCOUNTER — HOME CARE VISIT (OUTPATIENT)
Dept: SCHEDULING | Facility: HOME HEALTH | Age: 83
End: 2018-12-16
Payer: MEDICARE

## 2018-12-16 PROCEDURE — 3331090002 HH PPS REVENUE DEBIT

## 2018-12-16 PROCEDURE — 400013 HH SOC

## 2018-12-16 PROCEDURE — 3331090001 HH PPS REVENUE CREDIT

## 2018-12-16 PROCEDURE — G0299 HHS/HOSPICE OF RN EA 15 MIN: HCPCS

## 2018-12-17 ENCOUNTER — HOME CARE VISIT (OUTPATIENT)
Dept: SCHEDULING | Facility: HOME HEALTH | Age: 83
End: 2018-12-17
Payer: MEDICARE

## 2018-12-17 ENCOUNTER — HOME CARE VISIT (OUTPATIENT)
Dept: HOME HEALTH SERVICES | Facility: HOME HEALTH | Age: 83
End: 2018-12-17
Payer: MEDICARE

## 2018-12-17 VITALS
OXYGEN SATURATION: 97 % | DIASTOLIC BLOOD PRESSURE: 89 MMHG | HEART RATE: 75 BPM | SYSTOLIC BLOOD PRESSURE: 145 MMHG | TEMPERATURE: 97.9 F

## 2018-12-17 VITALS
HEART RATE: 74 BPM | RESPIRATION RATE: 16 BRPM | TEMPERATURE: 98.4 F | SYSTOLIC BLOOD PRESSURE: 118 MMHG | DIASTOLIC BLOOD PRESSURE: 70 MMHG | OXYGEN SATURATION: 96 %

## 2018-12-17 VITALS
OXYGEN SATURATION: 93 % | DIASTOLIC BLOOD PRESSURE: 89 MMHG | TEMPERATURE: 97.9 F | HEART RATE: 75 BPM | SYSTOLIC BLOOD PRESSURE: 145 MMHG

## 2018-12-17 PROCEDURE — G0152 HHCP-SERV OF OT,EA 15 MIN: HCPCS

## 2018-12-17 PROCEDURE — 3331090002 HH PPS REVENUE DEBIT

## 2018-12-17 PROCEDURE — G0151 HHCP-SERV OF PT,EA 15 MIN: HCPCS

## 2018-12-17 PROCEDURE — 3331090001 HH PPS REVENUE CREDIT

## 2018-12-18 PROCEDURE — 3331090002 HH PPS REVENUE DEBIT

## 2018-12-18 PROCEDURE — 3331090001 HH PPS REVENUE CREDIT

## 2018-12-19 PROCEDURE — 3331090002 HH PPS REVENUE DEBIT

## 2018-12-19 PROCEDURE — 3331090001 HH PPS REVENUE CREDIT

## 2018-12-20 ENCOUNTER — HOME CARE VISIT (OUTPATIENT)
Dept: SCHEDULING | Facility: HOME HEALTH | Age: 83
End: 2018-12-20
Payer: MEDICARE

## 2018-12-20 VITALS
OXYGEN SATURATION: 98 % | HEART RATE: 68 BPM | SYSTOLIC BLOOD PRESSURE: 134 MMHG | DIASTOLIC BLOOD PRESSURE: 76 MMHG | TEMPERATURE: 97.4 F | RESPIRATION RATE: 18 BRPM

## 2018-12-20 PROCEDURE — 3331090002 HH PPS REVENUE DEBIT

## 2018-12-20 PROCEDURE — G0496 LPN CARE TRAIN/EDU IN HH: HCPCS

## 2018-12-20 PROCEDURE — 3331090001 HH PPS REVENUE CREDIT

## 2018-12-21 PROCEDURE — 3331090001 HH PPS REVENUE CREDIT

## 2018-12-21 PROCEDURE — 3331090002 HH PPS REVENUE DEBIT

## 2018-12-22 PROCEDURE — 3331090001 HH PPS REVENUE CREDIT

## 2018-12-22 PROCEDURE — 3331090002 HH PPS REVENUE DEBIT

## 2018-12-23 PROCEDURE — 3331090002 HH PPS REVENUE DEBIT

## 2018-12-23 PROCEDURE — 3331090001 HH PPS REVENUE CREDIT

## 2018-12-24 PROCEDURE — 3331090001 HH PPS REVENUE CREDIT

## 2018-12-24 PROCEDURE — 3331090002 HH PPS REVENUE DEBIT

## 2018-12-25 PROCEDURE — 3331090001 HH PPS REVENUE CREDIT

## 2018-12-25 PROCEDURE — 3331090002 HH PPS REVENUE DEBIT

## 2018-12-26 ENCOUNTER — HOME CARE VISIT (OUTPATIENT)
Dept: HOME HEALTH SERVICES | Facility: HOME HEALTH | Age: 83
End: 2018-12-26
Payer: MEDICARE

## 2018-12-26 PROCEDURE — 3331090001 HH PPS REVENUE CREDIT

## 2018-12-26 PROCEDURE — 3331090002 HH PPS REVENUE DEBIT

## 2018-12-27 PROCEDURE — 3331090001 HH PPS REVENUE CREDIT

## 2018-12-27 PROCEDURE — 3331090002 HH PPS REVENUE DEBIT

## 2018-12-28 ENCOUNTER — HOME CARE VISIT (OUTPATIENT)
Dept: SCHEDULING | Facility: HOME HEALTH | Age: 83
End: 2018-12-28
Payer: MEDICARE

## 2018-12-28 VITALS
OXYGEN SATURATION: 96 % | TEMPERATURE: 97.5 F | RESPIRATION RATE: 18 BRPM | SYSTOLIC BLOOD PRESSURE: 142 MMHG | DIASTOLIC BLOOD PRESSURE: 80 MMHG | HEART RATE: 76 BPM

## 2018-12-28 PROCEDURE — 3331090001 HH PPS REVENUE CREDIT

## 2018-12-28 PROCEDURE — 3331090002 HH PPS REVENUE DEBIT

## 2018-12-28 PROCEDURE — G0496 LPN CARE TRAIN/EDU IN HH: HCPCS

## 2018-12-29 PROCEDURE — 3331090002 HH PPS REVENUE DEBIT

## 2018-12-29 PROCEDURE — 3331090001 HH PPS REVENUE CREDIT

## 2018-12-30 PROCEDURE — 3331090001 HH PPS REVENUE CREDIT

## 2018-12-30 PROCEDURE — 3331090002 HH PPS REVENUE DEBIT

## 2018-12-31 PROCEDURE — 3331090001 HH PPS REVENUE CREDIT

## 2018-12-31 PROCEDURE — 3331090002 HH PPS REVENUE DEBIT

## 2019-01-01 ENCOUNTER — HOME CARE VISIT (OUTPATIENT)
Dept: HOSPICE | Facility: HOSPICE | Age: 84
End: 2019-01-01
Payer: MEDICARE

## 2019-01-01 ENCOUNTER — HOME CARE VISIT (OUTPATIENT)
Dept: SCHEDULING | Facility: HOME HEALTH | Age: 84
End: 2019-01-01
Payer: MEDICARE

## 2019-01-01 ENCOUNTER — APPOINTMENT (OUTPATIENT)
Dept: VASCULAR SURGERY | Age: 84
DRG: 300 | End: 2019-01-01
Attending: EMERGENCY MEDICINE
Payer: MEDICARE

## 2019-01-01 ENCOUNTER — HOSPICE ADMISSION (OUTPATIENT)
Dept: HOSPICE | Facility: HOSPICE | Age: 84
End: 2019-01-01
Payer: MEDICARE

## 2019-01-01 ENCOUNTER — HOSPITAL ENCOUNTER (INPATIENT)
Age: 84
LOS: 1 days | Discharge: HOSPICE | DRG: 300 | End: 2019-08-19
Attending: EMERGENCY MEDICINE | Admitting: HOSPITALIST
Payer: MEDICARE

## 2019-01-01 VITALS
HEART RATE: 76 BPM | SYSTOLIC BLOOD PRESSURE: 106 MMHG | RESPIRATION RATE: 16 BRPM | OXYGEN SATURATION: 97 % | DIASTOLIC BLOOD PRESSURE: 64 MMHG

## 2019-01-01 VITALS
OXYGEN SATURATION: 93 % | DIASTOLIC BLOOD PRESSURE: 62 MMHG | HEART RATE: 78 BPM | SYSTOLIC BLOOD PRESSURE: 110 MMHG | TEMPERATURE: 98.2 F | RESPIRATION RATE: 16 BRPM

## 2019-01-01 VITALS
OXYGEN SATURATION: 88 % | SYSTOLIC BLOOD PRESSURE: 130 MMHG | RESPIRATION RATE: 16 BRPM | TEMPERATURE: 97.3 F | HEART RATE: 68 BPM | DIASTOLIC BLOOD PRESSURE: 62 MMHG

## 2019-01-01 VITALS
TEMPERATURE: 97.3 F | RESPIRATION RATE: 16 BRPM | SYSTOLIC BLOOD PRESSURE: 120 MMHG | HEART RATE: 68 BPM | DIASTOLIC BLOOD PRESSURE: 72 MMHG | OXYGEN SATURATION: 87 %

## 2019-01-01 VITALS
DIASTOLIC BLOOD PRESSURE: 62 MMHG | TEMPERATURE: 97.2 F | RESPIRATION RATE: 16 BRPM | OXYGEN SATURATION: 92 % | HEART RATE: 78 BPM | SYSTOLIC BLOOD PRESSURE: 120 MMHG

## 2019-01-01 VITALS
RESPIRATION RATE: 16 BRPM | HEART RATE: 60 BPM | TEMPERATURE: 96.4 F | SYSTOLIC BLOOD PRESSURE: 136 MMHG | OXYGEN SATURATION: 98 % | DIASTOLIC BLOOD PRESSURE: 68 MMHG

## 2019-01-01 VITALS
DIASTOLIC BLOOD PRESSURE: 62 MMHG | HEART RATE: 88 BPM | SYSTOLIC BLOOD PRESSURE: 118 MMHG | OXYGEN SATURATION: 96 % | TEMPERATURE: 98.3 F | RESPIRATION RATE: 16 BRPM

## 2019-01-01 VITALS
SYSTOLIC BLOOD PRESSURE: 130 MMHG | TEMPERATURE: 99.1 F | RESPIRATION RATE: 16 BRPM | OXYGEN SATURATION: 93 % | HEART RATE: 78 BPM | DIASTOLIC BLOOD PRESSURE: 62 MMHG

## 2019-01-01 VITALS
HEART RATE: 70 BPM | DIASTOLIC BLOOD PRESSURE: 60 MMHG | SYSTOLIC BLOOD PRESSURE: 126 MMHG | OXYGEN SATURATION: 92 % | TEMPERATURE: 97.2 F | RESPIRATION RATE: 16 BRPM

## 2019-01-01 VITALS
DIASTOLIC BLOOD PRESSURE: 62 MMHG | OXYGEN SATURATION: 81 % | SYSTOLIC BLOOD PRESSURE: 110 MMHG | RESPIRATION RATE: 16 BRPM | TEMPERATURE: 98.3 F | HEART RATE: 80 BPM

## 2019-01-01 VITALS
RESPIRATION RATE: 16 BRPM | HEART RATE: 78 BPM | DIASTOLIC BLOOD PRESSURE: 72 MMHG | TEMPERATURE: 97.2 F | SYSTOLIC BLOOD PRESSURE: 130 MMHG | OXYGEN SATURATION: 93 %

## 2019-01-01 VITALS
DIASTOLIC BLOOD PRESSURE: 64 MMHG | RESPIRATION RATE: 16 BRPM | TEMPERATURE: 96.8 F | SYSTOLIC BLOOD PRESSURE: 110 MMHG | OXYGEN SATURATION: 98 % | HEART RATE: 88 BPM

## 2019-01-01 VITALS
HEART RATE: 88 BPM | WEIGHT: 214 LBS | HEIGHT: 59 IN | SYSTOLIC BLOOD PRESSURE: 165 MMHG | RESPIRATION RATE: 16 BRPM | DIASTOLIC BLOOD PRESSURE: 70 MMHG | BODY MASS INDEX: 43.14 KG/M2 | OXYGEN SATURATION: 93 % | TEMPERATURE: 97.4 F

## 2019-01-01 VITALS
HEART RATE: 63 BPM | OXYGEN SATURATION: 96 % | RESPIRATION RATE: 18 BRPM | DIASTOLIC BLOOD PRESSURE: 72 MMHG | SYSTOLIC BLOOD PRESSURE: 139 MMHG

## 2019-01-01 VITALS
HEART RATE: 88 BPM | SYSTOLIC BLOOD PRESSURE: 120 MMHG | DIASTOLIC BLOOD PRESSURE: 62 MMHG | RESPIRATION RATE: 14 BRPM | OXYGEN SATURATION: 89 % | TEMPERATURE: 99.1 F

## 2019-01-01 VITALS
TEMPERATURE: 97.6 F | HEART RATE: 80 BPM | SYSTOLIC BLOOD PRESSURE: 120 MMHG | DIASTOLIC BLOOD PRESSURE: 82 MMHG | RESPIRATION RATE: 18 BRPM | OXYGEN SATURATION: 92 %

## 2019-01-01 VITALS
HEART RATE: 80 BPM | RESPIRATION RATE: 16 BRPM | SYSTOLIC BLOOD PRESSURE: 100 MMHG | TEMPERATURE: 97.2 F | DIASTOLIC BLOOD PRESSURE: 62 MMHG | OXYGEN SATURATION: 91 %

## 2019-01-01 VITALS
SYSTOLIC BLOOD PRESSURE: 120 MMHG | HEART RATE: 78 BPM | TEMPERATURE: 97.2 F | DIASTOLIC BLOOD PRESSURE: 62 MMHG | RESPIRATION RATE: 16 BRPM | OXYGEN SATURATION: 90 %

## 2019-01-01 VITALS
HEART RATE: 88 BPM | TEMPERATURE: 97.2 F | RESPIRATION RATE: 18 BRPM | DIASTOLIC BLOOD PRESSURE: 62 MMHG | OXYGEN SATURATION: 91 % | SYSTOLIC BLOOD PRESSURE: 110 MMHG

## 2019-01-01 VITALS
RESPIRATION RATE: 18 BRPM | OXYGEN SATURATION: 97 % | HEART RATE: 110 BPM | SYSTOLIC BLOOD PRESSURE: 120 MMHG | TEMPERATURE: 97.4 F | DIASTOLIC BLOOD PRESSURE: 62 MMHG

## 2019-01-01 VITALS
HEART RATE: 80 BPM | TEMPERATURE: 99.1 F | DIASTOLIC BLOOD PRESSURE: 60 MMHG | RESPIRATION RATE: 16 BRPM | OXYGEN SATURATION: 93 % | SYSTOLIC BLOOD PRESSURE: 130 MMHG

## 2019-01-01 VITALS
HEIGHT: 59 IN | HEART RATE: 77 BPM | RESPIRATION RATE: 18 BRPM | SYSTOLIC BLOOD PRESSURE: 149 MMHG | BODY MASS INDEX: 43.14 KG/M2 | DIASTOLIC BLOOD PRESSURE: 62 MMHG | WEIGHT: 214 LBS | OXYGEN SATURATION: 93 % | TEMPERATURE: 99.6 F

## 2019-01-01 VITALS
SYSTOLIC BLOOD PRESSURE: 115 MMHG | HEART RATE: 69 BPM | RESPIRATION RATE: 18 BRPM | DIASTOLIC BLOOD PRESSURE: 62 MMHG | OXYGEN SATURATION: 96 %

## 2019-01-01 DIAGNOSIS — I63.9 CEREBROVASCULAR ACCIDENT (CVA), UNSPECIFIED MECHANISM (HCC): ICD-10-CM

## 2019-01-01 DIAGNOSIS — I99.8 ISCHEMIC LEG: Primary | ICD-10-CM

## 2019-01-01 LAB
ALBUMIN SERPL-MCNC: 4.1 G/DL (ref 3.4–5)
ALBUMIN/GLOB SERPL: 1.3 {RATIO} (ref 0.8–1.7)
ALP SERPL-CCNC: 93 U/L (ref 45–117)
ALT SERPL-CCNC: 15 U/L (ref 13–56)
ANION GAP SERPL CALC-SCNC: 5 MMOL/L (ref 3–18)
APTT PPP: 29.1 SEC (ref 23–36.4)
AST SERPL-CCNC: 15 U/L (ref 10–38)
ATRIAL RATE: 67 BPM
BASOPHILS # BLD: 0 K/UL (ref 0–0.1)
BASOPHILS NFR BLD: 0 % (ref 0–2)
BILIRUB SERPL-MCNC: 0.5 MG/DL (ref 0.2–1)
BUN SERPL-MCNC: 32 MG/DL (ref 7–18)
BUN/CREAT SERPL: 18 (ref 12–20)
CALCIUM SERPL-MCNC: 8.8 MG/DL (ref 8.5–10.1)
CALCULATED P AXIS, ECG09: 74 DEGREES
CALCULATED R AXIS, ECG10: 61 DEGREES
CALCULATED T AXIS, ECG11: 58 DEGREES
CHLORIDE SERPL-SCNC: 101 MMOL/L (ref 100–111)
CO2 SERPL-SCNC: 32 MMOL/L (ref 21–32)
CREAT SERPL-MCNC: 1.82 MG/DL (ref 0.6–1.3)
DIAGNOSIS, 93000: NORMAL
DIFFERENTIAL METHOD BLD: ABNORMAL
EOSINOPHIL # BLD: 0.2 K/UL (ref 0–0.4)
EOSINOPHIL NFR BLD: 3 % (ref 0–5)
ERYTHROCYTE [DISTWIDTH] IN BLOOD BY AUTOMATED COUNT: 15.6 % (ref 11.6–14.5)
GLOBULIN SER CALC-MCNC: 3.1 G/DL (ref 2–4)
GLUCOSE SERPL-MCNC: 182 MG/DL (ref 74–99)
HCT VFR BLD AUTO: 41.9 % (ref 35–45)
HGB BLD-MCNC: 13 G/DL (ref 12–16)
INR PPP: 1 (ref 0.8–1.2)
LYMPHOCYTES # BLD: 0.8 K/UL (ref 0.9–3.6)
LYMPHOCYTES NFR BLD: 9 % (ref 21–52)
MCH RBC QN AUTO: 27.6 PG (ref 24–34)
MCHC RBC AUTO-ENTMCNC: 31 G/DL (ref 31–37)
MCV RBC AUTO: 89 FL (ref 74–97)
MONOCYTES # BLD: 0.5 K/UL (ref 0.05–1.2)
MONOCYTES NFR BLD: 6 % (ref 3–10)
NEUTS SEG # BLD: 7.7 K/UL (ref 1.8–8)
NEUTS SEG NFR BLD: 82 % (ref 40–73)
P-R INTERVAL, ECG05: 206 MS
PLATELET # BLD AUTO: 135 K/UL (ref 135–420)
PMV BLD AUTO: 9.7 FL (ref 9.2–11.8)
POTASSIUM SERPL-SCNC: 4.2 MMOL/L (ref 3.5–5.5)
PROT SERPL-MCNC: 7.2 G/DL (ref 6.4–8.2)
PROTHROMBIN TIME: 12.5 SEC (ref 11.5–15.2)
Q-T INTERVAL, ECG07: 410 MS
QRS DURATION, ECG06: 82 MS
QTC CALCULATION (BEZET), ECG08: 433 MS
RBC # BLD AUTO: 4.71 M/UL (ref 4.2–5.3)
SODIUM SERPL-SCNC: 138 MMOL/L (ref 136–145)
VENTRICULAR RATE, ECG03: 67 BPM
WBC # BLD AUTO: 9.3 K/UL (ref 4.6–13.2)

## 2019-01-01 PROCEDURE — 0651 HSPC ROUTINE HOME CARE

## 2019-01-01 PROCEDURE — G0299 HHS/HOSPICE OF RN EA 15 MIN: HCPCS

## 2019-01-01 PROCEDURE — T4528 ADULT SIZE PULL-ON XL: HCPCS

## 2019-01-01 PROCEDURE — A4245 ALCOHOL WIPES PER BOX: HCPCS

## 2019-01-01 PROCEDURE — G0155 HHCP-SVS OF CSW,EA 15 MIN: HCPCS

## 2019-01-01 PROCEDURE — 74011250636 HC RX REV CODE- 250/636: Performed by: EMERGENCY MEDICINE

## 2019-01-01 PROCEDURE — HOSPICE MEDICATION HC HH HOSPICE MEDICATION

## 2019-01-01 PROCEDURE — A9270 NON-COVERED ITEM OR SERVICE: HCPCS

## 2019-01-01 PROCEDURE — 85025 COMPLETE CBC W/AUTO DIFF WBC: CPT

## 2019-01-01 PROCEDURE — 99285 EMERGENCY DEPT VISIT HI MDM: CPT

## 2019-01-01 PROCEDURE — A6216 NON-STERILE GAUZE<=16 SQ IN: HCPCS

## 2019-01-01 PROCEDURE — A6250 SKIN SEAL PROTECT MOISTURIZR: HCPCS

## 2019-01-01 PROCEDURE — A4927 NON-STERILE GLOVES: HCPCS

## 2019-01-01 PROCEDURE — A6260 WOUND CLEANSER ANY TYPE/SIZE: HCPCS

## 2019-01-01 PROCEDURE — 3331090002 HH PPS REVENUE DEBIT

## 2019-01-01 PROCEDURE — 96365 THER/PROPH/DIAG IV INF INIT: CPT

## 2019-01-01 PROCEDURE — 77030022017 HC DRSG HEMO QCLOT ZMED -A

## 2019-01-01 PROCEDURE — A6252 ABSORPT DRG >16 <=48 W/O BDR: HCPCS

## 2019-01-01 PROCEDURE — 85730 THROMBOPLASTIN TIME PARTIAL: CPT

## 2019-01-01 PROCEDURE — 77030034849

## 2019-01-01 PROCEDURE — 3331090003 HH PPS REVENUE ADJ

## 2019-01-01 PROCEDURE — HHS10554 SHAMPOO/BODY WASH 8 OZ ALOE VESTA

## 2019-01-01 PROCEDURE — 3331090001 HH PPS REVENUE CREDIT

## 2019-01-01 PROCEDURE — 85610 PROTHROMBIN TIME: CPT

## 2019-01-01 PROCEDURE — T4541 LARGE DISPOSABLE UNDERPAD: HCPCS

## 2019-01-01 PROCEDURE — 93005 ELECTROCARDIOGRAM TRACING: CPT

## 2019-01-01 PROCEDURE — 3336500001 HSPC ELECTION

## 2019-01-01 PROCEDURE — 93922 UPR/L XTREMITY ART 2 LEVELS: CPT

## 2019-01-01 PROCEDURE — 65660000000 HC RM CCU STEPDOWN

## 2019-01-01 PROCEDURE — 80053 COMPREHEN METABOLIC PANEL: CPT

## 2019-01-01 RX ORDER — SCOLOPAMINE TRANSDERMAL SYSTEM 1 MG/1
1 PATCH, EXTENDED RELEASE TRANSDERMAL
Qty: 10 PATCH | Refills: 0 | Status: SHIPPED | OUTPATIENT
Start: 2019-01-01

## 2019-01-01 RX ORDER — ATORVASTATIN CALCIUM 40 MG/1
80 TABLET, FILM COATED ORAL
Status: CANCELLED | OUTPATIENT
Start: 2019-01-01

## 2019-01-01 RX ORDER — MORPHINE SULFATE 20 MG/ML
10 SOLUTION ORAL
Qty: 1 BOTTLE | Refills: 0 | Status: SHIPPED | OUTPATIENT
Start: 2019-01-01 | End: 2019-01-01

## 2019-01-01 RX ORDER — MORPHINE SULFATE 10 MG/ML
4 INJECTION, SOLUTION INTRAMUSCULAR; INTRAVENOUS
Status: CANCELLED | OUTPATIENT
Start: 2019-01-01

## 2019-01-01 RX ORDER — MAGNESIUM SULFATE 100 %
4 CRYSTALS MISCELLANEOUS AS NEEDED
Status: CANCELLED | OUTPATIENT
Start: 2019-01-01

## 2019-01-01 RX ORDER — SCOLOPAMINE TRANSDERMAL SYSTEM 1 MG/1
1 PATCH, EXTENDED RELEASE TRANSDERMAL
Status: DISCONTINUED | OUTPATIENT
Start: 2019-01-01 | End: 2019-01-01 | Stop reason: HOSPADM

## 2019-01-01 RX ORDER — LOSARTAN POTASSIUM 50 MG/1
50 TABLET ORAL DAILY
Status: CANCELLED | OUTPATIENT
Start: 2019-01-01

## 2019-01-01 RX ORDER — DOCUSATE SODIUM 100 MG/1
100 CAPSULE, LIQUID FILLED ORAL AS NEEDED
Status: CANCELLED | OUTPATIENT
Start: 2019-01-01

## 2019-01-01 RX ORDER — METOPROLOL TARTRATE 25 MG/1
50 TABLET, FILM COATED ORAL 2 TIMES DAILY
Status: CANCELLED | OUTPATIENT
Start: 2019-01-01

## 2019-01-01 RX ORDER — INSULIN LISPRO 100 [IU]/ML
INJECTION, SOLUTION INTRAVENOUS; SUBCUTANEOUS
Status: CANCELLED | OUTPATIENT
Start: 2019-01-01

## 2019-01-01 RX ORDER — PANTOPRAZOLE SODIUM 40 MG/1
40 TABLET, DELAYED RELEASE ORAL
Status: CANCELLED | OUTPATIENT
Start: 2019-01-01

## 2019-01-01 RX ORDER — HYDROCODONE BITARTRATE AND ACETAMINOPHEN 5; 325 MG/1; MG/1
1 TABLET ORAL
Status: CANCELLED | OUTPATIENT
Start: 2019-01-01

## 2019-01-01 RX ORDER — LORAZEPAM 2 MG/ML
1 CONCENTRATE ORAL
Status: DISCONTINUED | OUTPATIENT
Start: 2019-01-01 | End: 2019-01-01 | Stop reason: HOSPADM

## 2019-01-01 RX ORDER — LANOLIN ALCOHOL/MO/W.PET/CERES
325 CREAM (GRAM) TOPICAL
Status: CANCELLED | OUTPATIENT
Start: 2019-01-01

## 2019-01-01 RX ORDER — FEBUXOSTAT 40 MG/1
40 TABLET, FILM COATED ORAL DAILY
Status: CANCELLED | OUTPATIENT
Start: 2019-01-01

## 2019-01-01 RX ORDER — SODIUM CHLORIDE 9 MG/ML
50 INJECTION, SOLUTION INTRAVENOUS CONTINUOUS
Status: CANCELLED | OUTPATIENT
Start: 2019-01-01

## 2019-01-01 RX ORDER — MORPHINE SULFATE 20 MG/ML
10 SOLUTION ORAL
Status: DISCONTINUED | OUTPATIENT
Start: 2019-01-01 | End: 2019-01-01 | Stop reason: HOSPADM

## 2019-01-01 RX ORDER — ACETAMINOPHEN 325 MG/1
650 TABLET ORAL
Status: CANCELLED | OUTPATIENT
Start: 2019-01-01

## 2019-01-01 RX ORDER — ONDANSETRON 2 MG/ML
4 INJECTION INTRAMUSCULAR; INTRAVENOUS
Status: DISCONTINUED | OUTPATIENT
Start: 2019-01-01 | End: 2019-01-01 | Stop reason: HOSPADM

## 2019-01-01 RX ORDER — HEPARIN SODIUM 1000 [USP'U]/ML
80 INJECTION, SOLUTION INTRAVENOUS; SUBCUTANEOUS ONCE
Status: COMPLETED | OUTPATIENT
Start: 2019-01-01 | End: 2019-01-01

## 2019-01-01 RX ORDER — LORAZEPAM 2 MG/ML
1 CONCENTRATE ORAL
Qty: 1 BOTTLE | Refills: 0 | Status: SHIPPED | OUTPATIENT
Start: 2019-01-01

## 2019-01-01 RX ORDER — LEVOTHYROXINE SODIUM 100 UG/1
100 TABLET ORAL
Status: CANCELLED | OUTPATIENT
Start: 2019-01-01

## 2019-01-01 RX ORDER — ONDANSETRON 2 MG/ML
4 INJECTION INTRAMUSCULAR; INTRAVENOUS
Status: CANCELLED | OUTPATIENT
Start: 2019-01-01

## 2019-01-01 RX ORDER — ONDANSETRON 4 MG/1
4 TABLET, FILM COATED ORAL
Qty: 30 TAB | Refills: 0 | Status: SHIPPED | OUTPATIENT
Start: 2019-01-01

## 2019-01-01 RX ORDER — GUAIFENESIN 100 MG/5ML
81 LIQUID (ML) ORAL DAILY
Status: CANCELLED | OUTPATIENT
Start: 2019-01-01

## 2019-01-01 RX ORDER — HEPARIN SODIUM 10000 [USP'U]/100ML
22-36 INJECTION, SOLUTION INTRAVENOUS
Status: DISCONTINUED | OUTPATIENT
Start: 2019-01-01 | End: 2019-01-01

## 2019-01-01 RX ORDER — GABAPENTIN 300 MG/1
300 CAPSULE ORAL 2 TIMES DAILY
Status: CANCELLED | OUTPATIENT
Start: 2019-01-01

## 2019-01-01 RX ADMIN — HEPARIN SODIUM 22 UNITS/KG/HR: 10000 INJECTION, SOLUTION INTRAVENOUS at 12:46

## 2019-01-01 RX ADMIN — HEPARIN SODIUM 7770 UNITS: 1000 INJECTION, SOLUTION INTRAVENOUS; SUBCUTANEOUS at 12:55

## 2019-01-02 VITALS
OXYGEN SATURATION: 95 % | DIASTOLIC BLOOD PRESSURE: 80 MMHG | SYSTOLIC BLOOD PRESSURE: 158 MMHG | HEART RATE: 60 BPM | TEMPERATURE: 98 F | RESPIRATION RATE: 18 BRPM

## 2019-08-18 PROBLEM — I99.8 ISCHEMIC FOOT: Status: ACTIVE | Noted: 2019-01-01

## 2019-08-18 NOTE — ED TRIAGE NOTES
L foot pain, onset about one hour after awakening, at 0830 and lasting about one hour, denies injury/trauma to the L foot, states she had pain  In the foot and then it felt as though her L toes were dragging, per EMS patient was able to ambulate to the stretcher with a walker

## 2019-08-18 NOTE — ED PROVIDER NOTES
EMERGENCY DEPARTMENT HISTORY AND PHYSICAL EXAM    12:15 PM      Date: 8/18/2019  Patient Name: Rocio Gagnon    History of Presenting Illness     Chief Complaint   Patient presents with    Foot Pain         HISTORY: Rocio Gagnon is a 80 y.o. female with history as listed below who presents with foot and leg pain that started today. Patient denies trauma. She woke up having pain in the left foot was not able to move her toes and having numbness and tingling which she states is new for her. The pain has improved and now she is able to move her toes. She is able to ambulate. As the pain has improved she did not want to come to the emergency department but her children encouraged her to do so. She does have a history of A. fib. Used to be on Coumadin but is no longer on it. Denies headache, chest pain, difficulty breathing, abdominal pain, vomiting. Daughter at the bedside denies any concerns for confusion or other weakness. Appears that the patient was taken off of Coumadin after a hemorrhagic stroke in 2017. She did have an embolic stroke in 6152 but neurology notes at that time state that she is not a good candidate for anticoagulation and to continue with aspirin. PCP: Ting Victoria MD    Current Facility-Administered Medications   Medication Dose Route Frequency Provider Last Rate Last Dose    heparin (porcine) 1,000 unit/mL injection 7,770 Units  80 Units/kg IntraVENous ONCE Marie Flores MD        heparin 25,000 units in D5W 250 ml infusion  18-36 Units/kg/hr IntraVENous TITRATE Marie Flores MD         Current Outpatient Medications   Medication Sig Dispense Refill    docusate sodium (DULCOLAX STOOL SOFTENER, DSS,) 100 mg capsule Take 100 mg by mouth as needed for Constipation.  atorvastatin (LIPITOR) 80 mg tablet Take 1 Tab by mouth nightly. 30 Tab 0    ferrous sulfate 325 mg (65 mg iron) tablet Take 1 Tab by mouth daily (with breakfast).  30 Tab 0    HYDROcodone-acetaminophen (NORCO) 5-325 mg per tablet Take 1 Tab by mouth every six (6) hours as needed for Pain. Max Daily Amount: 4 Tabs. 12 Tab 0    febuxostat (ULORIC) 40 mg tab tablet Take 40 mg by mouth daily.  glipiZIDE (GLUCOTROL) 5 mg tablet Take 5 mg by mouth daily.  ascorbic acid, vitamin C, (VITAMIN C) 500 mg tablet Take 1 Tab by mouth daily. 60 Tab 0    aspirin 81 mg chewable tablet Take 1 Tab by mouth daily. 100 Tab 0    dorzolamide-timolol (COSOPT) 22.3-6.8 mg/mL ophthalmic solution Administer 1 Drop to both eyes two (2) times a day. 5 mL 0    ferrous fumarate-vitamin C (LAURA-SEQUELS, IRON-VIT C,) 200 mg (65 mg iron)-25 mg TbER Take 1 Tab by mouth daily. (Patient taking differently: Take 1 Tab by mouth daily. 65 mg) 60 Tab 0    folic acid (FOLVITE) 1 mg tablet Take 1 Tab by mouth daily. 60 Tab 0    furosemide (LASIX) 20 mg tablet One pill a day (Patient taking differently: Take 20 mg by mouth daily. One pill a day) 60 Tab 0    gabapentin (NEURONTIN) 300 mg capsule Take 1 Cap by mouth nightly. (Patient taking differently: Take 300 mg by mouth two (2) times a day.) 60 Cap 0    levothyroxine (SYNTHROID) 100 mcg tablet Take 1 Tab by mouth Daily (before breakfast). 60 Tab 0    linagliptin (TRADJENTA) 5 mg tablet Take 1 Tab by mouth Daily (before breakfast). 60 Tab 0    metoprolol tartrate (LOPRESSOR) 50 mg tablet Take 1 Tab by mouth two (2) times a day. 60 Tab 0    pantoprazole (PROTONIX) 40 mg tablet Take 1 Tab by mouth Daily (before breakfast). 60 Tab 0    simvastatin (ZOCOR) 40 mg tablet Take 1 Tab by mouth nightly. (Patient taking differently: Take 80 mg by mouth nightly.) 60 Tab 0    spironolactone (ALDACTONE) 50 mg tablet Take 1 Tab by mouth two (2) times a day. 60 Tab 0    Thiamine Mononitrate (B-1) 100 mg tablet Take 1 Tab by mouth daily. 60 Tab 0    zolpidem (AMBIEN) 5 mg tablet Take 1 Tab by mouth nightly as needed for Sleep.  Max Daily Amount: 5 mg. 60 Tab 0    bisacodyl (DULCOLAX) 10 mg suppository Insert 10 mg into rectum daily as needed. 30 Suppository 0    IRON/DOCUSATE SODIUM (LAURA-SEQUELS PO) Take 50 mg by mouth daily.  meclizine (ANTIVERT) 25 mg tablet Take 25 mg by mouth four (4) times daily as needed.  betamethasone valerate (VALISONE) 0.1 % topical cream Apply 1 Each to affected area two (2) times a day.  losartan (COZAAR) 50 mg tablet Take 50 mg by mouth daily.  glimepiride (AMARYL) 2 mg tablet Take 2 mg by mouth daily. Past History     Past Medical History:  Past Medical History:   Diagnosis Date    Atrial fibrillation (HCC)     CKD (chronic kidney disease) stage 3, GFR 30-59 ml/min ([facility])     unknown what stage    Diabetes ([email or web address].)        Past Surgical History:  No past surgical history on file. Family History:  Family History   Family history unknown: Yes       Social History:  Social History     Tobacco Use    Smoking status: Never Smoker    Smokeless tobacco: Never Used   Substance Use Topics    Alcohol use: No    Drug use: No       Allergies: Allergies   Allergen Reactions    Codeine Itching         Review of Systems       Review of Systems   Constitutional: Negative for chills. HENT: Negative for congestion and sore throat. Respiratory: Negative for cough and shortness of breath. Cardiovascular: Negative for chest pain. Gastrointestinal: Negative for abdominal pain, diarrhea, nausea and vomiting. Genitourinary: Negative for dysuria. Musculoskeletal: Negative for back pain. Left leg pain   Skin: Negative for rash. Neurological: Negative for dizziness and headaches. All other systems reviewed and are negative.         Physical Exam     Visit Vitals  /76 (BP 1 Location: Right arm, BP Patient Position: At rest;Sitting)   Pulse 72   Temp 98.2 °F (36.8 °C)   Resp 18   Ht 4' 11\" (1.499 m)   Wt 97.1 kg (214 lb)   SpO2 95%   BMI 43.22 kg/m²       Physical Exam  General Exam: Patient is a well developed and well nourished in no distress. Patient does not appear acutely ill or toxic. Obese  Eye Exam: Lids and conjunctiva are normal  ENT Exam: The general head and facial exam is normal.  The neck is supple without meningeal signs. No significant adenopathy. Pulmonary Exam: No respiratory distress. The respiratory rate is normal.  No stridor. The breath sounds are equal bilaterally. There are no wheezes, rales, or rhonchi noted. Cardiac Exam: The cardiac rate and rhythm are normal.  No significant murmurs, rubs, or gallops. The peripheral pulses of the upper extremities are normal.  Abdominal Exam: Abdomen is soft and non-distended. No pulsatile masses. There is no local tenderness. There is no rebound or guarding noted. Skin and Soft Tissue: The skin is warm and dry, without significant abnormality. Good color. Musculoskeletal Exam: Left lower leg is cold from just below the knee through the foot to touch, slightly cyanotic in appearance, unable to palpate or Doppler pulses in the foot, able to Doppler left femoral pulse, right lower extremity is warm and of normal color and she has an easily palpable DP pulse, ports normal sensation to bilateral legs and is able to move bilateral legs on her own. Psychiatric: Normal adult with appropriate demeanor and interpersonal interaction. Is oriented to person, place, and time. Diagnostic Study Results     Labs -  No results found for this or any previous visit (from the past 12 hour(s)). Radiologic Studies -   No orders to display         Medical Decision Making   I am the first provider for this patient. I reviewed the vital signs, available nursing notes, past medical history, past surgical history, family history and social history. Vital Signs-Reviewed the patient's vital signs. EKG: Interpreted by the EP. EKG performed at 1225.    Interpretation 867, normal sinus rhythm, no STEMI, no ST depressions    Records Reviewed: Nursing Notes (Time of Review: 12:15 PM)    ED Course: Progress Notes, Reevaluation, and Consults:      Provider Notes (Medical Decision Making): With left foot pain. On initial exam her extremity is cold and pulseless. She is able to move the leg and feel sensation. Consult to vascular surgery and patient was placed on a heparin drip. Labs and arterial duplex were ordered. Patient is to be admitted for ongoing management. Denies any symptoms that would be concerning for a stroke or intracranial process. Consult:  Discussed care with Dr. Elliot Clark (Vascular). Standard discussion; including history of patients chief complaint, available diagnostic results, and treatment course. He is on his way in to see the pt. that we are starting her on a heparin drip. Aware of her history of a hemorrhagic stroke in 2017. Meryl history of atrial fibrillation and that she was removed off of Coumadin when she had a hemorrhagic stroke. 12:15 PM, 8/18/2019       1:05 PM: Dr. Nic Mcgee is at the bedside. Planning for an angiogram tomorrow. Patient is on a heparin drip and clinically appears to be improving as there is increased warmth. Consult:  Discussed care with Dr. Adebayo Jimenez ([address] [place] [address]). Standard discussion; including history of patients chief complaint, available diagnostic results, and treatment course. Will admit the pt  1:09 PM, 8/18/2019     2:40 PM  Patient's renal occlusion is much more severe than originally thought. Dr. Nic Mcgee was at the bedside when vascular was obtaining arterial studies. Her occlusion originates at the common femoral artery. He gave the patient and family several options but discussed that she still is a high risk for long-term anticoagulation and any emergent procedures would not event this from reoccurring if she were not on blood thinners long-term. Patient and family have opted for hospice care. We will turn off the heparin drip. I have communicated this plan to Dr. Jena Biggs.     Critical Care Time:  Critical Care Time:  The services I provided to this patient were to treat and/or prevent clinically significant deterioration that could result in the failure of one or more body systems and/or organ systems due to acute ischemic limb. Services included the following:  -reviewing nursing notes and old charts  -vital sign assessments  -direct patient care  -medication orders and management  -interpreting and reviewing diagnostic studies/labs  -re-evaluations  -documentation time    Aggregate critical care time was 50 minutes, which includes only time during which I was engaged in work directly related to the patient's care as described above, whether I was at bedside or elsewhere in the Emergency Department. It did not include time spent performing other reported procedures or the services of residents, students, nurses, or advance practice providers. Minerva Pickett MD    1:10 PM      Diagnosis     Clinical Impression:   1. Ischemic leg        Disposition: ADMIT    Follow-up Information    None          Patient's Medications   Start Taking    No medications on file   Continue Taking    ASCORBIC ACID, VITAMIN C, (VITAMIN C) 500 MG TABLET    Take 1 Tab by mouth daily. ASPIRIN 81 MG CHEWABLE TABLET    Take 1 Tab by mouth daily. ATORVASTATIN (LIPITOR) 80 MG TABLET    Take 1 Tab by mouth nightly. BETAMETHASONE VALERATE (VALISONE) 0.1 % TOPICAL CREAM    Apply 1 Each to affected area two (2) times a day. BISACODYL (DULCOLAX) 10 MG SUPPOSITORY    Insert 10 mg into rectum daily as needed. DOCUSATE SODIUM (DULCOLAX STOOL SOFTENER, DSS,) 100 MG CAPSULE    Take 100 mg by mouth as needed for Constipation. DORZOLAMIDE-TIMOLOL (COSOPT) 22.3-6.8 MG/ML OPHTHALMIC SOLUTION    Administer 1 Drop to both eyes two (2) times a day. FEBUXOSTAT (ULORIC) 40 MG TAB TABLET    Take 40 mg by mouth daily. FERROUS FUMARATE-VITAMIN C (LAURA-SEQUELS, IRON-VIT C,) 200 MG (65 MG IRON)-25 MG TBER    Take 1 Tab by mouth daily. FERROUS SULFATE 325 MG (65 MG IRON) TABLET    Take 1 Tab by mouth daily (with breakfast). FOLIC ACID (FOLVITE) 1 MG TABLET    Take 1 Tab by mouth daily. FUROSEMIDE (LASIX) 20 MG TABLET    One pill a day    GABAPENTIN (NEURONTIN) 300 MG CAPSULE    Take 1 Cap by mouth nightly. GLIMEPIRIDE (AMARYL) 2 MG TABLET    Take 2 mg by mouth daily. GLIPIZIDE (GLUCOTROL) 5 MG TABLET    Take 5 mg by mouth daily. HYDROCODONE-ACETAMINOPHEN (NORCO) 5-325 MG PER TABLET    Take 1 Tab by mouth every six (6) hours as needed for Pain. Max Daily Amount: 4 Tabs. IRON/DOCUSATE SODIUM (LAURA-SEQUELS PO)    Take 50 mg by mouth daily. LEVOTHYROXINE (SYNTHROID) 100 MCG TABLET    Take 1 Tab by mouth Daily (before breakfast). LINAGLIPTIN (TRADJENTA) 5 MG TABLET    Take 1 Tab by mouth Daily (before breakfast). LOSARTAN (COZAAR) 50 MG TABLET    Take 50 mg by mouth daily. MECLIZINE (ANTIVERT) 25 MG TABLET    Take 25 mg by mouth four (4) times daily as needed. METOPROLOL TARTRATE (LOPRESSOR) 50 MG TABLET    Take 1 Tab by mouth two (2) times a day. PANTOPRAZOLE (PROTONIX) 40 MG TABLET    Take 1 Tab by mouth Daily (before breakfast). SIMVASTATIN (ZOCOR) 40 MG TABLET    Take 1 Tab by mouth nightly. SPIRONOLACTONE (ALDACTONE) 50 MG TABLET    Take 1 Tab by mouth two (2) times a day. THIAMINE MONONITRATE (B-1) 100 MG TABLET    Take 1 Tab by mouth daily. ZOLPIDEM (AMBIEN) 5 MG TABLET    Take 1 Tab by mouth nightly as needed for Sleep. Max Daily Amount: 5 mg. These Medications have changed    No medications on file   Stop Taking    No medications on file     _______________________________    Please note that this dictation was completed with [employer], the computer voice recognition software. Quite often unanticipated grammatical, syntax, homophones, and other interpretive errors are inadvertently transcribed by the computer software. Please disregard these errors.   Please excuse any errors that have escaped final proofreading.

## 2019-08-18 NOTE — PROGRESS NOTES
Problem: Pain  Goal: *Control of acute pain  Outcome: Progressing Towards Goal     Problem: Pressure Injury - Risk of  Goal: *Prevention of pressure injury  Outcome: Progressing Towards Goal     Problem: Grieving  Goal: *Able to express feelings of grief  Outcome: Progressing Towards Goal  Goal: *Able to identify stages of grieving process  Outcome: Progressing Towards Goal     Problem: Mood - Altered  Goal: *Alleviation of anxiety and depressive symptoms  Outcome: Progressing Towards Goal     Problem: Discharge Planning  Goal: *Participates in discharge planning  Outcome: Progressing Towards Goal     Problem: Patient Education: Go to Patient Education Activity  Goal: Patient/Family Education  Outcome: Progressing Towards Goal     Problem: Pain  Goal: *Control of Pain  Outcome: Progressing Towards Goal  Goal: *PALLIATIVE CARE:  Alleviation of Pain  Outcome: Progressing Towards Goal     Problem: Patient Education: Go to Patient Education Activity  Goal: Patient/Family Education  Outcome: Progressing Towards Goal

## 2019-08-18 NOTE — PROGRESS NOTES
Reviewed ultrasound at bedside and surprisingly found to have complete occlusion of left CFA with visible acute thrombus within profunda and SFA. Had family meeting with multiple children and grandchildren. After extensive discussion of face to face discussing surgery and post surgical care over 90 minutes decision finally made for hospice care. ED will get hospice consultation.

## 2019-08-18 NOTE — ED NOTES
TRANSFER - ED to INPATIENT REPORT:    SBAR report made available to receiving floor on this patient being transferred to [address] [place] [address] ([facility])  for routine progression of care       Admitting diagnosis Ischemic foot [I99.8]    Information from the following report(s) SBAR, Procedure Summary, MAR and Recent Results was made available to receiving floor. Lines:   Peripheral IV 08/18/19 Right Forearm (Active)   Site Assessment Clean, dry, & intact 8/18/2019 12:25 PM   Phlebitis Assessment 0 8/18/2019 12:25 PM   Infiltration Assessment 0 8/18/2019 12:25 PM   Dressing Status Clean, dry, & intact 8/18/2019 12:25 PM       Peripheral IV 08/18/19 Left Antecubital (Active)   Site Assessment Clean, dry, & intact 8/18/2019 12:38 PM   Phlebitis Assessment 0 8/18/2019 12:38 PM   Infiltration Assessment 0 8/18/2019 12:38 PM   Dressing Status Clean, dry, & intact 8/18/2019 12:38 PM   Dressing Type Transparent 8/18/2019 12:38 PM   Hub Color/Line Status Pink 8/18/2019 12:38 PM        Medication list unable to confirm    Opportunity for questions and clarification was provided.       Patient is oriented to time, place, person and situation alert and oriented  Patient is  incontinent and ambulatory with assist     Valuables transported with patient     Patient transported with:   Tech    MAP (Monitor): 91 =Monitored (most recent)  Vitals w/ MEWS Score (last day)     Date/Time MEWS Score Pulse Resp Temp BP Level of Consciousness SpO2    08/18/19 1700          154/72    94 %    08/18/19 1645              95 %    08/18/19 1640  1  82  18  98.4 °F (36.9 °C)  159/58  Alert  95 %    08/18/19 1630              94 %    08/18/19 1615              94 %    08/18/19 1600          146/59    95 %    08/18/19 1545              94 %    08/18/19 1530              95 %    08/18/19 1515    69          93 %    08/18/19 1500    70      164/65    94 %    08/18/19 1445    68      164/66    93 %    08/18/19 1430   70      188/70    94 %    08/18/19 1415    70      183/78    94 %    08/18/19 1400    71  20    181/76    93 %    08/18/19 1345    65      176/66    93 %    08/18/19 1330    67      174/61    94 %    08/18/19 1315    67      181/84    94 %    08/18/19 1300    70      182/76    94 %    08/18/19 1245    70      187/69    95 %    08/18/19 1230    69  13    (!) 155/93    94 %    08/18/19 1215              97 %    08/18/19 1200              94 %    08/18/19 1145          169/79    95 %    08/18/19 1140  1  72  18  98.2 °F (36.8 °C)  171/76  Alert  95 %              Septic Patients:     Lactic Acid  No results found for: LACPOC (Most recent on top)  Repeat drawn: NO Time: n/a     ALL LACTIC ACIDS GREATER THAN 2 MUST BE REPEATED POC WITHIN 4 HOURS OR PRIOR TO ADMISSION               Total Fluid Bolus initiated and documented on MAR: NO    All ordered antibiotics initiated within first 3 hours of TIME ZERO?   NO

## 2019-08-18 NOTE — ED NOTES
Patient given a warm blanket, changed the BP to every hour for comfort, lights off, railings up, family at the bedsdie

## 2019-08-18 NOTE — ED NOTES
Heparin drip stopped by cardiovascular provider. He is with pt's family at the bedside and talking to them.

## 2019-08-18 NOTE — ED NOTES
Vascular surgeon at the bedside, vascular study done, family at the bedside, provider at the bedside to discuss care for the patient

## 2019-08-18 NOTE — H&P
Medicine History and Physical    Patient: Sánchez Lisa   Age:  80 y.o. Assessment   Active Problems:    Ischemic foot (8/18/2019)          Plan     1)  Ischemic Foot   - Family as decided on hospice   - Patient has Post with DNR on file   - hospice on tomorrow    DISPO    Anticipated Date of Discharge: 1-2 days  Anticipated Disposition (home, SNF) : home vs snf    Chief Complaint:   Chief Complaint   Patient presents with    Foot Pain         HPI:   Sánchez Lisa is a 80y.o. year old female who presents with  LLE Pain along with foot numbness and difficulty with movement. She has had a previous CVA hemorrhagic so is no longer on anticoagulation. She was seen by vascular surgery in ed found to have cold and cyanotic leg from knee down. Decided as family with ED and vascular to make hospice. Review of Systems - positive responses in bold type   Constitutional: Negative for fever, chills, diaphoresis and unexpected weight change. HENT: Negative for ear pain, congestion, sore throat, rhinorrhea, drooling, trouble swallowing, neck pain and tinnitus. Eyes: Negative for photophobia, pain, redness and visual disturbance. Respiratory: negative for shortness of breath, cough, choking, chest tightness, wheezing or stridor. Cardiovascular: Negative for chest pain, palpitations and leg swelling. Gastrointestinal: Negative for nausea, vomiting, abdominal pain, diarrhea, constipation, blood in stool, abdominal distention and anal bleeding. Genitourinary: Negative for dysuria, urgency, frequency, hematuria, flank pain and difficulty urinating. Musculoskeletal: Negative for back pain and arthralgias. Skin: Negative for color change, rash and wound. Neurological: Negative for dizziness, seizures, syncope, speech difficulty, light-headedness or headaches. Hematological: Does not bruise/bleed easily.    Psychiatric/Behavioral: Negative for suicidal ideas, hallucinations, behavioral problems, self-injury or agitation       Past Medical History:  Past Medical History:   Diagnosis Date    Atrial fibrillation (HCC)     CKD (chronic kidney disease) stage 3, GFR 30-59 ml/min (HCC)     unknown what stage    Diabetes ([facility]     Hemorrhagic stroke ([facility]     TIA (transient ischemic attack)        Past Surgical History:  No past surgical history on file. Family History:  Family History   Family history unknown: Yes       Social History:  Social History     Socioeconomic History    Marital status:      Spouse name: Not on file    Number of children: Not on file    Years of education: Not on file    Highest education level: Not on file   Tobacco Use    Smoking status: Never Smoker    Smokeless tobacco: Never Used   Substance and Sexual Activity    Alcohol use: No    Drug use: No       Home Medications:  Prior to Admission medications    Medication Sig Start Date End Date Taking? Authorizing Provider   docusate sodium (DULCOLAX STOOL SOFTENER, DSS,) 100 mg capsule Take 100 mg by mouth as needed for Constipation. Calista Hernández MD   atorvastatin (LIPITOR) 80 mg tablet Take 1 Tab by mouth nightly. 12/13/18   Moisés Blades, NP   ferrous sulfate 325 mg (65 mg iron) tablet Take 1 Tab by mouth daily (with breakfast). 12/13/18   Moisés Blades, NP   HYDROcodone-acetaminophen (NORCO) 5-325 mg per tablet Take 1 Tab by mouth every six (6) hours as needed for Pain. Max Daily Amount: 4 Tabs. 10/21/18   DEBBIE Capellan   febuxostat (ULORIC) 40 mg tab tablet Take 40 mg by mouth daily. Provider, Historical   glipiZIDE (GLUCOTROL) 5 mg tablet Take 5 mg by mouth daily. Provider, Historical   ascorbic acid, vitamin C, (VITAMIN C) 500 mg tablet Take 1 Tab by mouth daily. 7/13/17   Valente Beasley MD   aspirin 81 mg chewable tablet Take 1 Tab by mouth daily.  7/13/17   Valente Beasley MD   dorzolamide-timolol (COSOPT) 22.3-6.8 mg/mL ophthalmic solution Administer 1 Drop to both eyes two (2) times a day. 7/13/17   Harry Patricia MD   ferrous fumarate-vitamin C (LAURA-SEQUELS, IRON-VIT C,) 200 mg (65 mg iron)-25 mg TbER Take 1 Tab by mouth daily. Patient taking differently: Take 1 Tab by mouth daily. 65 mg 7/13/17   Harry Patricia MD   folic acid (FOLVITE) 1 mg tablet Take 1 Tab by mouth daily. 7/13/17   Harry Patricia MD   furosemide (LASIX) 20 mg tablet One pill a day  Patient taking differently: Take 20 mg by mouth daily. One pill a day 7/13/17   Harry Patricia MD   gabapentin (NEURONTIN) 300 mg capsule Take 1 Cap by mouth nightly. Patient taking differently: Take 300 mg by mouth two (2) times a day. 7/13/17   Harry Patricia MD   levothyroxine (SYNTHROID) 100 mcg tablet Take 1 Tab by mouth Daily (before breakfast). 7/13/17   Harry Patricia MD   linagliptin (TRADJENTA) 5 mg tablet Take 1 Tab by mouth Daily (before breakfast). 7/13/17   Harry Patricia MD   metoprolol tartrate (LOPRESSOR) 50 mg tablet Take 1 Tab by mouth two (2) times a day. 7/13/17   Harry Patricia MD   pantoprazole (PROTONIX) 40 mg tablet Take 1 Tab by mouth Daily (before breakfast). 7/13/17   Harry Patricia MD   simvastatin (ZOCOR) 40 mg tablet Take 1 Tab by mouth nightly. Patient taking differently: Take 80 mg by mouth nightly. 7/13/17   Harry Patricia MD   spironolactone (ALDACTONE) 50 mg tablet Take 1 Tab by mouth two (2) times a day. 7/13/17   Harry Patricia MD   Thiamine Mononitrate (B-1) 100 mg tablet Take 1 Tab by mouth daily. 7/13/17   Harry Patricia MD   zolpidem (AMBIEN) 5 mg tablet Take 1 Tab by mouth nightly as needed for Sleep. Max Daily Amount: 5 mg. 7/13/17   Harry Patricia MD   bisacodyl (DULCOLAX) 10 mg suppository Insert 10 mg into rectum daily as needed. 6/16/17   Toro Woodard MD   IRON/DOCUSATE SODIUM (LAURA-SEQUELS PO) Take 50 mg by mouth daily.     Other, MD Jose   meclizine (ANTIVERT) 25 mg tablet Take 25 mg by mouth four (4) times daily as needed. Jose Knight MD   betamethasone valerate (VALISONE) 0.1 % topical cream Apply 1 Each to affected area two (2) times a day. Jose Knight MD   losartan (COZAAR) 50 mg tablet Take 50 mg by mouth daily. Jose Knight MD   glimepiride (AMARYL) 2 mg tablet Take 2 mg by mouth daily. Jose Knight MD       Allergies: Allergies   Allergen Reactions    Codeine Itching           Physical Exam:     Visit Vitals  /76   Pulse 71   Temp 98.2 °F (36.8 °C)   Resp 20   Ht 4' 11\" (1.499 m)   Wt 97.1 kg (214 lb)   SpO2 93%   BMI 43.22 kg/m²       Physical Exam:  General appearance: alert, cooperative, no distress, appears stated age  Head: Normocephalic, without obvious abnormality, atraumatic  Neck: supple, trachea midline  Lungs: clear to auscultation bilaterally  Heart: regular rate and rhythm, S1, S2 normal, no murmur, click, rub or gallop  Abdomen: soft, non-tender. Bowel sounds normal. No masses,  no organomegaly  Extremities: LLE with discoloring and cold from knee done. Skin: Skin color, texture, turgor normal. No rashes or lesions  Neurologic: Grossly normal    Intake and Output:  Current Shift:  No intake/output data recorded. Last three shifts:  No intake/output data recorded.     Lab/Data Reviewed:  BMP:   Lab Results   Component Value Date/Time     08/18/2019 12:15 PM    K 4.2 08/18/2019 12:15 PM     08/18/2019 12:15 PM    CO2 32 08/18/2019 12:15 PM    AGAP 5 08/18/2019 12:15 PM     (H) 08/18/2019 12:15 PM    BUN 32 (H) 08/18/2019 12:15 PM    CREA 1.82 (H) 08/18/2019 12:15 PM    GFRAA 32 (L) 08/18/2019 12:15 PM    GFRNA 26 (L) 08/18/2019 12:15 PM     CMP:   Lab Results   Component Value Date/Time     08/18/2019 12:15 PM    K 4.2 08/18/2019 12:15 PM     08/18/2019 12:15 PM    CO2 32 08/18/2019 12:15 PM    AGAP 5 08/18/2019 12:15 PM     (H) 08/18/2019 12:15 PM    BUN 32 (H) 08/18/2019 12:15 PM    CREA 1.82 (H) 08/18/2019 12:15 PM    GFRAA 32 (L) 08/18/2019 12:15 PM    GFRNA 26 (L) 08/18/2019 12:15 PM    CA 8.8 08/18/2019 12:15 PM    ALB 4.1 08/18/2019 12:15 PM    TP 7.2 08/18/2019 12:15 PM    GLOB 3.1 08/18/2019 12:15 PM    AGRAT 1.3 08/18/2019 12:15 PM    SGOT 15 08/18/2019 12:15 PM    ALT 15 08/18/2019 12:15 PM     CBC:   Lab Results   Component Value Date/Time    WBC 9.3 08/18/2019 12:15 PM    HGB 13.0 08/18/2019 12:15 PM    HCT 41.9 08/18/2019 12:15 PM     08/18/2019 12:15 PM     Braulio Worley MD    August 18, 2019

## 2019-08-18 NOTE — ED NOTES
Pt alert and oriented, transported by Amelia Blend. Pt urinated about 200 mL, and brief has been changed. Gauze on the tear of left groin has been replaced.

## 2019-08-18 NOTE — CONSULTS
Surgery Consult      Patient: Estela Son MRN: [id number]  CSN: [id number]      YOB: 1929    Age: 80 y.o. Sex: female      DOA: 8/18/2019       HPI:     Estela Son is a 80 y.o. female who presents with acute limb ischemia of left lower extremity with rest pain and inability to move and feel foot. She has previous history of known a fib with hemorrhagic stroke taken off coumadin with subsequent embolic stroke. Deemed unsafe for anticoagulation by neuro. Currently feeling much improved. Foot was ice cold and cyanotic from knee down. Currently feeling foot as previous and has full motion in toes with no cyanosis and coolness to midcalf that is actually warming distally with continued heparin gtt administration. Patient is not okay with amputation. Past Medical History:   Diagnosis Date    Atrial fibrillation (HCC)     CKD (chronic kidney disease) stage 3, GFR 30-59 ml/min (HCC)     unknown what stage    Diabetes ([facility]     Hemorrhagic stroke (HCC)     TIA (transient ischemic attack)        No past surgical history on file. Family History   Family history unknown: Yes       Social History     Socioeconomic History    Marital status:      Spouse name: Not on file    Number of children: Not on file    Years of education: Not on file    Highest education level: Not on file   Tobacco Use    Smoking status: Never Smoker    Smokeless tobacco: Never Used   Substance and Sexual Activity    Alcohol use: No    Drug use: No       Prior to Admission medications    Medication Sig Start Date End Date Taking? Authorizing Provider   docusate sodium (DULCOLAX STOOL SOFTENER, DSS,) 100 mg capsule Take 100 mg by mouth as needed for Constipation. Edmund Bansal MD   atorvastatin (LIPITOR) 80 mg tablet Take 1 Tab by mouth nightly. 12/13/18   Prabhjot Gonzales NP   ferrous sulfate 325 mg (65 mg iron) tablet Take 1 Tab by mouth daily (with breakfast).  12/13/18   Prabhjot Gonzales NP   HYDROcodone-acetaminophen (NORCO) 5-325 mg per tablet Take 1 Tab by mouth every six (6) hours as needed for Pain. Max Daily Amount: 4 Tabs. 10/21/18   DEBBIE Ramirez   febuxostat (ULORIC) 40 mg tab tablet Take 40 mg by mouth daily. Provider, Historical   glipiZIDE (GLUCOTROL) 5 mg tablet Take 5 mg by mouth daily. Provider, Historical   ascorbic acid, vitamin C, (VITAMIN C) 500 mg tablet Take 1 Tab by mouth daily. 7/13/17   Ruma Phan MD   aspirin 81 mg chewable tablet Take 1 Tab by mouth daily. 7/13/17   Ruma Phan MD   dorzolamide-timolol (COSOPT) 22.3-6.8 mg/mL ophthalmic solution Administer 1 Drop to both eyes two (2) times a day. 7/13/17   Ruma Phan MD   ferrous fumarate-vitamin C (LAURA-SEQUELS, IRON-VIT C,) 200 mg (65 mg iron)-25 mg TbER Take 1 Tab by mouth daily. Patient taking differently: Take 1 Tab by mouth daily. 65 mg 7/13/17   Ruma Phan MD   folic acid (FOLVITE) 1 mg tablet Take 1 Tab by mouth daily. 7/13/17   Ruma Phna MD   furosemide (LASIX) 20 mg tablet One pill a day  Patient taking differently: Take 20 mg by mouth daily. One pill a day 7/13/17   Ruma Phan MD   gabapentin (NEURONTIN) 300 mg capsule Take 1 Cap by mouth nightly. Patient taking differently: Take 300 mg by mouth two (2) times a day. 7/13/17   Ruma Phan MD   levothyroxine (SYNTHROID) 100 mcg tablet Take 1 Tab by mouth Daily (before breakfast). 7/13/17   Ruma Phan MD   linagliptin (TRADJENTA) 5 mg tablet Take 1 Tab by mouth Daily (before breakfast). 7/13/17   Ruma Phan MD   metoprolol tartrate (LOPRESSOR) 50 mg tablet Take 1 Tab by mouth two (2) times a day. 7/13/17   Ruma Phan MD   pantoprazole (PROTONIX) 40 mg tablet Take 1 Tab by mouth Daily (before breakfast). 7/13/17   Ruma Phan MD   simvastatin (ZOCOR) 40 mg tablet Take 1 Tab by mouth nightly. Patient taking differently: Take 80 mg by mouth nightly. 7/13/17   Harry Patricia MD   spironolactone (ALDACTONE) 50 mg tablet Take 1 Tab by mouth two (2) times a day. 7/13/17   Harry Patricia MD   Thiamine Mononitrate (B-1) 100 mg tablet Take 1 Tab by mouth daily. 7/13/17   Harry Patricia MD   zolpidem (AMBIEN) 5 mg tablet Take 1 Tab by mouth nightly as needed for Sleep. Max Daily Amount: 5 mg. 7/13/17   Harry Patricia MD   bisacodyl (DULCOLAX) 10 mg suppository Insert 10 mg into rectum daily as needed. 6/16/17   Toro Woodard MD   IRON/DOCUSATE SODIUM (LAURA-SEQUELS PO) Take 50 mg by mouth daily. Jose Knight MD   meclizine (ANTIVERT) 25 mg tablet Take 25 mg by mouth four (4) times daily as needed. Jose Knight MD   betamethasone valerate (VALISONE) 0.1 % topical cream Apply 1 Each to affected area two (2) times a day. Jose Knight MD   losartan (COZAAR) 50 mg tablet Take 50 mg by mouth daily. Jose Knight MD   glimepiride (AMARYL) 2 mg tablet Take 2 mg by mouth daily. Jose Knight MD       Allergies   Allergen Reactions    Codeine Itching       Physical Exam:      Visit Vitals  /69   Pulse 70   Temp 98.2 °F (36.8 °C)   Resp 13   Ht 4' 11\" (1.499 m)   Wt 214 lb (97.1 kg)   SpO2 95%   BMI 43.22 kg/m²       GENERAL: alert, cooperative, no distress, appears stated age, morbidly obese, EYE: negative findings: anicteric sclera and EOMI, THROAT & NECK: normal, no erythema or exudates noted. and mucous membranes moist, LUNG: clear to auscultation bilaterally, HEART: regular rate and rhythm, S1, S2 normal, no murmur, click, rub or gallop, ABDOMEN: soft, non-tender.  Bowel sounds normal. No masses,  no organomegaly, abnormal findings:  obese, EXTREMITIES:  Trace bilaterally, RLE with 1+ DP nonpalp PT, w/w/p; LLE cool from mid calf distal with mild lattice work appearance full motion and sensation in 1st webspace, no pain with passive or active movement, 2-3 second capillary refill worse as I go distally to toes best at ankle and calf, NEUROLOGIC: negative findings: alert, oriented x3  speech normal in context and clarity  memory intact grossly  cranial nerves II-XII intact  no involuntary movements - tremors    ROS:  Constitutional: negative  Eyes: negative  Ears, nose, mouth, throat, and face: negative  Respiratory: negative  Cardiovascular: negative  Gastrointestinal: negative  Genitourinary:negative  Hematologic/lymphatic: negative  Musculoskeletal:negative  Neurological: negative  Behavioral/Psych: negative  Endocrine: negative  Allergic/Immunologic: negative  Unless otherwise mentioned in the HPI. Data Review:    CBC:   Lab Results   Component Value Date/Time    WBC 9.3 08/18/2019 12:15 PM    RBC 4.71 08/18/2019 12:15 PM    HGB 13.0 08/18/2019 12:15 PM    HCT 41.9 08/18/2019 12:15 PM    PLATELET 913 71/72/6408 12:15 PM      BMP:   Lab Results   Component Value Date/Time    Glucose 182 (H) 08/18/2019 12:15 PM    Sodium 138 08/18/2019 12:15 PM    Potassium 4.2 08/18/2019 12:15 PM    Chloride 101 08/18/2019 12:15 PM    CO2 32 08/18/2019 12:15 PM    BUN 32 (H) 08/18/2019 12:15 PM    Creatinine 1.82 (H) 08/18/2019 12:15 PM    Calcium 8.8 08/18/2019 12:15 PM     Coagulation:   Lab Results   Component Value Date/Time    Prothrombin time 12.5 08/18/2019 12:15 PM    INR 1.0 08/18/2019 12:15 PM    aPTT 29.1 08/18/2019 12:15 PM         Assessment/Plan     [address] y/o female with previous acute limb ischemia now resolving with heparin and no longer in critical limb ischemia. --Given no longer in acute limb ischemia no need for emergent surgery  --will await duplex and plan for angiography tomorrow. --family needs to make plans for patient as long as she doesn't take long term anticoagulation she will continue to throw embolisms as she goes in and out of a fib. We need to know how far to go medically for her or if all surgical care should be withdrawn. --will discuss case with Dr. Eren Batista for angio tomorrow.   --further plan post angiography did discuss procedure with patient and family giving risks and benefits including but not limited to bleeding, infection, damage to adjacent structures, MI, stroke, death, loss of limb, and need for further surgery. They are agreeable to angiogram with possible intervention. --previous hemorrhagic stroke was in 2017. --Please call with any further questions or concerns. Active Problems:    * No active hospital problems.  Juana Arroyo MD  August 18, 2019

## 2019-08-18 NOTE — ROUTINE PROCESS
1718 Received report from ED RN; all questions answered     1803 Pt arrived on the floor from ED; no complaints or distress; no new changes; family at bedside; called MD about diet    1935 -Bedside shift change report given to Carmine Santizo (oncoming nurse) by Aurelia (offgoing nurse). Report included the following information SBAR, Kardex, Intake/Output, MAR, Accordion, Recent Results and Med Rec Status.

## 2019-08-19 NOTE — PROGRESS NOTES
0700  -- Bedside, Verbal and Written shift change report given to [address] (oncoming nurse) by  (offgoing nurse).  Report included the following information SBAR, Kardex, Intake/Output, MAR and Recent Results.         1400 -- Pt to e discharged.  Lesley Galindo

## 2019-08-19 NOTE — ROUTINE PROCESS
Assumed care of pt report received from [employer] ,RN. Pt awake, alert and oriented X 3. Pt denies any pain at this time. No verbal distress noted. Bed in lowest position & wheels locked. Call bell within reach. 8/19/19    9911  -  Bedside and Verbal shift change report given to BLANCA Montague (oncoming nurse) by Armando Pabon RN BSN ( off going Nurse ) inclusive of SBAR and plan of care, Intake & Output.

## 2019-08-19 NOTE — PROGRESS NOTES
Problem: Pain  Goal: *Control of acute pain  Outcome: Progressing Towards Goal     Problem: Pressure Injury - Risk of  Goal: *Prevention of pressure injury  Outcome: Progressing Towards Goal     Problem: Grieving  Goal: *Able to express feelings of grief  Outcome: Progressing Towards Goal     Problem: Mood - Altered  Goal: *Alleviation of anxiety and depressive symptoms  Outcome: Progressing Towards Goal     Problem: Patient Education: Go to Patient Education Activity  Goal: Patient/Family Education  Outcome: Progressing Towards Goal     Problem: Falls - Risk of  Goal: *Absence of Falls  Description  Document Josue Jones Fall Risk and appropriate interventions in the flowsheet.   Outcome: Progressing Towards Goal  Note:   Fall Risk Interventions:  Mobility Interventions: Communicate number of staff needed for ambulation/transfer, OT consult for ADLs, Patient to call before getting OOB, PT Consult for mobility concerns, Strengthening exercises (ROM-active/passive)         Medication Interventions: Assess postural VS orthostatic hypotension, Evaluate medications/consider consulting pharmacy, Patient to call before getting OOB, Teach patient to arise slowly    Elimination Interventions: Call light in reach, Patient to call for help with toileting needs, Stay With Me (per policy), Toileting schedule/hourly rounds

## 2019-08-19 NOTE — PROGRESS NOTES
Discharge instructions provided to pt and pt family at the bedside on behalf of primary nurse Sandor Red. Prescriptions faxed per daughter request to [facility]. Returned hard copy to daughter. Pt escorted via wheelchair to car to be driven by son. Pt left in stable condition.

## 2019-08-19 NOTE — PROGRESS NOTES
conducted an initial consultation and Spiritual Assessment for Jackson Delgado, who is a 80 y.o.,female. Patients Primary Language is: [place]. According to the patients EMR Congregation Affiliation is: No preference. The reason the Patient came to the hospital is:   Patient Active Problem List    Diagnosis Date Noted    Ischemic foot 08/18/2019    TIA (transient ischemic attack) 12/09/2018    CVA [facility]cerebral vascular accident) ([facility] 12/09/2018    Constipation 06/16/2017    CKD (chronic kidney disease) stage 3, GFR 30-59 ml/min ([facility] 06/14/2017    Obesity 06/14/2017    SOB (shortness of breath) 06/14/2017    Hemorrhagic stroke ([facility] 06/11/2017    Chronic a-fib ([facility] 06/11/2017    Supratherapeutic INR 06/11/2017    Left-sided weakness 06/11/2017    Hypokalemia 06/11/2017        The  provided the following Interventions:  Initiated a relationship of care and support with patient in room 3031 today. Listened empathically as she talked about her being here and her hopes for the future. Provided information about Spiritual Care Services. Offered prayer and assurance of continued prayers on patients behalf. The following outcomes were achieved:  Patient shared limited information about her medical narrative and spiritual journey/beliefs. Patient processed feeling about current hospitalization. Patient expressed gratitude for pastoral care visit. Assessment:  Patient does not have any Episcopal/cultural needs that will affect patients preferences in health care. There are no further spiritual or Episcopal issues which require Spiritual Care Services interventions at this time. Plan:  Chaplains will continue to follow and will provide pastoral care on an as needed/requested basis    . Kyung Parmar   Spiritual Care   [phone]

## 2019-08-19 NOTE — PROGRESS NOTES
Problem: Discharge Planning  Goal: *Participates in discharge planning  Outcome: Resolved/Met   Home with hospice    Reason for Admission:   Ischemic foot                  RRAT Score:    25              Do you (patient/family) have any concerns for transition/discharge?     none              Plan for utilizing home health:   Hospice care    Current Advanced Directive/Advance Care Plan: On file, dtr ernie and son last are named as agents            Transition of Care Plan:    Spoke with pt and her family. She and family agree to hospice care. They have already spoken with ap from [facility]. Gave them list and dtr signed Mickey Cousin is on med poa as well as son. Pt has walker, bsc, w/c and lyft bed. Jimmy Emerson will be main caregiver. Pt lives with her. Pt amb with a walker and required assist with adls prior to admit. pcp Dr Adonay Roach, demographics correct. Lives in 2 story home with stair lift in home, and ramp on outside. Family wishes to drive pt home, she feels comfortable with that. Plan home with hospice      Patient has designated _____daughter___________________ to participate in his/her discharge plan and to receive any needed information. Name: April Deshpande  Address:  Phone number: [phone]    [facility] & [address] Provider list has been given to the patient and/or patient representative. Patient and/or patient representative has signed the [place] of Choice selecting ____[facility]_____________________as their preference agency and a copy given. Both Home Health Provider list and Freedom of Choice have been placed on the chart. Care Management Interventions  PCP Verified by CM: Yes  Palliative Care Criteria Met (RRAT>21 & CHF Dx)?: No  Mode of Transport at Discharge:  Other (see comment)  Transition of Care Consult (CM Consult): Keena: Yes  Discharge Durable Medical Equipment: No  Physical Therapy Consult: No  Occupational Therapy Consult: No  Speech Therapy Consult: No  Current Support Network: Relative's Home  Confirm Follow Up Transport: Family  Plan discussed with Pt/Family/Caregiver: Yes  Freedom of Choice Offered: Yes  Discharge Location  Discharge Placement: Home with hospice

## 2019-08-19 NOTE — HOSPICE
Pt/family pursuing hospice:yes   Admission dx: End Stage Cardiac Disease Comorbids: Stroke, CKD, PAD  Anticipated hospital d/c date: 19 Aug 2019   Discussion occurred with patient and/or family about preference of hospitalization once admitted to hospice: yes   Reviewed and discussed hospice philosophy and keeping the patient comfortable in their residence: yes (Document additional information below)    Narrative of events and/or assessment if applicable:  Family meeting. Spoke with patient, 2 daughter, son and grandson to discuss Hospice criteria, philosophy, services and IDT. Patient stated she just wants \"to be comfortable in my own home with no one cutting me up. \"  Patient and family verbalized understanding of no longer needing to utilize 911 services and calling Hospice to keep patient comfortable in her own home.

## 2019-08-19 NOTE — PHYSICIAN ADVISORY
Letter of Admission Status Determination    Ngoc Robison   Age: 80 y.o. MRN: [id number]    Date of hospitalization: 8/18/2019    I have reviewed this case as it involves a patient admitted as Inpatient that does not meet payor's criteria for Inpatient admission. Therefore, I recommend changing the admission status to OBSERVATION. The final decision regarding the patient's hospitalization status depends on the attending physician's judgment.       Reginaldo Stokes MD, ROBBY, [address] [facility], [address] [place] [address]  [phone]

## 2019-08-19 NOTE — HOSPICE
Bedside visit made. Patient asked writer to call family as she would like them here for consult. 0915: Called martha Loja. Family meeting scheduled for 1100.

## 2019-08-19 NOTE — DISCHARGE INSTRUCTIONS
DISCHARGE SUMMARY from Nurse    PATIENT INSTRUCTIONS:    After general anesthesia or intravenous sedation, for 24 hours or while taking prescription Narcotics:  · Limit your activities  · Do not drive and operate hazardous machinery  · Do not make important personal or business decisions  · Do  not drink alcoholic beverages  · If you have not urinated within 8 hours after discharge, please contact your surgeon on call. Report the following to your surgeon:  · Excessive pain, swelling, redness or odor of or around the surgical area  · Temperature over 100.5  · Nausea and vomiting lasting longer than 4 hours or if unable to take medications  · Any signs of decreased circulation or nerve impairment to extremity: change in color, persistent  numbness, tingling, coldness or increase pain  · Any questions    What to do at Home:  Recommended activity: Activity as tolerated    If you experience any of the following symptoms uncontrolled pain, please follow up with hospice nurse. *  Please give a list of your current medications to your Primary Care Provider. *  Please update this list whenever your medications are discontinued, doses are      changed, or new medications (including over-the-counter products) are added. *  Please carry medication information at all times in case of emergency situations. These are general instructions for a healthy lifestyle:    No smoking/ No tobacco products/ Avoid exposure to second hand smoke  Surgeon General's Warning:  Quitting smoking now greatly reduces serious risk to your health.     Obesity, smoking, and sedentary lifestyle greatly increases your risk for illness    A healthy diet, regular physical exercise & weight monitoring are important for maintaining a healthy lifestyle    You may be retaining fluid if you have a history of heart failure or if you experience any of the following symptoms:  Weight gain of 3 pounds or more overnight or 5 pounds in a week, increased swelling in our hands or feet or shortness of breath while lying flat in bed. Please call your doctor as soon as you notice any of these symptoms; do not wait until your next office visit. The discharge information has been reviewed with the patient. The patient verbalized understanding. Discharge medications reviewed with the patient and appropriate educational materials and side effects teaching were provided. Patient armband removed and shredded.

## 2019-08-19 NOTE — PROGRESS NOTES
Problem: Pain  Goal: *Control of acute pain  Outcome: Progressing Towards Goal     Problem: Pressure Injury - Risk of  Goal: *Prevention of pressure injury  Outcome: Progressing Towards Goal     Problem: Grieving  Goal: *Able to express feelings of grief  Outcome: Progressing Towards Goal  Goal: *Able to identify stages of grieving process  Outcome: Progressing Towards Goal     Problem: Mood - Altered  Goal: *Alleviation of anxiety and depressive symptoms  Outcome: Progressing Towards Goal     Problem: Discharge Planning  Goal: *Participates in discharge planning  Outcome: Progressing Towards Goal     Problem: Patient Education: Go to Patient Education Activity  Goal: Patient/Family Education  Outcome: Progressing Towards Goal     Problem: Pain  Goal: *Control of Pain  Outcome: Progressing Towards Goal  Goal: *PALLIATIVE CARE:  Alleviation of Pain  Outcome: Progressing Towards Goal     Problem: Patient Education: Go to Patient Education Activity  Goal: Patient/Family Education  Outcome: Progressing Towards Goal     Problem: Pressure Injury - Risk of  Goal: *Prevention of pressure injury  Description  Document Kamlesh Scale and appropriate interventions in the flowsheet.   Outcome: Progressing Towards Goal  Note:   Pressure Injury Interventions:  Sensory Interventions: Assess changes in LOC    Moisture Interventions: Absorbent underpads    Activity Interventions: Assess need for specialty bed    Mobility Interventions: Assess need for specialty bed    Nutrition Interventions: Document food/fluid/supplement intake    Friction and Shear Interventions: HOB 30 degrees or less, Transfer aides:transfer board/Amy lift/ceiling lift, Transferring/repositioning devices                Problem: Patient Education: Go to Patient Education Activity  Goal: Patient/Family Education  Outcome: Progressing Towards Goal     Problem: Falls - Risk of  Goal: *Absence of Falls  Description  Document Jun Fall Risk and appropriate interventions in the flowsheet.   Outcome: Progressing Towards Goal  Note:   Fall Risk Interventions:  Mobility Interventions: Communicate number of staff needed for ambulation/transfer         Medication Interventions: Assess postural VS orthostatic hypotension    Elimination Interventions: Call light in reach              Problem: Patient Education: Go to Patient Education Activity  Goal: Patient/Family Education  Outcome: Progressing Towards Goal

## 2019-08-19 NOTE — HOSPICE
Family meeting. Spoke with patient, 2 daughter, son and grandson to discuss Hospice criteria, philosophy, services and IDT. [facility] brochure and number placed on white board. Discharge plan is for patient to go home. Family would like to transport patient home. Spoke to Tamiko ASIF to update. Will continue to monitor. Thank you for the referral to [facility] to care for Pt and family. If there is an acute change in patient status, please call [facility] at [phone].

## 2019-08-19 NOTE — CDMP QUERY
Patient admitted with ischemic foot, noted to have atrial fibrillation. If possible, please document in progress notes and d/c summary if you are evaluating and/or treating any of the following:    => Paroxysmal Atrial Fibrillation  => Persistent Atrial Fibrillation  => Permanent Atrial Fibrillation   => Other Explanation of clinical findings  => Clinically Undetermined (no explanation for clinical findings)    The medical record reflects the following:     Risk Factors: [address] yo female with PMH atrial fibrillation       Clinical Indicators: Per PMH atrial fibrillation  Per ED note She does have a history of A. fib. Used to be on Coumadin but is no longer on it        Treatment: EKG    Please clarify and document your clinical opinion in the progress notes and discharge summary including the definitive and/or presumptive diagnosis, (suspected or probable), related to the above clinical findings. Please include clinical findings supporting your diagnosis.    -------------------------------------------------------------------------------------------------  Paroxysmal:  begins suddenly and stops on its own. Symptoms can be mild or severe. They stop within about a week, but usually in less than 24 hours. Persistent: continues for more than a week. It may stop on its own, or it can be stopped with treatment.     Permanent: cannot be restored with treatment      Thank you,  Meliton Alves RN -[phone]

## 2019-08-21 NOTE — PROGRESS NOTES
"              After Visit Summary   7/10/2018    Piper Lisa    MRN: [id number]           Patient Information     Date Of Birth          1965        Visit Information        Provider Department      7/10/2018 10:30 AM Fabiola Kate MD [facility]        Today's Diagnoses     Malignant neoplasm of upper-inner quadrant of right breast in female, estrogen receptor positive (H)    -  1      Care Instructions    -tamoxifen prescription sent to your pharmacy  -mammogram in August 2018 already scheduled  -return to clinic in 6 months  - Scheduled - Gris    Patient declined AVS - Gris          Follow-ups after your visit        Your next 10 appointments already scheduled     Aug 28, 2018  3:30 PM CDT   MA SCREENING DIGITAL BILATERAL with [id number]   [facility] [place] ([facility])    [address] [place] [address], [address]  [facility] [address] [phone]           Do not use any powder, lotion or deodorant under your arms or on your breast. If you do, we will ask you to remove it before your exam.  Wear comfortable, two-piece clothing.  If you have any allergies, tell your care team.  Bring any previous mammograms from other facilities or have them mailed to the breast center. Three-dimensional (3D) mammograms are available at [place] locations in [facility], [place], [facility], [place], [place], and [place]. [facility] locations include [place] and [facility] in [place]. Benefits of 3D mammograms include: - Improved rate of cancer detection - Decreases your chance of having to go back for more tests, which means fewer: - \"False-positive\" results (This means that there is an abnormal area but it isn't cancer.) - Invasive testing procedures, such as a biopsy or surgery - Can provide clearer images of the breast if you have dense breast tissue. 3D mammography is an optional exam that anyone can have with a 2D mammogram. It " Clarification:  Patient is noted to have Paroxysmal Atrial Fibrillation. doesn't replace or take the place of a 2D mammogram. 2D mammograms remain an effective screening test for all women.  Not all insurance companies cover the cost of a 3D mammogram. Check with your insurance.            Jan 18, 2019  1:00 PM CST   Return Visit with Fabiola Kate MD   [facility] ([facility])    [facility]  [address]  [facility] [address] [phone]              Who to contact     If you have questions or need follow up information about today's clinic visit or your schedule please contact [facility] directly at [phone].  Normal or non-critical lab and imaging results will be communicated to you by "[employer]hart, letter or phone within 4 business days after the clinic has received the results. If you do not hear from us within 7 days, please contact the clinic through [email or web address] or phone. If you have a critical or abnormal lab result, we will notify you by phone as soon as possible.  Submit refill requests through "[employer] or call your pharmacy and they will forward the refill request to us. Please allow 3 business days for your refill to be completed.          Additional Information About Your Visit        "[employer] Information     "[employer] gives you secure access to your electronic health record. If you see a primary care provider, you can also send messages to your care team and make appointments. If you have questions, please call your primary care clinic.  If you do not have a primary care provider, please call [phone] and they will assist you.        Care EveryWhere ID     This is your Care EveryWhere ID. This could be used by other organizations to access your [place] medical records  [id number]        Your Vitals Were     Pulse Temperature Respirations Pulse Oximetry BMI (Body Mass Index)       74 98.3  F (36.8  C) (Oral) 18 99% 25.99 kg/m2        Blood Pressure from Last 3 Encounters:   07/10/18 112/74    01/12/18 111/73   08/23/17 110/62    Weight from Last 3 Encounters:   07/10/18 70.9 kg (156 lb 3.2 oz)   01/12/18 71.7 kg (158 lb)   08/23/17 69.2 kg (152 lb 9.6 oz)              Today, you had the following     No orders found for display         Today's Medication Changes          These changes are accurate as of 7/10/18 11:19 AM.  If you have any questions, ask your nurse or doctor.               These medicines have changed or have updated prescriptions.        Dose/Directions    escitalopram 10 MG tablet   Commonly known as:  LEXAPRO   This may have changed:  Another medication with the same name was removed. Continue taking this medication, and follow the directions you see here.   Used for:  Generalized anxiety disorder   Changed by:  Fabiola Kate MD        Dose:  10 mg   Take 1 tablet (10 mg) by mouth daily   Quantity:  90 tablet   Refills:  1            Where to get your medicines      These medications were sent to [facility] [place] [address] [facility] [address]    Hours:  Mon-Fri 8:30am-5:00pm Toll Free [phone] Phone:  [phone]     tamoxifen 20 MG tablet                Primary Care Provider Office Phone # Fax #    Maribel Serna [phone]       [address] [place] [facility] [place] [address]        Equal Access to Services     [facility] AH: Hadii pelon hunter hadasho [place], waaxda luqadaha, qaybta kaalmada adeegyada, bam miller. So [facility] [phone].    ATENCIÓN: Si habla español, tiene a salazar disposición servicios gratuitos de asistencia lingüística. Llame al [phone].    We comply with applicable federal civil rights laws and [place] laws. We do not discriminate on the basis of race, color, national origin, age, disability, sex, sexual orientation, or gender identity.            Thank you!     Thank you for choosing [facility]  for your care. Our  goal is always to provide you with excellent care. Hearing back from our patients is one way we can continue to improve our services. Please take a few minutes to complete the written survey that you may receive in the mail after your visit with us. Thank you!             Your Updated Medication List - Protect others around you: Learn how to safely use, store and throw away your medicines at www.disposemymeds.org.          This list is accurate as of 7/10/18 11:19 AM.  Always use your most recent med list.                   Brand Name Dispense Instructions for use Diagnosis    CALCIUM + D PO           escitalopram 10 MG tablet    LEXAPRO    90 tablet    Take 1 tablet (10 mg) by mouth daily    Generalized anxiety disorder       Multi-vitamin Tabs tablet      Take 1 tablet by mouth daily        SUMAtriptan 50 MG tablet    IMITREX    10 tablet    Take 1 tablet (50 mg) by mouth at onset of headache for migraine May repeat dose in 2 hours if needed, max daily dose is 200 mg    Migraine without aura and without status migrainosus, not intractable       tamoxifen 20 MG tablet    NOLVADEX    90 tablet    Take 1 tablet (20 mg) by mouth daily    Malignant neoplasm of upper-inner quadrant of right breast in female, estrogen receptor positive (H)

## 2019-08-26 NOTE — DISCHARGE SUMMARY
[address]    Name:  Annette Francis  MR#:   [id number]  :  1929  ACCOUNT #:  [de-identified]  ADMIT DATE:  2019  DISCHARGE DATE:  2019    ADMITTING DIAGNOSIS:  Peripheral arterial disease with left ischemic foot. HISTORY OF PRESENT ILLNESS:  The patient is a 80-year-old -American female who presented to the emergency department with numbness and difficulty moving her right leg. She is known to have had a stroke in the past.  She is no longer on anticoagulation. In the emergency department, her left leg was found to have cyanosis and was cool to the touch. She was admitted for ongoing management. HOSPITAL COURSE:  The patient and family were requesting hospice. Hospice consultation was obtained on admission. Resources were established with hospice and with care management. By 2019, she was considered ready for discharge and her hospice was established. Family was asking for rapid discharge. She was discharged home. DISCHARGE DIAGNOSES:  1. Peripheral arterial disease involving left foot and left leg. 2.  Cerebrovascular disease with history of hemorrhagic stroke in the past.  3.  History of paroxysmal atrial fibrillation. DISPOSITION:  Home with hospice. CONDITION ON DISCHARGE:  Her condition is terminal.    ACTIVITY:  Ad guru. DISCHARGE INSTRUCTIONS:  Followup is with home hospice as arranged. DISCHARGE MEDICATIONS:  1. Amaryl 2 mg by mouth daily. 2.  Aspirin 81 mg by mouth daily. 3.  Lipitor 80 mg by mouth daily. 4.  Lasix 20 mg by mouth daily. 5.  Gabapentin 300 mg by mouth nightly. 6.  Glucotrol 5 mg by mouth daily. 7.  Synthroid 100 mcg by mouth daily. 8.  Tradjenta 5 mg by mouth daily. 9.  Intensol 2 mg/mL 0.5 mL by mouth every 1 hour as needed for agitation. 10.  Lopressor 50 mg by mouth 2 times daily. 11.  Zofran 4 mg by mouth every 8 hours as needed for nausea. 12.  Protonix 40 mg by mouth daily.   13. Scopolamine patch every 72 hours as needed for nausea. 14.  Zocor 40 mg by mouth nightly. 15.  Spironolactone 50 mg by mouth 2 times daily. 16.  Uloric 40 mg by mouth daily. 17.  Ambien 5 mg by mouth nightly as needed for sleep. DIET:  Regular as tolerated. Da Chatman MD      [id number]/  D:  08/26/2019 6:46  T:  08/26/2019 9:11  JOB #:  [id number]  CC:  Jorge Garcia MD

## 2020-01-01 ENCOUNTER — HOME CARE VISIT (OUTPATIENT)
Dept: HOSPICE | Facility: HOSPICE | Age: 85
End: 2020-01-01
Payer: MEDICARE

## 2020-01-01 ENCOUNTER — HOME CARE VISIT (OUTPATIENT)
Dept: SCHEDULING | Facility: HOME HEALTH | Age: 85
End: 2020-01-01
Payer: MEDICARE

## 2020-01-01 VITALS
RESPIRATION RATE: 16 BRPM | SYSTOLIC BLOOD PRESSURE: 120 MMHG | HEART RATE: 80 BPM | TEMPERATURE: 98.4 F | DIASTOLIC BLOOD PRESSURE: 76 MMHG | OXYGEN SATURATION: 98 %

## 2020-01-01 VITALS
RESPIRATION RATE: 16 BRPM | TEMPERATURE: 96.8 F | HEART RATE: 70 BPM | DIASTOLIC BLOOD PRESSURE: 74 MMHG | OXYGEN SATURATION: 98 % | SYSTOLIC BLOOD PRESSURE: 138 MMHG

## 2020-01-01 VITALS
SYSTOLIC BLOOD PRESSURE: 108 MMHG | RESPIRATION RATE: 16 BRPM | HEART RATE: 68 BPM | TEMPERATURE: 97.2 F | OXYGEN SATURATION: 93 % | DIASTOLIC BLOOD PRESSURE: 62 MMHG

## 2020-01-01 VITALS
DIASTOLIC BLOOD PRESSURE: 62 MMHG | RESPIRATION RATE: 16 BRPM | HEART RATE: 80 BPM | OXYGEN SATURATION: 93 % | SYSTOLIC BLOOD PRESSURE: 120 MMHG | TEMPERATURE: 98.2 F

## 2020-01-01 VITALS
RESPIRATION RATE: 16 BRPM | HEART RATE: 68 BPM | TEMPERATURE: 98.3 F | DIASTOLIC BLOOD PRESSURE: 82 MMHG | SYSTOLIC BLOOD PRESSURE: 126 MMHG | OXYGEN SATURATION: 97 %

## 2020-01-01 VITALS
DIASTOLIC BLOOD PRESSURE: 62 MMHG | OXYGEN SATURATION: 94 % | RESPIRATION RATE: 16 BRPM | SYSTOLIC BLOOD PRESSURE: 110 MMHG | TEMPERATURE: 98.2 F | HEART RATE: 68 BPM

## 2020-01-01 VITALS
DIASTOLIC BLOOD PRESSURE: 62 MMHG | TEMPERATURE: 97.3 F | SYSTOLIC BLOOD PRESSURE: 110 MMHG | OXYGEN SATURATION: 88 % | RESPIRATION RATE: 16 BRPM | HEART RATE: 90 BPM

## 2020-01-01 VITALS
DIASTOLIC BLOOD PRESSURE: 56 MMHG | RESPIRATION RATE: 16 BRPM | HEART RATE: 88 BPM | SYSTOLIC BLOOD PRESSURE: 120 MMHG | TEMPERATURE: 97.2 F | OXYGEN SATURATION: 93 %

## 2020-01-01 VITALS
RESPIRATION RATE: 16 BRPM | HEART RATE: 80 BPM | TEMPERATURE: 98.2 F | OXYGEN SATURATION: 91 % | RESPIRATION RATE: 16 BRPM | OXYGEN SATURATION: 97 % | DIASTOLIC BLOOD PRESSURE: 72 MMHG | DIASTOLIC BLOOD PRESSURE: 72 MMHG | SYSTOLIC BLOOD PRESSURE: 130 MMHG | SYSTOLIC BLOOD PRESSURE: 120 MMHG | HEART RATE: 80 BPM | TEMPERATURE: 98.2 F

## 2020-01-01 VITALS
HEART RATE: 80 BPM | RESPIRATION RATE: 14 BRPM | DIASTOLIC BLOOD PRESSURE: 62 MMHG | TEMPERATURE: 98.2 F | OXYGEN SATURATION: 98 % | SYSTOLIC BLOOD PRESSURE: 110 MMHG

## 2020-01-01 VITALS
OXYGEN SATURATION: 93 % | SYSTOLIC BLOOD PRESSURE: 120 MMHG | TEMPERATURE: 98.2 F | RESPIRATION RATE: 16 BRPM | HEART RATE: 78 BPM | DIASTOLIC BLOOD PRESSURE: 72 MMHG

## 2020-01-01 VITALS
SYSTOLIC BLOOD PRESSURE: 110 MMHG | HEART RATE: 78 BPM | DIASTOLIC BLOOD PRESSURE: 62 MMHG | RESPIRATION RATE: 16 BRPM | OXYGEN SATURATION: 93 %

## 2020-01-01 VITALS
RESPIRATION RATE: 16 BRPM | SYSTOLIC BLOOD PRESSURE: 120 MMHG | HEART RATE: 90 BPM | DIASTOLIC BLOOD PRESSURE: 72 MMHG | DIASTOLIC BLOOD PRESSURE: 72 MMHG | OXYGEN SATURATION: 93 % | SYSTOLIC BLOOD PRESSURE: 120 MMHG | OXYGEN SATURATION: 92 % | TEMPERATURE: 98.2 F | HEART RATE: 80 BPM | TEMPERATURE: 97.9 F

## 2020-01-01 VITALS
DIASTOLIC BLOOD PRESSURE: 62 MMHG | OXYGEN SATURATION: 90 % | TEMPERATURE: 96.6 F | RESPIRATION RATE: 14 BRPM | SYSTOLIC BLOOD PRESSURE: 130 MMHG | HEART RATE: 80 BPM

## 2020-01-01 VITALS
RESPIRATION RATE: 16 BRPM | OXYGEN SATURATION: 91 % | DIASTOLIC BLOOD PRESSURE: 72 MMHG | TEMPERATURE: 97.3 F | HEART RATE: 90 BPM | SYSTOLIC BLOOD PRESSURE: 120 MMHG

## 2020-01-01 VITALS
TEMPERATURE: 97.2 F | HEART RATE: 90 BPM | RESPIRATION RATE: 20 BRPM | SYSTOLIC BLOOD PRESSURE: 120 MMHG | OXYGEN SATURATION: 94 % | DIASTOLIC BLOOD PRESSURE: 62 MMHG

## 2020-01-01 VITALS
RESPIRATION RATE: 14 BRPM | TEMPERATURE: 97.2 F | DIASTOLIC BLOOD PRESSURE: 62 MMHG | SYSTOLIC BLOOD PRESSURE: 110 MMHG | HEART RATE: 80 BPM | HEART RATE: 90 BPM | TEMPERATURE: 98.2 F | OXYGEN SATURATION: 95 % | RESPIRATION RATE: 16 BRPM | DIASTOLIC BLOOD PRESSURE: 68 MMHG | SYSTOLIC BLOOD PRESSURE: 120 MMHG | OXYGEN SATURATION: 88 %

## 2020-01-01 VITALS
TEMPERATURE: 98.2 F | DIASTOLIC BLOOD PRESSURE: 62 MMHG | SYSTOLIC BLOOD PRESSURE: 120 MMHG | RESPIRATION RATE: 16 BRPM | OXYGEN SATURATION: 92 % | HEART RATE: 80 BPM

## 2020-01-01 VITALS
DIASTOLIC BLOOD PRESSURE: 62 MMHG | TEMPERATURE: 99.1 F | SYSTOLIC BLOOD PRESSURE: 130 MMHG | OXYGEN SATURATION: 97 % | RESPIRATION RATE: 18 BRPM | HEART RATE: 100 BPM

## 2020-01-01 VITALS
TEMPERATURE: 97.2 F | DIASTOLIC BLOOD PRESSURE: 62 MMHG | RESPIRATION RATE: 20 BRPM | OXYGEN SATURATION: 78 % | SYSTOLIC BLOOD PRESSURE: 120 MMHG | HEART RATE: 100 BPM

## 2020-01-01 VITALS
RESPIRATION RATE: 18 BRPM | OXYGEN SATURATION: 93 % | TEMPERATURE: 97.2 F | DIASTOLIC BLOOD PRESSURE: 62 MMHG | SYSTOLIC BLOOD PRESSURE: 110 MMHG | HEART RATE: 78 BPM

## 2020-01-01 VITALS
TEMPERATURE: 97.2 F | HEART RATE: 80 BPM | OXYGEN SATURATION: 95 % | RESPIRATION RATE: 16 BRPM | DIASTOLIC BLOOD PRESSURE: 72 MMHG | SYSTOLIC BLOOD PRESSURE: 120 MMHG

## 2020-01-01 VITALS
DIASTOLIC BLOOD PRESSURE: 68 MMHG | RESPIRATION RATE: 18 BRPM | TEMPERATURE: 97.2 F | OXYGEN SATURATION: 91 % | HEART RATE: 78 BPM | SYSTOLIC BLOOD PRESSURE: 120 MMHG

## 2020-01-01 VITALS
TEMPERATURE: 97.2 F | HEART RATE: 90 BPM | RESPIRATION RATE: 14 BRPM | OXYGEN SATURATION: 92 % | DIASTOLIC BLOOD PRESSURE: 62 MMHG | SYSTOLIC BLOOD PRESSURE: 110 MMHG

## 2020-01-01 VITALS
SYSTOLIC BLOOD PRESSURE: 110 MMHG | TEMPERATURE: 97.2 F | OXYGEN SATURATION: 90 % | DIASTOLIC BLOOD PRESSURE: 62 MMHG | HEART RATE: 78 BPM | RESPIRATION RATE: 18 BRPM

## 2020-01-01 VITALS
RESPIRATION RATE: 18 BRPM | OXYGEN SATURATION: 89 % | SYSTOLIC BLOOD PRESSURE: 130 MMHG | HEART RATE: 90 BPM | DIASTOLIC BLOOD PRESSURE: 82 MMHG | TEMPERATURE: 98.2 F

## 2020-01-01 VITALS
HEART RATE: 80 BPM | TEMPERATURE: 98.1 F | OXYGEN SATURATION: 97 % | DIASTOLIC BLOOD PRESSURE: 62 MMHG | RESPIRATION RATE: 16 BRPM | SYSTOLIC BLOOD PRESSURE: 110 MMHG

## 2020-01-01 VITALS
DIASTOLIC BLOOD PRESSURE: 62 MMHG | TEMPERATURE: 98.1 F | OXYGEN SATURATION: 92 % | SYSTOLIC BLOOD PRESSURE: 120 MMHG | RESPIRATION RATE: 16 BRPM | HEART RATE: 90 BPM | TEMPERATURE: 97.2 F | SYSTOLIC BLOOD PRESSURE: 100 MMHG | HEART RATE: 100 BPM | DIASTOLIC BLOOD PRESSURE: 62 MMHG | RESPIRATION RATE: 16 BRPM | OXYGEN SATURATION: 90 %

## 2020-01-01 VITALS
OXYGEN SATURATION: 91 % | TEMPERATURE: 97.2 F | RESPIRATION RATE: 16 BRPM | HEART RATE: 80 BPM | DIASTOLIC BLOOD PRESSURE: 62 MMHG | SYSTOLIC BLOOD PRESSURE: 108 MMHG

## 2020-01-01 VITALS
TEMPERATURE: 97.4 F | DIASTOLIC BLOOD PRESSURE: 86 MMHG | SYSTOLIC BLOOD PRESSURE: 138 MMHG | OXYGEN SATURATION: 97 % | HEART RATE: 84 BPM | RESPIRATION RATE: 18 BRPM

## 2020-01-01 VITALS
RESPIRATION RATE: 18 BRPM | TEMPERATURE: 98.1 F | OXYGEN SATURATION: 93 % | HEART RATE: 80 BPM | SYSTOLIC BLOOD PRESSURE: 120 MMHG | DIASTOLIC BLOOD PRESSURE: 62 MMHG

## 2020-01-01 VITALS
TEMPERATURE: 97.1 F | DIASTOLIC BLOOD PRESSURE: 62 MMHG | RESPIRATION RATE: 16 BRPM | HEART RATE: 80 BPM | SYSTOLIC BLOOD PRESSURE: 106 MMHG | OXYGEN SATURATION: 91 %

## 2020-01-01 VITALS
RESPIRATION RATE: 16 BRPM | TEMPERATURE: 98.2 F | RESPIRATION RATE: 16 BRPM | OXYGEN SATURATION: 87 % | DIASTOLIC BLOOD PRESSURE: 62 MMHG | HEART RATE: 90 BPM | TEMPERATURE: 97.2 F | HEART RATE: 80 BPM | OXYGEN SATURATION: 82 % | OXYGEN SATURATION: 94 % | SYSTOLIC BLOOD PRESSURE: 110 MMHG

## 2020-01-01 VITALS
SYSTOLIC BLOOD PRESSURE: 120 MMHG | RESPIRATION RATE: 16 BRPM | TEMPERATURE: 98.2 F | HEART RATE: 90 BPM | DIASTOLIC BLOOD PRESSURE: 62 MMHG | OXYGEN SATURATION: 89 %

## 2020-01-01 VITALS
OXYGEN SATURATION: 90 % | DIASTOLIC BLOOD PRESSURE: 62 MMHG | TEMPERATURE: 97.2 F | SYSTOLIC BLOOD PRESSURE: 110 MMHG | HEART RATE: 80 BPM | RESPIRATION RATE: 16 BRPM

## 2020-01-01 VITALS — OXYGEN SATURATION: 88 % | TEMPERATURE: 99.2 F | HEART RATE: 90 BPM | RESPIRATION RATE: 18 BRPM

## 2020-01-01 VITALS
SYSTOLIC BLOOD PRESSURE: 120 MMHG | DIASTOLIC BLOOD PRESSURE: 76 MMHG | HEART RATE: 100 BPM | RESPIRATION RATE: 16 BRPM | TEMPERATURE: 97.2 F | OXYGEN SATURATION: 98 %

## 2020-01-01 VITALS
OXYGEN SATURATION: 91 % | RESPIRATION RATE: 16 BRPM | HEART RATE: 78 BPM | DIASTOLIC BLOOD PRESSURE: 52 MMHG | SYSTOLIC BLOOD PRESSURE: 108 MMHG | TEMPERATURE: 97.2 F

## 2020-01-01 VITALS
OXYGEN SATURATION: 88 % | TEMPERATURE: 98.2 F | SYSTOLIC BLOOD PRESSURE: 108 MMHG | RESPIRATION RATE: 16 BRPM | DIASTOLIC BLOOD PRESSURE: 62 MMHG | HEART RATE: 90 BPM

## 2020-01-01 VITALS
DIASTOLIC BLOOD PRESSURE: 72 MMHG | TEMPERATURE: 98.2 F | RESPIRATION RATE: 16 BRPM | HEART RATE: 78 BPM | SYSTOLIC BLOOD PRESSURE: 120 MMHG | OXYGEN SATURATION: 93 %

## 2020-01-01 VITALS
TEMPERATURE: 97.2 F | SYSTOLIC BLOOD PRESSURE: 120 MMHG | HEART RATE: 80 BPM | OXYGEN SATURATION: 95 % | RESPIRATION RATE: 16 BRPM | DIASTOLIC BLOOD PRESSURE: 72 MMHG

## 2020-01-01 VITALS
TEMPERATURE: 98.2 F | HEART RATE: 90 BPM | DIASTOLIC BLOOD PRESSURE: 62 MMHG | SYSTOLIC BLOOD PRESSURE: 100 MMHG | RESPIRATION RATE: 16 BRPM | OXYGEN SATURATION: 82 %

## 2020-01-01 VITALS
OXYGEN SATURATION: 93 % | DIASTOLIC BLOOD PRESSURE: 62 MMHG | RESPIRATION RATE: 16 BRPM | TEMPERATURE: 98.3 F | HEART RATE: 90 BPM | SYSTOLIC BLOOD PRESSURE: 128 MMHG

## 2020-01-01 VITALS
TEMPERATURE: 97.8 F | SYSTOLIC BLOOD PRESSURE: 118 MMHG | OXYGEN SATURATION: 91 % | DIASTOLIC BLOOD PRESSURE: 62 MMHG | HEART RATE: 55 BPM | RESPIRATION RATE: 18 BRPM

## 2020-01-01 VITALS
OXYGEN SATURATION: 91 % | RESPIRATION RATE: 16 BRPM | TEMPERATURE: 98.4 F | SYSTOLIC BLOOD PRESSURE: 130 MMHG | HEART RATE: 82 BPM | DIASTOLIC BLOOD PRESSURE: 72 MMHG

## 2020-01-01 VITALS
RESPIRATION RATE: 16 BRPM | OXYGEN SATURATION: 94 % | TEMPERATURE: 97.3 F | HEART RATE: 90 BPM | SYSTOLIC BLOOD PRESSURE: 120 MMHG | DIASTOLIC BLOOD PRESSURE: 72 MMHG

## 2020-01-01 VITALS
OXYGEN SATURATION: 83 % | DIASTOLIC BLOOD PRESSURE: 62 MMHG | TEMPERATURE: 99.8 F | RESPIRATION RATE: 18 BRPM | HEART RATE: 90 BPM | SYSTOLIC BLOOD PRESSURE: 110 MMHG

## 2020-01-01 VITALS
SYSTOLIC BLOOD PRESSURE: 120 MMHG | RESPIRATION RATE: 16 BRPM | TEMPERATURE: 98.2 F | OXYGEN SATURATION: 91 % | DIASTOLIC BLOOD PRESSURE: 82 MMHG | HEART RATE: 90 BPM

## 2020-01-01 VITALS
OXYGEN SATURATION: 90 % | HEART RATE: 88 BPM | DIASTOLIC BLOOD PRESSURE: 62 MMHG | RESPIRATION RATE: 16 BRPM | TEMPERATURE: 97.2 F | SYSTOLIC BLOOD PRESSURE: 108 MMHG

## 2020-01-01 VITALS
DIASTOLIC BLOOD PRESSURE: 62 MMHG | SYSTOLIC BLOOD PRESSURE: 110 MMHG | OXYGEN SATURATION: 84 % | HEART RATE: 50 BPM | TEMPERATURE: 98.2 F | RESPIRATION RATE: 16 BRPM

## 2020-01-01 VITALS
SYSTOLIC BLOOD PRESSURE: 120 MMHG | OXYGEN SATURATION: 95 % | RESPIRATION RATE: 14 BRPM | DIASTOLIC BLOOD PRESSURE: 62 MMHG | TEMPERATURE: 97.2 F | HEART RATE: 60 BPM

## 2020-01-01 PROCEDURE — HOSPICE MEDICATION HC HH HOSPICE MEDICATION

## 2020-01-01 PROCEDURE — A4927 NON-STERILE GLOVES: HCPCS

## 2020-01-01 PROCEDURE — 0651 HSPC ROUTINE HOME CARE

## 2020-01-01 PROCEDURE — G0155 HHCP-SVS OF CSW,EA 15 MIN: HCPCS

## 2020-01-01 PROCEDURE — G0299 HHS/HOSPICE OF RN EA 15 MIN: HCPCS

## 2020-01-01 PROCEDURE — A6252 ABSORPT DRG >16 <=48 W/O BDR: HCPCS

## 2020-01-01 PROCEDURE — T4528 ADULT SIZE PULL-ON XL: HCPCS

## 2020-01-01 PROCEDURE — A4245 ALCOHOL WIPES PER BOX: HCPCS

## 2020-01-01 PROCEDURE — A6216 NON-STERILE GAUZE<=16 SQ IN: HCPCS

## 2020-01-01 PROCEDURE — A4314 CATH W/DRAINAGE 2-WAY LATEX: HCPCS

## 2020-01-01 PROCEDURE — A6212 FOAM DRG <=16 SQ IN W/BORDER: HCPCS

## 2020-01-01 PROCEDURE — A9270 NON-COVERED ITEM OR SERVICE: HCPCS

## 2020-01-01 PROCEDURE — T4541 LARGE DISPOSABLE UNDERPAD: HCPCS

## 2020-01-01 PROCEDURE — T4524 ADULT SIZE BRIEF/DIAPER XL: HCPCS

## 2020-01-01 PROCEDURE — 3331090004 HSPC SERVICE INTENSITY ADD-ON

## 2020-01-01 PROCEDURE — A6197 ALGINATE DRSG >16 <=48 SQ IN: HCPCS

## 2020-01-01 PROCEDURE — A6250 SKIN SEAL PROTECT MOISTURIZR: HCPCS

## 2020-01-01 RX ADMIN — SCOLOPAMINE TRANSDERMAL SYSTEM 1 PATCH: 1 PATCH, EXTENDED RELEASE TRANSDERMAL at 11:24

## 2020-06-18 VITALS
RESPIRATION RATE: 18 BRPM | SYSTOLIC BLOOD PRESSURE: 94 MMHG | TEMPERATURE: 97.4 F | DIASTOLIC BLOOD PRESSURE: 60 MMHG | OXYGEN SATURATION: 90 % | HEART RATE: 80 BPM
